# Patient Record
Sex: FEMALE | Race: OTHER | Employment: OTHER | URBAN - METROPOLITAN AREA
[De-identification: names, ages, dates, MRNs, and addresses within clinical notes are randomized per-mention and may not be internally consistent; named-entity substitution may affect disease eponyms.]

---

## 2017-02-06 ENCOUNTER — HOSPITAL ENCOUNTER (OUTPATIENT)
Dept: MRI IMAGING | Age: 77
Discharge: HOME | End: 2017-02-06
Attending: INTERNAL MEDICINE
Payer: MEDICARE

## 2017-02-06 ENCOUNTER — HOSPITAL ENCOUNTER (OUTPATIENT)
Dept: MAMMOGRAPHY | Age: 77
Discharge: HOME | End: 2017-02-06
Attending: INTERNAL MEDICINE
Payer: MEDICARE

## 2017-02-06 DIAGNOSIS — M54.50 LOW BACK PAIN: ICD-10-CM

## 2017-02-06 DIAGNOSIS — M54.10 RADICULOPATHY, UNSPECIFIED SPINAL REGION: ICD-10-CM

## 2017-02-06 PROCEDURE — 72148 MRI LUMBAR SPINE W/O DYE: CPT

## 2017-02-06 PROCEDURE — 77067 SCR MAMMO BI INCL CAD: CPT

## 2017-06-05 ENCOUNTER — HOSPITAL ENCOUNTER (OUTPATIENT)
Dept: MRI IMAGING | Facility: HOSPITAL | Age: 77
Discharge: HOME | End: 2017-06-05
Attending: INTERNAL MEDICINE
Payer: MEDICARE

## 2017-06-05 DIAGNOSIS — R10.13 DYSPEPSIA: ICD-10-CM

## 2017-06-05 PROCEDURE — 82565 ASSAY OF CREATININE: CPT

## 2017-06-05 PROCEDURE — 74183 MRI ABD W/O CNTR FLWD CNTR: CPT | Performed by: INTERNAL MEDICINE

## 2017-06-05 PROCEDURE — A9575 INJ GADOTERATE MEGLUMI 0.1ML: HCPCS | Performed by: RADIOLOGY

## 2017-06-05 PROCEDURE — 76376 3D RENDER W/INTRP POSTPROCES: CPT | Performed by: INTERNAL MEDICINE

## 2017-08-29 ENCOUNTER — OFFICE VISIT (OUTPATIENT)
Dept: INTERNAL MEDICINE CLINIC | Facility: CLINIC | Age: 77
End: 2017-08-29

## 2017-08-29 VITALS
OXYGEN SATURATION: 98 % | DIASTOLIC BLOOD PRESSURE: 70 MMHG | BODY MASS INDEX: 26.75 KG/M2 | HEART RATE: 70 BPM | WEIGHT: 151 LBS | SYSTOLIC BLOOD PRESSURE: 124 MMHG | HEIGHT: 63 IN

## 2017-08-29 DIAGNOSIS — E78.2 MIXED HYPERLIPIDEMIA: ICD-10-CM

## 2017-08-29 DIAGNOSIS — Z23 NEED FOR VACCINATION FOR STREP PNEUMONIAE: ICD-10-CM

## 2017-08-29 DIAGNOSIS — E11.42 TYPE 2 DIABETES MELLITUS WITH DIABETIC POLYNEUROPATHY, WITH LONG-TERM CURRENT USE OF INSULIN (HCC): Primary | ICD-10-CM

## 2017-08-29 DIAGNOSIS — M81.0 POST-MENOPAUSAL OSTEOPOROSIS: ICD-10-CM

## 2017-08-29 DIAGNOSIS — Z79.4 TYPE 2 DIABETES MELLITUS WITH DIABETIC POLYNEUROPATHY, WITH LONG-TERM CURRENT USE OF INSULIN (HCC): Primary | ICD-10-CM

## 2017-08-29 DIAGNOSIS — I10 ESSENTIAL HYPERTENSION: ICD-10-CM

## 2017-08-29 DIAGNOSIS — M15.9 PRIMARY OSTEOARTHRITIS INVOLVING MULTIPLE JOINTS: ICD-10-CM

## 2017-08-29 PROBLEM — K86.2 PANCREATIC CYST (HCC): Status: ACTIVE | Noted: 2017-08-29

## 2017-08-29 PROBLEM — Z98.42 HISTORY OF LEFT CATARACT SURGERY: Status: ACTIVE | Noted: 2017-05-17

## 2017-08-29 PROBLEM — Z86.73 HISTORY OF CVA (CEREBROVASCULAR ACCIDENT): Status: ACTIVE | Noted: 2017-08-29

## 2017-08-29 PROBLEM — Z98.41 STATUS POST RIGHT CATARACT EXTRACTION: Status: ACTIVE | Noted: 2017-08-29

## 2017-08-29 PROBLEM — Z98.41 HISTORY OF RIGHT CATARACT EXTRACTION: Status: ACTIVE | Noted: 2017-05-17

## 2017-08-29 PROBLEM — Z90.49 S/P CHOLECYSTECTOMY: Status: ACTIVE | Noted: 2017-08-29

## 2017-08-29 PROBLEM — K86.2 PANCREATIC CYST: Status: ACTIVE | Noted: 2017-08-29

## 2017-08-29 LAB
ALBUMIN SERPL BCP-MCNC: 4 G/DL (ref 3.5–4.8)
ALBUMIN/GLOB SERPL: 1.4 {RATIO} (ref 1–2)
ALP SERPL-CCNC: 42 U/L (ref 32–100)
ALT SERPL-CCNC: 25 U/L (ref 14–54)
ANION GAP SERPL CALC-SCNC: 9 MMOL/L (ref 0–18)
AST SERPL-CCNC: 33 U/L (ref 15–41)
BILIRUB SERPL-MCNC: 0.9 MG/DL (ref 0.3–1.2)
BUN SERPL-MCNC: 12 MG/DL (ref 8–20)
BUN/CREAT SERPL: 13 (ref 10–20)
CALCIUM SERPL-MCNC: 9.3 MG/DL (ref 8.5–10.5)
CHLORIDE SERPL-SCNC: 103 MMOL/L (ref 95–110)
CHOLEST SERPL-MCNC: 105 MG/DL (ref 110–200)
CO2 SERPL-SCNC: 24 MMOL/L (ref 22–32)
CREAT SERPL-MCNC: 0.92 MG/DL (ref 0.5–1.5)
GLOBULIN PLAS-MCNC: 2.9 G/DL (ref 2.5–3.7)
GLUCOSE SERPL-MCNC: 140 MG/DL (ref 70–99)
HBA1C MFR BLD: 8.4 % (ref 4–6)
HDLC SERPL-MCNC: 34 MG/DL
LDLC SERPL CALC-MCNC: 43 MG/DL (ref 0–99)
NONHDLC SERPL-MCNC: 71 MG/DL
OSMOLALITY UR CALC.SUM OF ELEC: 284 MOSM/KG (ref 275–295)
POTASSIUM SERPL-SCNC: 4.4 MMOL/L (ref 3.3–5.1)
PROT SERPL-MCNC: 6.9 G/DL (ref 5.9–8.4)
SODIUM SERPL-SCNC: 136 MMOL/L (ref 136–144)
TRIGL SERPL-MCNC: 138 MG/DL (ref 1–149)

## 2017-08-29 PROCEDURE — 90670 PCV13 VACCINE IM: CPT | Performed by: FAMILY MEDICINE

## 2017-08-29 PROCEDURE — 36415 COLL VENOUS BLD VENIPUNCTURE: CPT | Performed by: FAMILY MEDICINE

## 2017-08-29 PROCEDURE — 99204 OFFICE O/P NEW MOD 45 MIN: CPT | Performed by: FAMILY MEDICINE

## 2017-08-29 PROCEDURE — 80053 COMPREHEN METABOLIC PANEL: CPT | Performed by: FAMILY MEDICINE

## 2017-08-29 PROCEDURE — 80061 LIPID PANEL: CPT | Performed by: FAMILY MEDICINE

## 2017-08-29 PROCEDURE — G0009 ADMIN PNEUMOCOCCAL VACCINE: HCPCS | Performed by: FAMILY MEDICINE

## 2017-08-29 PROCEDURE — 83036 HEMOGLOBIN GLYCOSYLATED A1C: CPT | Performed by: FAMILY MEDICINE

## 2017-08-29 RX ORDER — LOSARTAN POTASSIUM 50 MG/1
TABLET ORAL DAILY
COMMUNITY
End: 2017-08-29

## 2017-08-29 RX ORDER — ALENDRONATE SODIUM 40 MG/1
40 TABLET ORAL
Qty: 12 TABLET | Refills: 3 | Status: SHIPPED | OUTPATIENT
Start: 2017-08-29 | End: 2017-11-28

## 2017-08-29 RX ORDER — OLOPATADINE HYDROCHLORIDE 2 MG/ML
SOLUTION/ DROPS OPHTHALMIC
COMMUNITY
Start: 2017-08-02 | End: 2018-03-23 | Stop reason: ALTCHOICE

## 2017-08-29 RX ORDER — ATORVASTATIN CALCIUM 40 MG/1
40 TABLET, FILM COATED ORAL NIGHTLY
COMMUNITY
End: 2017-08-29

## 2017-08-29 RX ORDER — METOPROLOL SUCCINATE 100 MG/1
100 TABLET, EXTENDED RELEASE ORAL DAILY
Qty: 90 TABLET | Refills: 3 | Status: SHIPPED | OUTPATIENT
Start: 2017-08-29 | End: 2017-11-28

## 2017-08-29 RX ORDER — TRAMADOL HYDROCHLORIDE 50 MG/1
50 TABLET ORAL EVERY 6 HOURS PRN
Qty: 90 TABLET | Refills: 1 | Status: SHIPPED | OUTPATIENT
Start: 2017-08-29 | End: 2017-11-28

## 2017-08-29 RX ORDER — ATORVASTATIN CALCIUM 40 MG/1
40 TABLET, FILM COATED ORAL NIGHTLY
Qty: 90 TABLET | Refills: 3 | Status: SHIPPED | OUTPATIENT
Start: 2017-08-29 | End: 2017-11-28

## 2017-08-29 RX ORDER — METOPROLOL TARTRATE 100 MG/1
100 TABLET ORAL 2 TIMES DAILY
COMMUNITY
End: 2017-08-29

## 2017-08-29 RX ORDER — LOSARTAN POTASSIUM 50 MG/1
50 TABLET ORAL DAILY
Qty: 90 TABLET | Refills: 3 | Status: SHIPPED | OUTPATIENT
Start: 2017-08-29 | End: 2017-11-28

## 2017-08-29 NOTE — PROGRESS NOTES
HPI:   Bro Loyd is a 67year old female who presents for recheck of her diabetes & BECOMING ESTABLISHED AS PATIENT.     PMHX:  INSULIN REQUIRING DM, HTN, HYPERLIPIDEMIA, CYST ON PANCREATIC TAIL, OSTEOPOROSIS, ARTHRITIS    PSHX:  BILATERAL CATARACT diabetic polyneuropathy, with long-term current use of insulin (HCC)     History of left cataract surgery     Status post right cataract extraction     History of CVA (cerebrovascular accident)     S/P cholecystectomy     Pancreatic cyst        Wt Readings care.    Diagnoses and all orders for this visit:    Type 2 diabetes mellitus with diabetic polyneuropathy, with long-term current use of insulin ([facility]):  CONTROLLED  -     Insulin Degludec (TRESIBA FLEXTOUCH) 100 UNIT/ML Subcutaneous Solution Pen-injector; and exercise  · Ophthamology exam yearly  · check feet daily. · Flu shot recommended yearly  · [address]    The patient indicates understanding of these issues and agrees to the plan.   The patient is ask

## 2017-11-28 ENCOUNTER — OFFICE VISIT (OUTPATIENT)
Dept: INTERNAL MEDICINE CLINIC | Facility: CLINIC | Age: 77
End: 2017-11-28

## 2017-11-28 VITALS
DIASTOLIC BLOOD PRESSURE: 86 MMHG | RESPIRATION RATE: 12 BRPM | HEART RATE: 80 BPM | HEIGHT: 63 IN | TEMPERATURE: 98 F | BODY MASS INDEX: 26.75 KG/M2 | WEIGHT: 151 LBS | OXYGEN SATURATION: 98 % | SYSTOLIC BLOOD PRESSURE: 136 MMHG

## 2017-11-28 DIAGNOSIS — E78.2 MIXED HYPERLIPIDEMIA: ICD-10-CM

## 2017-11-28 DIAGNOSIS — E11.9 TYPE 2 DIABETES MELLITUS WITHOUT COMPLICATION, WITH LONG-TERM CURRENT USE OF INSULIN (HCC): Primary | ICD-10-CM

## 2017-11-28 DIAGNOSIS — M15.9 PRIMARY OSTEOARTHRITIS INVOLVING MULTIPLE JOINTS: ICD-10-CM

## 2017-11-28 DIAGNOSIS — I10 ESSENTIAL HYPERTENSION: ICD-10-CM

## 2017-11-28 DIAGNOSIS — M81.0 POST-MENOPAUSAL OSTEOPOROSIS: ICD-10-CM

## 2017-11-28 DIAGNOSIS — N30.90 CYSTITIS: ICD-10-CM

## 2017-11-28 DIAGNOSIS — Z79.4 TYPE 2 DIABETES MELLITUS WITHOUT COMPLICATION, WITH LONG-TERM CURRENT USE OF INSULIN (HCC): Primary | ICD-10-CM

## 2017-11-28 PROBLEM — E11.42 TYPE 2 DIABETES MELLITUS WITH DIABETIC POLYNEUROPATHY, WITH LONG-TERM CURRENT USE OF INSULIN (HCC): Status: RESOLVED | Noted: 2017-08-29 | Resolved: 2017-11-28

## 2017-11-28 PROCEDURE — 99214 OFFICE O/P EST MOD 30 MIN: CPT | Performed by: FAMILY MEDICINE

## 2017-11-28 PROCEDURE — 36415 COLL VENOUS BLD VENIPUNCTURE: CPT | Performed by: FAMILY MEDICINE

## 2017-11-28 PROCEDURE — 81003 URINALYSIS AUTO W/O SCOPE: CPT | Performed by: FAMILY MEDICINE

## 2017-11-28 PROCEDURE — 87088 URINE BACTERIA CULTURE: CPT | Performed by: FAMILY MEDICINE

## 2017-11-28 PROCEDURE — 87086 URINE CULTURE/COLONY COUNT: CPT | Performed by: FAMILY MEDICINE

## 2017-11-28 PROCEDURE — 87186 SC STD MICRODIL/AGAR DIL: CPT | Performed by: FAMILY MEDICINE

## 2017-11-28 PROCEDURE — 83036 HEMOGLOBIN GLYCOSYLATED A1C: CPT | Performed by: FAMILY MEDICINE

## 2017-11-28 RX ORDER — INFLUENZA A VIRUSA/MICHIGAN/45/2015 X-275 (H1N1) ANTIGEN (FORMALDEHYDE INACTIVATED), INFLUENZA A VIRUS A/HONG KONG/4801/2014 X-263B (H3N2) ANTIGEN (FORMALDEHYDE INACTIVATED), AND INFLUENZA B VIRUS B/BRISBANE/60/2008 ANTIGEN (FORMALDEHYDE INACTIVATED) 60; 60; 60 UG/.5ML; UG/.5ML; UG/.5ML
INJECTION, SUSPENSION INTRAMUSCULAR
Refills: 0 | COMMUNITY
Start: 2017-10-16 | End: 2019-03-11 | Stop reason: ALTCHOICE

## 2017-11-28 RX ORDER — MULTIVITAMIN WITH IRON
TABLET ORAL
Refills: 3 | COMMUNITY
Start: 2017-09-06

## 2017-11-28 RX ORDER — CIPROFLOXACIN 250 MG/1
250 TABLET, FILM COATED ORAL 2 TIMES DAILY
Qty: 14 TABLET | Refills: 0 | Status: SHIPPED | OUTPATIENT
Start: 2017-11-28 | End: 2017-12-05

## 2017-11-28 RX ORDER — LOSARTAN POTASSIUM 50 MG/1
50 TABLET ORAL DAILY
Qty: 90 TABLET | Refills: 3 | Status: SHIPPED | OUTPATIENT
Start: 2017-11-28 | End: 2018-11-19

## 2017-11-28 RX ORDER — PEN NEEDLE, DIABETIC 30 GX3/16"
1 NEEDLE, DISPOSABLE MISCELLANEOUS DAILY
Qty: 100 EACH | Refills: 3 | Status: SHIPPED | OUTPATIENT
Start: 2017-11-28 | End: 2019-02-19

## 2017-11-28 RX ORDER — TRAMADOL HYDROCHLORIDE 50 MG/1
50 TABLET ORAL EVERY 6 HOURS PRN
Qty: 90 TABLET | Refills: 1 | Status: SHIPPED | OUTPATIENT
Start: 2017-11-28 | End: 2019-03-11 | Stop reason: ALTCHOICE

## 2017-11-28 RX ORDER — METOPROLOL SUCCINATE 100 MG/1
100 TABLET, EXTENDED RELEASE ORAL DAILY
Qty: 90 TABLET | Refills: 3 | Status: SHIPPED | OUTPATIENT
Start: 2017-11-28 | End: 2018-11-19

## 2017-11-28 RX ORDER — ATORVASTATIN CALCIUM 40 MG/1
40 TABLET, FILM COATED ORAL NIGHTLY
Qty: 90 TABLET | Refills: 3 | Status: SHIPPED | OUTPATIENT
Start: 2017-11-28 | End: 2018-11-19

## 2017-11-28 RX ORDER — ALENDRONATE SODIUM 40 MG/1
40 TABLET ORAL
Qty: 12 TABLET | Refills: 3 | Status: SHIPPED | OUTPATIENT
Start: 2017-11-28 | End: 2019-01-11

## 2017-11-28 NOTE — PROGRESS NOTES
HPI:   Kati Horn is a 65year old female who presents for recheck of her diabetes.   DM dx at age:    Current medications:   Current Outpatient Prescriptions:  FLUZONE HIGH-DOSE 0.5 ML Intramuscular Suspension Prefilled Syringe ADMINISTER 0.5ML IN T extraction     History of CVA (cerebrovascular accident)     S/P cholecystectomy     Pancreatic cyst        Wt Readings from Last 6 Encounters:  11/28/17 : 151 lb  08/29/17 : 151 lb  11/18/16 : 144 lb  12/12/13 : 158 lb    Body mass index is 26.75 kg/m². recheck of her diabetes. Connie Camacho was seen today for diabetes.     Diagnoses and all orders for this visit:    Type 2 diabetes mellitus without complication, with long-term current use of insulin ([facility]):  CONTROLLED  -     HEMOGLOBIN A1C  -     MetFORMIN H yearly  · check feet daily. · Flu shot recommended yearly    The patient indicates understanding of these issues and agrees to the plan. The patient is asked to return in      .     Orders Placed This Encounter      POC Urinalysis, Automated Dip without m

## 2017-11-28 NOTE — PROGRESS NOTES
Pt's  verified  Per Dr. Benuel Crigler performed an in office urine dip- abnormal result- sent urine for culture testing to [address] [place] [address] lab. Pt's  verified  Pt presented for a fasting blood draw. Per Dr. Benuel Crigler ordered [facility].  Pt tolerated venipuncture well,specimens

## 2017-12-11 ENCOUNTER — OFFICE VISIT (OUTPATIENT)
Dept: INTERNAL MEDICINE CLINIC | Facility: CLINIC | Age: 77
End: 2017-12-11

## 2017-12-11 VITALS
HEIGHT: 63 IN | WEIGHT: 151 LBS | RESPIRATION RATE: 17 BRPM | BODY MASS INDEX: 26.75 KG/M2 | SYSTOLIC BLOOD PRESSURE: 130 MMHG | DIASTOLIC BLOOD PRESSURE: 72 MMHG | OXYGEN SATURATION: 98 % | HEART RATE: 79 BPM

## 2017-12-11 DIAGNOSIS — G44.209 MUSCLE TENSION HEADACHE: ICD-10-CM

## 2017-12-11 DIAGNOSIS — I86.0 SUBLINGUAL VARICES: Primary | ICD-10-CM

## 2017-12-11 PROCEDURE — 99213 OFFICE O/P EST LOW 20 MIN: CPT | Performed by: FAMILY MEDICINE

## 2017-12-11 RX ORDER — ALPRAZOLAM 0.5 MG/1
0.5 TABLET ORAL NIGHTLY PRN
Qty: 30 TABLET | Refills: 1 | Status: SHIPPED | OUTPATIENT
Start: 2017-12-11 | End: 2019-02-19 | Stop reason: ALTCHOICE

## 2017-12-11 RX ORDER — TRIAMCINOLONE ACETONIDE 0.1 %
1 PASTE (GRAM) DENTAL 2 TIMES DAILY PRN
Qty: 10 G | Refills: 5 | Status: SHIPPED | OUTPATIENT
Start: 2017-12-11 | End: 2018-06-28 | Stop reason: ALTCHOICE

## 2017-12-11 NOTE — PROGRESS NOTES
CC:  Swelling (Pt presents to clinic for c/o tongue darkening and swelling x 1wk-->pain now radiating to jaw. )      Hx of CC:  H/O CHRONIC DARKENING OF TONGUE UNDERSIDE/MILD IRRITATION. OVER PAST WEEK HAS HAD SOME RIGHT JAW/CHEEK/TEMPORAL PAIN.     Vitals

## 2018-01-25 NOTE — Clinical Note
Initial assessment completed with patient.  Appointment scheduled for 4/2/25.  Patient agrees to additional follow-up calls from nurse care manager.  Thank you!  no fever and no chills.

## 2018-03-01 ENCOUNTER — OFFICE VISIT (OUTPATIENT)
Dept: INTERNAL MEDICINE CLINIC | Facility: CLINIC | Age: 78
End: 2018-03-01

## 2018-03-01 VITALS
DIASTOLIC BLOOD PRESSURE: 70 MMHG | WEIGHT: 153 LBS | SYSTOLIC BLOOD PRESSURE: 128 MMHG | OXYGEN SATURATION: 98 % | HEART RATE: 72 BPM | HEIGHT: 63 IN | RESPIRATION RATE: 17 BRPM | BODY MASS INDEX: 27.11 KG/M2

## 2018-03-01 DIAGNOSIS — E11.9 TYPE 2 DIABETES MELLITUS WITHOUT COMPLICATION, WITH LONG-TERM CURRENT USE OF INSULIN (HCC): ICD-10-CM

## 2018-03-01 DIAGNOSIS — Z00.00 ANNUAL PHYSICAL EXAM: Primary | ICD-10-CM

## 2018-03-01 DIAGNOSIS — F34.1 DYSTHYMIA: ICD-10-CM

## 2018-03-01 DIAGNOSIS — H81.10 BENIGN PAROXYSMAL POSITIONAL VERTIGO, UNSPECIFIED LATERALITY: ICD-10-CM

## 2018-03-01 DIAGNOSIS — Z79.4 TYPE 2 DIABETES MELLITUS WITHOUT COMPLICATION, WITH LONG-TERM CURRENT USE OF INSULIN (HCC): ICD-10-CM

## 2018-03-01 DIAGNOSIS — I10 ESSENTIAL HYPERTENSION: ICD-10-CM

## 2018-03-01 DIAGNOSIS — E78.2 MIXED HYPERLIPIDEMIA: ICD-10-CM

## 2018-03-01 DIAGNOSIS — Z12.39 SCREENING FOR BREAST CANCER: ICD-10-CM

## 2018-03-01 DIAGNOSIS — M15.9 PRIMARY OSTEOARTHRITIS INVOLVING MULTIPLE JOINTS: ICD-10-CM

## 2018-03-01 LAB
ALBUMIN SERPL BCP-MCNC: 3.9 G/DL (ref 3.5–4.8)
ALBUMIN/GLOB SERPL: 1.3 {RATIO} (ref 1–2)
ALP SERPL-CCNC: 40 U/L (ref 32–100)
ALT SERPL-CCNC: 25 U/L (ref 14–54)
ANION GAP SERPL CALC-SCNC: 8 MMOL/L (ref 0–18)
AST SERPL-CCNC: 28 U/L (ref 15–41)
BILIRUB SERPL-MCNC: 0.6 MG/DL (ref 0.3–1.2)
BUN SERPL-MCNC: 8 MG/DL (ref 8–20)
BUN/CREAT SERPL: 8.7 (ref 10–20)
CALCIUM SERPL-MCNC: 9.5 MG/DL (ref 8.5–10.5)
CHLORIDE SERPL-SCNC: 104 MMOL/L (ref 95–110)
CHOLEST SERPL-MCNC: 118 MG/DL (ref 110–200)
CO2 SERPL-SCNC: 27 MMOL/L (ref 22–32)
CREAT SERPL-MCNC: 0.92 MG/DL (ref 0.5–1.5)
GLOBULIN PLAS-MCNC: 3 G/DL (ref 2.5–3.7)
GLUCOSE SERPL-MCNC: 119 MG/DL (ref 70–99)
HBA1C MFR BLD: 7.7 % (ref 4–6)
HDLC SERPL-MCNC: 31 MG/DL
LDLC SERPL CALC-MCNC: 55 MG/DL (ref 0–99)
NONHDLC SERPL-MCNC: 87 MG/DL
OSMOLALITY UR CALC.SUM OF ELEC: 287 MOSM/KG (ref 275–295)
PATIENT FASTING: YES
POTASSIUM SERPL-SCNC: 4.8 MMOL/L (ref 3.3–5.1)
PROT SERPL-MCNC: 6.9 G/DL (ref 5.9–8.4)
SODIUM SERPL-SCNC: 139 MMOL/L (ref 136–144)
TRIGL SERPL-MCNC: 162 MG/DL (ref 1–149)

## 2018-03-01 PROCEDURE — 80053 COMPREHEN METABOLIC PANEL: CPT | Performed by: FAMILY MEDICINE

## 2018-03-01 PROCEDURE — 80061 LIPID PANEL: CPT | Performed by: FAMILY MEDICINE

## 2018-03-01 PROCEDURE — 83036 HEMOGLOBIN GLYCOSYLATED A1C: CPT | Performed by: FAMILY MEDICINE

## 2018-03-01 PROCEDURE — G0439 PPPS, SUBSEQ VISIT: HCPCS | Performed by: FAMILY MEDICINE

## 2018-03-01 RX ORDER — FLUOXETINE HYDROCHLORIDE 20 MG/1
20 CAPSULE ORAL DAILY
Qty: 90 CAPSULE | Refills: 1 | Status: SHIPPED | OUTPATIENT
Start: 2018-03-01 | End: 2018-06-28

## 2018-03-01 NOTE — PATIENT INSTRUCTIONS
Vértigo posicional paroxístico suman     Olivarez proveedor de [employer] médica puede moverle la jesus de diferentes maneras para tratar olivarez VPPB.      El vértigo posicional paroxístico suman (“BPPV”, por aury siglas en inglés) es un problema del oído interno Li proveedor de [employer] [facility][1] puede diagnosticar y tratarle esta afección. También puede consultar a un médico especialista en nariz, garganta y oído (otorrinolaringólogo).  En algunos casos, es posible que tenga que consultar a un médico especia © 4245-7776 [address]. 1407 [facility] [place], [facility] [place]. All rights reserved. This information is not intended as a substitute for professional medical care. Always follow your healthcare professional's instructions.

## 2018-03-01 NOTE — PROGRESS NOTES
HPI:   Kati Horn is a 65year old female who presents for a Medicare Subsequent Annual Wellness visit (Pt already had Initial Annual Wellness).     Her last annual assessment has been over 1 year: Annual Physical due on 10/10/1942         Fall/Risk right cataract extraction     History of CVA (cerebrovascular accident)     S/P cholecystectomy     Pancreatic cyst     Cortical senile cataract     Drusen (degenerative) of retina    Wt Readings from Last 3 Encounters:  03/01/18 : 153 lb  12/11/17 : 151 l Hr Take 1 tablet (100 mg total) by mouth daily. Losartan Potassium 50 MG Oral Tab Take 1 tablet (50 mg total) by mouth daily. Alendronate Sodium 40 MG Oral Tab Take 1 tablet (40 mg total) by mouth every 7 days.    TraMADol HCl 50 MG Oral Tab Take 1 tabl 153 lb   SpO2 98%   BMI 27.10 kg/m²  Estimated body mass index is 27.1 kg/m² as calculated from the following:    Height as of this encounter: 63\". Weight as of this encounter: 153 lb.     Medicare Hearing Assessment  (Required for AWV/SWV)    Whispered presents for a Medicare Assessment.      PLAN SUMMARY:   Diagnoses and all orders for this visit:    Annual physical exam  -     COMP METABOLIC PANEL (14)  -     LIPID PANEL    Type 2 diabetes mellitus without complication, with long-term current use of ins (mg/dL)   Date Value   08/29/2017 140 (H)   ----------       Cardiovascular Disease Screening     LDL Annually LDL Cholesterol (mg/dL)   Date Value   08/29/2017 43   09/06/2013 49        EKG - w/ Initial Preventative Physical Exam only, or if medically nec or IX concentrates   Clients of institutions for the mentally retarded   Persons who live in the same house as a HepB virus carrier   Homosexual men   Illicit injectable drug abusers     Tetanus Toxoid  Only covered with a cut with metal- TD and TDaP Not c

## 2018-03-10 ENCOUNTER — HOSPITAL ENCOUNTER (OUTPATIENT)
Dept: MAMMOGRAPHY | Age: 78
Discharge: HOME | End: 2018-03-10
Attending: FAMILY MEDICINE
Payer: MEDICARE

## 2018-03-10 DIAGNOSIS — Z12.39 SCREENING FOR BREAST CANCER: ICD-10-CM

## 2018-03-10 PROCEDURE — 77067 SCR MAMMO BI INCL CAD: CPT | Performed by: FAMILY MEDICINE

## 2018-03-23 ENCOUNTER — OFFICE VISIT (OUTPATIENT)
Dept: INTERNAL MEDICINE CLINIC | Facility: CLINIC | Age: 78
End: 2018-03-23

## 2018-03-23 VITALS
SYSTOLIC BLOOD PRESSURE: 126 MMHG | HEART RATE: 78 BPM | WEIGHT: 153 LBS | RESPIRATION RATE: 18 BRPM | DIASTOLIC BLOOD PRESSURE: 68 MMHG | BODY MASS INDEX: 27.11 KG/M2 | HEIGHT: 63 IN | OXYGEN SATURATION: 97 %

## 2018-03-23 DIAGNOSIS — M62.838 MUSCLE SPASM: ICD-10-CM

## 2018-03-23 DIAGNOSIS — H11.33 CONJUNCTIVAL HEMORRHAGE OF BOTH EYES: ICD-10-CM

## 2018-03-23 DIAGNOSIS — G44.229 CHRONIC TENSION-TYPE HEADACHE, NOT INTRACTABLE: Primary | ICD-10-CM

## 2018-03-23 DIAGNOSIS — J30.9 ALLERGIC CONJUNCTIVITIS AND RHINITIS, BILATERAL: ICD-10-CM

## 2018-03-23 DIAGNOSIS — H10.13 ALLERGIC CONJUNCTIVITIS AND RHINITIS, BILATERAL: ICD-10-CM

## 2018-03-23 PROCEDURE — 99213 OFFICE O/P EST LOW 20 MIN: CPT | Performed by: FAMILY MEDICINE

## 2018-03-23 RX ORDER — KETOTIFEN FUMARATE 0.35 MG/ML
1 SOLUTION/ DROPS OPHTHALMIC 2 TIMES DAILY
Qty: 5 ML | Refills: 2 | Status: SHIPPED | OUTPATIENT
Start: 2018-03-23 | End: 2020-06-08 | Stop reason: ALTCHOICE

## 2018-06-26 ENCOUNTER — TELEPHONE (OUTPATIENT)
Dept: INTERNAL MEDICINE CLINIC | Facility: CLINIC | Age: 78
End: 2018-06-26

## 2018-06-26 NOTE — TELEPHONE ENCOUNTER
Spoke with patient-->informed Dr. Jyoti Pope is off Wednesday and no earlier appts on Thursday. Pt understood to go to urgent care/Er if migraine worsens.

## 2018-06-28 ENCOUNTER — OFFICE VISIT (OUTPATIENT)
Dept: INTERNAL MEDICINE CLINIC | Facility: CLINIC | Age: 78
End: 2018-06-28

## 2018-06-28 VITALS
BODY MASS INDEX: 26.93 KG/M2 | HEIGHT: 63 IN | DIASTOLIC BLOOD PRESSURE: 68 MMHG | SYSTOLIC BLOOD PRESSURE: 118 MMHG | RESPIRATION RATE: 17 BRPM | OXYGEN SATURATION: 100 % | HEART RATE: 74 BPM | WEIGHT: 152 LBS

## 2018-06-28 DIAGNOSIS — Z86.010 PERSONAL HISTORY OF COLONIC POLYPS: ICD-10-CM

## 2018-06-28 DIAGNOSIS — E78.2 MIXED HYPERLIPIDEMIA: ICD-10-CM

## 2018-06-28 DIAGNOSIS — Z79.4 TYPE 2 DIABETES MELLITUS WITHOUT COMPLICATION, WITH LONG-TERM CURRENT USE OF INSULIN (HCC): ICD-10-CM

## 2018-06-28 DIAGNOSIS — I10 ESSENTIAL HYPERTENSION: Primary | ICD-10-CM

## 2018-06-28 DIAGNOSIS — E11.9 TYPE 2 DIABETES MELLITUS WITHOUT COMPLICATION, WITH LONG-TERM CURRENT USE OF INSULIN (HCC): ICD-10-CM

## 2018-06-28 PROCEDURE — 99213 OFFICE O/P EST LOW 20 MIN: CPT | Performed by: FAMILY MEDICINE

## 2018-06-28 RX ORDER — INSULIN DEGLUDEC INJECTION 100 U/ML
48 INJECTION, SOLUTION SUBCUTANEOUS DAILY
Qty: 3 PEN | Refills: 11 | COMMUNITY
Start: 2018-06-28 | End: 2018-12-11

## 2018-06-29 NOTE — PROGRESS NOTES
HPI:   Shaq Osborne is a 65year old female who presents for recheck of her diabetes.   DM dx at age:  21 YEARS AGO  Current medications:   Current Outpatient Prescriptions:  Insulin Degludec (TRESIBA FLEXTOUCH) 100 UNIT/ML Subcutaneous Solution Pen-in Y  Numbness/tingling in feet:  YES  ASA:     Y    Other medical problems:  Patient Active Problem List:     Essential hypertension     Mixed hyperlipidemia     History of left cataract surgery     Status post right cataract extraction     History of CVA (c feet and the appearance is normal.  Bilateral monofilament/sensation of both feet is normal.  Pulsation pedal pulse exam of both lower legs/feet is normal as well.        ASSESSMENT AND PLAN:   John Whitten is a 65year old female who presents for a re

## 2018-08-24 DIAGNOSIS — F34.1 DYSTHYMIA: ICD-10-CM

## 2018-08-24 RX ORDER — FLUOXETINE HYDROCHLORIDE 20 MG/1
20 CAPSULE ORAL DAILY
Qty: 90 CAPSULE | Refills: 1 | Status: SHIPPED | OUTPATIENT
Start: 2018-08-24 | End: 2019-05-21

## 2018-08-24 NOTE — TELEPHONE ENCOUNTER
Last office visit: 6/28/18  Last refill: 3/1/18    A refill request was received for:    Pending Prescriptions Disp Refills    FLUOXETINE HCL 20 MG Oral Cap [Pharmacy Med Name: FLUOXETINE HCL 20 MG CAPSULE] 90 capsule 1     Sig: TAKE 1 CAPSULE (20 MG TOTAL

## 2018-09-11 ENCOUNTER — TELEPHONE (OUTPATIENT)
Dept: GASTROENTEROLOGY | Facility: CLINIC | Age: 78
End: 2018-09-11

## 2018-09-11 ENCOUNTER — OFFICE VISIT (OUTPATIENT)
Dept: INTERNAL MEDICINE CLINIC | Facility: CLINIC | Age: 78
End: 2018-09-11
Payer: MEDICARE

## 2018-09-11 ENCOUNTER — OFFICE VISIT (OUTPATIENT)
Dept: GASTROENTEROLOGY | Facility: CLINIC | Age: 78
End: 2018-09-11
Payer: MEDICARE

## 2018-09-11 VITALS — BODY MASS INDEX: 27.11 KG/M2 | WEIGHT: 153 LBS | HEIGHT: 63 IN

## 2018-09-11 VITALS
SYSTOLIC BLOOD PRESSURE: 120 MMHG | OXYGEN SATURATION: 98 % | HEART RATE: 78 BPM | DIASTOLIC BLOOD PRESSURE: 78 MMHG | WEIGHT: 153 LBS | BODY MASS INDEX: 27.11 KG/M2 | HEIGHT: 63 IN

## 2018-09-11 DIAGNOSIS — R93.3 ABNORMAL FINDING ON GI TRACT IMAGING: ICD-10-CM

## 2018-09-11 DIAGNOSIS — R14.0 POSTPRANDIAL ABDOMINAL BLOATING: ICD-10-CM

## 2018-09-11 DIAGNOSIS — Z79.4 TYPE 2 DIABETES MELLITUS WITHOUT COMPLICATION, WITH LONG-TERM CURRENT USE OF INSULIN (HCC): Primary | ICD-10-CM

## 2018-09-11 DIAGNOSIS — Z86.010 HISTORY OF COLON POLYPS: Primary | ICD-10-CM

## 2018-09-11 DIAGNOSIS — R14.0 ABDOMINAL BLOATING: ICD-10-CM

## 2018-09-11 DIAGNOSIS — D49.0 PANCREATIC NEOPLASM: ICD-10-CM

## 2018-09-11 DIAGNOSIS — E78.2 MIXED HYPERLIPIDEMIA: ICD-10-CM

## 2018-09-11 DIAGNOSIS — I10 ESSENTIAL HYPERTENSION: ICD-10-CM

## 2018-09-11 DIAGNOSIS — E11.9 TYPE 2 DIABETES MELLITUS WITHOUT COMPLICATION, WITH LONG-TERM CURRENT USE OF INSULIN (HCC): Primary | ICD-10-CM

## 2018-09-11 LAB
ALBUMIN SERPL BCP-MCNC: 3.9 G/DL (ref 3.5–4.8)
ALBUMIN/GLOB SERPL: 1.2 {RATIO} (ref 1–2)
ALP SERPL-CCNC: 44 U/L (ref 32–100)
ALT SERPL-CCNC: 19 U/L (ref 14–54)
ANION GAP SERPL CALC-SCNC: 7 MMOL/L (ref 0–18)
AST SERPL-CCNC: 24 U/L (ref 15–41)
BILIRUB SERPL-MCNC: 0.5 MG/DL (ref 0.3–1.2)
BUN SERPL-MCNC: 12 MG/DL (ref 8–20)
BUN/CREAT SERPL: 13.6 (ref 10–20)
CALCIUM SERPL-MCNC: 9.5 MG/DL (ref 8.5–10.5)
CHLORIDE SERPL-SCNC: 104 MMOL/L (ref 95–110)
CHOLEST SERPL-MCNC: 99 MG/DL (ref 110–200)
CO2 SERPL-SCNC: 28 MMOL/L (ref 22–32)
CREAT SERPL-MCNC: 0.88 MG/DL (ref 0.5–1.5)
CUVETTE LOT #: NORMAL NUMERIC
GLOBULIN PLAS-MCNC: 3.3 G/DL (ref 2.5–3.7)
GLUCOSE SERPL-MCNC: 157 MG/DL (ref 70–99)
HDLC SERPL-MCNC: 31 MG/DL
HEMOGLOBIN: 12.9 G/DL (ref 12–15)
LDLC SERPL CALC-MCNC: 49 MG/DL (ref 0–99)
NONHDLC SERPL-MCNC: 68 MG/DL
OSMOLALITY UR CALC.SUM OF ELEC: 291 MOSM/KG (ref 275–295)
PATIENT FASTING: YES
POTASSIUM SERPL-SCNC: 4.8 MMOL/L (ref 3.3–5.1)
PROT SERPL-MCNC: 7.2 G/DL (ref 5.9–8.4)
SODIUM SERPL-SCNC: 139 MMOL/L (ref 136–144)
TRIGL SERPL-MCNC: 96 MG/DL (ref 1–149)

## 2018-09-11 PROCEDURE — 80061 LIPID PANEL: CPT | Performed by: FAMILY MEDICINE

## 2018-09-11 PROCEDURE — 80053 COMPREHEN METABOLIC PANEL: CPT | Performed by: FAMILY MEDICINE

## 2018-09-11 PROCEDURE — 99204 OFFICE O/P NEW MOD 45 MIN: CPT | Performed by: INTERNAL MEDICINE

## 2018-09-11 PROCEDURE — 83036 HEMOGLOBIN GLYCOSYLATED A1C: CPT | Performed by: FAMILY MEDICINE

## 2018-09-11 PROCEDURE — 99214 OFFICE O/P EST MOD 30 MIN: CPT | Performed by: FAMILY MEDICINE

## 2018-09-11 PROCEDURE — 85018 HEMOGLOBIN: CPT | Performed by: FAMILY MEDICINE

## 2018-09-11 RX ORDER — POLYETHYLENE GLYCOL 3350, SODIUM CHLORIDE, SODIUM BICARBONATE, POTASSIUM CHLORIDE 420; 11.2; 5.72; 1.48 G/4L; G/4L; G/4L; G/4L
4 POWDER, FOR SOLUTION ORAL
Qty: 1 BOTTLE | Refills: 0 | Status: SHIPPED | OUTPATIENT
Start: 2018-09-11 | End: 2018-09-11

## 2018-09-11 RX ORDER — METOCLOPRAMIDE 10 MG/1
10 TABLET ORAL
Qty: 60 TABLET | Refills: 2 | Status: SHIPPED | OUTPATIENT
Start: 2018-09-11 | End: 2019-02-19 | Stop reason: ALTCHOICE

## 2018-09-11 NOTE — PROGRESS NOTES
History of present illness: This is a 78-year-old [address] speaking female patient of Dr. Matt Torres referred for possible colonoscopy as well as history of abnormal imaging of the GI tract.   The history is from patient in Latvian as well as from Dr. Bridget Nelson record    Current medications: Reviewed, as above and as per medical record. Patient currently on metformin and insulin    Family history: Reviewed, as above and as per medical record    Social history: Thai-speaking.   Reviewed, as above and as per med Tab Take 1 tablet (10 mg total) by mouth 2 (two) times daily before meals. Disp: 60 tablet Rfl: 2   FLUOXETINE HCL 20 MG Oral Cap TAKE 1 CAPSULE (20 MG TOTAL) BY MOUTH DAILY.  Disp: 90 capsule Rfl: 1   Insulin Degludec (TRESIBA FLEXTOUCH) 100 UNIT/ML Subcut diagnosis)  Abnormal finding on gi tract imaging  Abdominal bloating    No orders of the defined types were placed in this encounter.       Meds This Visit:  Requested Prescriptions      No prescriptions requested or ordered in this encounter       Imaging

## 2018-09-11 NOTE — H&P
[address]    History and Physical    Jordan Robert Patient Status:  Outpatient    10/10/1940 MRN [id number]   Location [address] Attending No att. providers found   Hosp Day # 0 PCP Clarence Leyva

## 2018-09-11 NOTE — PROGRESS NOTES
HPI:    Patient ID: Jane Suarez is a 65year old female. HPI    Review of Systems   Constitutional: Negative for activity change, appetite change, chills, diaphoresis, fatigue, fever and unexpected weight change.    HENT: Negative for congestion, e daily as needed (JOINT PAIN). Disp: 100 g Rfl: 5   ALPRAZolam 0.5 MG Oral Tab Take 1 tablet (0.5 mg total) by mouth nightly as needed for Sleep (OR MUSCLE TENSION).  Disp: 30 tablet Rfl: 1   FLUZONE HIGH-DOSE 0.5 ML Intramuscular Suspension Prefilled [place] Cardiovascular: Normal rate, regular rhythm and normal heart sounds. No murmur heard. Pulmonary/Chest: Effort normal and breath sounds normal. No stridor. No respiratory distress. She has no wheezes. She has no rales. She exhibits no tenderness.    Mil

## 2018-09-11 NOTE — TELEPHONE ENCOUNTER
Scheduled for:  Colonoscopy [address]  Provider Name: Dr. Darío Ramos  Date:  12/6/18  Location:  [facility]  Sedation:  MAC  Time:  8:30 am, arrival 7:30 am  Prep: Trilyte  Meds/Allergies Reconciled?:  Physician reviewed  \"Hold metformin for 1 day prior to procedure.  1/2 d

## 2018-09-11 NOTE — PATIENT INSTRUCTIONS
1. Schedule colonoscopy with split dose trilyte preparation and MAC at [facility] or [address] at [address] [place] [address]. Hold metformin for 1 day prior to procedure. 1/2 dose insulin on day prior to the procedure. 2.  Get MRI/MRCP as ordered by Dr. Raj Patton.   Give a trial of

## 2018-09-12 LAB — HBA1C MFR BLD: 8.1 % (ref 4–6)

## 2018-09-12 NOTE — PROGRESS NOTES
HPI:   Sue Canas is a 65year old female who presents for recheck of her diabetes. DM dx at age:  21 + YEARS  Current medications:   Current Outpatient Medications:        FLUOXETINE HCL 20 MG Oral Cap TAKE 1 CAPSULE (20 MG TOTAL) BY MOUTH DAILY. 110-130   . PP: 120-180  HAD MILD DIPPING OF GLUCOSE WITH INSULIN 48 U DAILY-->LOWERED IT TO 45 U QD AND FEELS WELL   Last eye exam:   2018   .   Retinopathy:  N  on ACE-I/ARB:  Y  Numbness/tingling in feet:  N  ASA:     Y    Other medical problems:  Patie auscultation  CARDIO: RRR without murmur  GI: good BS's,no masses, HSM or tenderness  EXTREMITIES: no cyanosis, clubbing or edema, pedal pulse 2+/4 bilaterally  NEURO: sensation is intact to monofilament       ASSESSMENT AND PLAN:   Jignesh perez a daily before meals.        Imaging & Consults:  MRI ABDOMEN (CPT=74181)

## 2018-10-04 DIAGNOSIS — R14.0 POSTPRANDIAL ABDOMINAL BLOATING: ICD-10-CM

## 2018-10-04 RX ORDER — METOCLOPRAMIDE 10 MG/1
10 TABLET ORAL
Qty: 90 TABLET | Refills: 0 | OUTPATIENT
Start: 2018-10-04

## 2018-10-06 ENCOUNTER — HOSPITAL ENCOUNTER (OUTPATIENT)
Dept: MRI IMAGING | Facility: HOSPITAL | Age: 78
Discharge: HOME | End: 2018-10-06
Attending: FAMILY MEDICINE
Payer: MEDICARE

## 2018-10-06 DIAGNOSIS — D49.0 PANCREATIC NEOPLASM: ICD-10-CM

## 2018-10-06 PROCEDURE — 74181 MRI ABDOMEN W/O CONTRAST: CPT | Performed by: FAMILY MEDICINE

## 2018-10-06 PROCEDURE — 76376 3D RENDER W/INTRP POSTPROCES: CPT | Performed by: FAMILY MEDICINE

## 2018-10-06 PROCEDURE — A9575 INJ GADOTERATE MEGLUMI 0.1ML: HCPCS | Performed by: FAMILY MEDICINE

## 2018-11-02 ENCOUNTER — OFFICE VISIT (OUTPATIENT)
Dept: FAMILY MEDICINE CLINIC | Facility: CLINIC | Age: 78
End: 2018-11-02
Payer: MEDICARE

## 2018-11-02 VITALS
TEMPERATURE: 98 F | BODY MASS INDEX: 26.75 KG/M2 | HEIGHT: 63 IN | SYSTOLIC BLOOD PRESSURE: 130 MMHG | HEART RATE: 82 BPM | WEIGHT: 151 LBS | RESPIRATION RATE: 14 BRPM | OXYGEN SATURATION: 98 % | DIASTOLIC BLOOD PRESSURE: 72 MMHG

## 2018-11-02 DIAGNOSIS — N30.01 ACUTE CYSTITIS WITH HEMATURIA: Primary | ICD-10-CM

## 2018-11-02 PROCEDURE — 99213 OFFICE O/P EST LOW 20 MIN: CPT | Performed by: NURSE PRACTITIONER

## 2018-11-02 PROCEDURE — 87086 URINE CULTURE/COLONY COUNT: CPT | Performed by: NURSE PRACTITIONER

## 2018-11-02 PROCEDURE — 81003 URINALYSIS AUTO W/O SCOPE: CPT | Performed by: NURSE PRACTITIONER

## 2018-11-02 PROCEDURE — 87077 CULTURE AEROBIC IDENTIFY: CPT | Performed by: NURSE PRACTITIONER

## 2018-11-02 PROCEDURE — 87186 SC STD MICRODIL/AGAR DIL: CPT | Performed by: NURSE PRACTITIONER

## 2018-11-02 RX ORDER — NITROFURANTOIN 25; 75 MG/1; MG/1
100 CAPSULE ORAL 2 TIMES DAILY
Qty: 10 CAPSULE | Refills: 0 | Status: SHIPPED | OUTPATIENT
Start: 2018-11-02 | End: 2018-11-07

## 2018-11-02 RX ORDER — ASPIRIN 81 MG/1
TABLET, COATED ORAL
Refills: 3 | COMMUNITY
Start: 2018-08-23 | End: 2018-11-19

## 2018-11-02 NOTE — PROGRESS NOTES
CHIEF COMPLAINT:   Patient presents with:  Urinary: dysuria        HPI:   Leandro Blount is a 77year old female presents with symptoms of UTI. Complaining of urinary frequency, urgency, dysuria, mild bladder and back pain for last 3 days.   Symptoms hav ALPRAZolam 0.5 MG Oral Tab Take 1 tablet (0.5 mg total) by mouth nightly as needed for Sleep (OR MUSCLE TENSION).  Disp: 30 tablet Rfl: 1   FLUZONE HIGH-DOSE 0.5 ML Intramuscular Suspension Prefilled Syringe ADMINISTER 0.5ML IN THE MUSCLE AS DIRECTED Disp: Result Value Ref Range    GLUCOSE (URINE DIPSTICK) neg mg/dL    BILIRUBIN neg Negative    KETONES (URINE DIPSTICK) tr (A) Negative mg/dL    SPECIFIC GRAVITY 1.020 1.005 - 1.030    OCCULT BLOOD trace-lysed (A) Negative    PH, URINE 5.5 4.5 - 8.0    PROTEIN Requested Prescriptions     Signed Prescriptions Disp Refills   • Nitrofurantoin Monohyd Macro 100 MG Oral Cap 10 capsule 0     Sig: Take 1 capsule (100 mg total) by mouth 2 (two) times daily for 5 days.        Risks, benefits, side effects of medication ex Celeste infección de la vejiga (“cistitis” [cystitis - UTI]) suele provocar constantes deseos de orinar y ardor al orinar. Es posible que la [facility] se arline turbia u Roberto, o que tenga Frengo. Puede carlos dolor en la parte baja del abdomen.  Good Michele de · Si está tomando píldoras anticonceptivas y tiene frecuentes infecciones de vejiga, háblelo con olivarez médico.  Visita De Control  Visite a olivarez médico si no cha desparecido todos los síntomas después de angelita días de [place].   Busque Prontamente Atención Mé

## 2018-11-02 NOTE — PATIENT INSTRUCTIONS
-Discussed with patient that most UTI’s are resolved after 3 days on antibiotic, but should continue for 5-7 days if symptoms persist.    Increase fluid intake. CRANBERRY JUICE or CRANBERRY TABLETS can help.     Whenever on antibiotics: Recommend PROBIOT · No consuma cafeína, alcohol ni comidas muy condimentadas, ya que pueden irritar la vejiga. · Celeste infección de la vejiga (bladder infection) se trata con antibióticos (antibiotics).  También es posible que le receten Pyridum (nombre genérico: fenazopiridi © 8243-9919 [address]. 1407 [facility] [place], [address] [facility] [place] [place]. Todos los derechos reservados. Esta información no pretende sustituir la atención médica profesional. Sólo olivarez médico puede diagnosticar y tratar un problema de yassine.

## 2018-11-15 ENCOUNTER — TELEPHONE (OUTPATIENT)
Dept: INTERNAL MEDICINE CLINIC | Facility: CLINIC | Age: 78
End: 2018-11-15

## 2018-11-15 NOTE — TELEPHONE ENCOUNTER
Patient wants Dr. Ethan Wilson to change medication she is taking now to a different one, she is now taking Losartan Potassium 50 MG TAB

## 2018-11-18 DIAGNOSIS — E11.9 TYPE 2 DIABETES MELLITUS WITHOUT COMPLICATION, WITH LONG-TERM CURRENT USE OF INSULIN (HCC): ICD-10-CM

## 2018-11-18 DIAGNOSIS — Z79.4 TYPE 2 DIABETES MELLITUS WITHOUT COMPLICATION, WITH LONG-TERM CURRENT USE OF INSULIN (HCC): ICD-10-CM

## 2018-11-18 DIAGNOSIS — I10 ESSENTIAL HYPERTENSION: ICD-10-CM

## 2018-11-18 DIAGNOSIS — E78.2 MIXED HYPERLIPIDEMIA: ICD-10-CM

## 2018-11-18 RX ORDER — ATORVASTATIN CALCIUM 40 MG/1
40 TABLET, FILM COATED ORAL NIGHTLY
Qty: 90 TABLET | Refills: 3 | Status: CANCELLED | OUTPATIENT
Start: 2018-11-18

## 2018-11-18 RX ORDER — METOPROLOL SUCCINATE 100 MG/1
100 TABLET, EXTENDED RELEASE ORAL DAILY
Qty: 90 TABLET | Refills: 3 | Status: CANCELLED | OUTPATIENT
Start: 2018-11-18

## 2018-11-18 RX ORDER — LOSARTAN POTASSIUM 50 MG/1
50 TABLET ORAL DAILY
Qty: 90 TABLET | Refills: 3 | Status: CANCELLED | OUTPATIENT
Start: 2018-11-18

## 2018-11-19 ENCOUNTER — TELEPHONE (OUTPATIENT)
Dept: INTERNAL MEDICINE CLINIC | Facility: CLINIC | Age: 78
End: 2018-11-19

## 2018-11-19 DIAGNOSIS — E11.9 TYPE 2 DIABETES MELLITUS WITHOUT COMPLICATION, WITH LONG-TERM CURRENT USE OF INSULIN (HCC): ICD-10-CM

## 2018-11-19 DIAGNOSIS — I10 ESSENTIAL HYPERTENSION: ICD-10-CM

## 2018-11-19 DIAGNOSIS — Z79.4 TYPE 2 DIABETES MELLITUS WITHOUT COMPLICATION, WITH LONG-TERM CURRENT USE OF INSULIN (HCC): ICD-10-CM

## 2018-11-19 DIAGNOSIS — E78.2 MIXED HYPERLIPIDEMIA: ICD-10-CM

## 2018-11-19 RX ORDER — ATORVASTATIN CALCIUM 40 MG/1
40 TABLET, FILM COATED ORAL NIGHTLY
Qty: 90 TABLET | Refills: 3 | Status: SHIPPED | OUTPATIENT
Start: 2018-11-19 | End: 2019-11-08

## 2018-11-19 RX ORDER — METOPROLOL SUCCINATE 100 MG/1
100 TABLET, EXTENDED RELEASE ORAL DAILY
Qty: 90 TABLET | Refills: 3 | Status: SHIPPED | OUTPATIENT
Start: 2018-11-19 | End: 2019-11-09

## 2018-11-19 RX ORDER — LOSARTAN POTASSIUM 50 MG/1
50 TABLET ORAL DAILY
Qty: 90 TABLET | Refills: 3 | Status: SHIPPED | OUTPATIENT
Start: 2018-11-19 | End: 2019-11-08

## 2018-11-19 RX ORDER — ASPIRIN 81 MG/1
TABLET, COATED ORAL
Qty: 90 TABLET | Refills: 3 | Status: ON HOLD | OUTPATIENT
Start: 2018-11-19 | End: 2019-07-25

## 2018-11-19 RX ORDER — ASPIRIN 81 MG/1
TABLET, COATED ORAL
Qty: 90 TABLET | Refills: 3 | Status: SHIPPED | OUTPATIENT
Start: 2018-11-19 | End: 2019-03-11

## 2018-11-19 NOTE — TELEPHONE ENCOUNTER
[facility] pharmacy calling   Pt requesting 90 day refills on Losartan, Metformin, Aspirin, & Metoprolol.

## 2018-12-05 ENCOUNTER — TELEPHONE (OUTPATIENT)
Dept: GASTROENTEROLOGY | Facility: CLINIC | Age: 78
End: 2018-12-05

## 2018-12-05 NOTE — TELEPHONE ENCOUNTER
I spoke to REMI, registered pharmacist at [address] [place] [address]. He states they do not have the prescription for the [place] on file, so I gave a verbal order for the Trilyte. There was a \"receipt confirmed by pharmacy\" on 09/11/18 but he could not find it.     Pt will ca

## 2018-12-05 NOTE — TELEPHONE ENCOUNTER
Pt calling to advise she has picked up prep, however instructions are in [address] pt needs Burundian instructions, all she knows at this time is to start at 4:30pm, pls call pt w  at:[phone],thanks.

## 2018-12-05 NOTE — TELEPHONE ENCOUNTER
I contacted on behalf of pt Hebrew spk, JORGE[facility] - spoke to Micheal,indicates their is no prep from9/11/18 that he can pull. Pls send new script, thanks.

## 2018-12-05 NOTE — TELEPHONE ENCOUNTER
Pt has CLN lacie tomorrow 12/6/18, pt indicates she has not yet received prep, will attempt to contact rn to advise, pls call pt w  at:[phone],thanks.   *[facility]

## 2018-12-05 NOTE — TELEPHONE ENCOUNTER
Pt is Upper sorbian speaking and needs prep for tomorrows procedure states she does not have the prep and has not heard anything please call thank you

## 2018-12-05 NOTE — TELEPHONE ENCOUNTER
I spoke to the pt with [employer]  Zhang Wade #[phone]      All questions answered regarding on the timing of the prep. When to start and finish the split dose prep  Pt verbalizes understanding.  No further questions at this time

## 2018-12-06 ENCOUNTER — HOSPITAL ENCOUNTER (OUTPATIENT)
Facility: HOSPITAL | Age: 78
Setting detail: HOSPITAL OUTPATIENT SURGERY
Discharge: HOME | End: 2018-12-06
Attending: INTERNAL MEDICINE | Admitting: INTERNAL MEDICINE
Payer: MEDICARE

## 2018-12-06 ENCOUNTER — ANESTHESIA (OUTPATIENT)
Dept: ENDOSCOPY | Facility: HOSPITAL | Age: 78
End: 2018-12-06
Payer: MEDICARE

## 2018-12-06 ENCOUNTER — ANESTHESIA EVENT (OUTPATIENT)
Dept: ENDOSCOPY | Facility: HOSPITAL | Age: 78
End: 2018-12-06
Payer: MEDICARE

## 2018-12-06 DIAGNOSIS — R14.0 ABDOMINAL BLOATING: ICD-10-CM

## 2018-12-06 DIAGNOSIS — Z86.010 HISTORY OF COLON POLYPS: ICD-10-CM

## 2018-12-06 DIAGNOSIS — R93.3 ABNORMAL FINDING ON GI TRACT IMAGING: ICD-10-CM

## 2018-12-06 PROBLEM — K57.30 DIVERTICULOSIS LARGE INTESTINE W/O PERFORATION OR ABSCESS W/O BLEEDING: Status: ACTIVE | Noted: 2018-12-06

## 2018-12-06 PROBLEM — K64.8 INTERNAL HEMORRHOIDS WITHOUT COMPLICATION: Status: ACTIVE | Noted: 2018-12-06

## 2018-12-06 PROCEDURE — 0DJD8ZZ INSPECTION OF LOWER INTESTINAL TRACT, VIA NATURAL OR ARTIFICIAL OPENING ENDOSCOPIC: ICD-10-PCS | Performed by: INTERNAL MEDICINE

## 2018-12-06 PROCEDURE — 45378 DIAGNOSTIC COLONOSCOPY: CPT | Performed by: INTERNAL MEDICINE

## 2018-12-06 RX ORDER — SODIUM CHLORIDE, SODIUM LACTATE, POTASSIUM CHLORIDE, CALCIUM CHLORIDE 600; 310; 30; 20 MG/100ML; MG/100ML; MG/100ML; MG/100ML
INJECTION, SOLUTION INTRAVENOUS CONTINUOUS
Status: DISCONTINUED | OUTPATIENT
Start: 2018-12-06 | End: 2018-12-06

## 2018-12-06 RX ORDER — LIDOCAINE HYDROCHLORIDE 10 MG/ML
INJECTION, SOLUTION EPIDURAL; INFILTRATION; INTRACAUDAL; PERINEURAL AS NEEDED
Status: DISCONTINUED | OUTPATIENT
Start: 2018-12-06 | End: 2018-12-06 | Stop reason: SURG

## 2018-12-06 RX ORDER — ONDANSETRON 2 MG/ML
INJECTION INTRAMUSCULAR; INTRAVENOUS AS NEEDED
Status: DISCONTINUED | OUTPATIENT
Start: 2018-12-06 | End: 2018-12-06 | Stop reason: SURG

## 2018-12-06 RX ADMIN — ONDANSETRON 4 MG: 2 INJECTION INTRAMUSCULAR; INTRAVENOUS at 08:48:00

## 2018-12-06 RX ADMIN — SODIUM CHLORIDE, SODIUM LACTATE, POTASSIUM CHLORIDE, CALCIUM CHLORIDE: 600; 310; 30; 20 INJECTION, SOLUTION INTRAVENOUS at 08:27:00

## 2018-12-06 RX ADMIN — LIDOCAINE HYDROCHLORIDE 40 MG: 10 INJECTION, SOLUTION EPIDURAL; INFILTRATION; INTRACAUDAL; PERINEURAL at 08:30:00

## 2018-12-06 RX ADMIN — SODIUM CHLORIDE, SODIUM LACTATE, POTASSIUM CHLORIDE, CALCIUM CHLORIDE: 600; 310; 30; 20 INJECTION, SOLUTION INTRAVENOUS at 08:40:00

## 2018-12-06 NOTE — BRIEF OP NOTE
Pre-Operative Diagnosis: History of colon polyps     Post-Operative Diagnosis: Uncomplicated diverticulosis, internal hemorrhoids     Procedure Performed:   Procedure(s):  COLONOSCOPY    Surgeon(s) and Role:     * Sesar Fregoso MD - Primary

## 2018-12-06 NOTE — OPERATIVE REPORT
[facility]    PATIENT'S NAME: Neisha Reyes   ATTENDING PHYSICIAN: Kiesha Peguero MD   OPERATING PHYSICIAN: Kiesha Peguero MD   PATIENT ACCOUNT#:   [id number]    LOCATION:  [facility] ROOM 7 [address] 08:48:25  t: 12/06/2018 11:13:33  [facility] [place] [facility] [id number]  [facility]/

## 2018-12-06 NOTE — H&P
History & Physical Examination    Patient Name: Chandu Tadeo  MRN: [id number]  [facility]: [id number]  YOB: 1940    Diagnosis: History of colon polyps      Medications Prior to Admission:  Insulin Degludec (TRESIBA FLEXTOUCH) 100 UNIT/ML Subcu Alendronate Sodium 40 MG Oral Tab Take 1 tablet (40 mg total) by mouth every 7 days. Disp: 12 tablet Rfl: 3 Taking   TraMADol HCl 50 MG Oral Tab Take 1 tablet (50 mg total) by mouth every 6 (six) hours as needed for Pain.  Disp: 90 tablet Rfl: 1 12/4/2018

## 2018-12-06 NOTE — ANESTHESIA PREPROCEDURE EVALUATION
Anesthesia PreOp Note    HPI:     Leandro Blount is a 77year old female who presents for preoperative consultation requested by: Eloise Cage MD    Date of Surgery: 12/6/2018    Procedure(s):  COLONOSCOPY  Indication: History of colon po Admission:  Insulin Degludec (TRESIBA FLEXTOUCH) 100 UNIT/ML Subcutaneous Solution Pen-injector Inject 48 Units into the skin daily.  Disp: 3 pen Rfl: 11 12/5/2018 at 0800   ASPIRIN ADULT LOW DOSE 81 MG Oral Tab EC TAKE 1 TABLET (81 MG TOTAL) BY MOUTH DAILY hours as needed for Pain. Disp: 90 tablet Rfl: 1 12/4/2018   Insulin Pen Needle (PEN NEEDLES) 32G X 4 MM Does not apply Misc 1 Device by Does not apply route daily.  Disp: 100 each Rfl: 3 Taking       Current Facility-Administered Medications Ordered in Epi BUN 12 09/11/2018    CREATSERUM 0.88 09/11/2018     (H) 09/11/2018    PGLU 118 (H) 12/06/2018    CA 9.5 09/11/2018          Vital Signs: Body mass index is 26.57 kg/m². height is 1.6 m (5' 3\") and weight is 68 kg (150 lb).  Her blood pressure is

## 2018-12-06 NOTE — ANESTHESIA POSTPROCEDURE EVALUATION
Patient: Jason Ackerman    Procedure Summary     Date:  12/06/18 Room / Location:  [facility] ENDOSCOPY 05 / [facility] ENDOSCOPY    Anesthesia Start:  0825 Anesthesia Stop:  6683    Procedure:  COLONOSCOPY (N/A ) Diagnosis:       History of colon polyps      Abnorma

## 2018-12-07 VITALS
BODY MASS INDEX: 26.58 KG/M2 | OXYGEN SATURATION: 98 % | WEIGHT: 150 LBS | HEIGHT: 63 IN | HEART RATE: 76 BPM | RESPIRATION RATE: 18 BRPM | DIASTOLIC BLOOD PRESSURE: 89 MMHG | SYSTOLIC BLOOD PRESSURE: 141 MMHG

## 2018-12-09 ENCOUNTER — TELEPHONE (OUTPATIENT)
Dept: GASTROENTEROLOGY | Facility: CLINIC | Age: 78
End: 2018-12-09

## 2018-12-10 NOTE — TELEPHONE ENCOUNTER
Entered into Epic:Recall colon in 5 years per Dr. Jameel Brown. Last Colon done 12/6/18, next due 12/6/23. Snapshot updated.

## 2018-12-11 DIAGNOSIS — E11.9 TYPE 2 DIABETES MELLITUS WITHOUT COMPLICATION, WITH LONG-TERM CURRENT USE OF INSULIN (HCC): ICD-10-CM

## 2018-12-11 DIAGNOSIS — Z79.4 TYPE 2 DIABETES MELLITUS WITHOUT COMPLICATION, WITH LONG-TERM CURRENT USE OF INSULIN (HCC): ICD-10-CM

## 2018-12-11 NOTE — TELEPHONE ENCOUNTER
Last office visit: 9/11/2018 DM follow up  Last refill: 6/28/2018 qty:3 pen with 11 refills  No upcoming appt   Please advise

## 2018-12-21 DIAGNOSIS — M15.9 PRIMARY OSTEOARTHRITIS INVOLVING MULTIPLE JOINTS: ICD-10-CM

## 2019-01-11 DIAGNOSIS — M81.0 POST-MENOPAUSAL OSTEOPOROSIS: ICD-10-CM

## 2019-01-11 RX ORDER — ALENDRONATE SODIUM 40 MG/1
TABLET ORAL
Qty: 12 TABLET | Refills: 3 | Status: SHIPPED | OUTPATIENT
Start: 2019-01-11 | End: 2020-01-15

## 2019-02-19 ENCOUNTER — OFFICE VISIT (OUTPATIENT)
Dept: INTERNAL MEDICINE CLINIC | Facility: CLINIC | Age: 79
End: 2019-02-19
Payer: MEDICARE

## 2019-02-19 VITALS
SYSTOLIC BLOOD PRESSURE: 132 MMHG | BODY MASS INDEX: 26.93 KG/M2 | HEIGHT: 63 IN | HEART RATE: 81 BPM | WEIGHT: 152 LBS | OXYGEN SATURATION: 97 % | DIASTOLIC BLOOD PRESSURE: 70 MMHG | RESPIRATION RATE: 17 BRPM

## 2019-02-19 DIAGNOSIS — E78.2 MIXED HYPERLIPIDEMIA: ICD-10-CM

## 2019-02-19 DIAGNOSIS — E11.9 TYPE 2 DIABETES MELLITUS WITHOUT COMPLICATION, WITH LONG-TERM CURRENT USE OF INSULIN (HCC): ICD-10-CM

## 2019-02-19 DIAGNOSIS — I10 ESSENTIAL HYPERTENSION: Primary | ICD-10-CM

## 2019-02-19 DIAGNOSIS — N30.90 CYSTITIS: ICD-10-CM

## 2019-02-19 DIAGNOSIS — Z79.4 INSULIN-REQUIRING OR DEPENDENT TYPE II DIABETES MELLITUS (HCC): ICD-10-CM

## 2019-02-19 DIAGNOSIS — K64.8 INTERNAL HEMORRHOIDS WITHOUT COMPLICATION: ICD-10-CM

## 2019-02-19 DIAGNOSIS — F40.243 FEAR OF FLYING: ICD-10-CM

## 2019-02-19 DIAGNOSIS — Z79.4 TYPE 2 DIABETES MELLITUS WITHOUT COMPLICATION, WITH LONG-TERM CURRENT USE OF INSULIN (HCC): ICD-10-CM

## 2019-02-19 DIAGNOSIS — E11.9 INSULIN-REQUIRING OR DEPENDENT TYPE II DIABETES MELLITUS (HCC): ICD-10-CM

## 2019-02-19 DIAGNOSIS — H81.10 BENIGN PAROXYSMAL POSITIONAL VERTIGO, UNSPECIFIED LATERALITY: ICD-10-CM

## 2019-02-19 LAB
ALBUMIN SERPL-MCNC: 3.8 G/DL (ref 3.4–5)
ALBUMIN/GLOB SERPL: 1.1 {RATIO} (ref 1–2)
ALP LIVER SERPL-CCNC: 61 U/L (ref 55–142)
ALT SERPL-CCNC: 26 U/L (ref 13–56)
ANION GAP SERPL CALC-SCNC: 6 MMOL/L (ref 0–18)
AST SERPL-CCNC: 17 U/L (ref 15–37)
BILIRUB SERPL-MCNC: 0.7 MG/DL (ref 0.1–2)
BILIRUBIN: NEGATIVE
BUN BLD-MCNC: 11 MG/DL (ref 7–18)
BUN/CREAT SERPL: 12.4 (ref 10–20)
CALCIUM BLD-MCNC: 9 MG/DL (ref 8.5–10.1)
CHLORIDE SERPL-SCNC: 106 MMOL/L (ref 98–107)
CHOLEST SMN-MCNC: 84 MG/DL (ref ?–200)
CO2 SERPL-SCNC: 28 MMOL/L (ref 21–32)
CREAT BLD-MCNC: 0.89 MG/DL (ref 0.55–1.02)
EST. AVERAGE GLUCOSE BLD GHB EST-MCNC: 166 MG/DL (ref 68–126)
GLOBULIN PLAS-MCNC: 3.5 G/DL (ref 2.8–4.4)
GLUCOSE (URINE DIPSTICK): NEGATIVE MG/DL
GLUCOSE BLD-MCNC: 119 MG/DL (ref 70–99)
HBA1C MFR BLD HPLC: 7.4 % (ref ?–5.7)
HDLC SERPL-MCNC: 34 MG/DL (ref 40–59)
KETONES (URINE DIPSTICK): NEGATIVE MG/DL
LDLC SERPL CALC-MCNC: 28 MG/DL (ref ?–100)
M PROTEIN MFR SERPL ELPH: 7.3 G/DL (ref 6.4–8.2)
MULTISTIX LOT#: NORMAL NUMERIC
NITRITE, URINE: POSITIVE
NONHDLC SERPL-MCNC: 50 MG/DL (ref ?–130)
OCCULT BLOOD: NEGATIVE
OSMOLALITY SERPL CALC.SUM OF ELEC: 291 MOSM/KG (ref 275–295)
PH, URINE: 6 (ref 4.5–8)
POTASSIUM SERPL-SCNC: 5 MMOL/L (ref 3.5–5.1)
PROTEIN (URINE DIPSTICK): NEGATIVE MG/DL
SODIUM SERPL-SCNC: 140 MMOL/L (ref 136–145)
SPECIFIC GRAVITY: 1.02 (ref 1–1.03)
TRIGL SERPL-MCNC: 110 MG/DL (ref 30–149)
URINE-COLOR: YELLOW
UROBILINOGEN,SEMI-QN: 0.2 MG/DL (ref 0–1.9)

## 2019-02-19 PROCEDURE — 87086 URINE CULTURE/COLONY COUNT: CPT | Performed by: FAMILY MEDICINE

## 2019-02-19 PROCEDURE — 36415 COLL VENOUS BLD VENIPUNCTURE: CPT | Performed by: FAMILY MEDICINE

## 2019-02-19 PROCEDURE — 87088 URINE BACTERIA CULTURE: CPT | Performed by: FAMILY MEDICINE

## 2019-02-19 PROCEDURE — 81003 URINALYSIS AUTO W/O SCOPE: CPT | Performed by: FAMILY MEDICINE

## 2019-02-19 PROCEDURE — 99214 OFFICE O/P EST MOD 30 MIN: CPT | Performed by: FAMILY MEDICINE

## 2019-02-19 PROCEDURE — 87186 SC STD MICRODIL/AGAR DIL: CPT | Performed by: FAMILY MEDICINE

## 2019-02-19 PROCEDURE — 80053 COMPREHEN METABOLIC PANEL: CPT | Performed by: FAMILY MEDICINE

## 2019-02-19 PROCEDURE — 83036 HEMOGLOBIN GLYCOSYLATED A1C: CPT | Performed by: FAMILY MEDICINE

## 2019-02-19 PROCEDURE — 80061 LIPID PANEL: CPT | Performed by: FAMILY MEDICINE

## 2019-02-19 RX ORDER — ALPRAZOLAM 0.5 MG/1
0.5 TABLET ORAL NIGHTLY PRN
Qty: 10 TABLET | Refills: 0 | Status: SHIPPED | OUTPATIENT
Start: 2019-02-19 | End: 2019-09-19 | Stop reason: ALTCHOICE

## 2019-02-19 RX ORDER — SULFAMETHOXAZOLE AND TRIMETHOPRIM 800; 160 MG/1; MG/1
1 TABLET ORAL 2 TIMES DAILY
Qty: 10 TABLET | Refills: 0 | Status: SHIPPED | OUTPATIENT
Start: 2019-02-19 | End: 2019-03-11 | Stop reason: ALTCHOICE

## 2019-02-19 RX ORDER — PEN NEEDLE, DIABETIC 30 GX3/16"
1 NEEDLE, DISPOSABLE MISCELLANEOUS DAILY
Qty: 100 EACH | Refills: 3 | Status: SHIPPED | OUTPATIENT
Start: 2019-02-19 | End: 2020-03-19

## 2019-02-19 NOTE — PROGRESS NOTES
Pt presented to clinic today for blood draw. Per physician able to draw orders. Orders  documented within chart. Pt tolerated lab draw well.  verified.   Orders drawn include: glyco, cmp, lipid   Site of draw: rt jose manuel Smith CMA

## 2019-02-19 NOTE — PROGRESS NOTES
HPI:   Jason Ackerman is a 77year old female who presents for recheck of her diabetes.   DM dx at age:  22 YEARS AGO  Current medications:   Current Outpatient Medications:        hydrocortisone (PROCTOZONE-HC) 2.5 % Rectal Cream Place 1 Application rec by mouth every 6 (six) hours as needed for Pain. Disp: 90 tablet Rfl: 1       Frequency of glucose monitoring:     . FB-110    . PP:  130-200  Last eye exam: 2018   (ALEXANDRA)  .   Retinopathy:  TRACE/MILD  on ACE-I/ARB:  Y  Numbness/tingling in feet OVER PAST TWO WEEKS    EXAM:   /70 (BP Location: Right arm, Patient Position: Sitting, Cuff Size: adult)   Pulse 81   Resp 17   Ht 63\"   Wt 152 lb   SpO2 97%   BMI 26.93 kg/m²   GENERAL: well developed, well nourished,in no apparent distress  SKIN: Future  -     Sulfamethoxazole-TMP -160 MG Oral Tab per tablet; Take 1 tablet by mouth 2 (two) times daily.     Benign paroxysmal positional vertigo, unspecified laterality:  DISCUSSED, INFORMATION PROVIDED      · continue present meds  · lose wgt wit

## 2019-02-19 NOTE — PATIENT INSTRUCTIONS
Vértigo posicional paroxístico suman     Olivarez proveedor de [employer] médica puede moverle la jesus de diferentes maneras para tratar olivarez VPPB.      El vértigo posicional paroxístico suman (“BPPV”, por aury siglas en inglés) es un problema del oído interno Li proveedor de [employer] [facility] puede diagnosticar y tratarle esta afección. También puede consultar a un médico especialista en nariz, garganta y oído (otorrinolaringólogo).  En algunos casos, es posible que tenga que consultar a un médico especia © 3216-4314 [address]. 1407 [facility] [place], [facility] [place]. All rights reserved. This information is not intended as a substitute for professional medical care. Always follow your healthcare professional's instructions.

## 2019-02-20 ENCOUNTER — TELEPHONE (OUTPATIENT)
Dept: INTERNAL MEDICINE CLINIC | Facility: CLINIC | Age: 79
End: 2019-02-20

## 2019-03-11 ENCOUNTER — OFFICE VISIT (OUTPATIENT)
Dept: INTERNAL MEDICINE CLINIC | Facility: CLINIC | Age: 79
End: 2019-03-11
Payer: MEDICARE

## 2019-03-11 VITALS
HEART RATE: 73 BPM | OXYGEN SATURATION: 98 % | BODY MASS INDEX: 27.46 KG/M2 | RESPIRATION RATE: 18 BRPM | HEIGHT: 63 IN | DIASTOLIC BLOOD PRESSURE: 62 MMHG | SYSTOLIC BLOOD PRESSURE: 110 MMHG | WEIGHT: 155 LBS

## 2019-03-11 DIAGNOSIS — M43.6 TORTICOLLIS, ACQUIRED: Primary | ICD-10-CM

## 2019-03-11 DIAGNOSIS — G44.209 TENSION HEADACHE: ICD-10-CM

## 2019-03-11 PROCEDURE — 99213 OFFICE O/P EST LOW 20 MIN: CPT | Performed by: FAMILY MEDICINE

## 2019-03-11 RX ORDER — BLOOD SUGAR DIAGNOSTIC
1 STRIP MISCELLANEOUS 2 TIMES DAILY
Qty: 100 STRIP | Refills: 5 | Status: SHIPPED | OUTPATIENT
Start: 2019-03-11 | End: 2019-03-12

## 2019-03-11 RX ORDER — BACLOFEN 10 MG/1
5 TABLET ORAL 2 TIMES DAILY PRN
Qty: 30 TABLET | Refills: 0 | Status: SHIPPED | OUTPATIENT
Start: 2019-03-11 | End: 2019-04-08

## 2019-03-12 ENCOUNTER — TELEPHONE (OUTPATIENT)
Dept: INTERNAL MEDICINE CLINIC | Facility: CLINIC | Age: 79
End: 2019-03-12

## 2019-03-12 RX ORDER — BLOOD SUGAR DIAGNOSTIC
1 STRIP MISCELLANEOUS 2 TIMES DAILY
Qty: 100 STRIP | Refills: 5 | Status: SHIPPED | OUTPATIENT
Start: 2019-03-12 | End: 2020-03-19

## 2019-03-12 NOTE — PROGRESS NOTES
CC:  Headache (Pt c/o increased frequency of headaches.) and Eye Problem (C/o left eye swelling x 4 episodes last wk.)      Hx of CC:  1-2 WEEKS OF FREQUENT/DAILY RIGHT SIDED NECK/OCCIPUT AND TEMPORAL HEADACHS WHICH RESPOND SOMEWHAT TO IBUPROFEN BUT REOCCU

## 2019-04-03 DIAGNOSIS — M15.9 PRIMARY OSTEOARTHRITIS INVOLVING MULTIPLE JOINTS: ICD-10-CM

## 2019-04-08 DIAGNOSIS — M43.6 TORTICOLLIS, ACQUIRED: ICD-10-CM

## 2019-04-08 DIAGNOSIS — G44.209 TENSION HEADACHE: ICD-10-CM

## 2019-04-08 RX ORDER — BACLOFEN 10 MG/1
TABLET ORAL
Qty: 30 TABLET | Refills: 1 | Status: SHIPPED | OUTPATIENT
Start: 2019-04-08 | End: 2019-05-13

## 2019-05-13 ENCOUNTER — OFFICE VISIT (OUTPATIENT)
Dept: FAMILY MEDICINE CLINIC | Facility: CLINIC | Age: 79
End: 2019-05-13
Payer: MEDICARE

## 2019-05-13 VITALS
BODY MASS INDEX: 28 KG/M2 | HEIGHT: 63 IN | WEIGHT: 158 LBS | TEMPERATURE: 100 F | DIASTOLIC BLOOD PRESSURE: 80 MMHG | SYSTOLIC BLOOD PRESSURE: 122 MMHG | HEART RATE: 76 BPM | RESPIRATION RATE: 12 BRPM | OXYGEN SATURATION: 99 %

## 2019-05-13 DIAGNOSIS — B02.9 HERPES ZOSTER WITHOUT COMPLICATION: Primary | ICD-10-CM

## 2019-05-13 PROCEDURE — 99214 OFFICE O/P EST MOD 30 MIN: CPT | Performed by: FAMILY MEDICINE

## 2019-05-13 RX ORDER — VALACYCLOVIR HYDROCHLORIDE 1 G/1
1 TABLET, FILM COATED ORAL 3 TIMES DAILY
Qty: 21 TABLET | Refills: 0 | Status: SHIPPED | OUTPATIENT
Start: 2019-05-13 | End: 2019-05-20

## 2019-05-13 NOTE — PROGRESS NOTES
HPI:   Patient presents with:  Rosy Kauffman is a 77year old female presenting for:    Rash  -started 3 wks ago  -in chest  -spreading rectnly  -itchy and burns; painful  -no f/c, n/v/d, CP/SOB  -has tried topical ointments w/o improvm [employer] Expiration Date 8/2,019 Date   URINE CULTURE, ROUTINE   Result Value Ref Range    Urine Culture >100,000 CFU/ML Escherichia coli (A)        Susceptibility    Escherichia coli -  (no method available)     Ampicillin <=2 Sensitive      Cefazolin < ALPRAZolam 0.5 MG Oral Tab Take 1 tablet (0.5 mg total) by mouth nightly as needed for Anxiety. Disp: 10 tablet Rfl: 0   hydrocortisone (PROCTOZONE-HC) 2.5 % Rectal Cream Place 1 Application rectally 2 (two) times daily as needed for Hemorrhoids. Disp: 28. Performed by Halie Garcia MD at [address] [place] [address] ENDOSCOPY   • HAND [address] Left 11/18/2016    Performed by Maia Pope MD at [address] [place] [address] MAIN OR   • PALMAR FASCIECTOMY Left 11-18-16    L Zandra's  LMF Ray   • TUBAL LIGATION  1978     No Along T1/T2 dermatome   Vitals reviewed. ASSESSMENT AND PLAN:   Patient is a 77year old female who presents primarily presents for:    1.  Herpes zoster without complication  -Clinical course, prognosis, and treatment options of disease process disc

## 2019-05-18 DIAGNOSIS — E11.9 TYPE 2 DIABETES MELLITUS WITHOUT COMPLICATION, WITH LONG-TERM CURRENT USE OF INSULIN (HCC): ICD-10-CM

## 2019-05-18 DIAGNOSIS — Z79.4 TYPE 2 DIABETES MELLITUS WITHOUT COMPLICATION, WITH LONG-TERM CURRENT USE OF INSULIN (HCC): ICD-10-CM

## 2019-05-20 RX ORDER — INSULIN DEGLUDEC INJECTION 100 U/ML
INJECTION, SOLUTION SUBCUTANEOUS
Qty: 9 PEN | Refills: 1 | Status: SHIPPED | OUTPATIENT
Start: 2019-05-20 | End: 2019-08-02

## 2019-05-21 ENCOUNTER — OFFICE VISIT (OUTPATIENT)
Dept: INTERNAL MEDICINE CLINIC | Facility: CLINIC | Age: 79
End: 2019-05-21
Payer: MEDICARE

## 2019-05-21 VITALS
RESPIRATION RATE: 17 BRPM | HEIGHT: 63 IN | SYSTOLIC BLOOD PRESSURE: 128 MMHG | OXYGEN SATURATION: 97 % | HEART RATE: 77 BPM | WEIGHT: 153 LBS | DIASTOLIC BLOOD PRESSURE: 74 MMHG | BODY MASS INDEX: 27.11 KG/M2

## 2019-05-21 DIAGNOSIS — R53.83 FATIGUE, UNSPECIFIED TYPE: Primary | ICD-10-CM

## 2019-05-21 DIAGNOSIS — F34.1 DYSTHYMIA: ICD-10-CM

## 2019-05-21 PROCEDURE — 84443 ASSAY THYROID STIM HORMONE: CPT | Performed by: FAMILY MEDICINE

## 2019-05-21 PROCEDURE — 99213 OFFICE O/P EST LOW 20 MIN: CPT | Performed by: FAMILY MEDICINE

## 2019-05-21 PROCEDURE — 85018 HEMOGLOBIN: CPT | Performed by: FAMILY MEDICINE

## 2019-05-21 RX ORDER — FLUOXETINE HYDROCHLORIDE 20 MG/1
20 CAPSULE ORAL DAILY
Qty: 90 CAPSULE | Refills: 1 | Status: SHIPPED | OUTPATIENT
Start: 2019-05-21 | End: 2019-09-19 | Stop reason: ALTCHOICE

## 2019-05-22 NOTE — PROGRESS NOTES
CC:  Medication Follow-Up (Pt presents to clinic for medication f/up -->questions about aspirin. )      Hx of CC:  GOT BACK FROM [place], C/O INCREASING FATIGUE AND MOOD X COUPLE OF MONTHS.     Vitals:    05/21/19  1111   BP: 128/74   Pulse: 77   Resp: 17

## 2019-05-24 ENCOUNTER — TELEPHONE (OUTPATIENT)
Dept: INTERNAL MEDICINE CLINIC | Facility: CLINIC | Age: 79
End: 2019-05-24

## 2019-05-29 ENCOUNTER — OFFICE VISIT (OUTPATIENT)
Dept: FAMILY MEDICINE CLINIC | Facility: CLINIC | Age: 79
End: 2019-05-29
Payer: MEDICARE

## 2019-05-29 VITALS
SYSTOLIC BLOOD PRESSURE: 149 MMHG | OXYGEN SATURATION: 97 % | DIASTOLIC BLOOD PRESSURE: 54 MMHG | RESPIRATION RATE: 16 BRPM | HEART RATE: 74 BPM | TEMPERATURE: 99 F

## 2019-05-29 DIAGNOSIS — E11.65 UNCONTROLLED TYPE 2 DIABETES MELLITUS WITH HYPERGLYCEMIA (HCC): ICD-10-CM

## 2019-05-29 DIAGNOSIS — N30.01 ACUTE CYSTITIS WITH HEMATURIA: Primary | ICD-10-CM

## 2019-05-29 PROCEDURE — 87088 URINE BACTERIA CULTURE: CPT | Performed by: NURSE PRACTITIONER

## 2019-05-29 PROCEDURE — 87186 SC STD MICRODIL/AGAR DIL: CPT | Performed by: NURSE PRACTITIONER

## 2019-05-29 PROCEDURE — 87086 URINE CULTURE/COLONY COUNT: CPT | Performed by: NURSE PRACTITIONER

## 2019-05-29 PROCEDURE — 82962 GLUCOSE BLOOD TEST: CPT | Performed by: NURSE PRACTITIONER

## 2019-05-29 PROCEDURE — 99213 OFFICE O/P EST LOW 20 MIN: CPT | Performed by: NURSE PRACTITIONER

## 2019-05-29 PROCEDURE — 81003 URINALYSIS AUTO W/O SCOPE: CPT | Performed by: NURSE PRACTITIONER

## 2019-05-29 RX ORDER — SULFAMETHOXAZOLE AND TRIMETHOPRIM 800; 160 MG/1; MG/1
1 TABLET ORAL 2 TIMES DAILY
Qty: 20 TABLET | Refills: 0 | Status: SHIPPED | OUTPATIENT
Start: 2019-05-29 | End: 2019-06-08

## 2019-05-29 NOTE — PATIENT INSTRUCTIONS
Dieta: Diabetes [Diabetic Diet]  Los alimentos son Juanita Juniper herramienta importante que puede utilizar para controlar la diabetes y [place] issa.  [place] alimentos glory equilibrados en las cantidades [employer] ayudará a controlar la concentración de azúcar · Coma menos grasa para ayudar a reducir olivarez riesgo de enfermedad del corazón. Consuma productos lácteos sin grasa o con bajo contenido graso y Effie . Evite los alimentos fritos. Use aceites de cocina no saturados.   · Consulte a olivarez nutricionista sob [address]  · Claudia abundante líquido (al [employer], entre 6 y 6 vasos por día, excepto que le hayan indicado [place]'s líquidos por otras razones médicas).  Eso hará que el medicamento ingrese mejor al sistema urinario y arrastrará las bacterias fuer · Secreción vaginal.  · Dolor, enrojecimiento o hinchazón en los labios vaginales (catherine exterior de la vagina). Date Last Reviewed: 4/8/2014  © 4997-7755 [address]. 1407 [facility] [place], [address] [place] [address] [place]. Todos los derechos reservados.

## 2019-05-29 NOTE — PROGRESS NOTES
CHIEF COMPLAINT:   No chief complaint on file. HPI:   Alessandra Graham is a 77year old female who presents with symptoms of UTI. Complaining of urinary frequency, urgency, dysuria for last 4 days. Symptoms have been worsening since onset.   Treatme triamcinolone acetonide 0.1 % External Ointment Apply 1 drop topically 2 (two) times daily. To affected area Disp: 15 g Rfl: 0   DICLOFENAC SODIUM 1 % Transdermal Gel APPLY 2 G TOPICALLY 4 (FOUR) TIMES DAILY AS NEEDED (JOINT PAIN).  Disp: 100 g Rfl: 5   hyd : No suprapubic tenderness, bladder distention, or CVAT   EXTREMITIES: no edema    Recent Results (from the past 24 hour(s))   URINALYSIS, AUTO, W/O SCOPE    Collection Time: 05/29/19 10:32 AM   Result Value Ref Range    Glucose Urine >=1,000 mg/dL    Bi Risk and benefits of medication discussed. Stressed importance of completing full course of antibiotic.      Patient Instructions     Dieta: Diabetes [Diabetic Diet]  Los alimentos son Beverley Josefa herramienta importante que puede utilizar para controlar la diabetes · Coma menos grasa para ayudar a reducir olivarez riesgo de enfermedad del corazón. Consuma productos lácteos sin grasa o con bajo contenido graso y Terryl Mins. Evite los alimentos fritos. Use aceites de cocina no saturados.   · Consulte a olivarez nutricionista sob [address]  · Claudia abundante líquido (al Cecille, entre 6 y 6 vasos por día, excepto que le hayan indicado Summer's líquidos por otras razones médicas).  Eso hará que el medicamento ingrese mejor al sistema urinario y arrastrará las bacterias fuer · Secreción vaginal.  · Dolor, enrojecimiento o hinchazón en los labios vaginales (catherine exterior de la vagina). Date Last Reviewed: 4/8/2014  © 4922-4298 [employer]. [facility] [facility], [address] [place] [address] [place]. Todos los derechos reservados.

## 2019-06-01 ENCOUNTER — TELEPHONE (OUTPATIENT)
Dept: FAMILY MEDICINE CLINIC | Facility: CLINIC | Age: 79
End: 2019-06-01

## 2019-06-01 DIAGNOSIS — B96.20 E. COLI UTI: Primary | ICD-10-CM

## 2019-06-01 DIAGNOSIS — N39.0 E. COLI UTI: Primary | ICD-10-CM

## 2019-06-01 RX ORDER — CEFDINIR 300 MG/1
300 CAPSULE ORAL 2 TIMES DAILY
Qty: 20 CAPSULE | Refills: 0 | Status: SHIPPED | OUTPATIENT
Start: 2019-06-01 | End: 2019-06-11

## 2019-06-01 NOTE — TELEPHONE ENCOUNTER
Left 2nd message regarding culture results. Sent cefdinir rx to pharmacy on file. Will try calling again later.

## 2019-06-02 ENCOUNTER — APPOINTMENT (OUTPATIENT)
Dept: GENERAL RADIOLOGY | Facility: HOSPITAL | Age: 79
End: 2019-06-02
Attending: EMERGENCY MEDICINE
Payer: MEDICARE

## 2019-06-02 ENCOUNTER — APPOINTMENT (OUTPATIENT)
Dept: CT IMAGING | Facility: HOSPITAL | Age: 79
End: 2019-06-02
Attending: EMERGENCY MEDICINE
Payer: MEDICARE

## 2019-06-02 ENCOUNTER — HOSPITAL ENCOUNTER (EMERGENCY)
Facility: HOSPITAL | Age: 79
Discharge: HOME | End: 2019-06-02
Attending: EMERGENCY MEDICINE
Payer: MEDICARE

## 2019-06-02 VITALS
DIASTOLIC BLOOD PRESSURE: 66 MMHG | OXYGEN SATURATION: 97 % | WEIGHT: 150 LBS | HEART RATE: 74 BPM | SYSTOLIC BLOOD PRESSURE: 161 MMHG | RESPIRATION RATE: 20 BRPM | BODY MASS INDEX: 26.58 KG/M2 | TEMPERATURE: 98 F | HEIGHT: 63 IN

## 2019-06-02 DIAGNOSIS — N30.00 ACUTE CYSTITIS WITHOUT HEMATURIA: Primary | ICD-10-CM

## 2019-06-02 PROCEDURE — 71045 X-RAY EXAM CHEST 1 VIEW: CPT | Performed by: EMERGENCY MEDICINE

## 2019-06-02 PROCEDURE — 93005 ELECTROCARDIOGRAM TRACING: CPT

## 2019-06-02 PROCEDURE — 85025 COMPLETE CBC W/AUTO DIFF WBC: CPT | Performed by: EMERGENCY MEDICINE

## 2019-06-02 PROCEDURE — 70450 CT HEAD/BRAIN W/O DYE: CPT | Performed by: EMERGENCY MEDICINE

## 2019-06-02 PROCEDURE — 81001 URINALYSIS AUTO W/SCOPE: CPT | Performed by: EMERGENCY MEDICINE

## 2019-06-02 PROCEDURE — 99285 EMERGENCY DEPT VISIT HI MDM: CPT

## 2019-06-02 PROCEDURE — 82962 GLUCOSE BLOOD TEST: CPT

## 2019-06-02 PROCEDURE — 87186 SC STD MICRODIL/AGAR DIL: CPT | Performed by: EMERGENCY MEDICINE

## 2019-06-02 PROCEDURE — 87086 URINE CULTURE/COLONY COUNT: CPT | Performed by: EMERGENCY MEDICINE

## 2019-06-02 PROCEDURE — 96361 HYDRATE IV INFUSION ADD-ON: CPT

## 2019-06-02 PROCEDURE — 87088 URINE BACTERIA CULTURE: CPT | Performed by: EMERGENCY MEDICINE

## 2019-06-02 PROCEDURE — 96360 HYDRATION IV INFUSION INIT: CPT

## 2019-06-02 PROCEDURE — 84484 ASSAY OF TROPONIN QUANT: CPT | Performed by: EMERGENCY MEDICINE

## 2019-06-02 PROCEDURE — 93010 ELECTROCARDIOGRAM REPORT: CPT | Performed by: EMERGENCY MEDICINE

## 2019-06-02 PROCEDURE — 80048 BASIC METABOLIC PNL TOTAL CA: CPT | Performed by: EMERGENCY MEDICINE

## 2019-06-02 RX ORDER — CEFDINIR 300 MG/1
300 CAPSULE ORAL 2 TIMES DAILY
Qty: 14 CAPSULE | Refills: 0 | Status: SHIPPED | OUTPATIENT
Start: 2019-06-02 | End: 2019-08-02 | Stop reason: ALTCHOICE

## 2019-06-02 RX ORDER — SODIUM CHLORIDE 9 MG/ML
125 INJECTION, SOLUTION INTRAVENOUS CONTINUOUS
Status: DISCONTINUED | OUTPATIENT
Start: 2019-06-02 | End: 2019-06-02

## 2019-06-02 RX ORDER — ALPRAZOLAM 0.25 MG/1
0.25 TABLET ORAL ONCE
Status: COMPLETED | OUTPATIENT
Start: 2019-06-02 | End: 2019-06-02

## 2019-06-02 NOTE — ED INITIAL ASSESSMENT (HPI)
Patient complains of weakness, headache, nvd, dry mouth, and difficulty breathing and speaking. Patient is worried due to past hx of cva. No facial droop. Speech is clear. No focal weakness or numbness.  Patient reports urine infections 8 days ago and

## 2019-06-02 NOTE — ED NOTES
DISCHARGE INSTRUCTIONS AND FOLLOW UP INFORMATION GIVEN TO PATIENT. VERBALIZED UNDERSTANDING. PATIENT IS A/OX4. NO DISTRESS.  VSS. PATIENT REPORTS FEELING BETTER.   PATIENT AMBULATES WITH A STEADY GAIT

## 2019-06-02 NOTE — ED PROVIDER NOTES
Patient Seen in: [facility] AND [facility] Emergency Department    History   Patient presents with:  Weakness    Stated Complaint: weakness    HPI    Patient is a 42-year-old female with a history of anxiety high blood pressure and a CVA.   She speaks Yi his negative except as noted above.     Physical Exam     ED Triage Vitals [06/02/19 1413]   BP (!) 184/97   Pulse 78   Resp 20   Temp 97.5 °F (36.4 °C)   Temp src Temporal   SpO2 98 %   O2 Device None (Room air)       Current:BP (!) 162/71   Pulse 78   Temp 97 Notable for the following components:    Glucose Urine 50  (*)     Ketones Urine Trace (*)     Nitrite Urine Positive (*)     Leukocyte Esterase Urine Trace (*)     Ascorbic Acid Urine 40  (*)     WBC Urine 9 (*)     Bacteria Urine Few (*)     All other co Work-up findings were discussed with patient and with family. They would like to go home. They have a prescription at the pharmacy for correct antibiotic. I did encourage family to see to her that she drinks more fluid.   And encourage close outpatient

## 2019-06-02 NOTE — ED NOTES
Patient ambulated steady gait to attempt to provide urine sample. Patient reports tremors/shakining has decreased since arrival to ED.

## 2019-06-13 ENCOUNTER — OFFICE VISIT (OUTPATIENT)
Dept: INTERNAL MEDICINE CLINIC | Facility: CLINIC | Age: 79
End: 2019-06-13
Payer: MEDICARE

## 2019-06-13 VITALS
HEART RATE: 71 BPM | WEIGHT: 156 LBS | OXYGEN SATURATION: 98 % | DIASTOLIC BLOOD PRESSURE: 80 MMHG | HEIGHT: 63 IN | SYSTOLIC BLOOD PRESSURE: 126 MMHG | BODY MASS INDEX: 27.64 KG/M2 | RESPIRATION RATE: 17 BRPM

## 2019-06-13 DIAGNOSIS — N18.9 ACUTE KIDNEY INJURY SUPERIMPOSED ON CHRONIC KIDNEY DISEASE (HCC): Primary | ICD-10-CM

## 2019-06-13 DIAGNOSIS — N17.9 ACUTE KIDNEY INJURY SUPERIMPOSED ON CHRONIC KIDNEY DISEASE (HCC): Primary | ICD-10-CM

## 2019-06-13 DIAGNOSIS — N76.0 ACUTE VAGINITIS: ICD-10-CM

## 2019-06-13 PROCEDURE — 99213 OFFICE O/P EST LOW 20 MIN: CPT | Performed by: FAMILY MEDICINE

## 2019-06-13 PROCEDURE — 80048 BASIC METABOLIC PNL TOTAL CA: CPT | Performed by: FAMILY MEDICINE

## 2019-06-13 RX ORDER — CLOTRIMAZOLE 1 %
1 CREAM (GRAM) TOPICAL 2 TIMES DAILY
Qty: 28 G | Refills: 2 | Status: ON HOLD | OUTPATIENT
Start: 2019-06-13 | End: 2019-07-24

## 2019-06-14 NOTE — PROGRESS NOTES
CC:  ER F/U (Pt presents to clinic for f/up on UTI and vaginal irritation. Finished abx from ED last night. )      Hx of CC:  S/P ER VISIT ON 6/2 DUE TO GASTROENTERITIS, DEHYDRATION AND CYSTITIS.   FINISHED ANTIBIOTIC COURSE BUT NOW HAVING VULVAR IRRITATION

## 2019-07-01 DIAGNOSIS — M43.6 TORTICOLLIS, ACQUIRED: ICD-10-CM

## 2019-07-01 DIAGNOSIS — G44.209 TENSION HEADACHE: ICD-10-CM

## 2019-07-01 RX ORDER — BACLOFEN 10 MG/1
TABLET ORAL
Qty: 30 TABLET | Refills: 1 | Status: ON HOLD | OUTPATIENT
Start: 2019-07-01 | End: 2019-07-24

## 2019-07-24 ENCOUNTER — OFFICE VISIT (OUTPATIENT)
Dept: FAMILY MEDICINE CLINIC | Facility: CLINIC | Age: 79
End: 2019-07-24
Payer: MEDICARE

## 2019-07-24 ENCOUNTER — APPOINTMENT (OUTPATIENT)
Dept: MRI IMAGING | Facility: HOSPITAL | Age: 79
End: 2019-07-24
Attending: HOSPITALIST
Payer: MEDICARE

## 2019-07-24 ENCOUNTER — APPOINTMENT (OUTPATIENT)
Dept: ULTRASOUND IMAGING | Facility: HOSPITAL | Age: 79
End: 2019-07-24
Attending: HOSPITALIST
Payer: MEDICARE

## 2019-07-24 ENCOUNTER — HOSPITAL ENCOUNTER (OUTPATIENT)
Facility: HOSPITAL | Age: 79
Setting detail: OBSERVATION
Discharge: HOME | End: 2019-07-25
Admitting: HOSPITALIST
Payer: MEDICARE

## 2019-07-24 ENCOUNTER — APPOINTMENT (OUTPATIENT)
Dept: CT IMAGING | Facility: HOSPITAL | Age: 79
End: 2019-07-24
Payer: MEDICARE

## 2019-07-24 DIAGNOSIS — R42 DIZZINESS: Primary | ICD-10-CM

## 2019-07-24 DIAGNOSIS — G45.9 TIA (TRANSIENT ISCHEMIC ATTACK): Primary | ICD-10-CM

## 2019-07-24 LAB
ANION GAP SERPL CALC-SCNC: 9 MMOL/L (ref 0–18)
APTT PPP: 28.5 SECONDS (ref 23.2–35.3)
BACTERIA UR QL AUTO: NEGATIVE /HPF
BILIRUB UR QL: NEGATIVE
BUN BLD-MCNC: 16 MG/DL (ref 7–18)
BUN/CREAT SERPL: 16.8 (ref 10–20)
CALCIUM BLD-MCNC: 8.9 MG/DL (ref 8.5–10.1)
CHLORIDE SERPL-SCNC: 98 MMOL/L (ref 98–112)
CHOLEST SMN-MCNC: 83 MG/DL (ref ?–200)
CLARITY UR: CLEAR
CO2 SERPL-SCNC: 27 MMOL/L (ref 21–32)
COLOR UR: YELLOW
CREAT BLD-MCNC: 0.95 MG/DL (ref 0.55–1.02)
EST. AVERAGE GLUCOSE BLD GHB EST-MCNC: 160 MG/DL (ref 68–126)
GLUCOSE BLD-MCNC: 152 MG/DL (ref 70–99)
GLUCOSE BLDC GLUCOMTR-MCNC: 106 MG/DL (ref 70–99)
GLUCOSE BLDC GLUCOMTR-MCNC: 158 MG/DL (ref 70–99)
GLUCOSE BLDC GLUCOMTR-MCNC: 174 MG/DL (ref 70–99)
GLUCOSE BLDC GLUCOMTR-MCNC: 99 MG/DL (ref 70–99)
GLUCOSE UR-MCNC: NEGATIVE MG/DL
HBA1C MFR BLD HPLC: 7.2 % (ref ?–5.7)
HDLC SERPL-MCNC: 35 MG/DL (ref 40–59)
HGB UR QL STRIP.AUTO: NEGATIVE
INR BLD: 1.02 (ref 0.9–1.2)
KETONES UR-MCNC: NEGATIVE MG/DL
LDLC SERPL CALC-MCNC: 13 MG/DL (ref ?–100)
LEUKOCYTE ESTERASE UR QL STRIP.AUTO: NEGATIVE
NITRITE UR QL STRIP.AUTO: NEGATIVE
NONHDLC SERPL-MCNC: 48 MG/DL (ref ?–130)
OSMOLALITY SERPL CALC.SUM OF ELEC: 282 MOSM/KG (ref 275–295)
PH UR: 6 [PH] (ref 5–8)
POTASSIUM SERPL-SCNC: 4.2 MMOL/L (ref 3.5–5.1)
PROT UR-MCNC: NEGATIVE MG/DL
PROTHROMBIN TIME: 13.2 SECONDS (ref 11.8–14.5)
RBC #/AREA URNS AUTO: 2 /HPF
SODIUM SERPL-SCNC: 134 MMOL/L (ref 136–145)
SP GR UR STRIP: 1.01 (ref 1–1.03)
TRIGL SERPL-MCNC: 175 MG/DL (ref 30–149)
TROPONIN I SERPL-MCNC: <0.045 NG/ML (ref ?–0.04)
UROBILINOGEN UR STRIP-ACNC: <2
VIT C UR-MCNC: 40 MG/DL
VLDLC SERPL CALC-MCNC: 35 MG/DL (ref 0–30)
WBC #/AREA URNS AUTO: 1 /HPF

## 2019-07-24 PROCEDURE — 93880 EXTRACRANIAL BILAT STUDY: CPT | Performed by: HOSPITALIST

## 2019-07-24 PROCEDURE — 70551 MRI BRAIN STEM W/O DYE: CPT | Performed by: HOSPITALIST

## 2019-07-24 PROCEDURE — 70450 CT HEAD/BRAIN W/O DYE: CPT

## 2019-07-24 PROCEDURE — 99220 INITIAL OBSERVATION CARE,LEVL III: CPT | Performed by: HOSPITALIST

## 2019-07-24 RX ORDER — ALPRAZOLAM 0.25 MG/1
0.25 TABLET ORAL 3 TIMES DAILY PRN
Status: DISCONTINUED | OUTPATIENT
Start: 2019-07-24 | End: 2019-07-25

## 2019-07-24 RX ORDER — POLYETHYLENE GLYCOL 3350 17 G/17G
17 POWDER, FOR SOLUTION ORAL DAILY PRN
Status: DISCONTINUED | OUTPATIENT
Start: 2019-07-24 | End: 2019-07-25

## 2019-07-24 RX ORDER — ACETAMINOPHEN 325 MG/1
650 TABLET ORAL EVERY 6 HOURS PRN
Status: DISCONTINUED | OUTPATIENT
Start: 2019-07-24 | End: 2019-07-25

## 2019-07-24 RX ORDER — METOPROLOL SUCCINATE 100 MG/1
100 TABLET, EXTENDED RELEASE ORAL DAILY
Status: DISCONTINUED | OUTPATIENT
Start: 2019-07-24 | End: 2019-07-25

## 2019-07-24 RX ORDER — ATORVASTATIN CALCIUM 40 MG/1
40 TABLET, FILM COATED ORAL NIGHTLY
Status: DISCONTINUED | OUTPATIENT
Start: 2019-07-24 | End: 2019-07-25

## 2019-07-24 RX ORDER — LOSARTAN POTASSIUM 50 MG/1
50 TABLET ORAL DAILY
Status: DISCONTINUED | OUTPATIENT
Start: 2019-07-24 | End: 2019-07-25

## 2019-07-24 RX ORDER — METOCLOPRAMIDE HYDROCHLORIDE 5 MG/ML
10 INJECTION INTRAMUSCULAR; INTRAVENOUS EVERY 8 HOURS PRN
Status: DISCONTINUED | OUTPATIENT
Start: 2019-07-24 | End: 2019-07-25

## 2019-07-24 RX ORDER — ACETAMINOPHEN 325 MG/1
650 TABLET ORAL EVERY 4 HOURS PRN
Status: DISCONTINUED | OUTPATIENT
Start: 2019-07-24 | End: 2019-07-25

## 2019-07-24 RX ORDER — ALPRAZOLAM 0.5 MG/1
0.5 TABLET ORAL NIGHTLY PRN
Status: DISCONTINUED | OUTPATIENT
Start: 2019-07-24 | End: 2019-07-24

## 2019-07-24 RX ORDER — ASPIRIN 300 MG
300 SUPPOSITORY, RECTAL RECTAL DAILY
Status: DISCONTINUED | OUTPATIENT
Start: 2019-07-24 | End: 2019-07-25

## 2019-07-24 RX ORDER — TEMAZEPAM 7.5 MG/1
7.5 CAPSULE ORAL NIGHTLY PRN
Status: DISCONTINUED | OUTPATIENT
Start: 2019-07-24 | End: 2019-07-25

## 2019-07-24 RX ORDER — SODIUM PHOSPHATE, DIBASIC AND SODIUM PHOSPHATE, MONOBASIC 7; 19 G/133ML; G/133ML
1 ENEMA RECTAL ONCE AS NEEDED
Status: DISCONTINUED | OUTPATIENT
Start: 2019-07-24 | End: 2019-07-25

## 2019-07-24 RX ORDER — BISACODYL 10 MG
10 SUPPOSITORY, RECTAL RECTAL
Status: DISCONTINUED | OUTPATIENT
Start: 2019-07-24 | End: 2019-07-25

## 2019-07-24 RX ORDER — ACETAMINOPHEN 650 MG/1
650 SUPPOSITORY RECTAL EVERY 4 HOURS PRN
Status: DISCONTINUED | OUTPATIENT
Start: 2019-07-24 | End: 2019-07-25

## 2019-07-24 RX ORDER — SODIUM CHLORIDE 0.9 % (FLUSH) 0.9 %
3 SYRINGE (ML) INJECTION AS NEEDED
Status: DISCONTINUED | OUTPATIENT
Start: 2019-07-24 | End: 2019-07-25

## 2019-07-24 RX ORDER — ASPIRIN 81 MG/1
324 TABLET, CHEWABLE ORAL ONCE
Status: COMPLETED | OUTPATIENT
Start: 2019-07-24 | End: 2019-07-24

## 2019-07-24 RX ORDER — KETOTIFEN FUMARATE 0.35 MG/ML
1 SOLUTION/ DROPS OPHTHALMIC 2 TIMES DAILY
Status: DISCONTINUED | OUTPATIENT
Start: 2019-07-24 | End: 2019-07-25

## 2019-07-24 RX ORDER — ONDANSETRON 2 MG/ML
4 INJECTION INTRAMUSCULAR; INTRAVENOUS EVERY 6 HOURS PRN
Status: DISCONTINUED | OUTPATIENT
Start: 2019-07-24 | End: 2019-07-25

## 2019-07-24 RX ORDER — ASPIRIN 325 MG
325 TABLET ORAL DAILY
Status: DISCONTINUED | OUTPATIENT
Start: 2019-07-24 | End: 2019-07-25

## 2019-07-24 RX ORDER — DEXTROSE MONOHYDRATE 25 G/50ML
50 INJECTION, SOLUTION INTRAVENOUS AS NEEDED
Status: DISCONTINUED | OUTPATIENT
Start: 2019-07-24 | End: 2019-07-25

## 2019-07-24 RX ORDER — FLUOXETINE HYDROCHLORIDE 20 MG/1
20 CAPSULE ORAL DAILY
Status: DISCONTINUED | OUTPATIENT
Start: 2019-07-24 | End: 2019-07-25

## 2019-07-24 NOTE — ED NOTES
Orders for admission, patient is aware of plan and ready to go upstairs.  Any questions, please call ED RN siomara at extension [phone]

## 2019-07-24 NOTE — PROGRESS NOTES
Here with son who reports her BP was 160's at home. She was able to walk on own into clinic and back out to car. Equal strong in arm and grasps. Can protrude tongue centrally. No ptosis or eyes or mouth.  Speak is clear but son says her speech is shaky like

## 2019-07-24 NOTE — H&P
[facility]    PATIENT'S NAME: Kassandra aRi   ATTENDING PHYSICIAN: Blu Espinosa MD   PATIENT ACCOUNT#:   [id number]    LOCATION:  [place] [address]  MEDICAL RECORD #:   [id number]       YOB: 1940  ADMISSION DATE:       07/2 arm and weakness. She describes burning sensation from the lower abdomen to the epigastric area. The patient denies any nausea or vomiting. She has not slept last night, and she has been under a lot of stress in the last week secondary to family issues.

## 2019-07-24 NOTE — ED INITIAL ASSESSMENT (HPI)
Slurred speech starting 1.5hr PTA. Slurred speech noted by son.  Pt reports she did not feel well starting yesterday and thought is was due to high BP

## 2019-07-25 ENCOUNTER — APPOINTMENT (OUTPATIENT)
Dept: CV DIAGNOSTICS | Facility: HOSPITAL | Age: 79
End: 2019-07-25
Attending: HOSPITALIST
Payer: MEDICARE

## 2019-07-25 VITALS
DIASTOLIC BLOOD PRESSURE: 64 MMHG | HEART RATE: 75 BPM | TEMPERATURE: 98 F | RESPIRATION RATE: 16 BRPM | WEIGHT: 151.5 LBS | BODY MASS INDEX: 27 KG/M2 | SYSTOLIC BLOOD PRESSURE: 139 MMHG | OXYGEN SATURATION: 96 %

## 2019-07-25 LAB
ANION GAP SERPL CALC-SCNC: 6 MMOL/L (ref 0–18)
BASOPHILS # BLD AUTO: 0.03 X10(3) UL (ref 0–0.2)
BASOPHILS NFR BLD AUTO: 0.4 %
BUN BLD-MCNC: 13 MG/DL (ref 7–18)
BUN/CREAT SERPL: 14.8 (ref 10–20)
CALCIUM BLD-MCNC: 8.8 MG/DL (ref 8.5–10.1)
CHLORIDE SERPL-SCNC: 102 MMOL/L (ref 98–112)
CO2 SERPL-SCNC: 30 MMOL/L (ref 21–32)
CREAT BLD-MCNC: 0.88 MG/DL (ref 0.55–1.02)
DEPRECATED RDW RBC AUTO: 39.4 FL (ref 35.1–46.3)
EOSINOPHIL # BLD AUTO: 0.05 X10(3) UL (ref 0–0.7)
EOSINOPHIL NFR BLD AUTO: 0.7 %
ERYTHROCYTE [DISTWIDTH] IN BLOOD BY AUTOMATED COUNT: 12.2 % (ref 11–15)
GLUCOSE BLD-MCNC: 71 MG/DL (ref 70–99)
GLUCOSE BLDC GLUCOMTR-MCNC: 155 MG/DL (ref 70–99)
GLUCOSE BLDC GLUCOMTR-MCNC: 195 MG/DL (ref 70–99)
GLUCOSE BLDC GLUCOMTR-MCNC: 82 MG/DL (ref 70–99)
HCT VFR BLD AUTO: 37.6 % (ref 35–48)
HGB BLD-MCNC: 12.8 G/DL (ref 12–16)
IMM GRANULOCYTES # BLD AUTO: 0.02 X10(3) UL (ref 0–1)
IMM GRANULOCYTES NFR BLD: 0.3 %
LYMPHOCYTES # BLD AUTO: 1.55 X10(3) UL (ref 1–4)
LYMPHOCYTES NFR BLD AUTO: 22.4 %
MCH RBC QN AUTO: 30.2 PG (ref 26–34)
MCHC RBC AUTO-ENTMCNC: 34 G/DL (ref 31–37)
MCV RBC AUTO: 88.7 FL (ref 80–100)
MONOCYTES # BLD AUTO: 0.64 X10(3) UL (ref 0.1–1)
MONOCYTES NFR BLD AUTO: 9.2 %
NEUTROPHILS # BLD AUTO: 4.64 X10 (3) UL (ref 1.5–7.7)
NEUTROPHILS # BLD AUTO: 4.64 X10(3) UL (ref 1.5–7.7)
NEUTROPHILS NFR BLD AUTO: 67 %
OSMOLALITY SERPL CALC.SUM OF ELEC: 285 MOSM/KG (ref 275–295)
PATIENT FASTING: NO
PLATELET # BLD AUTO: 204 10(3)UL (ref 150–450)
POTASSIUM SERPL-SCNC: 4.2 MMOL/L (ref 3.5–5.1)
RBC # BLD AUTO: 4.24 X10(6)UL (ref 3.8–5.3)
SODIUM SERPL-SCNC: 138 MMOL/L (ref 136–145)
WBC # BLD AUTO: 6.9 X10(3) UL (ref 4–11)

## 2019-07-25 PROCEDURE — 99204 OFFICE O/P NEW MOD 45 MIN: CPT | Performed by: OTHER

## 2019-07-25 PROCEDURE — 93306 TTE W/DOPPLER COMPLETE: CPT | Performed by: HOSPITALIST

## 2019-07-25 PROCEDURE — 99217 OBSERVATION CARE DISCHARGE: CPT | Performed by: HOSPITALIST

## 2019-07-25 RX ORDER — LOSARTAN POTASSIUM 50 MG/1
50 TABLET ORAL NIGHTLY
Status: DISCONTINUED | OUTPATIENT
Start: 2019-07-25 | End: 2019-07-25

## 2019-07-25 RX ORDER — CLOPIDOGREL BISULFATE 75 MG/1
75 TABLET ORAL DAILY
Status: DISCONTINUED | OUTPATIENT
Start: 2019-07-25 | End: 2019-07-25

## 2019-07-25 RX ORDER — CLOPIDOGREL BISULFATE 75 MG/1
75 TABLET ORAL DAILY
Qty: 30 TABLET | Refills: 11 | Status: SHIPPED | OUTPATIENT
Start: 2019-07-26 | End: 2020-01-23

## 2019-07-25 NOTE — PLAN OF CARE
Problem: Diabetes/Glucose Control  Goal: Glucose maintained within prescribed range  Description  INTERVENTIONS:  - Monitor Blood Glucose as ordered  - Assess for signs and symptoms of hyperglycemia and hypoglycemia  - Administer ordered medications to m swallowing and airway patency with patient fatigue and changes in neurological status  - Encourage and assist patient to increase activity and self care with guidance from PT/OT  - Encourage visually impaired, hearing impaired and aphasic patients to use a

## 2019-07-25 NOTE — SLP NOTE
ADULT SWALLOWING EVALUATION    ASSESSMENT    ASSESSMENT/OVERALL IMPRESSION:  Bedside swallow evaluation completed per CVA protocol. RN approved session and reported that the pt is demonstrating no difficulty with swallowing or speaking.  Pt received sitting HISTORY  Current Diet Consistency: Regular; Thin liquids  Dysphagia History: none  Imaging Results: MRI brain 7/24/19: \"CONCLUSION:      Minimal chronic microvascular ischemic disease without acute intracranial abnormality.      Partial opacification of the

## 2019-07-25 NOTE — CONSULTS
[facility] HOSP - [facility]    Report of Consultation    Brenda Shaw Patient Status:  Observation    10/10/1940 MRN [id number]   Location [address] [facility] Attending Alis Claire MD   Hosp Day # 0 PCP Toma Rich DO     Date of Admission:   Concern  Caffeine Concern        Yes    Comment:coffee    Social History Narrative    None on file            Current Medications:    Current Facility-Administered Medications:  losartan Potassium (COZAAR) tab 50 mg 50 mg Oral Nightly   Clopidogrel Admission:  FLUoxetine HCl 20 MG Oral Cap Take 1 capsule (20 mg total) by mouth daily. TRESIBA FLEXTOUCH 100 UNIT/ML Subcutaneous Solution Pen-injector INJECT 48 UNITS INTO THE SKIN DAILY.  (Patient taking differently: 46 Units.  )   ALPRAZolam 0.5 MG Ora 96%   Weight:   151 lb 8 oz        General: No apparent distress, well nourished, well groomed.   Head- Normocephalic, atraumatic  Eyes- No redness or swelling  ENT- Hearing intake, smell preserved, normal glutition  Neck- No masses or adenopathy  Cv: pulse 17 02/19/2019    ALT 26 02/19/2019    PTT 28.5 07/24/2019    INR 1.02 07/24/2019    PTP 13.2 07/24/2019    TSH 1.500 05/21/2019    TROP <0.045 07/24/2019         Imaging:    Mri Brain (cpt=[id number])    Result Date: 7/24/2019  CONCLUSION:   Minimal chronic leticia switch from aspirin to Plavix. In the meantime her LDL is excellent, Doppler of carotids did not show significant stenosis, echocardiogram did not show PFO or intramural clot.   Patient is okay to be discharged home from neurology standpoint    Thank you f

## 2019-07-25 NOTE — OCCUPATIONAL THERAPY NOTE
OCCUPATIONAL THERAPY EVALUATION - INPATIENT     Room Number: 646/906-U  Evaluation Date: 7/25/2019  Type of Evaluation: Initial  Presenting Problem: Zach Merritt)    Physician Order: IP Consult to Occupational Therapy  Reason for Therapy: ADL/IADL Dysfunction an RECOMMENDATIONS  OT Discharge Recommendations: Home; Intermittent Supervision;Home with home health PT  OT Device Recommendations: (rw)    PLAN    Patient has been evaluated and presents with no skilled Occupational Therapy needs  at this time.   Patient casa OBJECTIVE  Fall Risk: Standard fall risk    PAIN ASSESSMENT  Rating: (pt did not rate pain)  Location: (headache)    ACTIVITY TOLERANCE  Good HR 67 /54    O2 SATURATIONS  95% room air    COGNITION  Alert and oriented    VISION  No deficits noted supervision  Static Standing: supervision  Dynamic Standing: supervision w/ rw    FUNCTIONAL ADL ASSESSMENT  Grooming: set up  Feeding: independent  Bathing: set up  Toileting: set up  Upper Extremity Dressing: supervision  Lower Extremity Dressing: Saloni Singer

## 2019-07-25 NOTE — PLAN OF CARE
Problem: Diabetes/Glucose Control  Goal: Glucose maintained within prescribed range  Description  INTERVENTIONS:  - Monitor Blood Glucose as ordered  - Assess for signs and symptoms of hyperglycemia and hypoglycemia  - Administer ordered medications to m integrated in the patient's plan of care  Description  Interventions:  - What would you like us to know as we care for you?  \"I speak Maori and my family is involved in my care\"  - Provide timely, complete, and accurate information to patient/family  -

## 2019-07-25 NOTE — PHYSICAL THERAPY NOTE
PHYSICAL THERAPY EVALUATION - INPATIENT     Room Number: 268/307-X  Evaluation Date: 7/25/2019  Type of Evaluation: Initial   Physician Order: PT Eval and Treat    Presenting Problem: TIA, slurred speech, L sided weakness, hearing loss  Reason for Therapy preparation for discharge. DISCHARGE RECOMMENDATIONS  PT Discharge Recommendations: Home with home health PT(24/7 supervision initially)    PLAN  PT Treatment Plan: Bed mobility;Transfer training;Gait training;Patient education; Family education; Enduranc access)  Stairs to Enter : 0     Stairs to Bedroom: 0       Lives With: Alone(family lives nearby)  Drives: Yes     Patient Regularly Uses: None    Prior Level of [place]: Pt was independent w/ ADLs (including cooking, cleaning, laundry).  Pt amb w/o A training  Transfer training    Patient End of Session: Up in chair;Needs met;Call light within reach;RN aware of session/findings; All patient questions and concerns addressed; Alarm set    CURRENT GOALS    Goals to be met by: 8/8/19  Patient Goal Patient's

## 2019-07-26 ENCOUNTER — PATIENT OUTREACH (OUTPATIENT)
Dept: CASE MANAGEMENT | Age: 79
End: 2019-07-26

## 2019-07-26 DIAGNOSIS — Z02.9 ENCOUNTERS FOR ADMINISTRATIVE PURPOSE: ICD-10-CM

## 2019-07-26 NOTE — DISCHARGE SUMMARY
[facility]    Discharge Summary    Mahsa Yo Patient Status:  Observation    10/10/1940 MRN [id number]   Location [facility] Attending No att. providers found   Hosp Day # 0 PCP Ammy Senior DO     Date of Admission:  were negative. The patient will be admitted to the hospital for further management and observation. Aspirin was given in the emergency room. Hospital Course:     TIA  Pt sx likely related to TIA. Sx resolved within 24 hrs. Neuro consulted.   CT head Take 1 capsule (300 mg total) by mouth 2 (two) times daily. Quantity:  14 capsule  Refills:  0     Diclofenac Sodium 1 % Gel  Commonly known as:  VOLTAREN      APPLY 2 G TOPICALLY 4 (FOUR) TIMES DAILY AS NEEDED (JOINT PAIN).    Quantity:  100 g  Refills: Risk  0-28   Low Risk. TCM Follow-Up Recommendation:  LACE > 58: High Risk of readmission after discharge from the hospital.      >35 minutes spent preparing this discharge.     Froylan Koroma  7/25/2019  9:45 PM

## 2019-07-29 NOTE — PROGRESS NOTES
Initial Post Discharge Follow Up   Discharge Date: 7/25/19  Contact Date: 7/26/2019    Consent Verification:  Assessment Completed With: Patient  HIPAA Verified?   Yes   Outreach in Ivorian     Discharge Dx:   TIA    General:   • How have you been since y ALPRAZolam 0.5 MG Oral Tab Take 1 tablet (0.5 mg total) by mouth nightly as needed for Anxiety. Disp: 10 tablet Rfl: 0   hydrocortisone (PROCTOZONE-HC) 2.5 % Rectal Cream Place 1 Application rectally 2 (two) times daily as needed for Hemorrhoids.  Disp: 2 are there any needs or concerns you need addressed before your next visit with your PCP?  (DME, meds, disease concerns, Etc): No     Follow up appointments:       Your appointments     Date & Time Appointment Department [place] [facility])    Aug 02, 2019  1:20 PM CD

## 2019-07-29 NOTE — PROGRESS NOTES
KYLE placed call and spoke to [facility], who states she does not speak [place], her daughter Celso Fontaine is a work right now. KYLE will call later.

## 2019-08-02 ENCOUNTER — OFFICE VISIT (OUTPATIENT)
Dept: INTERNAL MEDICINE CLINIC | Facility: CLINIC | Age: 79
End: 2019-08-02
Payer: MEDICARE

## 2019-08-02 VITALS
RESPIRATION RATE: 17 BRPM | OXYGEN SATURATION: 98 % | SYSTOLIC BLOOD PRESSURE: 120 MMHG | WEIGHT: 156 LBS | DIASTOLIC BLOOD PRESSURE: 68 MMHG | HEIGHT: 63 IN | BODY MASS INDEX: 27.64 KG/M2 | HEART RATE: 68 BPM

## 2019-08-02 DIAGNOSIS — Z09 EXAMINATION, FOLLOW UP: Primary | ICD-10-CM

## 2019-08-02 DIAGNOSIS — G45.9 TIA (TRANSIENT ISCHEMIC ATTACK): ICD-10-CM

## 2019-08-02 DIAGNOSIS — E78.2 MIXED HYPERLIPIDEMIA: ICD-10-CM

## 2019-08-02 DIAGNOSIS — E11.9 TYPE 2 DIABETES MELLITUS WITHOUT COMPLICATION, WITH LONG-TERM CURRENT USE OF INSULIN (HCC): ICD-10-CM

## 2019-08-02 DIAGNOSIS — Z79.4 TYPE 2 DIABETES MELLITUS WITHOUT COMPLICATION, WITH LONG-TERM CURRENT USE OF INSULIN (HCC): ICD-10-CM

## 2019-08-02 PROCEDURE — 99495 TRANSJ CARE MGMT MOD F2F 14D: CPT | Performed by: FAMILY MEDICINE

## 2019-08-02 NOTE — PROGRESS NOTES
HPI:    Chandu Tadeo is a 77year old female here today for hospital follow up.    She was discharged from Inpatient hospital, [facility] AND [facility]  to Home   Admission Date: 7/24/19   Discharge Date: 7/25/19  Hospital Discharge Diagnoses (since 7/3/2019) Take 1 capsule (20 mg total) by mouth daily. DICLOFENAC SODIUM 1 % Transdermal Gel APPLY 2 G TOPICALLY 4 (FOUR) TIMES DAILY AS NEEDED (JOINT PAIN). Glucose Blood (ACCU-CHEK JOSE PLUS) In Vitro Strip Inject 1 Device into the skin 2 (two) times daily.  D has never used smokeless tobacco. She reports that she does not drink alcohol or use drugs.      ROS:   GENERAL: weight stable, energy stable, no sweating  SKIN: denies any unusual skin lesions  EYES: denies blurred vision or double vision  HEENT: denies na visit:    Examination, follow up    Type 2 diabetes mellitus without complication, with long-term current use of insulin ([facility].):  WELL CONTROLLED BUT HAVING SOME HYPOGLYCEMIA-->DECREASE BASAL INSULIN TRESIBA FROM 46-->42 U DAILY    Mixed hyperlipidemia:  WE

## 2019-08-19 DIAGNOSIS — Z79.4 TYPE 2 DIABETES MELLITUS WITHOUT COMPLICATION, WITH LONG-TERM CURRENT USE OF INSULIN (HCC): ICD-10-CM

## 2019-08-19 DIAGNOSIS — E11.9 TYPE 2 DIABETES MELLITUS WITHOUT COMPLICATION, WITH LONG-TERM CURRENT USE OF INSULIN (HCC): ICD-10-CM

## 2019-08-19 RX ORDER — INSULIN DEGLUDEC INJECTION 100 U/ML
INJECTION, SOLUTION SUBCUTANEOUS
Qty: 9 PEN | Refills: 1 | Status: SHIPPED | OUTPATIENT
Start: 2019-08-19 | End: 2019-09-19 | Stop reason: DRUGHIGH

## 2019-09-19 ENCOUNTER — OFFICE VISIT (OUTPATIENT)
Dept: INTERNAL MEDICINE CLINIC | Facility: CLINIC | Age: 79
End: 2019-09-19
Payer: MEDICARE

## 2019-09-19 VITALS
HEIGHT: 63 IN | BODY MASS INDEX: 26.4 KG/M2 | SYSTOLIC BLOOD PRESSURE: 126 MMHG | DIASTOLIC BLOOD PRESSURE: 64 MMHG | WEIGHT: 149 LBS | HEART RATE: 71 BPM | OXYGEN SATURATION: 98 %

## 2019-09-19 DIAGNOSIS — I10 ESSENTIAL HYPERTENSION: ICD-10-CM

## 2019-09-19 DIAGNOSIS — H35.369 DEGENERATIVE DRUSEN, UNSPECIFIED LATERALITY: ICD-10-CM

## 2019-09-19 DIAGNOSIS — E11.9 TYPE 2 DIABETES MELLITUS WITHOUT COMPLICATION, WITH LONG-TERM CURRENT USE OF INSULIN (HCC): Primary | ICD-10-CM

## 2019-09-19 DIAGNOSIS — Z51.81 MEDICATION MONITORING ENCOUNTER: ICD-10-CM

## 2019-09-19 DIAGNOSIS — Z79.4 TYPE 2 DIABETES MELLITUS WITHOUT COMPLICATION, WITH LONG-TERM CURRENT USE OF INSULIN (HCC): Primary | ICD-10-CM

## 2019-09-19 DIAGNOSIS — E78.2 MIXED HYPERLIPIDEMIA: ICD-10-CM

## 2019-09-19 PROCEDURE — 99213 OFFICE O/P EST LOW 20 MIN: CPT | Performed by: FAMILY MEDICINE

## 2019-09-19 RX ORDER — CYANOCOBALAMIN 1000 UG/ML
1000 INJECTION INTRAMUSCULAR; SUBCUTANEOUS ONCE
Status: COMPLETED | OUTPATIENT
Start: 2019-09-19 | End: 2019-09-20

## 2019-09-19 NOTE — PROGRESS NOTES
HPI:   Romeo Orourke is a 77year old female who presents for recheck of her diabetes.   DM dx at age:  20+ years ago  Current medications:   Current Outpatient Medications:  Insulin Degludec (TRESIBA FLEXTOUCH) 100 UNIT/ML Subcutaneous Solution Pen-inj intermittent    Other medical problems:  Patient Active Problem List:     Essential hypertension     Mixed hyperlipidemia     Insulin-requiring or dependent type II diabetes mellitus ([facility]     History of left cataract surgery     Status post right cataract 98%   BMI 26.39 kg/m²   GENERAL: well developed, well nourished,in no apparent distress  SKIN: no rashes,no suspicious lesions  NECK: supple,no adenopathy,no carotid bruits  LUNGS: clear to auscultation  CARDIO: RRR without murmur  GI: good BS's,no masses,

## 2019-09-20 PROCEDURE — 96372 THER/PROPH/DIAG INJ SC/IM: CPT | Performed by: FAMILY MEDICINE

## 2019-10-28 DIAGNOSIS — M15.9 PRIMARY OSTEOARTHRITIS INVOLVING MULTIPLE JOINTS: ICD-10-CM

## 2019-11-07 DIAGNOSIS — Z79.4 TYPE 2 DIABETES MELLITUS WITHOUT COMPLICATION, WITH LONG-TERM CURRENT USE OF INSULIN (HCC): ICD-10-CM

## 2019-11-07 DIAGNOSIS — E11.9 TYPE 2 DIABETES MELLITUS WITHOUT COMPLICATION, WITH LONG-TERM CURRENT USE OF INSULIN (HCC): ICD-10-CM

## 2019-11-08 DIAGNOSIS — I10 ESSENTIAL HYPERTENSION: ICD-10-CM

## 2019-11-08 DIAGNOSIS — E78.2 MIXED HYPERLIPIDEMIA: ICD-10-CM

## 2019-11-08 DIAGNOSIS — E11.9 TYPE 2 DIABETES MELLITUS WITHOUT COMPLICATION, WITH LONG-TERM CURRENT USE OF INSULIN (HCC): ICD-10-CM

## 2019-11-08 DIAGNOSIS — Z79.4 TYPE 2 DIABETES MELLITUS WITHOUT COMPLICATION, WITH LONG-TERM CURRENT USE OF INSULIN (HCC): ICD-10-CM

## 2019-11-08 RX ORDER — ATORVASTATIN CALCIUM 40 MG/1
TABLET, FILM COATED ORAL
Qty: 90 TABLET | Refills: 3 | Status: SHIPPED | OUTPATIENT
Start: 2019-11-08 | End: 2020-01-23

## 2019-11-08 RX ORDER — LOSARTAN POTASSIUM 50 MG/1
TABLET ORAL
Qty: 90 TABLET | Refills: 3 | Status: SHIPPED | OUTPATIENT
Start: 2019-11-08 | End: 2020-01-23

## 2019-11-09 DIAGNOSIS — I10 ESSENTIAL HYPERTENSION: ICD-10-CM

## 2019-11-09 RX ORDER — METOPROLOL SUCCINATE 100 MG/1
TABLET, EXTENDED RELEASE ORAL
Qty: 90 TABLET | Refills: 3 | Status: SHIPPED | OUTPATIENT
Start: 2019-11-09 | End: 2020-01-23

## 2019-11-11 DIAGNOSIS — F34.1 DYSTHYMIA: ICD-10-CM

## 2019-11-11 RX ORDER — FLUOXETINE HYDROCHLORIDE 20 MG/1
CAPSULE ORAL
Qty: 90 CAPSULE | Refills: 1 | Status: SHIPPED | OUTPATIENT
Start: 2019-11-11 | End: 2020-01-23 | Stop reason: ALTCHOICE

## 2019-12-02 DIAGNOSIS — E11.9 TYPE 2 DIABETES MELLITUS WITHOUT COMPLICATION, WITH LONG-TERM CURRENT USE OF INSULIN (HCC): ICD-10-CM

## 2019-12-02 DIAGNOSIS — Z79.4 TYPE 2 DIABETES MELLITUS WITHOUT COMPLICATION, WITH LONG-TERM CURRENT USE OF INSULIN (HCC): ICD-10-CM

## 2019-12-02 RX ORDER — INSULIN DEGLUDEC INJECTION 100 U/ML
INJECTION, SOLUTION SUBCUTANEOUS
Qty: 27 PEN | Refills: 1 | Status: SHIPPED | OUTPATIENT
Start: 2019-12-02 | End: 2020-01-23

## 2020-01-15 DIAGNOSIS — M81.0 POST-MENOPAUSAL OSTEOPOROSIS: ICD-10-CM

## 2020-01-15 RX ORDER — ALENDRONATE SODIUM 40 MG/1
TABLET ORAL
Qty: 12 TABLET | Refills: 0 | Status: SHIPPED | OUTPATIENT
Start: 2020-01-15 | End: 2020-01-23

## 2020-01-23 ENCOUNTER — OFFICE VISIT (OUTPATIENT)
Dept: INTERNAL MEDICINE CLINIC | Facility: CLINIC | Age: 80
End: 2020-01-23
Payer: MEDICARE

## 2020-01-23 VITALS
DIASTOLIC BLOOD PRESSURE: 68 MMHG | BODY MASS INDEX: 26.58 KG/M2 | HEART RATE: 76 BPM | HEIGHT: 63 IN | OXYGEN SATURATION: 98 % | WEIGHT: 150 LBS | SYSTOLIC BLOOD PRESSURE: 130 MMHG

## 2020-01-23 DIAGNOSIS — E78.2 MIXED HYPERLIPIDEMIA: ICD-10-CM

## 2020-01-23 DIAGNOSIS — M81.0 POST-MENOPAUSAL OSTEOPOROSIS: ICD-10-CM

## 2020-01-23 DIAGNOSIS — N30.90 CYSTITIS: ICD-10-CM

## 2020-01-23 DIAGNOSIS — R82.79 URINE CULTURE POSITIVE: ICD-10-CM

## 2020-01-23 DIAGNOSIS — I10 ESSENTIAL HYPERTENSION: ICD-10-CM

## 2020-01-23 DIAGNOSIS — Z86.73 HISTORY OF TIA (TRANSIENT ISCHEMIC ATTACK): ICD-10-CM

## 2020-01-23 DIAGNOSIS — Z79.4 TYPE 2 DIABETES MELLITUS WITHOUT COMPLICATION, WITH LONG-TERM CURRENT USE OF INSULIN (HCC): ICD-10-CM

## 2020-01-23 DIAGNOSIS — Z00.00 ANNUAL PHYSICAL EXAM: Primary | ICD-10-CM

## 2020-01-23 DIAGNOSIS — E11.65 UNCONTROLLED TYPE 2 DIABETES MELLITUS WITH HYPERGLYCEMIA (HCC): ICD-10-CM

## 2020-01-23 DIAGNOSIS — B07.9 VIRAL WARTS, UNSPECIFIED TYPE: ICD-10-CM

## 2020-01-23 DIAGNOSIS — M15.9 PRIMARY OSTEOARTHRITIS INVOLVING MULTIPLE JOINTS: ICD-10-CM

## 2020-01-23 DIAGNOSIS — E11.9 TYPE 2 DIABETES MELLITUS WITHOUT COMPLICATION, WITH LONG-TERM CURRENT USE OF INSULIN (HCC): ICD-10-CM

## 2020-01-23 LAB
ALBUMIN SERPL-MCNC: 3.7 G/DL (ref 3.4–5)
ALBUMIN/GLOB SERPL: 1 {RATIO} (ref 1–2)
ALP LIVER SERPL-CCNC: 44 U/L (ref 55–142)
ALT SERPL-CCNC: 20 U/L (ref 13–56)
ANION GAP SERPL CALC-SCNC: 8 MMOL/L (ref 0–18)
APPEARANCE: CLEAR
AST SERPL-CCNC: 11 U/L (ref 15–37)
BILIRUB SERPL-MCNC: 0.6 MG/DL (ref 0.1–2)
BUN BLD-MCNC: 16 MG/DL (ref 7–18)
BUN/CREAT SERPL: 13.9 (ref 10–20)
CALCIUM BLD-MCNC: 8.9 MG/DL (ref 8.5–10.1)
CHLORIDE SERPL-SCNC: 105 MMOL/L (ref 98–112)
CHOLEST SMN-MCNC: 105 MG/DL (ref ?–200)
CO2 SERPL-SCNC: 27 MMOL/L (ref 21–32)
CREAT BLD-MCNC: 1.15 MG/DL (ref 0.55–1.02)
EST. AVERAGE GLUCOSE BLD GHB EST-MCNC: 186 MG/DL (ref 68–126)
GLOBULIN PLAS-MCNC: 3.6 G/DL (ref 2.8–4.4)
GLUCOSE BLD-MCNC: 117 MG/DL (ref 70–99)
HBA1C MFR BLD HPLC: 8.1 % (ref ?–5.7)
HDLC SERPL-MCNC: 33 MG/DL (ref 40–59)
LDLC SERPL CALC-MCNC: 42 MG/DL (ref ?–100)
M PROTEIN MFR SERPL ELPH: 7.3 G/DL (ref 6.4–8.2)
MULTISTIX LOT#: ABNORMAL NUMERIC
NITRITE, URINE: POSITIVE
NONHDLC SERPL-MCNC: 72 MG/DL (ref ?–130)
OSMOLALITY SERPL CALC.SUM OF ELEC: 292 MOSM/KG (ref 275–295)
PATIENT FASTING Y/N/NP: YES
PATIENT FASTING Y/N/NP: YES
PH, URINE: 6 (ref 4.5–8)
POTASSIUM SERPL-SCNC: 4.3 MMOL/L (ref 3.5–5.1)
SODIUM SERPL-SCNC: 140 MMOL/L (ref 136–145)
SPECIFIC GRAVITY: 1.01 (ref 1–1.03)
TRIGL SERPL-MCNC: 149 MG/DL (ref 30–149)
URINE-COLOR: YELLOW
UROBILINOGEN,SEMI-QN: 0.2 MG/DL (ref 0–1.9)
VLDLC SERPL CALC-MCNC: 30 MG/DL (ref 0–30)

## 2020-01-23 PROCEDURE — 87088 URINE BACTERIA CULTURE: CPT | Performed by: FAMILY MEDICINE

## 2020-01-23 PROCEDURE — 81003 URINALYSIS AUTO W/O SCOPE: CPT | Performed by: FAMILY MEDICINE

## 2020-01-23 PROCEDURE — 83036 HEMOGLOBIN GLYCOSYLATED A1C: CPT | Performed by: FAMILY MEDICINE

## 2020-01-23 PROCEDURE — 80061 LIPID PANEL: CPT | Performed by: FAMILY MEDICINE

## 2020-01-23 PROCEDURE — 87086 URINE CULTURE/COLONY COUNT: CPT | Performed by: FAMILY MEDICINE

## 2020-01-23 PROCEDURE — 17110 DESTRUCTION B9 LES UP TO 14: CPT | Performed by: FAMILY MEDICINE

## 2020-01-23 PROCEDURE — 80053 COMPREHEN METABOLIC PANEL: CPT | Performed by: FAMILY MEDICINE

## 2020-01-23 PROCEDURE — 87186 SC STD MICRODIL/AGAR DIL: CPT | Performed by: FAMILY MEDICINE

## 2020-01-23 PROCEDURE — G0439 PPPS, SUBSEQ VISIT: HCPCS | Performed by: FAMILY MEDICINE

## 2020-01-23 RX ORDER — METOPROLOL SUCCINATE 100 MG/1
100 TABLET, EXTENDED RELEASE ORAL
Qty: 90 TABLET | Refills: 3 | Status: SHIPPED | OUTPATIENT
Start: 2020-01-23 | End: 2021-01-22

## 2020-01-23 RX ORDER — ALENDRONATE SODIUM 40 MG/1
TABLET ORAL
Qty: 12 TABLET | Refills: 3 | Status: SHIPPED | OUTPATIENT
Start: 2020-01-23 | End: 2020-07-07 | Stop reason: DRUGHIGH

## 2020-01-23 RX ORDER — ATORVASTATIN CALCIUM 40 MG/1
TABLET, FILM COATED ORAL
Qty: 90 TABLET | Refills: 3 | Status: SHIPPED | OUTPATIENT
Start: 2020-01-23 | End: 2021-01-22

## 2020-01-23 RX ORDER — LOSARTAN POTASSIUM 50 MG/1
50 TABLET ORAL
Qty: 90 TABLET | Refills: 3 | Status: SHIPPED | OUTPATIENT
Start: 2020-01-23 | End: 2020-10-01 | Stop reason: ALTCHOICE

## 2020-01-23 RX ORDER — INSULIN LISPRO 100 [IU]/ML
7 INJECTION, SOLUTION INTRAVENOUS; SUBCUTANEOUS
Qty: 1 PEN | Refills: 5 | Status: SHIPPED | OUTPATIENT
Start: 2020-01-23 | End: 2020-06-08 | Stop reason: ALTCHOICE

## 2020-01-23 RX ORDER — CLOPIDOGREL BISULFATE 75 MG/1
75 TABLET ORAL DAILY
Qty: 90 TABLET | Refills: 3 | Status: ON HOLD | OUTPATIENT
Start: 2020-01-23 | End: 2020-10-05

## 2020-01-23 RX ORDER — SULFAMETHOXAZOLE AND TRIMETHOPRIM 800; 160 MG/1; MG/1
1 TABLET ORAL 2 TIMES DAILY
Qty: 10 TABLET | Refills: 0 | Status: SHIPPED | OUTPATIENT
Start: 2020-01-23 | End: 2020-01-28

## 2020-01-24 NOTE — PROGRESS NOTES
HPI:   Shaq Osborne is a [de-identified]year old female who presents for a Medicare Annual Wellness visit.     Patient Active Problem List:     Essential hypertension     Mixed hyperlipidemia     Insulin-requiring or dependent type II diabetes mellitus ([facility] No    Difficulty dressing or bathing?: No    Problems with daily activities? : No    Memory Problems?: No      Fall/Risk Assessment          Have you fallen in the last 12 months?: 0-No    Fall/Risk Scorin          Depression Screening (PHQ-2/PHQ-9): O tablet (75 mg total) by mouth daily. 90 tablet 3   • Sulfamethoxazole-TMP -160 MG Oral Tab per tablet Take 1 tablet by mouth 2 (two) times daily for 5 days.  10 tablet 0   • Insulin Lispro, 1 Unit Dial, (HUMALOG KWIKPEN) 100 UNIT/ML Subcutaneous Solut 0.0 standard drinks    Drug use: Never    Occ: RETIRED : NO      REVIEW OF SYSTEMS:   GENERAL: feels well otherwise  SKIN: WARTY GROWTH ON RIGHT FOREHEAD X WEEKS  EYES: denies blurred vision or double vision  HEENT: denies nasal congestion, sinus pa deformity, history of amputation, dystrophic nails or dry skin.   NEURO: Oriented times three, cranial nerves are intact, motor and sensory are grossly intact    URINE DIP:  POS LEUK/NITRITES    ASSESSMENT AND OTHER RELEVANT CHRONIC CONDITIONS:   Noemi needed (JOINT PAIN). History of TIA (transient ischemic attack)  -     Clopidogrel Bisulfate 75 MG Oral Tab; Take 1 tablet (75 mg total) by mouth daily.     Viral warts, unspecified type:  CRYOTX X 2 ON ONE WART    Other orders  -     Sulfamethoxazole-TM this patient. Update Health Maintenance if applicable   Immunizations      Influenza No orders found for this or any previous visit.  Update Immunization Activity if applicable    Pneumococcal Orders placed or performed in visit on 08/29/17   • PNEUMOCOCCAL preventive care reminders to display for this patient. Pap All Patients q2 year if indicated There are no preventive care reminders to display for this patient.    Mammogram Age > 39 q1 year There are no preventive care reminders to display for this patie

## 2020-01-25 DIAGNOSIS — N30.90 CYSTITIS: Primary | ICD-10-CM

## 2020-01-25 RX ORDER — CIPROFLOXACIN 250 MG/1
250 TABLET, FILM COATED ORAL 2 TIMES DAILY
Qty: 10 TABLET | Refills: 0 | Status: SHIPPED | OUTPATIENT
Start: 2020-01-25 | End: 2020-01-30

## 2020-03-13 ENCOUNTER — OFFICE VISIT (OUTPATIENT)
Dept: FAMILY MEDICINE CLINIC | Facility: CLINIC | Age: 80
End: 2020-03-13
Payer: COMMERCIAL

## 2020-03-13 VITALS
HEIGHT: 63 IN | SYSTOLIC BLOOD PRESSURE: 132 MMHG | HEART RATE: 77 BPM | BODY MASS INDEX: 26.58 KG/M2 | TEMPERATURE: 98 F | OXYGEN SATURATION: 99 % | WEIGHT: 150 LBS | DIASTOLIC BLOOD PRESSURE: 70 MMHG

## 2020-03-13 DIAGNOSIS — J30.9 ALLERGIC RHINITIS, UNSPECIFIED SEASONALITY, UNSPECIFIED TRIGGER: Primary | ICD-10-CM

## 2020-03-13 PROCEDURE — 99213 OFFICE O/P EST LOW 20 MIN: CPT | Performed by: NURSE PRACTITIONER

## 2020-03-13 NOTE — PATIENT INSTRUCTIONS
Rinitis alérgica  La rinitis alérgica es josephine reacción alérgica que afecta la nariz y, con frecuencia, los ojos. Se la suele conocer ivone alergias nasales.  Es frecuente que las alergias nasales se deban a elementos que están presentes en el Summer's · Use josephine mascarilla con filtro cuando [place] el césped o trabaje en [place]. Ácaros del polvo en la casa:  · Lave la ropa de cama todas las semanas con [place] y detergente, y séquela en un ambiente caliente.   · Cubra el colchón, el somier y las a © 2176-5021 [employer] [address]. [facility], [facility]. Todos los derechos reservados. Esta información no pretende sustituir la atención médica profesional. Sólo olivarez médico puede diagnosticar y tratar un problema de yassine.     Incr

## 2020-03-13 NOTE — PROGRESS NOTES
CHIEF COMPLAINT:   Patient presents with:  Cough: cough, congestion, itchy ears x 3 days. HPI:     The patient complains of cough, congestion and itchy ears for 3 days.  Symptoms include: nasal congestion,clear rhinorrhea,nasal itching,plugged ears, • Ketotifen Fumarate 0.025 % Ophthalmic Solution Place 1 drop into both eyes 2 (two) times daily.  5 mL 2   • B Complex-C Oral Tab TK 1 T PO QD  3      Past Medical History:   Diagnosis Date   • Cataract    • Diabetes ([facility]    • Diverticulosis large intesti HEENT: head atraumatic, normocephalic, TM wnl mazin, no erythema of the throat. Moist oral and throat mucosa. Cobblestoning posterior tongue. NOSE- normal external nose. Nasal discharge- clear mucoid. Nasal mucosa- pink, no swelling.  No polyps, septum norm Se pueden realizar pruebas para detectar a qué alérgenos reacciona usted. Es posible que le remitan a un especialista en alergias para que le evalúe y le anatoliy pruebas.   Cuidados en olivarez casa  Probablemente el proveedor de [employer] recete medicament · No fume. Evite el humo de cigarrillo. Viktoriya humo es irritante y [employer]   Visitas de control  Programe josephine visita de control según le indique el proveedor de atención médica o nuestro personal. Si le remitieron a un especialista en aler

## 2020-03-19 DIAGNOSIS — Z79.4 TYPE 2 DIABETES MELLITUS WITHOUT COMPLICATION, WITH LONG-TERM CURRENT USE OF INSULIN (HCC): ICD-10-CM

## 2020-03-19 DIAGNOSIS — E11.9 TYPE 2 DIABETES MELLITUS WITHOUT COMPLICATION, WITH LONG-TERM CURRENT USE OF INSULIN (HCC): ICD-10-CM

## 2020-03-19 RX ORDER — BLOOD SUGAR DIAGNOSTIC
1 STRIP MISCELLANEOUS 2 TIMES DAILY
Qty: 100 STRIP | Refills: 5 | Status: SHIPPED | OUTPATIENT
Start: 2020-03-19 | End: 2021-01-06

## 2020-03-19 RX ORDER — PEN NEEDLE, DIABETIC 30 GX3/16"
1 NEEDLE, DISPOSABLE MISCELLANEOUS DAILY
Qty: 100 EACH | Refills: 3 | Status: SHIPPED | OUTPATIENT
Start: 2020-03-19 | End: 2021-04-28

## 2020-04-18 ENCOUNTER — VIRTUAL PHONE E/M (OUTPATIENT)
Dept: INTERNAL MEDICINE CLINIC | Facility: CLINIC | Age: 80
End: 2020-04-18
Payer: COMMERCIAL

## 2020-04-18 DIAGNOSIS — I87.8 VENOUS STASIS OF BOTH LOWER EXTREMITIES: ICD-10-CM

## 2020-04-18 DIAGNOSIS — R60.0 BILATERAL LEG EDEMA: Primary | ICD-10-CM

## 2020-04-18 DIAGNOSIS — E11.9 TYPE 2 DIABETES MELLITUS WITHOUT COMPLICATION, WITH LONG-TERM CURRENT USE OF INSULIN (HCC): ICD-10-CM

## 2020-04-18 DIAGNOSIS — Z79.4 TYPE 2 DIABETES MELLITUS WITHOUT COMPLICATION, WITH LONG-TERM CURRENT USE OF INSULIN (HCC): ICD-10-CM

## 2020-04-18 PROCEDURE — 99442 PHONE E/M BY PHYS 11-20 MIN: CPT | Performed by: FAMILY MEDICINE

## 2020-04-18 RX ORDER — FUROSEMIDE 20 MG/1
20 TABLET ORAL DAILY PRN
Qty: 90 TABLET | Refills: 0 | Status: SHIPPED | OUTPATIENT
Start: 2020-04-18 | End: 2020-07-10

## 2020-04-18 NOTE — PROGRESS NOTES
Virtual Telephone Check-In    Regina Smith verbally consents to a Virtual/Telephone Check-In visit on 04/18/20. Patient understands and accepts financial responsibility for any deductible, co-insurance and/or co-pays associated with this service.

## 2020-05-14 ENCOUNTER — VIRTUAL PHONE E/M (OUTPATIENT)
Dept: INTERNAL MEDICINE CLINIC | Facility: CLINIC | Age: 80
End: 2020-05-14
Payer: COMMERCIAL

## 2020-05-14 DIAGNOSIS — N30.90 CYSTITIS: Primary | ICD-10-CM

## 2020-05-14 PROBLEM — N18.30 CKD (CHRONIC KIDNEY DISEASE) STAGE 3, GFR 30-59 ML/MIN (HCC): Chronic | Status: ACTIVE | Noted: 2020-05-14

## 2020-05-14 PROCEDURE — 99441 PHONE E/M BY PHYS 5-10 MIN: CPT | Performed by: FAMILY MEDICINE

## 2020-05-14 RX ORDER — CLOTRIMAZOLE 1 %
1 CREAM (GRAM) TOPICAL 2 TIMES DAILY
Qty: 28 G | Refills: 0 | Status: SHIPPED | OUTPATIENT
Start: 2020-05-14

## 2020-05-14 RX ORDER — NITROFURANTOIN 25; 75 MG/1; MG/1
100 CAPSULE ORAL 2 TIMES DAILY
Qty: 14 CAPSULE | Refills: 0 | Status: SHIPPED | OUTPATIENT
Start: 2020-05-14 | End: 2020-05-21

## 2020-05-14 NOTE — PROGRESS NOTES
Virtual Telephone Check-In    Evelyn Raygoza verbally consents to a Virtual/Telephone Check-In visit on 05/14/20. Patient understands and accepts financial responsibility for any deductible, co-insurance and/or co-pays associated with this service.

## 2020-06-08 ENCOUNTER — OFFICE VISIT (OUTPATIENT)
Dept: INTERNAL MEDICINE CLINIC | Facility: CLINIC | Age: 80
End: 2020-06-08
Payer: MEDICARE

## 2020-06-08 VITALS
OXYGEN SATURATION: 96 % | WEIGHT: 150.63 LBS | TEMPERATURE: 99 F | DIASTOLIC BLOOD PRESSURE: 62 MMHG | SYSTOLIC BLOOD PRESSURE: 138 MMHG | HEART RATE: 75 BPM | HEIGHT: 63 IN | BODY MASS INDEX: 26.69 KG/M2

## 2020-06-08 DIAGNOSIS — E78.2 MIXED HYPERLIPIDEMIA: ICD-10-CM

## 2020-06-08 DIAGNOSIS — N18.30 CKD (CHRONIC KIDNEY DISEASE) STAGE 3, GFR 30-59 ML/MIN (HCC): ICD-10-CM

## 2020-06-08 DIAGNOSIS — K64.4 EXTERNAL HEMORRHOIDS: ICD-10-CM

## 2020-06-08 DIAGNOSIS — Z79.4 TYPE 2 DIABETES MELLITUS WITHOUT COMPLICATION, WITH LONG-TERM CURRENT USE OF INSULIN (HCC): Primary | ICD-10-CM

## 2020-06-08 DIAGNOSIS — I10 ESSENTIAL HYPERTENSION: ICD-10-CM

## 2020-06-08 DIAGNOSIS — E11.9 TYPE 2 DIABETES MELLITUS WITHOUT COMPLICATION, WITH LONG-TERM CURRENT USE OF INSULIN (HCC): Primary | ICD-10-CM

## 2020-06-08 PROCEDURE — 80053 COMPREHEN METABOLIC PANEL: CPT | Performed by: FAMILY MEDICINE

## 2020-06-08 PROCEDURE — 80061 LIPID PANEL: CPT | Performed by: FAMILY MEDICINE

## 2020-06-08 PROCEDURE — 83036 HEMOGLOBIN GLYCOSYLATED A1C: CPT | Performed by: FAMILY MEDICINE

## 2020-06-08 PROCEDURE — 99214 OFFICE O/P EST MOD 30 MIN: CPT | Performed by: FAMILY MEDICINE

## 2020-06-08 RX ORDER — HYDROCORTISONE 25 MG/G
1 CREAM TOPICAL 2 TIMES DAILY
Qty: 28 G | Refills: 5 | Status: SHIPPED | OUTPATIENT
Start: 2020-06-08

## 2020-06-08 NOTE — PROGRESS NOTES
HPI:   Marisa Tompkins is a [de-identified]year old female who presents for recheck of her diabetes.   DM dx at age:    Current medications:   Current Outpatient Medications   Medication Sig Dispense Refill   • Insulin Degludec (TRESIBA FLEXTOUCH) 100 UNIT/ML [place] Retinopathy:    on ACE-I/ARB:    Numbness/tingling in feet:      Other medical problems:  Patient Active Problem List:     Essential hypertension     Mixed hyperlipidemia     Insulin-requiring or dependent type II diabetes mellitus ([facility]     History of lef EXPENSIVE) SO TAKING TRESIBA INSULIN AND METFORMIN    EXAM:   /62   Pulse 75   Temp 98.8 °F (37.1 °C) (Oral)   Ht 63\"   Wt 150 lb 9.6 oz (68.3 kg)   SpO2 96%   BMI 26.68 kg/m²   GENERAL: well developed, well nourished,in no apparent distress  SKIN: indicates understanding of these issues and agrees to the plan. The patient is asked to return in      .   Orders Placed This Encounter      Comp Metabolic Panel (14) [E]      Hemoglobin A1C (Glycohemoglobin) [E]      Lipid Panel [E]      Meds & Refills fo

## 2020-06-09 ENCOUNTER — TELEPHONE (OUTPATIENT)
Dept: INTERNAL MEDICINE CLINIC | Facility: CLINIC | Age: 80
End: 2020-06-09

## 2020-06-09 NOTE — TELEPHONE ENCOUNTER
Received fax from pharmacy needs clarification on Insulin 100mg  Is pt to inj 42 or 43 units   Please advise,

## 2020-06-28 ENCOUNTER — APPOINTMENT (OUTPATIENT)
Dept: MRI IMAGING | Facility: HOSPITAL | Age: 80
End: 2020-06-28
Attending: EMERGENCY MEDICINE
Payer: MEDICARE

## 2020-06-28 ENCOUNTER — HOSPITAL ENCOUNTER (EMERGENCY)
Facility: HOSPITAL | Age: 80
Discharge: HOME | End: 2020-06-29
Attending: EMERGENCY MEDICINE
Payer: MEDICARE

## 2020-06-28 ENCOUNTER — TELEPHONE (OUTPATIENT)
Dept: FAMILY MEDICINE CLINIC | Facility: CLINIC | Age: 80
End: 2020-06-28

## 2020-06-28 ENCOUNTER — APPOINTMENT (OUTPATIENT)
Dept: CT IMAGING | Facility: HOSPITAL | Age: 80
End: 2020-06-28
Attending: EMERGENCY MEDICINE
Payer: MEDICARE

## 2020-06-28 DIAGNOSIS — Z20.822 CLOSE EXPOSURE TO 2019 NOVEL CORONAVIRUS: Primary | ICD-10-CM

## 2020-06-28 DIAGNOSIS — R20.0 NUMBNESS AND TINGLING: Primary | ICD-10-CM

## 2020-06-28 DIAGNOSIS — R20.2 NUMBNESS AND TINGLING: Primary | ICD-10-CM

## 2020-06-28 DIAGNOSIS — F41.0 PANIC ATTACK: ICD-10-CM

## 2020-06-28 PROCEDURE — 99285 EMERGENCY DEPT VISIT HI MDM: CPT

## 2020-06-28 PROCEDURE — 83735 ASSAY OF MAGNESIUM: CPT | Performed by: EMERGENCY MEDICINE

## 2020-06-28 PROCEDURE — 82962 GLUCOSE BLOOD TEST: CPT

## 2020-06-28 PROCEDURE — 96374 THER/PROPH/DIAG INJ IV PUSH: CPT

## 2020-06-28 PROCEDURE — 93010 ELECTROCARDIOGRAM REPORT: CPT | Performed by: EMERGENCY MEDICINE

## 2020-06-28 PROCEDURE — 85025 COMPLETE CBC W/AUTO DIFF WBC: CPT | Performed by: EMERGENCY MEDICINE

## 2020-06-28 PROCEDURE — A9575 INJ GADOTERATE MEGLUMI 0.1ML: HCPCS | Performed by: EMERGENCY MEDICINE

## 2020-06-28 PROCEDURE — 93005 ELECTROCARDIOGRAM TRACING: CPT

## 2020-06-28 PROCEDURE — 70450 CT HEAD/BRAIN W/O DYE: CPT | Performed by: EMERGENCY MEDICINE

## 2020-06-28 PROCEDURE — 70553 MRI BRAIN STEM W/O & W/DYE: CPT | Performed by: EMERGENCY MEDICINE

## 2020-06-28 PROCEDURE — 80048 BASIC METABOLIC PNL TOTAL CA: CPT | Performed by: EMERGENCY MEDICINE

## 2020-06-28 RX ORDER — LORAZEPAM 2 MG/ML
1 INJECTION INTRAMUSCULAR ONCE
Status: COMPLETED | OUTPATIENT
Start: 2020-06-28 | End: 2020-06-28

## 2020-06-28 NOTE — TELEPHONE ENCOUNTER
Pt's daughter calling to report that patient was in direct contact with family members 5 days ago who tested positive for COVID. Patient has hx of htn, CKD, DM type II with use of insulin. Currently is asymptomatic.  Will order COVID test. Advised to [facility]

## 2020-06-29 VITALS
WEIGHT: 156.94 LBS | TEMPERATURE: 99 F | RESPIRATION RATE: 19 BRPM | SYSTOLIC BLOOD PRESSURE: 136 MMHG | DIASTOLIC BLOOD PRESSURE: 65 MMHG | OXYGEN SATURATION: 99 % | HEIGHT: 65 IN | BODY MASS INDEX: 26.15 KG/M2 | HEART RATE: 71 BPM

## 2020-06-29 NOTE — ED NOTES
Pt resting comfortably in bed in nad at this time, watching tv. Vss. Daughter at bedside. Continuing to monitor.

## 2020-06-29 NOTE — ED NOTES
Pt and pt's daughter provided and explained d/c instructions, at-home care, and follow-up with pcp. Pt in nad at this time. Iv access d/c. Vss. Stern. Ambulatory. A&ox3. Belongings with pt. All questions and concerns addressed.

## 2020-06-29 NOTE — ED PROVIDER NOTES
Patient Seen in: [facility] AND [facility] Emergency Department      History   Patient presents with:  Numbness    Stated Complaint: NUMBNESS    HPI    Is an 24-year-old Macanese-speaking female who arrives by EMS for bilateral hand numbness that started at 6 PM. 159/53   Pulse 76   Temp 98.9 °F (37.2 °C) (Oral)   Resp 16   Ht 165.1 cm (5' 5\")   Wt 71.2 kg   SpO2 99%   BMI 26.12 kg/m²         Physical Exam    GENERAL: No acute distress, awake and alert  HEENT: MMM, EOMI, PERRL, oropharynx normal  Neck: supple, non cavernous carotid arteries. Dictated by (CST): Aleisha Brown MD on 6/28/2020 at 8:28 PM     Finalized by (CST): Aleisha Brown MD on 6/28/2020 at 8:31 PM          Stroke scale negative. MRI negative as read by vision. D/w daughter the results.  She fe

## 2020-07-07 ENCOUNTER — TELEPHONE (OUTPATIENT)
Dept: INTERNAL MEDICINE CLINIC | Facility: CLINIC | Age: 80
End: 2020-07-07

## 2020-07-07 DIAGNOSIS — M81.0 POST-MENOPAUSAL OSTEOPOROSIS: Primary | ICD-10-CM

## 2020-07-07 RX ORDER — ALENDRONATE SODIUM 35 MG/1
35 TABLET ORAL
Qty: 12 TABLET | Refills: 3 | Status: SHIPPED | OUTPATIENT
Start: 2020-07-07

## 2020-07-10 ENCOUNTER — TELEPHONE (OUTPATIENT)
Dept: INTERNAL MEDICINE CLINIC | Facility: CLINIC | Age: 80
End: 2020-07-10

## 2020-07-10 DIAGNOSIS — R60.0 BILATERAL LEG EDEMA: ICD-10-CM

## 2020-07-10 RX ORDER — FUROSEMIDE 20 MG/1
20 TABLET ORAL DAILY PRN
Qty: 90 TABLET | Refills: 0 | Status: SHIPPED | OUTPATIENT
Start: 2020-07-10 | End: 2020-10-01 | Stop reason: ALTCHOICE

## 2020-07-10 NOTE — TELEPHONE ENCOUNTER
Patient has question in regards to medication  metFORMIN HCl please call her back.   (Citizen of the [place] speaking only)

## 2020-07-30 ENCOUNTER — OFFICE VISIT (OUTPATIENT)
Dept: INTERNAL MEDICINE CLINIC | Facility: CLINIC | Age: 80
End: 2020-07-30
Payer: MEDICARE

## 2020-07-30 VITALS
SYSTOLIC BLOOD PRESSURE: 172 MMHG | TEMPERATURE: 98 F | WEIGHT: 150.63 LBS | OXYGEN SATURATION: 98 % | DIASTOLIC BLOOD PRESSURE: 76 MMHG | HEART RATE: 77 BPM | HEIGHT: 65 IN | BODY MASS INDEX: 25.09 KG/M2

## 2020-07-30 DIAGNOSIS — I10 ESSENTIAL HYPERTENSION: Primary | ICD-10-CM

## 2020-07-30 PROCEDURE — 99213 OFFICE O/P EST LOW 20 MIN: CPT | Performed by: FAMILY MEDICINE

## 2020-07-30 RX ORDER — DILTIAZEM HYDROCHLORIDE 120 MG/1
120 CAPSULE, EXTENDED RELEASE ORAL DAILY
Qty: 90 CAPSULE | Refills: 1 | Status: SHIPPED | OUTPATIENT
Start: 2020-07-30 | End: 2020-10-01 | Stop reason: DRUGHIGH

## 2020-08-01 NOTE — PROGRESS NOTES
CC:  Hypertension      Hx of CC:  HAS NOTICED HIGH BP READINGS AND MILD HEADACHE X PAST WEEK OR SO.  ON METOPROLOL  MG QD, AND LOSARTAN 50 MG QD.  SOMETIMES WILL USE FUROSEMIDE IN ANKLES SWELL    Vitals:    07/30/20  1122 07/30/20  1133   BP: 148/66

## 2020-10-01 ENCOUNTER — OFFICE VISIT (OUTPATIENT)
Dept: INTERNAL MEDICINE CLINIC | Facility: CLINIC | Age: 80
End: 2020-10-01
Payer: MEDICARE

## 2020-10-01 VITALS
WEIGHT: 149 LBS | BODY MASS INDEX: 24.83 KG/M2 | HEART RATE: 72 BPM | SYSTOLIC BLOOD PRESSURE: 142 MMHG | OXYGEN SATURATION: 98 % | RESPIRATION RATE: 16 BRPM | DIASTOLIC BLOOD PRESSURE: 64 MMHG | HEIGHT: 65 IN

## 2020-10-01 DIAGNOSIS — E11.9 TYPE 2 DIABETES MELLITUS WITHOUT COMPLICATION, WITH LONG-TERM CURRENT USE OF INSULIN (HCC): Primary | ICD-10-CM

## 2020-10-01 DIAGNOSIS — Z79.4 TYPE 2 DIABETES MELLITUS WITHOUT COMPLICATION, WITH LONG-TERM CURRENT USE OF INSULIN (HCC): Primary | ICD-10-CM

## 2020-10-01 DIAGNOSIS — E78.2 MIXED HYPERLIPIDEMIA: ICD-10-CM

## 2020-10-01 DIAGNOSIS — N18.31 STAGE 3A CHRONIC KIDNEY DISEASE (HCC): ICD-10-CM

## 2020-10-01 DIAGNOSIS — I10 ESSENTIAL HYPERTENSION: ICD-10-CM

## 2020-10-01 PROCEDURE — 83036 HEMOGLOBIN GLYCOSYLATED A1C: CPT | Performed by: FAMILY MEDICINE

## 2020-10-01 PROCEDURE — 80061 LIPID PANEL: CPT | Performed by: FAMILY MEDICINE

## 2020-10-01 PROCEDURE — 80053 COMPREHEN METABOLIC PANEL: CPT | Performed by: FAMILY MEDICINE

## 2020-10-01 PROCEDURE — 99214 OFFICE O/P EST MOD 30 MIN: CPT | Performed by: FAMILY MEDICINE

## 2020-10-01 RX ORDER — LOSARTAN POTASSIUM AND HYDROCHLOROTHIAZIDE 12.5; 5 MG/1; MG/1
1 TABLET ORAL DAILY
Qty: 90 TABLET | Refills: 3 | Status: ON HOLD | OUTPATIENT
Start: 2020-10-01 | End: 2020-10-05

## 2020-10-01 RX ORDER — DILTIAZEM HYDROCHLORIDE 180 MG/1
180 CAPSULE, COATED, EXTENDED RELEASE ORAL DAILY
Qty: 90 CAPSULE | Refills: 3 | Status: SHIPPED | OUTPATIENT
Start: 2020-10-01 | End: 2021-02-25

## 2020-10-02 ENCOUNTER — APPOINTMENT (OUTPATIENT)
Dept: GENERAL RADIOLOGY | Facility: HOSPITAL | Age: 80
DRG: 282 | End: 2020-10-02
Attending: EMERGENCY MEDICINE
Payer: MEDICARE

## 2020-10-02 ENCOUNTER — APPOINTMENT (OUTPATIENT)
Dept: MRI IMAGING | Facility: HOSPITAL | Age: 80
DRG: 282 | End: 2020-10-02
Attending: EMERGENCY MEDICINE
Payer: MEDICARE

## 2020-10-02 ENCOUNTER — HOSPITAL ENCOUNTER (INPATIENT)
Facility: HOSPITAL | Age: 80
LOS: 3 days | Discharge: HOME | DRG: 282 | End: 2020-10-05
Attending: EMERGENCY MEDICINE | Admitting: HOSPITALIST
Payer: MEDICARE

## 2020-10-02 DIAGNOSIS — R60.0 BILATERAL LEG EDEMA: ICD-10-CM

## 2020-10-02 DIAGNOSIS — I21.4 NON-STEMI (NON-ST ELEVATED MYOCARDIAL INFARCTION) (HCC): Primary | ICD-10-CM

## 2020-10-02 PROBLEM — E87.1 HYPONATREMIA: Status: ACTIVE | Noted: 2020-10-02

## 2020-10-02 PROBLEM — R79.89 AZOTEMIA: Status: ACTIVE | Noted: 2020-10-02

## 2020-10-02 PROBLEM — R73.9 HYPERGLYCEMIA: Status: ACTIVE | Noted: 2020-10-02

## 2020-10-02 PROCEDURE — 71045 X-RAY EXAM CHEST 1 VIEW: CPT | Performed by: EMERGENCY MEDICINE

## 2020-10-02 PROCEDURE — 99222 1ST HOSP IP/OBS MODERATE 55: CPT | Performed by: HOSPITALIST

## 2020-10-02 PROCEDURE — 70551 MRI BRAIN STEM W/O DYE: CPT | Performed by: EMERGENCY MEDICINE

## 2020-10-02 RX ORDER — HEPARIN SODIUM 5000 [USP'U]/ML
5000 INJECTION, SOLUTION INTRAVENOUS; SUBCUTANEOUS EVERY 12 HOURS SCHEDULED
Status: DISCONTINUED | OUTPATIENT
Start: 2020-10-03 | End: 2020-10-05

## 2020-10-02 RX ORDER — ASPIRIN 81 MG/1
81 TABLET, CHEWABLE ORAL DAILY
Status: DISCONTINUED | OUTPATIENT
Start: 2020-10-03 | End: 2020-10-05

## 2020-10-02 RX ORDER — NITROGLYCERIN 0.4 MG/1
0.4 TABLET SUBLINGUAL EVERY 5 MIN PRN
Status: DISCONTINUED | OUTPATIENT
Start: 2020-10-02 | End: 2020-10-05

## 2020-10-02 RX ORDER — ONDANSETRON 2 MG/ML
4 INJECTION INTRAMUSCULAR; INTRAVENOUS EVERY 6 HOURS PRN
Status: DISCONTINUED | OUTPATIENT
Start: 2020-10-02 | End: 2020-10-05

## 2020-10-02 RX ORDER — FUROSEMIDE 20 MG/1
20 TABLET ORAL DAILY PRN
Qty: 90 TABLET | Refills: 0 | Status: SHIPPED | OUTPATIENT
Start: 2020-10-02

## 2020-10-02 RX ORDER — SODIUM CHLORIDE 0.9 % (FLUSH) 0.9 %
3 SYRINGE (ML) INJECTION AS NEEDED
Status: DISCONTINUED | OUTPATIENT
Start: 2020-10-02 | End: 2020-10-05

## 2020-10-02 RX ORDER — LORAZEPAM 2 MG/ML
0.5 INJECTION INTRAMUSCULAR ONCE
Status: COMPLETED | OUTPATIENT
Start: 2020-10-02 | End: 2020-10-02

## 2020-10-02 NOTE — PROGRESS NOTES
HPI:   Hardik Fajardo is a [de-identified]year old female who presents for recheck of her diabetes.   DM dx at age:    Current medications:   Current Outpatient Medications   Medication Sig Dispense Refill   • dilTIAZem HCl ER Coated Beads 180 MG Oral Capsule SR [address] [place]   Retinopathy: N   on ACE-I/ARB:  Y  Numbness/tingling in feet:  N    Other medical problems:  Patient Active Problem List:     Essential hypertension     Mixed hyperlipidemia     Insulin-requiring or dependent type II diabetes mellitus ([facility]     History adult)   Pulse 72   Resp 16   Ht 65\"   Wt 149 lb (67.6 kg)   SpO2 98%   BMI 24.79 kg/m²   GENERAL: well developed, well nourished,in no apparent distress  SKIN: no rashes,no suspicious lesions  NECK: supple,no adenopathy,no carotid bruits  LUNGS: clear to Comp Metabolic Panel (14) [E]      Hemoglobin A1C (Glycohemoglobin) [E]      Lipid Panel [E]      Meds & Refills for this Visit:  Requested Prescriptions     Signed Prescriptions Disp Refills   • dilTIAZem HCl ER Coated Beads 180 MG Oral Capsule SR 24 Hr 9

## 2020-10-03 ENCOUNTER — APPOINTMENT (OUTPATIENT)
Dept: CV DIAGNOSTICS | Facility: HOSPITAL | Age: 80
DRG: 282 | End: 2020-10-03
Attending: HOSPITALIST
Payer: MEDICARE

## 2020-10-03 PROCEDURE — 93306 TTE W/DOPPLER COMPLETE: CPT | Performed by: HOSPITALIST

## 2020-10-03 PROCEDURE — 99233 SBSQ HOSP IP/OBS HIGH 50: CPT | Performed by: HOSPITALIST

## 2020-10-03 RX ORDER — DEXTROSE AND SODIUM CHLORIDE 5; .9 G/100ML; G/100ML
INJECTION, SOLUTION INTRAVENOUS CONTINUOUS
Status: DISCONTINUED | OUTPATIENT
Start: 2020-10-03 | End: 2020-10-05

## 2020-10-03 RX ORDER — VIT A/VIT C/VIT E/ZINC/COPPER 2148-113
2 TABLET ORAL DAILY
COMMUNITY

## 2020-10-03 RX ORDER — DEXTROSE MONOHYDRATE 25 G/50ML
50 INJECTION, SOLUTION INTRAVENOUS
Status: DISCONTINUED | OUTPATIENT
Start: 2020-10-03 | End: 2020-10-05

## 2020-10-03 RX ORDER — ATORVASTATIN CALCIUM 40 MG/1
40 TABLET, FILM COATED ORAL NIGHTLY
Status: DISCONTINUED | OUTPATIENT
Start: 2020-10-03 | End: 2020-10-05

## 2020-10-03 RX ORDER — METOPROLOL SUCCINATE 100 MG/1
100 TABLET, EXTENDED RELEASE ORAL
Status: DISCONTINUED | OUTPATIENT
Start: 2020-10-03 | End: 2020-10-05

## 2020-10-03 RX ORDER — FOLIC ACID/VIT B COMPLEX AND C 400 MCG
1 TABLET ORAL DAILY
Status: DISCONTINUED | OUTPATIENT
Start: 2020-10-03 | End: 2020-10-05

## 2020-10-03 RX ORDER — MAGNESIUM HYDROXIDE/ALUMINUM HYDROXICE/SIMETHICONE 120; 1200; 1200 MG/30ML; MG/30ML; MG/30ML
30 SUSPENSION ORAL 4 TIMES DAILY PRN
Status: DISCONTINUED | OUTPATIENT
Start: 2020-10-03 | End: 2020-10-05

## 2020-10-03 RX ORDER — MAGNESIUM HYDROXIDE/ALUMINUM HYDROXICE/SIMETHICONE 120; 1200; 1200 MG/30ML; MG/30ML; MG/30ML
SUSPENSION ORAL
Status: COMPLETED
Start: 2020-10-03 | End: 2020-10-03

## 2020-10-03 RX ORDER — CLOTRIMAZOLE 1 %
1 CREAM (GRAM) TOPICAL 2 TIMES DAILY PRN
Status: DISCONTINUED | OUTPATIENT
Start: 2020-10-03 | End: 2020-10-05

## 2020-10-03 RX ORDER — LOSARTAN POTASSIUM 50 MG/1
50 TABLET ORAL DAILY
Status: ON HOLD | COMMUNITY
End: 2020-10-03

## 2020-10-03 RX ORDER — CLOPIDOGREL BISULFATE 75 MG/1
75 TABLET ORAL DAILY
Status: DISCONTINUED | OUTPATIENT
Start: 2020-10-03 | End: 2020-10-05

## 2020-10-03 RX ORDER — DILTIAZEM HYDROCHLORIDE 180 MG/1
180 CAPSULE, EXTENDED RELEASE ORAL DAILY
Status: DISCONTINUED | OUTPATIENT
Start: 2020-10-03 | End: 2020-10-05

## 2020-10-03 RX ORDER — ACETAMINOPHEN 325 MG/1
650 TABLET ORAL EVERY 6 HOURS PRN
Status: DISCONTINUED | OUTPATIENT
Start: 2020-10-03 | End: 2020-10-05

## 2020-10-03 NOTE — ED NOTES
Orders for admission, patient is aware of plan and ready to go upstairs. Any questions, please call ED RN Uzma Bui  at extension [phone].    Type of COVID test sent: Rapid  COVID Suspicion level: Low    Titratable drug(s) infusing:n/a  Rate:n/a    LOC at time of

## 2020-10-03 NOTE — CONSULTS
[facility] AND CLINICS  Report of Consultation    Fabio Lehman Patient Status:  Inpatient    10/10/1940 MRN [id number]   [facility] 3W/SW Attending Vargas Angel MD   [facility] Day # 1 PCP Isaiah Soliz DO     Reason for Consultation:  NSTEMI Facility-Administered Medications:   •  atorvastatin (LIPITOR) tab 40 mg, 40 mg, Oral, Nightly  •  Marta-Bee/C (ALBEE/C) tab TABS 1 tablet, 1 tablet, Oral, Daily  •  Clopidogrel Bisulfate (PLAVIX) tab 75 mg, 75 mg, Oral, Daily  •  clotrimazole (LOTRIMIN) 1 kg)  07/30/20 : 150 lb 9.6 oz (68.3 kg)      Telemetry: sinus rhythm  General: Alert and oriented in no apparent distress. HEENT: No focal deficits. Oral mucosa moist. No scleral icterus. Neck: No JVD, carotids 2+ no bruits.   Cardiac: Regular rate and r

## 2020-10-03 NOTE — ED PROVIDER NOTES
Patient Seen in: [facility] AND [facility] Emergency Department      History   Patient presents with:  Hypertension    Stated Complaint:     HPI    She presents to the emergency department complaining of tightness in her throat and left arm tingling.   There is b src 10/02/20 1909 Temporal   SpO2 10/02/20 1905 98 %   O2 Device 10/02/20 1905 None (Room air)       Current:BP (!) 164/67   Pulse 74   Temp 97.7 °F (36.5 °C) (Temporal)   Resp 21   Ht 160 cm (5' 3\")   Wt 68 kg   SpO2 98%   BMI 26.57 kg/m²         Physica - Abnormal; Notable for the following components:    POC Glucose  187 (*)     All other components within normal limits   TROPONIN I - Normal   CBC WITH DIFFERENTIAL WITH PLATELET    Narrative:      The following orders were created for panel order CBC WITH follow up    Admission disposition: 10/2/2020 10:44 PM         Discussed with cardiology as well as the hospitalist.        Critical Care Documentation    There were greater than 30 minutes of critical care time spent directly on this patient and this time

## 2020-10-03 NOTE — PLAN OF CARE
Problem: Diabetes/Glucose Control  Goal: Glucose maintained within prescribed range  Description: INTERVENTIONS:  - Monitor Blood Glucose as ordered  - Assess for signs and symptoms of hyperglycemia and hypoglycemia  - Administer ordered medications to m vss       Patient is A&Ox4, primariy Georgian speaking, translation line at bedside and now the patients daughter Angella Cadena is at bedside. Patient is self care, ambulatory, vss, room air. Accucheck achs, dm diet in place.    Will need an angiogram on Monday-per C

## 2020-10-03 NOTE — PROGRESS NOTES
ADMISSION NOTE    [de-identified]year old female with h/o HTN DM presents with tightness in jaw throat and L arm. Second trop postive. . Available medical records partially reviewed. Dictation to follow.     Rio Agrawal M.D.  10/2/2020

## 2020-10-03 NOTE — H&P
[facility]    PATIENT'S NAME: Kassandra Rai   ATTENDING PHYSICIAN: Stan Langley MD   PATIENT ACCOUNT#:   [id number]    LOCATION:  3WSW [address] [facility] #:   [id number]       YOB: 1940  ADMISSION DATE:       10/02/202 room physician spoke with Dr. Isaac pierre for consultation, and the patient was admitted to the medical floor for further evaluation and treatment. PAST MEDICAL HISTORY:  Significant for anxiety.   The patient reports 3 CVAs in the past.  She sa any falls. She lives alone and states that she is able to care for her house and do her shopping and washing without difficulty. REVIEW OF SYSTEMS:  Twelve systems were reviewed. She denies any fever or chills. Her appetite has been good.   Denies any The patient's creatinine on October 1 was 0.94. Her kidney function is relatively stable when compared to previous. ASSESSMENT AND PLAN:    1. Myocardial infarction. The patient will be kept n.p.o. for evaluation by Cardiology.   She had an episode o

## 2020-10-03 NOTE — PROGRESS NOTES
[place] [facility] - [facility]    Progress Note    Hannahmariangel Cachorrodominique Patient Status:  Inpatient    10/10/1940 MRN [id number]   Location [facility] 3W/SW Attending Shawna Ro, [address] [facility] [address] Day # 1 PCP Franck Mccabe DO       Subjective:   Alex Blankenship acute infarction.     Dictated by (CST): Lyudmila Downs MD on 10/02/2020 at 9:37 PM     Finalized by (CST): Carson Avalos MD on 10/02/2020 at 9:39 PM          Ekg 12-lead    Result Date: 10/3/2020  ECG Report  Interpretation  ------------

## 2020-10-03 NOTE — PLAN OF CARE
Received patient awake and alert, Peruvian speaking only. Used  at bedside. Patient denies any pain. Able to ambulate herself. 2D echo to be done today. SCD's in place. Educated patient to call for assistance, call light within reach.     Problem: blood pressure and be able to go home.        - See additional Care Plan goals for specific interventions  10/3/2020 0658 by Layne Bingham, RN  Outcome: Progressing  10/3/2020 0414 by Layne Bingham, RN  Outcome: Not Progressing

## 2020-10-03 NOTE — PLAN OF CARE
Problem: Patient Centered Care  Goal: Patient preferences are identified and integrated in the patient's plan of care  Description: Interventions:  - What would you like us to know as we care for you? ***  - Provide timely, complete, and accurate informa

## 2020-10-04 PROCEDURE — 99233 SBSQ HOSP IP/OBS HIGH 50: CPT | Performed by: HOSPITALIST

## 2020-10-04 RX ORDER — CHLORHEXIDINE GLUCONATE 4 G/100ML
30 SOLUTION TOPICAL
Status: COMPLETED | OUTPATIENT
Start: 2020-10-05 | End: 2020-10-05

## 2020-10-04 RX ORDER — DEXTROSE MONOHYDRATE 25 G/50ML
50 INJECTION, SOLUTION INTRAVENOUS
Status: DISCONTINUED | OUTPATIENT
Start: 2020-10-04 | End: 2020-10-04

## 2020-10-04 RX ORDER — SODIUM CHLORIDE 9 MG/ML
INJECTION, SOLUTION INTRAVENOUS
Status: ACTIVE | OUTPATIENT
Start: 2020-10-05 | End: 2020-10-05

## 2020-10-04 NOTE — PLAN OF CARE
Primarily Hong Konger speaking. Pt was comfortable in bed throughout the night w/ no c/o px or discomfort. iBed awareness on. Call light within reach.     Plan: Cath Monday    Problem: Patient Centered Care  Goal: Patient preferences are identified and integrat pt to monitor pain and request assistance  - Assess pain using appropriate pain scale  - Administer analgesics based on type and severity of pain and evaluate response  - Implement non-pharmacological measures as appropriate and evaluate response  - Consid patient safety including physical limitations  - Instruct pt to call for assistance with activity based on assessment  - Modify environment to reduce risk of injury  - Provide assistive devices as appropriate  - Consider OT/PT consult to assist with streng

## 2020-10-04 NOTE — PROGRESS NOTES
[facility]    Progress Note    Marisa Tompkins Patient Status:  Inpatient    10/10/1940 MRN [id number]   Location [facility] 3W/SW Attending Rina North, [address] [facility]  Day # 2 PCP Ana Snider DO       Subjective:   Regan Lorenzo Dictated by (CST): Iftikhar Loving MD on 10/02/2020 at 9:37 PM     Finalized by (CST): Horacio Avalos MD on 10/02/2020 at 9:39 PM          Ekg 12-lead    Result Date: 10/3/2020  ECG Report  Interpretation  --------------------------     Ek

## 2020-10-04 NOTE — PROGRESS NOTES
[facility] AND CLINICS  Progress Note    Mahsa eleni Patient Status:  Inpatient    10/10/1940 MRN [id number]   Location [facility] 3W/SW Attending Siva Pierce MD   [facility] Day # 2 PCP Ammy Senior DO     Subjective:  Patient seen in follow up. fraction was 55-60%, by biplane method of disks. Wall motion was     normal; there were no regional wall motion abnormalities. Doppler parameters are consistent with abnormal left ventricular     relaxation (grade 1 diastolic dysfunction).     EKG 7/24/ Plan:    1. Non-ST elevated myocardial infarction  - elevated troponin x2   - no current chest pain  - on aspirin, atorvastatin 40 mg daily, plavix     2. HTN  - improved  - on diltiazem, metoprolol succinate     3. Hx TIA     4. DM type 2     5.  CKD stage

## 2020-10-04 NOTE — PLAN OF CARE
Up in chair & alejandrina. Well. Plan cardiac cath tomorrow. Obtained Paraguayan menu for pt. Primary language Paraguayan, some english, Paraguayan translation as needed, aware language line available. Cont. To monitor.    Problem: Patient Centered Care  Goal: Patient prefe goal  Description: INTERVENTIONS:  - Encourage pt to monitor pain and request assistance  - Assess pain using appropriate pain scale  - Administer analgesics based on type and severity of pain and evaluate response  - Implement non-pharmacological measures indicated by assessment.  - Educate pt/family on patient safety including physical limitations  - Instruct pt to call for assistance with activity based on assessment  - Modify environment to reduce risk of injury  - Provide assistive devices as appropriat

## 2020-10-05 ENCOUNTER — APPOINTMENT (OUTPATIENT)
Dept: INTERVENTIONAL RADIOLOGY/VASCULAR | Facility: HOSPITAL | Age: 80
DRG: 282 | End: 2020-10-05
Attending: NURSE PRACTITIONER
Payer: MEDICARE

## 2020-10-05 VITALS
BODY MASS INDEX: 25.6 KG/M2 | HEIGHT: 63 IN | SYSTOLIC BLOOD PRESSURE: 155 MMHG | DIASTOLIC BLOOD PRESSURE: 63 MMHG | WEIGHT: 144.5 LBS | HEART RATE: 66 BPM | RESPIRATION RATE: 18 BRPM | TEMPERATURE: 98 F | OXYGEN SATURATION: 97 %

## 2020-10-05 PROCEDURE — 99239 HOSP IP/OBS DSCHRG MGMT >30: CPT | Performed by: HOSPITALIST

## 2020-10-05 PROCEDURE — B2111ZZ FLUOROSCOPY OF MULTIPLE CORONARY ARTERIES USING LOW OSMOLAR CONTRAST: ICD-10-PCS | Performed by: INTERNAL MEDICINE

## 2020-10-05 PROCEDURE — 4A023N7 MEASUREMENT OF CARDIAC SAMPLING AND PRESSURE, LEFT HEART, PERCUTANEOUS APPROACH: ICD-10-PCS | Performed by: INTERNAL MEDICINE

## 2020-10-05 RX ORDER — ASPIRIN 81 MG/1
81 TABLET, CHEWABLE ORAL DAILY
Qty: 30 TABLET | Refills: 0 | Status: SHIPPED | OUTPATIENT
Start: 2020-10-06 | End: 2021-04-28

## 2020-10-05 RX ORDER — HEPARIN SODIUM 1000 [USP'U]/ML
INJECTION, SOLUTION INTRAVENOUS; SUBCUTANEOUS
Status: COMPLETED
Start: 2020-10-05 | End: 2020-10-05

## 2020-10-05 RX ORDER — MIDAZOLAM HYDROCHLORIDE 1 MG/ML
INJECTION INTRAMUSCULAR; INTRAVENOUS
Status: COMPLETED
Start: 2020-10-05 | End: 2020-10-05

## 2020-10-05 RX ORDER — LIDOCAINE HYDROCHLORIDE 20 MG/ML
INJECTION, SOLUTION EPIDURAL; INFILTRATION; INTRACAUDAL; PERINEURAL
Status: COMPLETED
Start: 2020-10-05 | End: 2020-10-05

## 2020-10-05 RX ORDER — NITROGLYCERIN 20 MG/100ML
INJECTION INTRAVENOUS
Status: COMPLETED
Start: 2020-10-05 | End: 2020-10-05

## 2020-10-05 RX ORDER — VERAPAMIL HYDROCHLORIDE 2.5 MG/ML
INJECTION, SOLUTION INTRAVENOUS
Status: COMPLETED
Start: 2020-10-05 | End: 2020-10-05

## 2020-10-05 NOTE — PLAN OF CARE
Oriented x4, room air, up to bathroom with supervision. Tolerating PO intake. VSS. Arrived to the floor with a right forearm hematoma s/p cardiac cath. Incision site soft and TR band deflated, Band-Aid in place.  Forearm pain 4/10 and managed by tylenol VS/assessment  - See additional Care Plan goals for specific interventions  Outcome: Adequate for Discharge     Problem: PAIN - ADULT  Goal: Verbalizes/displays adequate comfort level or patient's stated pain goal  Description: INTERVENTIONS:  - Encourage SAFETY ADULT - FALL  Goal: Free from fall injury  Description: INTERVENTIONS:  - Assess pt frequently for physical needs  - Identify cognitive and physical deficits and behaviors that affect risk of falls.   - [place] fall precautions as indicated by asse response  - Establish method for patient to ask for assistance (call light)  - Provide an  as needed  - Communicate barriers and strategies to overcome with those who interact with patient  Outcome: Adequate for Discharge

## 2020-10-05 NOTE — DISCHARGE PLANNING
Reviewed discharge AVS with patient and daughter, included post cardiac cath site-care instructions. Right wrist incision intact, soft, mild bruising with a proximal hematoma on the forearm. Plan to follow-up with Dr. Keith Alfaro early next week.  Discharge to

## 2020-10-05 NOTE — IVS NOTE
Procedure hand off report given to SHAUN Bragg. Procedural access site is dry and intact with no signs and symptoms of bleeding and hematoma. Dr London Roque came to see pt /family at bedside post procedure. Site check done with [facility] RN, upon transfer to floor.

## 2020-10-05 NOTE — DISCHARGE SUMMARY
[place] [facility] - [facility]    Discharge Summary    Alessandra Graham Patient Status:  Inpatient    10/10/1940 MRN [id number]   Location [facility] 3W/SW Attending Theo Cassidy MD   [address] Day # 3 PCP Nettie Ellsworth DO     Date of Admission: 10/2/20 as though her throat was closing and she had trouble catching her air. She said it lasted perhaps about 30 minutes and went away shortly after her arrival in the emergency room.   She has not had that sensation in the past.  She felt a little bit lighthead Stable    Current Discharge Medication List    New Orders    aspirin 81 MG Oral Chew Tab  Chew 1 tablet (81 mg total) by mouth daily. Home Meds - Unchanged    Multiple Vitamins-Minerals (PRESERVISION AREDS) Oral Tab  Take 2 tablets by mouth daily. Prescription details   alendronate 35 MG Tabs  Commonly known as: FOSAMAX  What changed: additional instructions      Take 1 tablet (35 mg total) by mouth every 7 days.    Quantity: 12 tablet  Refills: 3     Insulin Degludec 100 UNIT/ML Sopn  Commonly known Pen Needles 32G X 4 MM Misc      1 Device by Does not apply route daily. Quantity: 100 each  Refills: 3     PreserVision AREDS Tabs      Take 2 tablets by mouth daily.    Refills: 0        STOP taking these medications    Clopidogrel Bisulfate 75 MG Tab

## 2020-10-05 NOTE — PLAN OF CARE
Problem: Patient Centered Care  Goal: Patient preferences are identified and integrated in the patient's plan of care  Description: Interventions:  - What would you like us to know as we care for you?  Primarily Maltese speaking  - Provide timely, complet response  - Implement non-pharmacological measures as appropriate and evaluate response  - Consider cultural and social influences on pain and pain management  - Manage/alleviate anxiety  - Utilize distraction and/or relaxation techniques  - Monitor for op injury  - Provide assistive devices as appropriate  - Consider OT/PT consult to assist with strengthening/mobility  - Encourage toileting schedule  Outcome: Progressing     Problem: DISCHARGE PLANNING  Goal: Discharge to home or other facility with appropr

## 2020-10-05 NOTE — PROCEDURES
[facility]    PATIENT'S NAME: Santy Galeas   ATTENDING PHYSICIAN: Dereck Esquivel MD   OPERATING PHYSICIAN: Sha Guillen DO   PATIENT ACCOUNT#:   [de-identified]    LOCATION:  [place] [address]  MEDICAL RECORD #:   [id number]       DATE DO JOSE  d: 10/05/2020 08:21:42  t: 10/05/2020 09:20:39  [facility] [id number]  AS/

## 2020-10-05 NOTE — PROGRESS NOTES
[facility] AND CLINICS  Progress Note    Chandu Tadeo Patient Status:  Inpatient    10/10/1940 MRN [id number]   Location [facility] 3W/SW Attending Siri Chavez MD   [facility] Day # 3 PCP Christopher Patient, DO     Subjective:  Patient seen in follow up. disks. Wall motion was     normal; there were no regional wall motion abnormalities. Doppler parameters are consistent with abnormal left ventricular     relaxation (grade 1 diastolic dysfunction).     EKG 7/24/20  --------------------------  Sinus Rhyt 10/5/20  1. Minimal coronary artery disease involving the right coronary artery. 2.       Mid left anterior descending artery bridging, which is an incidental finding. Assessment and Plan:    1.  Non-ST elevated myocardial infarction  - elevated tro

## 2020-10-05 NOTE — PROGRESS NOTES
Inpatient Throughput Communication:    Called inpatient RN Jennie Caldwell and notified of scheduled procedure Coronary Angiogram.     Verified that appropriate consent is signed: Yes  Appropriate Consent Signed:  Yes  Access Site Hair Clipped and skin prepped: Yes

## 2020-10-05 NOTE — OPERATIVE REPORT
Preop diagnosis: nstemi  Post op diagnosis: minimal cad  Procedures: [facility] cors  Findings: mid lad bridging but no obstruction, lcx nl, rca minor luminal irreg dominant  Tortuous arteries suggestive of htn heart disease.   EBL: 20 mls  Specimens: None

## 2020-10-06 ENCOUNTER — TELEPHONE (OUTPATIENT)
Dept: INTERNAL MEDICINE CLINIC | Facility: CLINIC | Age: 80
End: 2020-10-06

## 2020-10-06 ENCOUNTER — PATIENT OUTREACH (OUTPATIENT)
Dept: CASE MANAGEMENT | Age: 80
End: 2020-10-06

## 2020-10-06 DIAGNOSIS — I21.4 NON-STEMI (NON-ST ELEVATED MYOCARDIAL INFARCTION) (HCC): ICD-10-CM

## 2020-10-06 DIAGNOSIS — E11.65 UNCONTROLLED TYPE 2 DIABETES MELLITUS WITH HYPERGLYCEMIA (HCC): ICD-10-CM

## 2020-10-06 DIAGNOSIS — Z86.73 HISTORY OF CVA (CEREBROVASCULAR ACCIDENT): ICD-10-CM

## 2020-10-06 DIAGNOSIS — N18.31 STAGE 3A CHRONIC KIDNEY DISEASE (HCC): Primary | Chronic | ICD-10-CM

## 2020-10-06 DIAGNOSIS — I10 ESSENTIAL HYPERTENSION: ICD-10-CM

## 2020-10-06 DIAGNOSIS — Z02.9 ENCOUNTERS FOR UNSPECIFIED ADMINISTRATIVE PURPOSE: ICD-10-CM

## 2020-10-06 NOTE — PROGRESS NOTES
Attempted to reach the patient to complete [facility] FU call. Left a message for the pt to call NCM back at, [phone]. A CCM referral was placed.

## 2020-10-06 NOTE — TELEPHONE ENCOUNTER
Pt was discharged from the hospital yesterday and has been checking her blood sugar and is getting readings in the 300's.  Please advise

## 2020-10-06 NOTE — TELEPHONE ENCOUNTER
Called pt per [place] to obtain more info (since pt Portuguese speaking)    Pt currently taking Tresiba 43u daily in the AM and Metformin 1000mg BID. Per Dr. Anjelica Stevens increase Ethelda Crosser to 48u in the AM and monitor fasting sugars.     Informed I will relay justa

## 2020-10-07 ENCOUNTER — TELEPHONE (OUTPATIENT)
Dept: INTERNAL MEDICINE CLINIC | Facility: CLINIC | Age: 80
End: 2020-10-07

## 2020-10-07 NOTE — PROGRESS NOTES
[facility] [address] [place] [address] # [phone]. NCM contact information provided. NCM sent TE to PCP office re: assistance scheduling TCM HFU appt.

## 2020-10-07 NOTE — TELEPHONE ENCOUNTER
Pt discharged 10-5-20, NSTEMI. Pt does not have HFU appt scheduled at this time. NCM called twice and left 2 voicemails with no return call. TCM/HFU appt recommended by 10-12-20 as pt is a high risk for readmission.   Please discuss with PCP and contact

## 2020-10-13 ENCOUNTER — OFFICE VISIT (OUTPATIENT)
Dept: INTERNAL MEDICINE CLINIC | Facility: CLINIC | Age: 80
End: 2020-10-13
Payer: MEDICARE

## 2020-10-13 VITALS
DIASTOLIC BLOOD PRESSURE: 60 MMHG | HEIGHT: 63 IN | OXYGEN SATURATION: 97 % | HEART RATE: 77 BPM | WEIGHT: 153 LBS | BODY MASS INDEX: 27.11 KG/M2 | SYSTOLIC BLOOD PRESSURE: 138 MMHG

## 2020-10-13 DIAGNOSIS — E11.22 TYPE 2 DIABETES MELLITUS WITH STAGE 3A CHRONIC KIDNEY DISEASE, WITH LONG-TERM CURRENT USE OF INSULIN (HCC): ICD-10-CM

## 2020-10-13 DIAGNOSIS — I21.4 NON-STEMI (NON-ST ELEVATED MYOCARDIAL INFARCTION) (HCC): ICD-10-CM

## 2020-10-13 DIAGNOSIS — Z09 HOSPITAL DISCHARGE FOLLOW-UP: Primary | ICD-10-CM

## 2020-10-13 DIAGNOSIS — I10 ESSENTIAL HYPERTENSION: ICD-10-CM

## 2020-10-13 DIAGNOSIS — Z79.4 TYPE 2 DIABETES MELLITUS WITH STAGE 3A CHRONIC KIDNEY DISEASE, WITH LONG-TERM CURRENT USE OF INSULIN (HCC): ICD-10-CM

## 2020-10-13 DIAGNOSIS — E78.2 MIXED HYPERLIPIDEMIA: ICD-10-CM

## 2020-10-13 DIAGNOSIS — N18.31 STAGE 3A CHRONIC KIDNEY DISEASE (HCC): ICD-10-CM

## 2020-10-13 DIAGNOSIS — N18.31 TYPE 2 DIABETES MELLITUS WITH STAGE 3A CHRONIC KIDNEY DISEASE, WITH LONG-TERM CURRENT USE OF INSULIN (HCC): ICD-10-CM

## 2020-10-13 DIAGNOSIS — Z86.73 HISTORY OF TIA (TRANSIENT ISCHEMIC ATTACK): ICD-10-CM

## 2020-10-13 PROCEDURE — 1111F DSCHRG MED/CURRENT MED MERGE: CPT | Performed by: FAMILY MEDICINE

## 2020-10-13 PROCEDURE — 99495 TRANSJ CARE MGMT MOD F2F 14D: CPT | Performed by: FAMILY MEDICINE

## 2020-10-14 PROBLEM — E11.22 TYPE 2 DIABETES MELLITUS WITH STAGE 3A CHRONIC KIDNEY DISEASE, WITH LONG-TERM CURRENT USE OF INSULIN (HCC): Status: ACTIVE | Noted: 2017-08-29

## 2020-10-14 PROBLEM — Z86.73 HISTORY OF TIA (TRANSIENT ISCHEMIC ATTACK): Status: ACTIVE | Noted: 2017-08-29

## 2020-10-14 PROBLEM — N18.31 TYPE 2 DIABETES MELLITUS WITH STAGE 3A CHRONIC KIDNEY DISEASE, WITH LONG-TERM CURRENT USE OF INSULIN (HCC): Status: ACTIVE | Noted: 2017-08-29

## 2020-10-14 PROBLEM — Z79.4 TYPE 2 DIABETES MELLITUS WITH STAGE 3A CHRONIC KIDNEY DISEASE, WITH LONG-TERM CURRENT USE OF INSULIN (HCC): Status: ACTIVE | Noted: 2017-08-29

## 2020-10-14 NOTE — PROGRESS NOTES
HPI:    Fabio Lehman is a 79year old female here today for hospital follow up.    She was discharged from Inpatient hospital, [facility] AND [facility]  to Home   Admission Date: 10/2/20   Discharge Date: 10/5/20  Hospital Discharge Diagnoses (since 9/14/2020 aspirin 81 MG Oral Chew Tab, Chew 1 tablet (81 mg total) by mouth daily. •  Multiple Vitamins-Minerals (PRESERVISION AREDS) Oral Tab, Take 2 tablets by mouth daily.     •  FUROSEMIDE 20 MG Oral Tab, TAKE 1 TABLET (20 MG TOTAL) BY MOUTH DAILY AS NEEDED ( abscess w/o bleeding (12/6/2018), Dupuytren's contracture of left hand (11-18-16), High blood pressure, High cholesterol, Internal hemorrhoids without complication (61/4/2583), and [facility]     She  has a past surgical history that includes tubal ligat or tenderness  MUSCULOSKELETAL: back is not tender, FROM of the extremities  EXTREMITIES: no cyanosis, clubbing or edema  NEURO: Oriented times three, cranial nerves are intact, motor and sensory are grossly intact    REVIEWED IMAGING/CARDIAC AND LAB STUDI

## 2020-10-15 NOTE — PROGRESS NOTES
Multiple attempts to contact the pt for TCM without a patient call back. The patient was seen for [facility] FU on 10/13/2020. NCM closing encounter.

## 2020-12-09 ENCOUNTER — OFFICE VISIT (OUTPATIENT)
Dept: INTERNAL MEDICINE CLINIC | Facility: CLINIC | Age: 80
End: 2020-12-09
Payer: MEDICARE

## 2020-12-09 VITALS
WEIGHT: 152 LBS | HEART RATE: 87 BPM | SYSTOLIC BLOOD PRESSURE: 140 MMHG | HEIGHT: 63 IN | BODY MASS INDEX: 26.93 KG/M2 | RESPIRATION RATE: 18 BRPM | OXYGEN SATURATION: 97 % | DIASTOLIC BLOOD PRESSURE: 74 MMHG

## 2020-12-09 DIAGNOSIS — M79.89 SWELLING OF LOWER EXTREMITY: Primary | ICD-10-CM

## 2020-12-09 DIAGNOSIS — R30.0 DYSURIA: ICD-10-CM

## 2020-12-09 PROCEDURE — 99214 OFFICE O/P EST MOD 30 MIN: CPT | Performed by: FAMILY MEDICINE

## 2020-12-09 RX ORDER — LOSARTAN POTASSIUM 50 MG/1
50 TABLET ORAL DAILY
COMMUNITY
Start: 2020-10-26 | End: 2021-02-25

## 2020-12-09 RX ORDER — AMLODIPINE BESYLATE 5 MG/1
5 TABLET ORAL DAILY
Qty: 90 TABLET | Refills: 0 | Status: SHIPPED | OUTPATIENT
Start: 2020-12-09 | End: 2021-02-25

## 2020-12-09 RX ORDER — ASPIRIN 81 MG/1
81 TABLET, CHEWABLE ORAL DAILY
Qty: 90 TABLET | Refills: 1 | Status: SHIPPED | OUTPATIENT
Start: 2020-12-09 | End: 2021-01-06 | Stop reason: ALTCHOICE

## 2020-12-09 RX ORDER — AMLODIPINE BESYLATE 5 MG/1
TABLET ORAL
COMMUNITY
Start: 2020-10-14 | End: 2020-12-09

## 2020-12-09 RX ORDER — DILTIAZEM HYDROCHLORIDE 120 MG/1
120 CAPSULE, EXTENDED RELEASE ORAL DAILY
COMMUNITY
Start: 2020-10-26 | End: 2021-01-06

## 2020-12-09 NOTE — PROGRESS NOTES
PATIENT IDENTIFICATION  Name: Leandro Blount  MRN: [id number]    Diagnoses:   Swelling of lower extremity  (primary encounter diagnosis)  Dysuria    SUBJECTIVE:  Leandro Blount is a 79year old female who presents for lower extremity swelling and bur 12/6/2018    Performed by Jenniffer Das MD at [facility] ENDOSCOPY   • HAND [address] Left 11/18/2016    Performed by Chelo Goldman MD at [facility] MAIN OR   • PALMAR FASCIECTOMY Left 11-18-16    SUKHDEV De Paz's  LMF Ray   • TUBAL LIGATION PLUS) In Vitro Strip Inject 1 Device into the skin 2 (two) times daily.  DX   E11.9 (Insulin-requiring or dependent type II diabetes mellitus) 100 strip 5   • Insulin Pen Needle (PEN NEEDLES) 32G X 4 MM Does not apply Misc 1 Device by Does not apply route d pressure log to appointment. (see media section for photo dated today for review of blood pressure logs for November and December)      2. Normal UA today, with history of burning only on a few occasions now resolved. Given return precautions.     Suki

## 2020-12-14 ENCOUNTER — TELEPHONE (OUTPATIENT)
Dept: INTERNAL MEDICINE CLINIC | Facility: CLINIC | Age: 80
End: 2020-12-14

## 2020-12-14 NOTE — TELEPHONE ENCOUNTER
Message from Dr. Willie Wheat:  Please call patient and see if she has been able to see her cardiologist yet or set up an appt. If not scheduled within next few days, please advise to come in for f/u appt with me and bring all cardiac/bp meds she is taking.    [place] Hint

## 2020-12-15 NOTE — TELEPHONE ENCOUNTER
Contacted pt-->she has appt w/ Dr. Judy Thapa tomorrow 12/16/20.    Asked pt to ask Dr. Itz Agustin office to please fax OV note to Dr. Karime Padilla att as Dr. Lara Moe is no longer here.   Ramon Mary

## 2021-01-06 ENCOUNTER — OFFICE VISIT (OUTPATIENT)
Dept: INTERNAL MEDICINE CLINIC | Facility: CLINIC | Age: 81
End: 2021-01-06
Payer: MEDICARE

## 2021-01-06 VITALS
RESPIRATION RATE: 17 BRPM | SYSTOLIC BLOOD PRESSURE: 130 MMHG | OXYGEN SATURATION: 98 % | HEIGHT: 63 IN | HEART RATE: 72 BPM | DIASTOLIC BLOOD PRESSURE: 88 MMHG | WEIGHT: 152 LBS | BODY MASS INDEX: 26.93 KG/M2

## 2021-01-06 DIAGNOSIS — M79.89 SWELLING OF LOWER EXTREMITY: ICD-10-CM

## 2021-01-06 DIAGNOSIS — E11.22 TYPE 2 DIABETES MELLITUS WITH STAGE 3A CHRONIC KIDNEY DISEASE, WITH LONG-TERM CURRENT USE OF INSULIN (HCC): Primary | ICD-10-CM

## 2021-01-06 DIAGNOSIS — I10 ESSENTIAL HYPERTENSION: ICD-10-CM

## 2021-01-06 DIAGNOSIS — Z79.4 TYPE 2 DIABETES MELLITUS WITH STAGE 3A CHRONIC KIDNEY DISEASE, WITH LONG-TERM CURRENT USE OF INSULIN (HCC): Primary | ICD-10-CM

## 2021-01-06 DIAGNOSIS — N18.31 TYPE 2 DIABETES MELLITUS WITH STAGE 3A CHRONIC KIDNEY DISEASE, WITH LONG-TERM CURRENT USE OF INSULIN (HCC): Primary | ICD-10-CM

## 2021-01-06 DIAGNOSIS — N18.31 STAGE 3A CHRONIC KIDNEY DISEASE (HCC): ICD-10-CM

## 2021-01-06 LAB
CREAT UR-SCNC: 167 MG/DL
EST. AVERAGE GLUCOSE BLD GHB EST-MCNC: 166 MG/DL (ref 68–126)
HBA1C MFR BLD HPLC: 7.4 % (ref ?–5.7)
MICROALBUMIN UR-MCNC: 0.83 MG/DL
MICROALBUMIN/CREAT 24H UR-RTO: 5 UG/MG (ref ?–30)

## 2021-01-06 PROCEDURE — 99213 OFFICE O/P EST LOW 20 MIN: CPT | Performed by: FAMILY MEDICINE

## 2021-01-06 PROCEDURE — 83036 HEMOGLOBIN GLYCOSYLATED A1C: CPT | Performed by: FAMILY MEDICINE

## 2021-01-06 PROCEDURE — 82043 UR ALBUMIN QUANTITATIVE: CPT | Performed by: FAMILY MEDICINE

## 2021-01-06 PROCEDURE — 82570 ASSAY OF URINE CREATININE: CPT | Performed by: FAMILY MEDICINE

## 2021-01-06 RX ORDER — INSULIN DEGLUDEC INJECTION 100 U/ML
46 INJECTION, SOLUTION SUBCUTANEOUS DAILY
Qty: 27 PEN | Refills: 1 | Status: SHIPPED | OUTPATIENT
Start: 2021-01-06 | End: 2021-06-16

## 2021-01-06 RX ORDER — BLOOD SUGAR DIAGNOSTIC
1 STRIP MISCELLANEOUS 3 TIMES DAILY PRN
Qty: 300 STRIP | Refills: 3 | Status: SHIPPED | OUTPATIENT
Start: 2021-01-06 | End: 2021-04-28

## 2021-01-06 NOTE — PROGRESS NOTES
PATIENT IDENTIFICATION  Name: Fabio Lehman  MRN: [id number]    Diagnoses:   Type 2 diabetes mellitus with stage 3a chronic kidney disease, with long-term current use of insulin ([facility]  (primary encounter diagnosis)  Stage 3a chronic kidney disease  Swe Glucose Blood (ACCU-CHEK JOSE PLUS) In Vitro Strip Inject 1 Device into the skin 3 (three) times daily as needed.  DX   E11.9 (Insulin-requiring or dependent type II diabetes mellitus) 300 strip 3   • losartan Potassium 50 MG Oral Tab Take 50 mg by mouth d and well developed and well nourished  HEENT: Normocephalic, atraumatic  Lungs:normal respiratory effort.    Extremities:no edema of LEs   Neuro: Gait normal   Skin: Warm/dry, no rashes  Mood: No anxiety or depression noted    IMPRESSION/PLAN  Noemi Page

## 2021-01-21 DIAGNOSIS — E11.9 TYPE 2 DIABETES MELLITUS WITHOUT COMPLICATION, WITH LONG-TERM CURRENT USE OF INSULIN (HCC): ICD-10-CM

## 2021-01-21 DIAGNOSIS — Z79.4 TYPE 2 DIABETES MELLITUS WITHOUT COMPLICATION, WITH LONG-TERM CURRENT USE OF INSULIN (HCC): ICD-10-CM

## 2021-01-22 ENCOUNTER — TELEPHONE (OUTPATIENT)
Dept: INTERNAL MEDICINE CLINIC | Facility: CLINIC | Age: 81
End: 2021-01-22

## 2021-01-22 DIAGNOSIS — I10 ESSENTIAL HYPERTENSION: ICD-10-CM

## 2021-01-22 DIAGNOSIS — E78.2 MIXED HYPERLIPIDEMIA: ICD-10-CM

## 2021-01-22 RX ORDER — METOPROLOL SUCCINATE 100 MG/1
100 TABLET, EXTENDED RELEASE ORAL
Qty: 90 TABLET | Refills: 1 | Status: SHIPPED | OUTPATIENT
Start: 2021-01-22 | End: 2021-04-28

## 2021-01-22 RX ORDER — ATORVASTATIN CALCIUM 40 MG/1
TABLET, FILM COATED ORAL
Qty: 90 TABLET | Refills: 3 | Status: SHIPPED | OUTPATIENT
Start: 2021-01-22 | End: 2021-04-28

## 2021-01-22 NOTE — TELEPHONE ENCOUNTER
States she has had intermittent dizziness when laying down, goes away when she gets up. Advised likely BPPV, recommended in person visit. Also recommended she check her blood pressure when these episodes occur. Given ED precautions.   Patient requested

## 2021-02-02 DIAGNOSIS — Z23 NEED FOR VACCINATION: ICD-10-CM

## 2021-02-19 ENCOUNTER — TELEPHONE (OUTPATIENT)
Dept: INTERNAL MEDICINE CLINIC | Facility: CLINIC | Age: 81
End: 2021-02-19

## 2021-02-19 NOTE — TELEPHONE ENCOUNTER
Pharmacist calling for clarification on Amlodipine:    Jamin Sanchez had her on 5mg BID  (we have no record of this). What do you want to do?

## 2021-02-19 NOTE — TELEPHONE ENCOUNTER
Contacted Dr. Ariella White office. Requested last note to be faxed to us. .  Confirmed that pt is no longer on this medication (Amlodipine 5 mg).     Also called pt to verify current medication list.  · Aspirin 81mg QD  · Metformin 1000mg BID  · Atorvastatin

## 2021-02-25 ENCOUNTER — OFFICE VISIT (OUTPATIENT)
Dept: INTERNAL MEDICINE CLINIC | Facility: CLINIC | Age: 81
End: 2021-02-25
Payer: MEDICARE

## 2021-02-25 VITALS
HEART RATE: 85 BPM | BODY MASS INDEX: 27.46 KG/M2 | RESPIRATION RATE: 17 BRPM | WEIGHT: 155 LBS | OXYGEN SATURATION: 97 % | HEIGHT: 63 IN | DIASTOLIC BLOOD PRESSURE: 80 MMHG | SYSTOLIC BLOOD PRESSURE: 150 MMHG

## 2021-02-25 DIAGNOSIS — I10 ESSENTIAL HYPERTENSION: ICD-10-CM

## 2021-02-25 DIAGNOSIS — M79.642 BILATERAL HAND PAIN: Primary | ICD-10-CM

## 2021-02-25 DIAGNOSIS — M79.641 BILATERAL HAND PAIN: Primary | ICD-10-CM

## 2021-02-25 PROCEDURE — 99213 OFFICE O/P EST LOW 20 MIN: CPT | Performed by: FAMILY MEDICINE

## 2021-03-01 NOTE — PROGRESS NOTES
PATIENT IDENTIFICATION  Name: Hardik Fajardo  MRN: [id number]    Diagnoses:   Bilateral hand pain  (primary encounter diagnosis)  Essential hypertension    SUBJECTIVE:  Hardik Fajardo is a 79year old female who presents for BP follow up and joint pa • atorvastatin 40 MG Oral Tab TAKE 1 TABLET BY MOUTH EVERY DAY AT NIGHT 90 tablet 3   • metFORMIN HCl 1000 MG Oral Tab TAKE 1 TABLET BY MOUTH TWICE A DAY WITH MEALS 180 tablet 3   • Insulin Degludec (TRESIBA FLEXTOUCH) 100 UNIT/ML Subcutaneous Solution Pen Extremities: hands  as seen in photo below or (see media section for photo dated today)  Neuro: A x O x 3  Skin: Well perfused  Mood: No anxiety or depression noted        IMPRESSION/PLAN  Evelyn Raygoza presents to us today for BP follow up and joint p

## 2021-04-07 ENCOUNTER — HOSPITAL ENCOUNTER (EMERGENCY)
Facility: HOSPITAL | Age: 81
Discharge: HOME | End: 2021-04-07
Attending: EMERGENCY MEDICINE
Payer: MEDICARE

## 2021-04-07 ENCOUNTER — APPOINTMENT (OUTPATIENT)
Dept: GENERAL RADIOLOGY | Facility: HOSPITAL | Age: 81
End: 2021-04-07
Payer: MEDICARE

## 2021-04-07 VITALS
TEMPERATURE: 98 F | SYSTOLIC BLOOD PRESSURE: 167 MMHG | DIASTOLIC BLOOD PRESSURE: 70 MMHG | WEIGHT: 180 LBS | RESPIRATION RATE: 18 BRPM | OXYGEN SATURATION: 95 % | HEART RATE: 74 BPM | HEIGHT: 63 IN | BODY MASS INDEX: 31.89 KG/M2

## 2021-04-07 DIAGNOSIS — E16.2 HYPOGLYCEMIA: Primary | ICD-10-CM

## 2021-04-07 PROCEDURE — 85025 COMPLETE CBC W/AUTO DIFF WBC: CPT | Performed by: EMERGENCY MEDICINE

## 2021-04-07 PROCEDURE — 99284 EMERGENCY DEPT VISIT MOD MDM: CPT

## 2021-04-07 PROCEDURE — 84484 ASSAY OF TROPONIN QUANT: CPT | Performed by: EMERGENCY MEDICINE

## 2021-04-07 PROCEDURE — 85025 COMPLETE CBC W/AUTO DIFF WBC: CPT

## 2021-04-07 PROCEDURE — 36415 COLL VENOUS BLD VENIPUNCTURE: CPT

## 2021-04-07 PROCEDURE — 81001 URINALYSIS AUTO W/SCOPE: CPT | Performed by: EMERGENCY MEDICINE

## 2021-04-07 PROCEDURE — 83735 ASSAY OF MAGNESIUM: CPT | Performed by: EMERGENCY MEDICINE

## 2021-04-07 PROCEDURE — 80048 BASIC METABOLIC PNL TOTAL CA: CPT | Performed by: EMERGENCY MEDICINE

## 2021-04-07 PROCEDURE — 80048 BASIC METABOLIC PNL TOTAL CA: CPT

## 2021-04-07 PROCEDURE — 71045 X-RAY EXAM CHEST 1 VIEW: CPT | Performed by: EMERGENCY MEDICINE

## 2021-04-07 PROCEDURE — 93005 ELECTROCARDIOGRAM TRACING: CPT

## 2021-04-07 PROCEDURE — 82962 GLUCOSE BLOOD TEST: CPT

## 2021-04-07 PROCEDURE — 93010 ELECTROCARDIOGRAM REPORT: CPT | Performed by: EMERGENCY MEDICINE

## 2021-04-07 PROCEDURE — 84484 ASSAY OF TROPONIN QUANT: CPT

## 2021-04-08 NOTE — ED PROVIDER NOTES
Patient Seen in: [facility] AND [facility] Emergency Department      History   Patient presents with:  Numbness Weakness  Hypoglycemia    Stated Complaint: hypertension arms shaking    HPI/Subjective:   HPI    21-year-old female presents for evaluation for eleva SpO2 95%   BMI 31.89 kg/m²         Physical Exam  Vitals and nursing note reviewed. Constitutional:       Appearance: She is well-developed. HENT:      Head: Normocephalic and atraumatic.    Eyes:      General: Lids are normal.      Extraocular Movement Notable for the following components:    Urine Color Colorless (*)     Protein Urine 30  (*)     Bacteria Urine Rare (*)     All other components within normal limits   POCT GLUCOSE - Abnormal; Notable for the following components:    POC Glucose  161 (*) [facility],  CHEST AP PORTABLE (CPT=71045), 10/02/2020, 7:39 PM.  INDICATIONS: Hypertension and bilateral arm shaking/tremor. Focal psychomotor deficit. TECHNIQUE:   Single view. FINDINGS:  CARDIAC/VASC: The cardiomediastinal silhouette is not enlarged. you schedule follow up care with a primary care provider within the next three months to obtain basic health screening including reassessment of your blood pressure.       Medications Prescribed:  Current Discharge Medication List

## 2021-04-08 NOTE — ED INITIAL ASSESSMENT (HPI)
Pt received via [place] with c/o low blood sugar, shaking/tremors in the arms. Pt reports shakiness report calling EMS d/t high BP and low glucose 81. Pt is awake and alert, respirations nonlabored, is speaking in full sentences.  Denies any CP or dyspnea

## 2021-04-10 ENCOUNTER — OFFICE VISIT (OUTPATIENT)
Dept: RHEUMATOLOGY | Facility: CLINIC | Age: 81
End: 2021-04-10
Payer: MEDICARE

## 2021-04-10 VITALS
HEIGHT: 63 IN | HEART RATE: 80 BPM | BODY MASS INDEX: 27.4 KG/M2 | SYSTOLIC BLOOD PRESSURE: 177 MMHG | DIASTOLIC BLOOD PRESSURE: 74 MMHG | WEIGHT: 154.63 LBS

## 2021-04-10 DIAGNOSIS — M15.9 PRIMARY OSTEOARTHRITIS INVOLVING MULTIPLE JOINTS: Primary | ICD-10-CM

## 2021-04-10 DIAGNOSIS — M79.7 FIBROMYALGIA: ICD-10-CM

## 2021-04-10 PROBLEM — M15.0 PRIMARY OSTEOARTHRITIS INVOLVING MULTIPLE JOINTS: Status: ACTIVE | Noted: 2021-04-10

## 2021-04-10 PROCEDURE — 99204 OFFICE O/P NEW MOD 45 MIN: CPT | Performed by: INTERNAL MEDICINE

## 2021-04-10 RX ORDER — GABAPENTIN 100 MG/1
CAPSULE ORAL
Qty: 60 CAPSULE | Refills: 2 | Status: SHIPPED | OUTPATIENT
Start: 2021-04-10 | End: 2021-05-17

## 2021-04-10 NOTE — PROGRESS NOTES
Dear Dr. Agnes Ashley:    I saw your patient Hardik Found in consultation this morning at your request for evaluation of diffuse musculoskeletal pain. Her daughter is with her to translate.     As you know, she is an 44-year-old woman with long history of ar cigarettes or alcohol. She drinks coffee. She walks and does other exercises with her arms. When she crochets her thumbs hurt. She does not let her arthritis limit her much. Review of systems:  No fevers, chills, sweats, anorexia, weight loss, rash. Tylenol 1000 mg 3 times a day. She will remain active. She will use diclofenac gel, for her knuckles that are hurting. She will follow-up in 6 weeks. 2.  Diffuse myofascial discomfort is contributing to her diffuse pain.   She has nonrestorative sleep

## 2021-04-28 ENCOUNTER — OFFICE VISIT (OUTPATIENT)
Dept: INTERNAL MEDICINE CLINIC | Facility: CLINIC | Age: 81
End: 2021-04-28
Payer: MEDICARE

## 2021-04-28 VITALS
HEART RATE: 71 BPM | WEIGHT: 154 LBS | BODY MASS INDEX: 27.29 KG/M2 | DIASTOLIC BLOOD PRESSURE: 72 MMHG | OXYGEN SATURATION: 97 % | SYSTOLIC BLOOD PRESSURE: 130 MMHG | HEIGHT: 63 IN

## 2021-04-28 DIAGNOSIS — E78.2 MIXED HYPERLIPIDEMIA: ICD-10-CM

## 2021-04-28 DIAGNOSIS — N18.31 TYPE 2 DIABETES MELLITUS WITH STAGE 3A CHRONIC KIDNEY DISEASE, WITH LONG-TERM CURRENT USE OF INSULIN (HCC): Primary | ICD-10-CM

## 2021-04-28 DIAGNOSIS — E11.22 TYPE 2 DIABETES MELLITUS WITH STAGE 3A CHRONIC KIDNEY DISEASE, WITH LONG-TERM CURRENT USE OF INSULIN (HCC): Primary | ICD-10-CM

## 2021-04-28 DIAGNOSIS — I10 ESSENTIAL HYPERTENSION: ICD-10-CM

## 2021-04-28 DIAGNOSIS — Z79.4 TYPE 2 DIABETES MELLITUS WITH STAGE 3A CHRONIC KIDNEY DISEASE, WITH LONG-TERM CURRENT USE OF INSULIN (HCC): Primary | ICD-10-CM

## 2021-04-28 PROCEDURE — 99213 OFFICE O/P EST LOW 20 MIN: CPT | Performed by: FAMILY MEDICINE

## 2021-04-28 PROCEDURE — 83036 HEMOGLOBIN GLYCOSYLATED A1C: CPT | Performed by: FAMILY MEDICINE

## 2021-04-28 RX ORDER — ATORVASTATIN CALCIUM 40 MG/1
TABLET, FILM COATED ORAL
Qty: 90 TABLET | Refills: 3 | Status: SHIPPED | OUTPATIENT
Start: 2021-04-28

## 2021-04-28 RX ORDER — METOPROLOL SUCCINATE 100 MG/1
100 TABLET, EXTENDED RELEASE ORAL
Qty: 90 TABLET | Refills: 1 | Status: SHIPPED | OUTPATIENT
Start: 2021-04-28 | End: 2021-12-20

## 2021-04-28 RX ORDER — PEN NEEDLE, DIABETIC 30 GX3/16"
1 NEEDLE, DISPOSABLE MISCELLANEOUS DAILY
Qty: 100 EACH | Refills: 3 | Status: SHIPPED | OUTPATIENT
Start: 2021-04-28 | End: 2021-11-01

## 2021-04-28 RX ORDER — BLOOD SUGAR DIAGNOSTIC
1 STRIP MISCELLANEOUS 3 TIMES DAILY PRN
Qty: 300 STRIP | Refills: 3 | Status: SHIPPED | OUTPATIENT
Start: 2021-04-28 | End: 2021-11-01

## 2021-04-28 RX ORDER — ASPIRIN 81 MG/1
81 TABLET, CHEWABLE ORAL DAILY
Qty: 90 TABLET | Refills: 1 | Status: SHIPPED | OUTPATIENT
Start: 2021-04-28 | End: 2021-11-01

## 2021-05-02 NOTE — PROGRESS NOTES
PATIENT IDENTIFICATION  Name: Anup Rutherford  MRN: [id number]    Diagnoses:   Type 2 diabetes mellitus with stage 3a chronic kidney disease, with long-term current use of insulin ([facility]  (primary encounter diagnosis)  Essential hypertension  Mixed hyperl 3 (three) times daily as needed. DX   E11.9 (Insulin-requiring or dependent type II diabetes mellitus) 300 strip 3   • Insulin Pen Needle (PEN NEEDLES) 32G X 4 MM Does not apply Misc 1 Device by Does not apply route daily.  100 each 3   • Metoprolol [place] or depression noted    IMPRESSION/PLAN  Regina Smith presents to us today for dm and htn f/u.     1. Type 2 diabetes mellitus with stage 3a chronic kidney disease, with long-term current use of insulin (HCC)  - HEMOGLOBIN A1C  - Glucose Blood (ACCU-CH

## 2021-05-17 ENCOUNTER — OFFICE VISIT (OUTPATIENT)
Dept: RHEUMATOLOGY | Facility: CLINIC | Age: 81
End: 2021-05-17
Payer: MEDICARE

## 2021-05-17 VITALS
DIASTOLIC BLOOD PRESSURE: 75 MMHG | HEIGHT: 63 IN | HEART RATE: 80 BPM | WEIGHT: 157.88 LBS | BODY MASS INDEX: 27.97 KG/M2 | SYSTOLIC BLOOD PRESSURE: 168 MMHG

## 2021-05-17 DIAGNOSIS — M15.9 PRIMARY OSTEOARTHRITIS INVOLVING MULTIPLE JOINTS: Primary | ICD-10-CM

## 2021-05-17 PROCEDURE — 99213 OFFICE O/P EST LOW 20 MIN: CPT | Performed by: INTERNAL MEDICINE

## 2021-05-17 RX ORDER — GABAPENTIN 100 MG/1
CAPSULE ORAL
Qty: 90 CAPSULE | Refills: 3 | Status: SHIPPED | OUTPATIENT
Start: 2021-05-17

## 2021-05-17 NOTE — PROGRESS NOTES
HPI/Subjective:   Patient ID: Sesar Wei is a 79year old female. Chirag Christianson is a 50-year-old woman who I saw April 10th of 2021, with osteoarthritis, diffuse myofascial pain syndrome.   She has been taking 100 mg of gabapentin before bedtime since into the skin 3 (three) times daily as needed. DX   E11.9 (Insulin-requiring or dependent type II diabetes mellitus) 300 strip 3   • Insulin Pen Needle (PEN NEEDLES) 32G X 4 MM Does not apply Misc 1 Device by Does not apply route daily.  100 each 3   • Insu placed in this encounter. Meds This Visit:  Requested Prescriptions     Signed Prescriptions Disp Refills   • gabapentin 100 MG Oral Cap 90 capsule 3     Sig: Take 1 PO Q HS.        Imaging & Referrals:  None

## 2021-06-14 ENCOUNTER — TELEPHONE (OUTPATIENT)
Dept: INTERNAL MEDICINE CLINIC | Facility: CLINIC | Age: 81
End: 2021-06-14

## 2021-06-14 NOTE — TELEPHONE ENCOUNTER
Patient came in and is requesting form to be filled and faxed to Dentist.  She states that she has been here twice and it still has not been done.

## 2021-06-16 ENCOUNTER — OFFICE VISIT (OUTPATIENT)
Dept: INTERNAL MEDICINE CLINIC | Facility: CLINIC | Age: 81
End: 2021-06-16
Payer: MEDICARE

## 2021-06-16 VITALS
HEART RATE: 65 BPM | BODY MASS INDEX: 27.64 KG/M2 | DIASTOLIC BLOOD PRESSURE: 68 MMHG | OXYGEN SATURATION: 98 % | HEIGHT: 63 IN | WEIGHT: 156 LBS | RESPIRATION RATE: 18 BRPM | SYSTOLIC BLOOD PRESSURE: 146 MMHG

## 2021-06-16 DIAGNOSIS — E11.22 TYPE 2 DIABETES MELLITUS WITH STAGE 3A CHRONIC KIDNEY DISEASE, WITH LONG-TERM CURRENT USE OF INSULIN (HCC): ICD-10-CM

## 2021-06-16 DIAGNOSIS — N18.31 TYPE 2 DIABETES MELLITUS WITH STAGE 3A CHRONIC KIDNEY DISEASE, WITH LONG-TERM CURRENT USE OF INSULIN (HCC): ICD-10-CM

## 2021-06-16 DIAGNOSIS — G89.29 CHRONIC BILATERAL LOW BACK PAIN WITH BILATERAL SCIATICA: Primary | ICD-10-CM

## 2021-06-16 DIAGNOSIS — I10 ESSENTIAL HYPERTENSION: ICD-10-CM

## 2021-06-16 DIAGNOSIS — Z79.4 TYPE 2 DIABETES MELLITUS WITH STAGE 3A CHRONIC KIDNEY DISEASE, WITH LONG-TERM CURRENT USE OF INSULIN (HCC): ICD-10-CM

## 2021-06-16 DIAGNOSIS — M54.42 CHRONIC BILATERAL LOW BACK PAIN WITH BILATERAL SCIATICA: Primary | ICD-10-CM

## 2021-06-16 DIAGNOSIS — M54.41 CHRONIC BILATERAL LOW BACK PAIN WITH BILATERAL SCIATICA: Primary | ICD-10-CM

## 2021-06-16 PROCEDURE — 99213 OFFICE O/P EST LOW 20 MIN: CPT | Performed by: FAMILY MEDICINE

## 2021-06-16 RX ORDER — INSULIN DEGLUDEC INJECTION 100 U/ML
50 INJECTION, SOLUTION SUBCUTANEOUS DAILY
Qty: 1 EACH | Refills: 0 | COMMUNITY
Start: 2021-06-16 | End: 2021-11-01

## 2021-06-16 NOTE — PROGRESS NOTES
PATIENT IDENTIFICATION  Name: Lisa Fortune  MRN: [id number]    Diagnoses:   Chronic bilateral low back pain with bilateral sciatica  (primary encounter diagnosis)  Type 2 diabetes mellitus with stage 3a chronic kidney disease, with long-term current u Medication Sig Dispense Refill   • Insulin Degludec (TRESIBA FLEXTOUCH) 100 UNIT/ML Subcutaneous Solution Pen-injector Inject 50 Units into the skin daily. 1 each 0   • Diclofenac Sodium 1 % External Gel Apply 2 g topically 4 (four) times daily.  100 g 1 nourished  HEENT: Normocephalic, atraumatic  Lungs: normal respiratory effort. Back: TTP along bilateral SI joints and paraspinal muscles. No TTP along spine.     Neuro: A x O x 3  Skin: Well perfused  Mood: No anxiety or depression noted    IMPRESSION/PL

## 2021-07-28 ENCOUNTER — OFFICE VISIT (OUTPATIENT)
Dept: INTERNAL MEDICINE CLINIC | Facility: CLINIC | Age: 81
End: 2021-07-28
Payer: MEDICARE

## 2021-07-28 VITALS
BODY MASS INDEX: 27.29 KG/M2 | WEIGHT: 154 LBS | OXYGEN SATURATION: 97 % | SYSTOLIC BLOOD PRESSURE: 138 MMHG | RESPIRATION RATE: 17 BRPM | HEIGHT: 63 IN | HEART RATE: 75 BPM | DIASTOLIC BLOOD PRESSURE: 64 MMHG

## 2021-07-28 DIAGNOSIS — N18.31 TYPE 2 DIABETES MELLITUS WITH STAGE 3A CHRONIC KIDNEY DISEASE, WITH LONG-TERM CURRENT USE OF INSULIN (HCC): Primary | ICD-10-CM

## 2021-07-28 DIAGNOSIS — N18.31 STAGE 3A CHRONIC KIDNEY DISEASE (HCC): ICD-10-CM

## 2021-07-28 DIAGNOSIS — I10 ESSENTIAL HYPERTENSION: ICD-10-CM

## 2021-07-28 DIAGNOSIS — E11.22 TYPE 2 DIABETES MELLITUS WITH STAGE 3A CHRONIC KIDNEY DISEASE, WITH LONG-TERM CURRENT USE OF INSULIN (HCC): Primary | ICD-10-CM

## 2021-07-28 DIAGNOSIS — E78.2 MIXED HYPERLIPIDEMIA: ICD-10-CM

## 2021-07-28 DIAGNOSIS — Z79.4 TYPE 2 DIABETES MELLITUS WITH STAGE 3A CHRONIC KIDNEY DISEASE, WITH LONG-TERM CURRENT USE OF INSULIN (HCC): Primary | ICD-10-CM

## 2021-07-28 LAB
ANION GAP SERPL CALC-SCNC: 9 MMOL/L (ref 0–18)
BUN BLD-MCNC: 18 MG/DL (ref 7–18)
BUN/CREAT SERPL: 19.6 (ref 10–20)
CALCIUM BLD-MCNC: 9.1 MG/DL (ref 8.5–10.1)
CHLORIDE SERPL-SCNC: 104 MMOL/L (ref 98–112)
CO2 SERPL-SCNC: 27 MMOL/L (ref 21–32)
CREAT BLD-MCNC: 0.92 MG/DL
EST. AVERAGE GLUCOSE BLD GHB EST-MCNC: 177 MG/DL (ref 68–126)
GLUCOSE BLD-MCNC: 132 MG/DL (ref 70–99)
HBA1C MFR BLD HPLC: 7.8 % (ref ?–5.7)
OSMOLALITY SERPL CALC.SUM OF ELEC: 294 MOSM/KG (ref 275–295)
PATIENT FASTING Y/N/NP: YES
POTASSIUM SERPL-SCNC: 4.7 MMOL/L (ref 3.5–5.1)
SODIUM SERPL-SCNC: 140 MMOL/L (ref 136–145)

## 2021-07-28 PROCEDURE — 80048 BASIC METABOLIC PNL TOTAL CA: CPT | Performed by: FAMILY MEDICINE

## 2021-07-28 PROCEDURE — 83036 HEMOGLOBIN GLYCOSYLATED A1C: CPT | Performed by: FAMILY MEDICINE

## 2021-07-28 PROCEDURE — 99214 OFFICE O/P EST MOD 30 MIN: CPT | Performed by: FAMILY MEDICINE

## 2021-07-29 NOTE — PROGRESS NOTES
PATIENT IDENTIFICATION  Name: Shaq Osborne  MRN: [id number]    Diagnoses:   Type 2 diabetes mellitus with stage 3a chronic kidney disease, with long-term current use of insulin ([facility]  (primary encounter diagnosis)  Essential hypertension  Stage 3a chr into the skin daily. 1 each 0   • Diclofenac Sodium 1 % External Gel Apply 2 g topically 4 (four) times daily. 100 g 1   • gabapentin 100 MG Oral Cap Take 1 PO Q HS. 90 capsule 3   • aspirin 81 MG Oral Chew Tab Chew 1 tablet (81 mg total) by mouth daily.  9 without rales or wheezing  Heart: S1, S2 normal, no murmur, click, rub or gallop, regular rate and rhythm  Neuro: A x O x 3  Skin: Well perfused  Mood: No anxiety or depression noted    IMPRESSION/PLAN  Gallito Meyer presents to us today for DM and HTN

## 2021-10-08 ENCOUNTER — OFFICE VISIT (OUTPATIENT)
Dept: INTERNAL MEDICINE CLINIC | Facility: CLINIC | Age: 81
End: 2021-10-08
Payer: MEDICARE

## 2021-10-08 VITALS
OXYGEN SATURATION: 99 % | SYSTOLIC BLOOD PRESSURE: 106 MMHG | HEART RATE: 82 BPM | DIASTOLIC BLOOD PRESSURE: 66 MMHG | HEIGHT: 63 IN | WEIGHT: 153.19 LBS | BODY MASS INDEX: 27.14 KG/M2

## 2021-10-08 DIAGNOSIS — I10 ESSENTIAL HYPERTENSION: ICD-10-CM

## 2021-10-08 DIAGNOSIS — K86.2 PANCREATIC CYST: ICD-10-CM

## 2021-10-08 DIAGNOSIS — Z12.31 ENCOUNTER FOR SCREENING MAMMOGRAM FOR MALIGNANT NEOPLASM OF BREAST: ICD-10-CM

## 2021-10-08 DIAGNOSIS — R93.3 ABNORMAL FINDING ON GI TRACT IMAGING: ICD-10-CM

## 2021-10-08 DIAGNOSIS — M15.9 PRIMARY OSTEOARTHRITIS INVOLVING MULTIPLE JOINTS: ICD-10-CM

## 2021-10-08 DIAGNOSIS — Z86.73 HISTORY OF TIA (TRANSIENT ISCHEMIC ATTACK): ICD-10-CM

## 2021-10-08 DIAGNOSIS — N18.31 STAGE 3A CHRONIC KIDNEY DISEASE (HCC): Chronic | ICD-10-CM

## 2021-10-08 DIAGNOSIS — Z79.4 TYPE 2 DIABETES MELLITUS WITH STAGE 3A CHRONIC KIDNEY DISEASE, WITH LONG-TERM CURRENT USE OF INSULIN (HCC): ICD-10-CM

## 2021-10-08 DIAGNOSIS — I21.4 NON-STEMI (NON-ST ELEVATED MYOCARDIAL INFARCTION) (HCC): ICD-10-CM

## 2021-10-08 DIAGNOSIS — E78.2 MIXED HYPERLIPIDEMIA: ICD-10-CM

## 2021-10-08 DIAGNOSIS — E11.22 TYPE 2 DIABETES MELLITUS WITH STAGE 3A CHRONIC KIDNEY DISEASE, WITH LONG-TERM CURRENT USE OF INSULIN (HCC): ICD-10-CM

## 2021-10-08 DIAGNOSIS — K57.30 DIVERTICULOSIS LARGE INTESTINE W/O PERFORATION OR ABSCESS W/O BLEEDING: ICD-10-CM

## 2021-10-08 DIAGNOSIS — M79.7 FIBROMYALGIA: ICD-10-CM

## 2021-10-08 DIAGNOSIS — M81.0 POST-MENOPAUSAL OSTEOPOROSIS: ICD-10-CM

## 2021-10-08 DIAGNOSIS — Z78.0 POSTMENOPAUSAL: ICD-10-CM

## 2021-10-08 DIAGNOSIS — H35.369 DEGENERATIVE DRUSEN, UNSPECIFIED LATERALITY: ICD-10-CM

## 2021-10-08 DIAGNOSIS — Z00.00 ENCOUNTER FOR ANNUAL HEALTH EXAMINATION: Primary | ICD-10-CM

## 2021-10-08 DIAGNOSIS — Z86.010 HISTORY OF COLON POLYPS: ICD-10-CM

## 2021-10-08 DIAGNOSIS — N18.31 TYPE 2 DIABETES MELLITUS WITH STAGE 3A CHRONIC KIDNEY DISEASE, WITH LONG-TERM CURRENT USE OF INSULIN (HCC): ICD-10-CM

## 2021-10-08 DIAGNOSIS — K64.8 INTERNAL HEMORRHOIDS WITHOUT COMPLICATION: ICD-10-CM

## 2021-10-08 PROBLEM — B02.9 HERPES ZOSTER WITHOUT COMPLICATION: Status: RESOLVED | Noted: 2019-05-13 | Resolved: 2021-10-08

## 2021-10-08 PROBLEM — R79.89 AZOTEMIA: Status: RESOLVED | Noted: 2020-10-02 | Resolved: 2021-10-08

## 2021-10-08 PROBLEM — F40.243 FEAR OF FLYING: Status: RESOLVED | Noted: 2019-02-19 | Resolved: 2021-10-08

## 2021-10-08 PROBLEM — R73.9 HYPERGLYCEMIA: Status: RESOLVED | Noted: 2020-10-02 | Resolved: 2021-10-08

## 2021-10-08 PROBLEM — E11.65 UNCONTROLLED TYPE 2 DIABETES MELLITUS WITH HYPERGLYCEMIA (HCC): Status: RESOLVED | Noted: 2020-01-23 | Resolved: 2021-10-08

## 2021-10-08 PROCEDURE — G0439 PPPS, SUBSEQ VISIT: HCPCS | Performed by: FAMILY MEDICINE

## 2021-10-08 PROCEDURE — 80061 LIPID PANEL: CPT | Performed by: FAMILY MEDICINE

## 2021-10-08 PROCEDURE — 80053 COMPREHEN METABOLIC PANEL: CPT | Performed by: FAMILY MEDICINE

## 2021-10-08 NOTE — PROGRESS NOTES
HPI:   John Whitten is a 79year old female who presents for a Medicare Subsequent Annual Wellness visit (Pt already had Initial Annual Wellness).     Her last annual assessment has been over 1 year: Annual Physical due on 01/23/2021         She has be insulin ([address]     History of left cataract surgery     Status post right cataract extraction     History of TIA (transient ischemic attack)     Pancreatic cyst     Drusen (degenerative) of retina     History of colon polyps     Abnormal finding on GI tract Does not apply route daily. Metoprolol Succinate  MG Oral Tablet 24 Hr, Take 1 tablet (100 mg total) by mouth once daily.   metFORMIN HCl 1000 MG Oral Tab, TAKE 1 TABLET BY MOUTH TWICE A DAY WITH MEALS  atorvastatin 40 MG Oral Tab, TAKE 1 TABLET BY M Negative for cough, chest tightness and shortness of breath. Cardiovascular: Negative for chest pain. Gastrointestinal: Negative for abdominal pain, diarrhea, nausea and vomiting. Endocrine: Negative for polydipsia and polyuria.    Genitourinary: Neg 08/16/2019, 10/02/2020   • FLUZONE 6 months and older PFS 0.5 ml (96058) 09/12/2016   • Fluzone Vaccine Medicare () 10/15/2017, 10/20/2017, 09/11/2018   • HIB (3 Dose) 04/09/2016   • Haemophilus B Polysac Conj Vac Im 04/09/2016   • Influenza 10/23/201 follow-ups on file.      Christa Joaquin MD, 10/8/2021     General Health     In the past six months, have you lost more than 10 pounds without trying?: 2 - No  Has your appetite been poor?: No  How does the patient maintain a good energy level?: Appropriate Ex if patient is at high risk or previous colonoscopy was abnormal 12/06/2018    Colonoscopy due on 12/06/2023    Flexible Sigmoidoscopy   Covered every 4 years -    Fecal Occult Blood Test Covered annually -   Bone Density Screening    Bone density screening A1C 7.8 (H) 07/28/2021       No recommendations at this time    Creat/alb ratio Annually Lab Results   Component Value Date    MICROALBCREA 5.0 01/06/2021       LDL Annually Lab Results   Component Value Date    LDL 37 10/08/2021       Dilated Eye Exam Abby

## 2021-10-08 NOTE — PATIENT INSTRUCTIONS
Noemi Powell's SCREENING SCHEDULE   Tests on this list are recommended by your physician but may not be covered, or covered at this frequency, by your insurer. Please check with your insurance carrier before scheduling to verify coverage.    PREVEN (CPT=77080) 05/05/2016      No recommendations at this time   Pap and Pelvic    Pap   Covered every 2 years for women at normal risk;  Annually if at high risk -  No recommendations at this time    Chlamydia Annually if high risk -  No recommendations at th Component Value Date    CREATSERUM 0.92 07/28/2021         BUN Annually Lab Results   Component Value Date    BUN 18 07/28/2021       Drug Serum Conc Annually No results found for: DIGOXIN, DIG, VALP              Recommended Websites for [employer]

## 2021-10-13 PROBLEM — E87.1 HYPONATREMIA: Status: RESOLVED | Noted: 2020-10-02 | Resolved: 2021-10-13

## 2021-10-13 PROBLEM — Z98.41 STATUS POST RIGHT CATARACT EXTRACTION: Status: RESOLVED | Noted: 2017-08-29 | Resolved: 2021-10-13

## 2021-10-13 PROBLEM — Z90.49 S/P CHOLECYSTECTOMY: Status: RESOLVED | Noted: 2017-08-29 | Resolved: 2021-10-13

## 2021-10-13 PROBLEM — Z79.4 TYPE 2 DIABETES MELLITUS WITHOUT COMPLICATION, WITH LONG-TERM CURRENT USE OF INSULIN (HCC): Status: RESOLVED | Noted: 2020-06-08 | Resolved: 2021-10-13

## 2021-10-13 PROBLEM — Z98.42 HISTORY OF LEFT CATARACT SURGERY: Status: RESOLVED | Noted: 2017-05-17 | Resolved: 2021-10-13

## 2021-10-13 PROBLEM — E11.9 TYPE 2 DIABETES MELLITUS WITHOUT COMPLICATION, WITH LONG-TERM CURRENT USE OF INSULIN (HCC): Status: RESOLVED | Noted: 2020-06-08 | Resolved: 2021-10-13

## 2021-10-13 PROBLEM — G45.9 TIA (TRANSIENT ISCHEMIC ATTACK): Status: RESOLVED | Noted: 2019-07-24 | Resolved: 2021-10-13

## 2021-11-01 ENCOUNTER — NURSE ONLY (OUTPATIENT)
Dept: INTERNAL MEDICINE CLINIC | Facility: CLINIC | Age: 81
End: 2021-11-01
Payer: MEDICARE

## 2021-11-01 DIAGNOSIS — E11.22 TYPE 2 DIABETES MELLITUS WITH STAGE 3A CHRONIC KIDNEY DISEASE, WITH LONG-TERM CURRENT USE OF INSULIN (HCC): ICD-10-CM

## 2021-11-01 DIAGNOSIS — N18.31 TYPE 2 DIABETES MELLITUS WITH STAGE 3A CHRONIC KIDNEY DISEASE, WITH LONG-TERM CURRENT USE OF INSULIN (HCC): ICD-10-CM

## 2021-11-01 DIAGNOSIS — Z79.4 TYPE 2 DIABETES MELLITUS WITH STAGE 3A CHRONIC KIDNEY DISEASE, WITH LONG-TERM CURRENT USE OF INSULIN (HCC): ICD-10-CM

## 2021-11-01 DIAGNOSIS — E11.22 TYPE 2 DIABETES MELLITUS WITH STAGE 3A CHRONIC KIDNEY DISEASE, WITH LONG-TERM CURRENT USE OF INSULIN (HCC): Primary | ICD-10-CM

## 2021-11-01 DIAGNOSIS — N18.31 TYPE 2 DIABETES MELLITUS WITH STAGE 3A CHRONIC KIDNEY DISEASE, WITH LONG-TERM CURRENT USE OF INSULIN (HCC): Primary | ICD-10-CM

## 2021-11-01 DIAGNOSIS — Z79.4 TYPE 2 DIABETES MELLITUS WITH STAGE 3A CHRONIC KIDNEY DISEASE, WITH LONG-TERM CURRENT USE OF INSULIN (HCC): Primary | ICD-10-CM

## 2021-11-01 PROCEDURE — 83036 HEMOGLOBIN GLYCOSYLATED A1C: CPT | Performed by: FAMILY MEDICINE

## 2021-11-01 RX ORDER — PEN NEEDLE, DIABETIC 30 GX3/16"
1 NEEDLE, DISPOSABLE MISCELLANEOUS DAILY
Qty: 100 EACH | Refills: 3 | Status: SHIPPED | OUTPATIENT
Start: 2021-11-01

## 2021-11-01 RX ORDER — ASPIRIN 81 MG/1
81 TABLET, CHEWABLE ORAL DAILY
Qty: 90 TABLET | Refills: 1 | Status: SHIPPED | OUTPATIENT
Start: 2021-11-01 | End: 2022-02-04

## 2021-11-01 RX ORDER — LANCETS
EACH MISCELLANEOUS
COMMUNITY
End: 2021-11-01

## 2021-11-01 RX ORDER — LANCETS
EACH MISCELLANEOUS
Qty: 200 EACH | Refills: 0 | Status: SHIPPED | OUTPATIENT
Start: 2021-11-01

## 2021-11-01 RX ORDER — INSULIN DEGLUDEC INJECTION 100 U/ML
48 INJECTION, SOLUTION SUBCUTANEOUS DAILY
Qty: 15 ML | Refills: 2 | Status: SHIPPED | OUTPATIENT
Start: 2021-11-01 | End: 2021-11-16

## 2021-11-01 RX ORDER — BLOOD SUGAR DIAGNOSTIC
1 STRIP MISCELLANEOUS 3 TIMES DAILY PRN
Qty: 300 STRIP | Refills: 3 | Status: SHIPPED | OUTPATIENT
Start: 2021-11-01

## 2021-11-08 ENCOUNTER — HOSPITAL ENCOUNTER (OUTPATIENT)
Dept: MAMMOGRAPHY | Facility: HOSPITAL | Age: 81
Discharge: HOME | End: 2021-11-08
Attending: FAMILY MEDICINE
Payer: MEDICARE

## 2021-11-08 DIAGNOSIS — Z12.31 ENCOUNTER FOR SCREENING MAMMOGRAM FOR MALIGNANT NEOPLASM OF BREAST: ICD-10-CM

## 2021-11-08 PROCEDURE — 77063 BREAST TOMOSYNTHESIS BI: CPT | Performed by: FAMILY MEDICINE

## 2021-11-08 PROCEDURE — 77067 SCR MAMMO BI INCL CAD: CPT | Performed by: FAMILY MEDICINE

## 2021-11-16 ENCOUNTER — OFFICE VISIT (OUTPATIENT)
Dept: INTERNAL MEDICINE CLINIC | Facility: CLINIC | Age: 81
End: 2021-11-16
Payer: MEDICARE

## 2021-11-16 VITALS
DIASTOLIC BLOOD PRESSURE: 88 MMHG | BODY MASS INDEX: 27.46 KG/M2 | HEART RATE: 71 BPM | SYSTOLIC BLOOD PRESSURE: 132 MMHG | HEIGHT: 63 IN | OXYGEN SATURATION: 99 % | WEIGHT: 155 LBS

## 2021-11-16 DIAGNOSIS — I10 ESSENTIAL HYPERTENSION: Primary | ICD-10-CM

## 2021-11-16 DIAGNOSIS — N18.31 TYPE 2 DIABETES MELLITUS WITH STAGE 3A CHRONIC KIDNEY DISEASE, WITH LONG-TERM CURRENT USE OF INSULIN (HCC): ICD-10-CM

## 2021-11-16 DIAGNOSIS — Z79.4 TYPE 2 DIABETES MELLITUS WITH STAGE 3A CHRONIC KIDNEY DISEASE, WITH LONG-TERM CURRENT USE OF INSULIN (HCC): ICD-10-CM

## 2021-11-16 DIAGNOSIS — E11.22 TYPE 2 DIABETES MELLITUS WITH STAGE 3A CHRONIC KIDNEY DISEASE, WITH LONG-TERM CURRENT USE OF INSULIN (HCC): ICD-10-CM

## 2021-11-16 PROCEDURE — 99213 OFFICE O/P EST LOW 20 MIN: CPT | Performed by: FAMILY MEDICINE

## 2021-11-16 RX ORDER — INSULIN DEGLUDEC INJECTION 100 U/ML
50 INJECTION, SOLUTION SUBCUTANEOUS DAILY
Qty: 15 ML | Refills: 2 | COMMUNITY
Start: 2021-11-16 | End: 2022-02-04

## 2021-11-21 NOTE — PROGRESS NOTES
PATIENT IDENTIFICATION  Name: Angel Menezes  MRN: [id number]    Diagnoses:   Essential hypertension  (primary encounter diagnosis)  Type 2 diabetes mellitus with stage 3a chronic kidney disease, with long-term current use of insulin ([facility]    SUBJECTIVE tablet 1   • Insulin Pen Needle (PEN NEEDLES) 32G X 4 MM Does not apply Misc 1 Device by Does not apply route daily. 100 each 3   • Glucose Blood (ACCU-CHEK JOSE PLUS) In Vitro Strip Inject 1 Device into the skin 3 (three) times daily as needed. DX   E11. presents to us today for DM and HTN f/u.     1. Type 2 diabetes mellitus with stage 3a chronic kidney disease, with long-term current use of insulin (HCC)  - Insulin Degludec (TRESIBA FLEXTOUCH) 100 UNIT/ML Subcutaneous Solution Pen-injector;  Inject 50 Uni

## 2021-12-09 ENCOUNTER — HOSPITAL ENCOUNTER (OUTPATIENT)
Dept: MAMMOGRAPHY | Facility: HOSPITAL | Age: 81
Discharge: HOME | End: 2021-12-09
Attending: FAMILY MEDICINE
Payer: MEDICARE

## 2021-12-09 DIAGNOSIS — R92.8 ABNORMAL MAMMOGRAM: ICD-10-CM

## 2021-12-09 PROCEDURE — 77061 BREAST TOMOSYNTHESIS UNI: CPT | Performed by: FAMILY MEDICINE

## 2021-12-09 PROCEDURE — 77065 DX MAMMO INCL CAD UNI: CPT | Performed by: FAMILY MEDICINE

## 2021-12-20 DIAGNOSIS — I10 ESSENTIAL HYPERTENSION: ICD-10-CM

## 2021-12-20 RX ORDER — METOPROLOL SUCCINATE 100 MG/1
TABLET, EXTENDED RELEASE ORAL
Qty: 90 TABLET | Refills: 1 | Status: SHIPPED | OUTPATIENT
Start: 2021-12-20

## 2022-02-04 RX ORDER — LORATADINE 10 MG
TABLET ORAL
Qty: 90 TABLET | Refills: 1 | Status: SHIPPED | OUTPATIENT
Start: 2022-02-04

## 2022-02-04 RX ORDER — INSULIN DEGLUDEC INJECTION 100 U/ML
48 INJECTION, SOLUTION SUBCUTANEOUS DAILY
Qty: 15 ML | Refills: 1 | Status: SHIPPED | OUTPATIENT
Start: 2022-02-04 | End: 2022-02-09

## 2022-02-09 ENCOUNTER — OFFICE VISIT (OUTPATIENT)
Dept: INTERNAL MEDICINE CLINIC | Facility: CLINIC | Age: 82
End: 2022-02-09
Payer: MEDICARE

## 2022-02-09 VITALS
SYSTOLIC BLOOD PRESSURE: 128 MMHG | HEIGHT: 63 IN | BODY MASS INDEX: 27.29 KG/M2 | OXYGEN SATURATION: 98 % | WEIGHT: 154 LBS | HEART RATE: 89 BPM | DIASTOLIC BLOOD PRESSURE: 42 MMHG

## 2022-02-09 DIAGNOSIS — Z79.4 TYPE 2 DIABETES MELLITUS WITH STAGE 3A CHRONIC KIDNEY DISEASE, WITH LONG-TERM CURRENT USE OF INSULIN (HCC): Primary | ICD-10-CM

## 2022-02-09 DIAGNOSIS — N18.31 TYPE 2 DIABETES MELLITUS WITH STAGE 3A CHRONIC KIDNEY DISEASE, WITH LONG-TERM CURRENT USE OF INSULIN (HCC): Primary | ICD-10-CM

## 2022-02-09 DIAGNOSIS — I10 ESSENTIAL HYPERTENSION: ICD-10-CM

## 2022-02-09 DIAGNOSIS — E11.22 TYPE 2 DIABETES MELLITUS WITH STAGE 3A CHRONIC KIDNEY DISEASE, WITH LONG-TERM CURRENT USE OF INSULIN (HCC): Primary | ICD-10-CM

## 2022-02-09 LAB
CARTRIDGE LOT#: ABNORMAL NUMERIC
HEMOGLOBIN A1C: 7.9 % (ref 4.3–5.6)

## 2022-02-09 PROCEDURE — 99213 OFFICE O/P EST LOW 20 MIN: CPT | Performed by: FAMILY MEDICINE

## 2022-02-09 PROCEDURE — 83036 HEMOGLOBIN GLYCOSYLATED A1C: CPT | Performed by: FAMILY MEDICINE

## 2022-02-09 RX ORDER — INSULIN DEGLUDEC INJECTION 100 U/ML
48 INJECTION, SOLUTION SUBCUTANEOUS DAILY
Qty: 15 ML | Refills: 1 | Status: SHIPPED | OUTPATIENT
Start: 2022-02-09 | End: 2022-02-10

## 2022-02-10 ENCOUNTER — TELEPHONE (OUTPATIENT)
Dept: INTERNAL MEDICINE CLINIC | Facility: CLINIC | Age: 82
End: 2022-02-10

## 2022-02-10 RX ORDER — INSULIN DEGLUDEC INJECTION 100 U/ML
48 INJECTION, SOLUTION SUBCUTANEOUS DAILY
Qty: 45 ML | Refills: 0 | Status: SHIPPED | OUTPATIENT
Start: 2022-02-10

## 2022-02-10 NOTE — TELEPHONE ENCOUNTER
Trasiba. Needs 90 days supply per insurance. Please ORDER 45 ml (equals 3 boxes)  This will cover 90 days. Void out the 15 ml order please.

## 2022-03-08 ENCOUNTER — OFFICE VISIT (OUTPATIENT)
Dept: INTERNAL MEDICINE CLINIC | Facility: CLINIC | Age: 82
End: 2022-03-08
Payer: MEDICARE

## 2022-03-08 VITALS
WEIGHT: 155 LBS | SYSTOLIC BLOOD PRESSURE: 140 MMHG | DIASTOLIC BLOOD PRESSURE: 66 MMHG | HEART RATE: 84 BPM | BODY MASS INDEX: 27.46 KG/M2 | HEIGHT: 63 IN | OXYGEN SATURATION: 98 %

## 2022-03-08 DIAGNOSIS — R53.1 GENERALIZED WEAKNESS: Primary | ICD-10-CM

## 2022-03-08 LAB
ANION GAP SERPL CALC-SCNC: 8 MMOL/L (ref 0–18)
BUN BLD-MCNC: 17 MG/DL (ref 7–18)
BUN/CREAT SERPL: 15.6 (ref 10–20)
CALCIUM BLD-MCNC: 9.1 MG/DL (ref 8.5–10.1)
CHLORIDE SERPL-SCNC: 103 MMOL/L (ref 98–112)
CO2 SERPL-SCNC: 27 MMOL/L (ref 21–32)
CREAT BLD-MCNC: 1.09 MG/DL
DEPRECATED RDW RBC AUTO: 42.2 FL (ref 35.1–46.3)
ERYTHROCYTE [DISTWIDTH] IN BLOOD BY AUTOMATED COUNT: 12.5 % (ref 11–15)
FASTING STATUS PATIENT QL REPORTED: NO
GLUCOSE BLD-MCNC: 192 MG/DL (ref 70–99)
HCT VFR BLD AUTO: 43.6 %
HGB BLD-MCNC: 14 G/DL
MAGNESIUM SERPL-MCNC: 1.9 MG/DL (ref 1.6–2.6)
MCH RBC QN AUTO: 29.7 PG (ref 26–34)
MCHC RBC AUTO-ENTMCNC: 32.1 G/DL (ref 31–37)
MCV RBC AUTO: 92.4 FL
OSMOLALITY SERPL CALC.SUM OF ELEC: 293 MOSM/KG (ref 275–295)
PLATELET # BLD AUTO: 240 10(3)UL (ref 150–450)
RBC # BLD AUTO: 4.72 X10(6)UL
SODIUM SERPL-SCNC: 138 MMOL/L (ref 136–145)
TSI SER-ACNC: 1.67 MIU/ML (ref 0.36–3.74)
WBC # BLD AUTO: 9 X10(3) UL (ref 4–11)

## 2022-03-08 PROCEDURE — 84443 ASSAY THYROID STIM HORMONE: CPT | Performed by: FAMILY MEDICINE

## 2022-03-08 PROCEDURE — 83735 ASSAY OF MAGNESIUM: CPT | Performed by: FAMILY MEDICINE

## 2022-03-08 PROCEDURE — 85027 COMPLETE CBC AUTOMATED: CPT | Performed by: FAMILY MEDICINE

## 2022-03-08 PROCEDURE — 80048 BASIC METABOLIC PNL TOTAL CA: CPT | Performed by: FAMILY MEDICINE

## 2022-03-08 PROCEDURE — 99214 OFFICE O/P EST MOD 30 MIN: CPT | Performed by: FAMILY MEDICINE

## 2022-03-21 ENCOUNTER — OFFICE VISIT (OUTPATIENT)
Dept: INTERNAL MEDICINE CLINIC | Facility: CLINIC | Age: 82
End: 2022-03-21
Payer: MEDICARE

## 2022-03-21 VITALS
WEIGHT: 155 LBS | HEART RATE: 83 BPM | HEIGHT: 63 IN | OXYGEN SATURATION: 97 % | SYSTOLIC BLOOD PRESSURE: 138 MMHG | DIASTOLIC BLOOD PRESSURE: 70 MMHG | BODY MASS INDEX: 27.46 KG/M2

## 2022-03-21 DIAGNOSIS — R10.33 PERIUMBILICAL PAIN, CHRONIC: ICD-10-CM

## 2022-03-21 DIAGNOSIS — G89.29 PERIUMBILICAL PAIN, CHRONIC: ICD-10-CM

## 2022-03-21 DIAGNOSIS — R79.89 ELEVATED SERUM CREATININE: ICD-10-CM

## 2022-03-21 DIAGNOSIS — R53.1 GENERALIZED WEAKNESS: Primary | ICD-10-CM

## 2022-03-21 DIAGNOSIS — D22.9 ATYPICAL NEVUS: ICD-10-CM

## 2022-03-21 DIAGNOSIS — H91.93 HEARING DIFFICULTY OF BOTH EARS: ICD-10-CM

## 2022-03-21 DIAGNOSIS — R51.9 CHRONIC INTRACTABLE HEADACHE, UNSPECIFIED HEADACHE TYPE: ICD-10-CM

## 2022-03-21 DIAGNOSIS — G89.29 CHRONIC INTRACTABLE HEADACHE, UNSPECIFIED HEADACHE TYPE: ICD-10-CM

## 2022-03-21 LAB
ANION GAP SERPL CALC-SCNC: 8 MMOL/L (ref 0–18)
BUN BLD-MCNC: 17 MG/DL (ref 7–18)
BUN/CREAT SERPL: 15 (ref 10–20)
CALCIUM BLD-MCNC: 8.7 MG/DL (ref 8.5–10.1)
CHLORIDE SERPL-SCNC: 103 MMOL/L (ref 98–112)
CO2 SERPL-SCNC: 26 MMOL/L (ref 21–32)
CREAT BLD-MCNC: 1.13 MG/DL
FASTING STATUS PATIENT QL REPORTED: NO
GLUCOSE BLD-MCNC: 209 MG/DL (ref 70–99)
OSMOLALITY SERPL CALC.SUM OF ELEC: 292 MOSM/KG (ref 275–295)
POTASSIUM SERPL-SCNC: 5.1 MMOL/L (ref 3.5–5.1)
SODIUM SERPL-SCNC: 137 MMOL/L (ref 136–145)

## 2022-03-21 PROCEDURE — 99214 OFFICE O/P EST MOD 30 MIN: CPT | Performed by: FAMILY MEDICINE

## 2022-03-21 PROCEDURE — 80048 BASIC METABOLIC PNL TOTAL CA: CPT | Performed by: FAMILY MEDICINE

## 2022-04-07 ENCOUNTER — OFFICE VISIT (OUTPATIENT)
Dept: FAMILY MEDICINE CLINIC | Facility: CLINIC | Age: 82
End: 2022-04-07
Payer: MEDICARE

## 2022-04-07 VITALS
WEIGHT: 158 LBS | TEMPERATURE: 98 F | HEIGHT: 63 IN | BODY MASS INDEX: 28 KG/M2 | HEART RATE: 79 BPM | DIASTOLIC BLOOD PRESSURE: 76 MMHG | SYSTOLIC BLOOD PRESSURE: 135 MMHG

## 2022-04-07 DIAGNOSIS — N18.31 TYPE 2 DIABETES MELLITUS WITH STAGE 3A CHRONIC KIDNEY DISEASE, WITH LONG-TERM CURRENT USE OF INSULIN (HCC): Primary | ICD-10-CM

## 2022-04-07 DIAGNOSIS — E11.22 TYPE 2 DIABETES MELLITUS WITH STAGE 3A CHRONIC KIDNEY DISEASE, WITH LONG-TERM CURRENT USE OF INSULIN (HCC): Primary | ICD-10-CM

## 2022-04-07 DIAGNOSIS — Z79.4 TYPE 2 DIABETES MELLITUS WITH STAGE 3A CHRONIC KIDNEY DISEASE, WITH LONG-TERM CURRENT USE OF INSULIN (HCC): Primary | ICD-10-CM

## 2022-04-07 DIAGNOSIS — R10.33 UMBILICAL PAIN: ICD-10-CM

## 2022-04-07 PROCEDURE — 99204 OFFICE O/P NEW MOD 45 MIN: CPT | Performed by: FAMILY MEDICINE

## 2022-04-07 RX ORDER — LOSARTAN POTASSIUM 25 MG/1
25 TABLET ORAL DAILY
COMMUNITY
Start: 2022-03-09

## 2022-04-07 RX ORDER — BUPRENORPHINE HCL 8 MG/1
1 TABLET SUBLINGUAL DAILY
COMMUNITY

## 2022-04-07 RX ORDER — MULTIVITAMIN WITH IRON
1 TABLET ORAL DAILY
Qty: 90 TABLET | Refills: 3 | COMMUNITY
Start: 2022-04-07

## 2022-04-07 RX ORDER — ASPIRIN 81 MG
100 TABLET, DELAYED RELEASE (ENTERIC COATED) ORAL 2 TIMES DAILY
Qty: 60 TABLET | Refills: 2 | Status: SHIPPED | OUTPATIENT
Start: 2022-04-07

## 2022-04-11 ENCOUNTER — TELEPHONE (OUTPATIENT)
Dept: FAMILY MEDICINE CLINIC | Facility: CLINIC | Age: 82
End: 2022-04-11

## 2022-05-10 ENCOUNTER — TELEPHONE (OUTPATIENT)
Dept: FAMILY MEDICINE CLINIC | Facility: CLINIC | Age: 82
End: 2022-05-10

## 2022-05-10 RX ORDER — LANCETS
EACH MISCELLANEOUS
Qty: 200 EACH | Refills: 1 | Status: SHIPPED | OUTPATIENT
Start: 2022-05-10

## 2022-05-10 RX ORDER — BLOOD SUGAR DIAGNOSTIC
1 STRIP MISCELLANEOUS 3 TIMES DAILY PRN
Qty: 300 STRIP | Refills: 3 | Status: SHIPPED | OUTPATIENT
Start: 2022-05-10

## 2022-05-12 ENCOUNTER — OFFICE VISIT (OUTPATIENT)
Dept: AUDIOLOGY | Facility: CLINIC | Age: 82
End: 2022-05-12
Payer: MEDICARE

## 2022-05-12 DIAGNOSIS — H90.3 SENSORINEURAL HEARING LOSS (SNHL) OF BOTH EARS: Primary | ICD-10-CM

## 2022-05-12 RX ORDER — LORATADINE 10 MG
TABLET ORAL
Qty: 90 TABLET | Refills: 1 | Status: SHIPPED | OUTPATIENT
Start: 2022-05-12

## 2022-05-13 RX ORDER — ATORVASTATIN CALCIUM 40 MG/1
TABLET, FILM COATED ORAL
Qty: 90 TABLET | Refills: 3 | Status: SHIPPED | OUTPATIENT
Start: 2022-05-13

## 2022-06-06 ENCOUNTER — OFFICE VISIT (OUTPATIENT)
Dept: FAMILY MEDICINE CLINIC | Facility: CLINIC | Age: 82
End: 2022-06-06
Payer: MEDICARE

## 2022-06-06 VITALS
DIASTOLIC BLOOD PRESSURE: 71 MMHG | HEART RATE: 71 BPM | TEMPERATURE: 97 F | BODY MASS INDEX: 27.11 KG/M2 | HEIGHT: 63 IN | WEIGHT: 153 LBS | SYSTOLIC BLOOD PRESSURE: 114 MMHG

## 2022-06-06 DIAGNOSIS — Z79.4 TYPE 2 DIABETES MELLITUS WITH STAGE 3A CHRONIC KIDNEY DISEASE, WITH LONG-TERM CURRENT USE OF INSULIN (HCC): ICD-10-CM

## 2022-06-06 DIAGNOSIS — Z23 NEED FOR SHINGLES VACCINE: Primary | ICD-10-CM

## 2022-06-06 DIAGNOSIS — E11.22 TYPE 2 DIABETES MELLITUS WITH STAGE 3A CHRONIC KIDNEY DISEASE, WITH LONG-TERM CURRENT USE OF INSULIN (HCC): ICD-10-CM

## 2022-06-06 DIAGNOSIS — N18.31 TYPE 2 DIABETES MELLITUS WITH STAGE 3A CHRONIC KIDNEY DISEASE, WITH LONG-TERM CURRENT USE OF INSULIN (HCC): ICD-10-CM

## 2022-06-06 DIAGNOSIS — M15.9 PRIMARY OSTEOARTHRITIS INVOLVING MULTIPLE JOINTS: ICD-10-CM

## 2022-06-06 DIAGNOSIS — M81.0 POST-MENOPAUSAL OSTEOPOROSIS: ICD-10-CM

## 2022-06-06 LAB
CARTRIDGE LOT#: ABNORMAL NUMERIC
HEMOGLOBIN A1C: 7.6 % (ref 4.3–5.6)

## 2022-06-06 RX ORDER — INSULIN DEGLUDEC INJECTION 100 U/ML
48 INJECTION, SOLUTION SUBCUTANEOUS DAILY
Qty: 45 ML | Refills: 2 | Status: SHIPPED | OUTPATIENT
Start: 2022-06-06

## 2022-06-06 RX ORDER — ALENDRONATE SODIUM 35 MG/1
35 TABLET ORAL
Qty: 12 TABLET | Refills: 2 | Status: SHIPPED | OUTPATIENT
Start: 2022-06-06

## 2022-06-06 RX ORDER — PEN NEEDLE, DIABETIC 30 GX3/16"
1 NEEDLE, DISPOSABLE MISCELLANEOUS DAILY
Qty: 100 EACH | Refills: 3 | Status: SHIPPED | OUTPATIENT
Start: 2022-06-06

## 2022-06-06 RX ORDER — ZOSTER VACCINE RECOMBINANT, ADJUVANTED 50 MCG/0.5
0.5 KIT INTRAMUSCULAR ONCE
Qty: 1 EACH | Refills: 0 | Status: SHIPPED | OUTPATIENT
Start: 2022-06-06 | End: 2022-06-06

## 2022-06-06 RX ORDER — LOSARTAN POTASSIUM 25 MG/1
25 TABLET ORAL DAILY
Refills: 0 | Status: CANCELLED | OUTPATIENT
Start: 2022-06-06

## 2022-06-06 RX ORDER — LANCETS
EACH MISCELLANEOUS
Qty: 200 EACH | Refills: 1 | Status: SHIPPED | OUTPATIENT
Start: 2022-06-06

## 2022-06-30 DIAGNOSIS — I10 ESSENTIAL HYPERTENSION: ICD-10-CM

## 2022-06-30 RX ORDER — METOPROLOL SUCCINATE 100 MG/1
TABLET, EXTENDED RELEASE ORAL
Qty: 90 TABLET | Refills: 1 | OUTPATIENT
Start: 2022-06-30

## 2022-07-02 DIAGNOSIS — M15.9 PRIMARY OSTEOARTHRITIS INVOLVING MULTIPLE JOINTS: ICD-10-CM

## 2022-07-21 ENCOUNTER — TELEPHONE (OUTPATIENT)
Dept: AUDIOLOGY | Facility: CLINIC | Age: 82
End: 2022-07-21

## 2022-07-21 NOTE — TELEPHONE ENCOUNTER
Left voicemail with patient's daughter. Medical clearance must be obtained by a physician. Please reach out to Dr. Luis Carlos Membreno or ENT Dr. Jenelle Jean to obtain medical clearance for hearing aids.      Chaparro Gr

## 2022-07-21 NOTE — TELEPHONE ENCOUNTER
Patient daughter states hearing aid company requesting medical clearance forms filled out. States she only received it as an email.  Please advise

## 2022-07-29 ENCOUNTER — TELEPHONE (OUTPATIENT)
Dept: FAMILY MEDICINE CLINIC | Facility: CLINIC | Age: 82
End: 2022-07-29

## 2022-07-29 DIAGNOSIS — N18.31 TYPE 2 DIABETES MELLITUS WITH STAGE 3A CHRONIC KIDNEY DISEASE, WITH LONG-TERM CURRENT USE OF INSULIN (HCC): ICD-10-CM

## 2022-07-29 DIAGNOSIS — Z79.4 TYPE 2 DIABETES MELLITUS WITH STAGE 3A CHRONIC KIDNEY DISEASE, WITH LONG-TERM CURRENT USE OF INSULIN (HCC): ICD-10-CM

## 2022-07-29 DIAGNOSIS — E11.22 TYPE 2 DIABETES MELLITUS WITH STAGE 3A CHRONIC KIDNEY DISEASE, WITH LONG-TERM CURRENT USE OF INSULIN (HCC): ICD-10-CM

## 2022-07-30 RX ORDER — BLOOD SUGAR DIAGNOSTIC
1 STRIP MISCELLANEOUS 3 TIMES DAILY PRN
Qty: 300 STRIP | Refills: 3 | Status: SHIPPED | OUTPATIENT
Start: 2022-07-30 | End: 2023-12-05

## 2022-07-31 NOTE — TELEPHONE ENCOUNTER
1. Type 2 diabetes mellitus with stage 3a chronic kidney disease, with long-term current use of insulin (HCC)    - Glucose Blood (ACCU-CHEK JOSE PLUS) In Vitro Strip; Inject 1 Device into the skin 3 (three) times daily as needed. DX   E11.9 (Insulin-requiring or dependent type II diabetes mellitus)  Dispense: 300 strip;  Refill: 3

## 2022-08-03 ENCOUNTER — TELEPHONE (OUTPATIENT)
Dept: FAMILY MEDICINE CLINIC | Facility: CLINIC | Age: 82
End: 2022-08-03

## 2022-08-03 NOTE — TELEPHONE ENCOUNTER
Patient requesting Medical clearance form and parking placard to be completed  Please call when ready

## 2022-08-06 NOTE — TELEPHONE ENCOUNTER
Placard and clearance form completed. Clearance form faxed, confirmation received. Patient informed and copies left at  for .

## 2022-08-15 DIAGNOSIS — N18.31 TYPE 2 DIABETES MELLITUS WITH STAGE 3A CHRONIC KIDNEY DISEASE, WITH LONG-TERM CURRENT USE OF INSULIN (HCC): ICD-10-CM

## 2022-08-15 DIAGNOSIS — Z79.4 TYPE 2 DIABETES MELLITUS WITH STAGE 3A CHRONIC KIDNEY DISEASE, WITH LONG-TERM CURRENT USE OF INSULIN (HCC): ICD-10-CM

## 2022-08-15 DIAGNOSIS — E11.22 TYPE 2 DIABETES MELLITUS WITH STAGE 3A CHRONIC KIDNEY DISEASE, WITH LONG-TERM CURRENT USE OF INSULIN (HCC): ICD-10-CM

## 2022-08-15 DIAGNOSIS — I10 ESSENTIAL HYPERTENSION: ICD-10-CM

## 2022-08-15 RX ORDER — LOSARTAN POTASSIUM 25 MG/1
25 TABLET ORAL DAILY
Qty: 90 TABLET | Refills: 1 | Status: SHIPPED | OUTPATIENT
Start: 2022-08-15

## 2022-08-15 RX ORDER — INSULIN DEGLUDEC INJECTION 100 U/ML
48 INJECTION, SOLUTION SUBCUTANEOUS DAILY
Qty: 45 ML | Refills: 2 | Status: SHIPPED | OUTPATIENT
Start: 2022-08-15

## 2022-08-15 NOTE — TELEPHONE ENCOUNTER
Acc-chek Tara Plus Strips were already sent on 7/30/22 for 300 strips with 3 refills. Veleria Hernandez Flextouch, losartan and metformin pended for review.

## 2022-08-16 RX ORDER — METOPROLOL SUCCINATE 100 MG/1
TABLET, EXTENDED RELEASE ORAL
Qty: 90 TABLET | Refills: 1 | Status: SHIPPED | OUTPATIENT
Start: 2022-08-16

## 2022-09-02 ENCOUNTER — OFFICE VISIT (OUTPATIENT)
Dept: FAMILY MEDICINE CLINIC | Facility: CLINIC | Age: 82
End: 2022-09-02
Payer: MEDICARE

## 2022-09-02 VITALS
WEIGHT: 153.63 LBS | DIASTOLIC BLOOD PRESSURE: 78 MMHG | SYSTOLIC BLOOD PRESSURE: 132 MMHG | TEMPERATURE: 98 F | HEART RATE: 75 BPM | BODY MASS INDEX: 27 KG/M2

## 2022-09-02 DIAGNOSIS — E11.22 TYPE 2 DIABETES MELLITUS WITH STAGE 3A CHRONIC KIDNEY DISEASE, WITH LONG-TERM CURRENT USE OF INSULIN (HCC): ICD-10-CM

## 2022-09-02 DIAGNOSIS — Z79.4 TYPE 2 DIABETES MELLITUS WITH STAGE 3A CHRONIC KIDNEY DISEASE, WITH LONG-TERM CURRENT USE OF INSULIN (HCC): ICD-10-CM

## 2022-09-02 DIAGNOSIS — N18.31 TYPE 2 DIABETES MELLITUS WITH STAGE 3A CHRONIC KIDNEY DISEASE, WITH LONG-TERM CURRENT USE OF INSULIN (HCC): ICD-10-CM

## 2022-09-02 DIAGNOSIS — I10 ESSENTIAL HYPERTENSION: ICD-10-CM

## 2022-09-02 PROCEDURE — 99213 OFFICE O/P EST LOW 20 MIN: CPT | Performed by: FAMILY MEDICINE

## 2022-09-02 PROCEDURE — 1126F AMNT PAIN NOTED NONE PRSNT: CPT | Performed by: FAMILY MEDICINE

## 2022-09-02 RX ORDER — INSULIN DEGLUDEC INJECTION 100 U/ML
48 INJECTION, SOLUTION SUBCUTANEOUS DAILY
Qty: 45 ML | Refills: 3 | Status: SHIPPED | OUTPATIENT
Start: 2022-09-02

## 2022-09-07 ENCOUNTER — TELEPHONE (OUTPATIENT)
Dept: FAMILY MEDICINE CLINIC | Facility: CLINIC | Age: 82
End: 2022-09-07

## 2022-09-09 ENCOUNTER — APPOINTMENT (OUTPATIENT)
Dept: GENERAL RADIOLOGY | Facility: HOSPITAL | Age: 82
End: 2022-09-09
Payer: MEDICARE

## 2022-09-09 ENCOUNTER — HOSPITAL ENCOUNTER (EMERGENCY)
Facility: HOSPITAL | Age: 82
Discharge: HOME | End: 2022-09-09
Payer: MEDICARE

## 2022-09-09 ENCOUNTER — APPOINTMENT (OUTPATIENT)
Dept: CT IMAGING | Facility: HOSPITAL | Age: 82
End: 2022-09-09
Attending: STUDENT IN AN ORGANIZED HEALTH CARE EDUCATION/TRAINING PROGRAM
Payer: MEDICARE

## 2022-09-09 VITALS
HEART RATE: 85 BPM | RESPIRATION RATE: 16 BRPM | OXYGEN SATURATION: 97 % | SYSTOLIC BLOOD PRESSURE: 188 MMHG | BODY MASS INDEX: 27 KG/M2 | TEMPERATURE: 98 F | DIASTOLIC BLOOD PRESSURE: 82 MMHG | WEIGHT: 150 LBS

## 2022-09-09 DIAGNOSIS — M54.13 RADICULOPATHY OF CERVICOTHORACIC REGION: Primary | ICD-10-CM

## 2022-09-09 LAB
ANION GAP SERPL CALC-SCNC: 9 MMOL/L (ref 0–18)
BASOPHILS # BLD AUTO: 0.02 X10(3) UL (ref 0–0.2)
BASOPHILS NFR BLD AUTO: 0.2 %
BUN BLD-MCNC: 18 MG/DL (ref 7–18)
BUN/CREAT SERPL: 14.3 (ref 10–20)
CALCIUM BLD-MCNC: 8.5 MG/DL (ref 8.5–10.1)
CHLORIDE SERPL-SCNC: 103 MMOL/L (ref 98–112)
CO2 SERPL-SCNC: 24 MMOL/L (ref 21–32)
CREAT BLD-MCNC: 1.26 MG/DL
DEPRECATED RDW RBC AUTO: 40.2 FL (ref 35.1–46.3)
EOSINOPHIL # BLD AUTO: 0.03 X10(3) UL (ref 0–0.7)
EOSINOPHIL NFR BLD AUTO: 0.3 %
ERYTHROCYTE [DISTWIDTH] IN BLOOD BY AUTOMATED COUNT: 12.3 % (ref 11–15)
GFR SERPLBLD BASED ON 1.73 SQ M-ARVRAT: 43 ML/MIN/1.73M2 (ref 60–?)
GLUCOSE BLD-MCNC: 261 MG/DL (ref 70–99)
HCT VFR BLD AUTO: 40 %
HGB BLD-MCNC: 13.2 G/DL
IMM GRANULOCYTES # BLD AUTO: 0.02 X10(3) UL (ref 0–1)
IMM GRANULOCYTES NFR BLD: 0.2 %
LYMPHOCYTES # BLD AUTO: 1.41 X10(3) UL (ref 1–4)
LYMPHOCYTES NFR BLD AUTO: 15.8 %
MCH RBC QN AUTO: 29.4 PG (ref 26–34)
MCHC RBC AUTO-ENTMCNC: 33 G/DL (ref 31–37)
MCV RBC AUTO: 89.1 FL
MONOCYTES # BLD AUTO: 0.66 X10(3) UL (ref 0.1–1)
MONOCYTES NFR BLD AUTO: 7.4 %
NEUTROPHILS # BLD AUTO: 6.8 X10 (3) UL (ref 1.5–7.7)
NEUTROPHILS # BLD AUTO: 6.8 X10(3) UL (ref 1.5–7.7)
NEUTROPHILS NFR BLD AUTO: 76.1 %
OSMOLALITY SERPL CALC.SUM OF ELEC: 293 MOSM/KG (ref 275–295)
PLATELET # BLD AUTO: 212 10(3)UL (ref 150–450)
POTASSIUM SERPL-SCNC: 4.4 MMOL/L (ref 3.5–5.1)
RBC # BLD AUTO: 4.49 X10(6)UL
SODIUM SERPL-SCNC: 136 MMOL/L (ref 136–145)
TROPONIN I HIGH SENSITIVITY: 32 NG/L
WBC # BLD AUTO: 8.9 X10(3) UL (ref 4–11)

## 2022-09-09 PROCEDURE — 93005 ELECTROCARDIOGRAM TRACING: CPT

## 2022-09-09 PROCEDURE — 80048 BASIC METABOLIC PNL TOTAL CA: CPT

## 2022-09-09 PROCEDURE — 99285 EMERGENCY DEPT VISIT HI MDM: CPT

## 2022-09-09 PROCEDURE — 71045 X-RAY EXAM CHEST 1 VIEW: CPT

## 2022-09-09 PROCEDURE — 96374 THER/PROPH/DIAG INJ IV PUSH: CPT

## 2022-09-09 PROCEDURE — 71260 CT THORAX DX C+: CPT | Performed by: STUDENT IN AN ORGANIZED HEALTH CARE EDUCATION/TRAINING PROGRAM

## 2022-09-09 PROCEDURE — 85025 COMPLETE CBC W/AUTO DIFF WBC: CPT

## 2022-09-09 PROCEDURE — 84484 ASSAY OF TROPONIN QUANT: CPT

## 2022-09-09 PROCEDURE — 93010 ELECTROCARDIOGRAM REPORT: CPT | Performed by: STUDENT IN AN ORGANIZED HEALTH CARE EDUCATION/TRAINING PROGRAM

## 2022-09-09 RX ORDER — DIAZEPAM 5 MG/1
5 TABLET ORAL NIGHTLY PRN
Qty: 7 TABLET | Refills: 0 | Status: SHIPPED | OUTPATIENT
Start: 2022-09-09 | End: 2022-09-16

## 2022-09-09 RX ORDER — MORPHINE SULFATE 4 MG/ML
4 INJECTION, SOLUTION INTRAMUSCULAR; INTRAVENOUS ONCE
Status: COMPLETED | OUTPATIENT
Start: 2022-09-09 | End: 2022-09-09

## 2022-09-09 RX ORDER — HYDROCODONE BITARTRATE AND ACETAMINOPHEN 5; 325 MG/1; MG/1
1 TABLET ORAL ONCE
Status: COMPLETED | OUTPATIENT
Start: 2022-09-09 | End: 2022-09-09

## 2022-09-09 NOTE — ED INITIAL ASSESSMENT (HPI)
Patient complains of left upper back pain x 2 weeks, left arm pain that started yesterday.  Was hypertensive on ems arrival

## 2022-09-10 NOTE — ED QUICK NOTES
Per pt stated that 2 weeks ago she got up from bed \"too fast\" and felt pain to left upper back. Stated say PCP and was told to come to ER. Yesterday pt stated that her left arm started to hurt. Denies trauma.

## 2022-09-12 ENCOUNTER — OFFICE VISIT (OUTPATIENT)
Dept: FAMILY MEDICINE CLINIC | Facility: CLINIC | Age: 82
End: 2022-09-12
Payer: MEDICARE

## 2022-09-12 ENCOUNTER — HOSPITAL ENCOUNTER (OUTPATIENT)
Dept: GENERAL RADIOLOGY | Age: 82
Discharge: HOME | End: 2022-09-12
Attending: FAMILY MEDICINE
Payer: MEDICARE

## 2022-09-12 VITALS
TEMPERATURE: 99 F | HEART RATE: 87 BPM | WEIGHT: 150 LBS | BODY MASS INDEX: 26.58 KG/M2 | SYSTOLIC BLOOD PRESSURE: 146 MMHG | HEIGHT: 63 IN | DIASTOLIC BLOOD PRESSURE: 77 MMHG

## 2022-09-12 DIAGNOSIS — M54.12 CERVICAL RADICULOPATHY: Primary | ICD-10-CM

## 2022-09-12 DIAGNOSIS — M54.2 NECK PAIN: ICD-10-CM

## 2022-09-12 DIAGNOSIS — M54.12 CERVICAL RADICULOPATHY: ICD-10-CM

## 2022-09-12 PROCEDURE — 72050 X-RAY EXAM NECK SPINE 4/5VWS: CPT | Performed by: FAMILY MEDICINE

## 2022-09-12 PROCEDURE — 1125F AMNT PAIN NOTED PAIN PRSNT: CPT | Performed by: FAMILY MEDICINE

## 2022-09-12 PROCEDURE — 99213 OFFICE O/P EST LOW 20 MIN: CPT | Performed by: FAMILY MEDICINE

## 2022-09-13 ENCOUNTER — TELEPHONE (OUTPATIENT)
Dept: PHYSICAL THERAPY | Facility: HOSPITAL | Age: 82
End: 2022-09-13

## 2022-09-13 ENCOUNTER — APPOINTMENT (OUTPATIENT)
Dept: PHYSICAL THERAPY | Age: 82
End: 2022-09-13
Attending: FAMILY MEDICINE
Payer: MEDICARE

## 2022-09-15 ENCOUNTER — OFFICE VISIT (OUTPATIENT)
Dept: PHYSICAL THERAPY | Age: 82
End: 2022-09-15
Attending: FAMILY MEDICINE
Payer: MEDICARE

## 2022-09-15 PROCEDURE — 97110 THERAPEUTIC EXERCISES: CPT | Performed by: PHYSICAL THERAPIST

## 2022-09-15 PROCEDURE — 97161 PT EVAL LOW COMPLEX 20 MIN: CPT | Performed by: PHYSICAL THERAPIST

## 2022-09-16 ENCOUNTER — NURSE TRIAGE (OUTPATIENT)
Dept: FAMILY MEDICINE CLINIC | Facility: CLINIC | Age: 82
End: 2022-09-16

## 2022-09-17 DIAGNOSIS — M15.9 PRIMARY OSTEOARTHRITIS INVOLVING MULTIPLE JOINTS: ICD-10-CM

## 2022-09-17 NOTE — TELEPHONE ENCOUNTER
Please review. Protocol Failed or has No Protocol. Requested Prescriptions   Pending Prescriptions Disp Refills    DICLOFENAC 1 % External Gel [Pharmacy Med Name: DICLOFENAC SODIUM 1% GEL] 300 g 1     Sig: APPLY 2 G TOPICALLY 4 (FOUR) TIMES DAILY.  USE OVER KNEES        There is no refill protocol information for this order           Recent Outpatient Visits              2 days ago     AARON Vaughn in [facility] [address] Mariia Perales, PT    Office Visit    5 days ago Cervical radiculopathy    [address], Marilee Leija MD    Office Visit    2 weeks ago Essential hypertension    [facility], [facility], [address], Marilee Leija MD    Office Visit    3 months ago Need for shingles vaccine    [facility], [facility], [address], Marilee Leija MD    Office Visit    4 months ago Sensorineural hearing loss (SNHL) of both [facility] [address] [place] [address] Audiology [facility]    Office Visit            Future Appointments         Provider Department Appt Notes    In 2 days Cierra Dolkhang, [facility], [place]-[facility] Np, Left Arm, Ref by Cecily Telles Medicare    In 4 days АЕЛКСАНДР Douglass in [facility]    In 2 weeks Saritha Coyer., АЛЕКСАНДР Hartman in [facility]    In 3 weeks Saritha Coyer., АЛЕКСАНДР Hart[place] in [facility]    In 3 weeks Saritha Coyer., [address] in [facility]    In 3 weeks Dhiraj Lara MD [place] [facility] [place], [facility], [address], [place]     In 1 month Saritha Coyer., АЛЕКСАНДР Hartman in [place] Need consent signed  Medicare/Medicaid    In 1 month Saritha Coyer., АЛЕКСАНДР Hartman in [place] Need consent signed  Medicare/Medicaid    In 1 month Saritha Coyer., [address] in [place] Need consent signed  Medicare/Medicaid    In 1 month Juliana Méndez., Frederick Stovall, PT [facility] in [place] Need consent signed  Medicare/Medicaid

## 2022-09-19 ENCOUNTER — OFFICE VISIT (OUTPATIENT)
Dept: PHYSICAL MEDICINE AND REHAB | Facility: CLINIC | Age: 82
End: 2022-09-19

## 2022-09-19 VITALS
HEIGHT: 63 IN | HEART RATE: 85 BPM | SYSTOLIC BLOOD PRESSURE: 166 MMHG | DIASTOLIC BLOOD PRESSURE: 70 MMHG | WEIGHT: 153 LBS | OXYGEN SATURATION: 95 % | BODY MASS INDEX: 27.11 KG/M2

## 2022-09-19 DIAGNOSIS — M54.12 CERVICAL RADICULOPATHY AT C6: Primary | ICD-10-CM

## 2022-09-19 PROCEDURE — 99204 OFFICE O/P NEW MOD 45 MIN: CPT | Performed by: PHYSICAL MEDICINE & REHABILITATION

## 2022-09-19 RX ORDER — METHYLPREDNISOLONE 4 MG/1
TABLET ORAL
Qty: 1 EACH | Refills: 0 | Status: SHIPPED | OUTPATIENT
Start: 2022-09-19

## 2022-09-19 RX ORDER — LOSARTAN POTASSIUM AND HYDROCHLOROTHIAZIDE 12.5; 5 MG/1; MG/1
1 TABLET ORAL DAILY
COMMUNITY
Start: 2022-09-13

## 2022-09-19 NOTE — PATIENT INSTRUCTIONS
1. I have ordered an MRI of your neck to better look at the nerves and discs. 2. We will follow up by video visit after this is complete to talk about injection options if still indicated. 3. I have sent a script for a steroid pack, monitor your blood sugars closely. 4. We will follow up after the MRI  5.  Continue physical therapy

## 2022-09-21 ENCOUNTER — APPOINTMENT (OUTPATIENT)
Dept: PHYSICAL THERAPY | Age: 82
End: 2022-09-21
Attending: FAMILY MEDICINE
Payer: MEDICARE

## 2022-09-22 ENCOUNTER — HOSPITAL ENCOUNTER (EMERGENCY)
Facility: HOSPITAL | Age: 82
Discharge: HOME | End: 2022-09-22
Attending: EMERGENCY MEDICINE

## 2022-09-22 ENCOUNTER — HOSPITAL ENCOUNTER (OUTPATIENT)
Age: 82
Discharge: ANOTHER HOSPITAL | End: 2022-09-22

## 2022-09-22 ENCOUNTER — NURSE TRIAGE (OUTPATIENT)
Dept: FAMILY MEDICINE CLINIC | Facility: CLINIC | Age: 82
End: 2022-09-22

## 2022-09-22 ENCOUNTER — APPOINTMENT (OUTPATIENT)
Dept: MRI IMAGING | Facility: HOSPITAL | Age: 82
End: 2022-09-22
Attending: EMERGENCY MEDICINE

## 2022-09-22 VITALS
HEIGHT: 65 IN | WEIGHT: 160 LBS | OXYGEN SATURATION: 97 % | HEART RATE: 79 BPM | TEMPERATURE: 97 F | SYSTOLIC BLOOD PRESSURE: 159 MMHG | RESPIRATION RATE: 20 BRPM | DIASTOLIC BLOOD PRESSURE: 76 MMHG | BODY MASS INDEX: 26.66 KG/M2

## 2022-09-22 VITALS
DIASTOLIC BLOOD PRESSURE: 68 MMHG | HEART RATE: 87 BPM | TEMPERATURE: 97 F | SYSTOLIC BLOOD PRESSURE: 178 MMHG | RESPIRATION RATE: 18 BRPM | OXYGEN SATURATION: 99 %

## 2022-09-22 DIAGNOSIS — Z20.822 ENCOUNTER FOR LABORATORY TESTING FOR COVID-19 VIRUS: ICD-10-CM

## 2022-09-22 DIAGNOSIS — R42 DIZZINESS: Primary | ICD-10-CM

## 2022-09-22 DIAGNOSIS — R29.898 WEAKNESS OF LEFT UPPER EXTREMITY: ICD-10-CM

## 2022-09-22 DIAGNOSIS — M54.12 CERVICAL RADICULOPATHY: Primary | ICD-10-CM

## 2022-09-22 DIAGNOSIS — R53.1 WEAKNESS GENERALIZED: ICD-10-CM

## 2022-09-22 DIAGNOSIS — R03.0 ELEVATED BLOOD PRESSURE READING: ICD-10-CM

## 2022-09-22 LAB
ANION GAP SERPL CALC-SCNC: 10 MMOL/L (ref 0–18)
BASOPHILS # BLD AUTO: 0.03 X10(3) UL (ref 0–0.2)
BASOPHILS NFR BLD AUTO: 0.3 %
BILIRUB UR QL: NEGATIVE
BUN BLD-MCNC: 21 MG/DL (ref 7–18)
BUN/CREAT SERPL: 21.2 (ref 10–20)
CALCIUM BLD-MCNC: 8.9 MG/DL (ref 8.5–10.1)
CHLORIDE SERPL-SCNC: 99 MMOL/L (ref 98–112)
CLARITY UR: CLEAR
CO2 SERPL-SCNC: 23 MMOL/L (ref 21–32)
COLOR UR: YELLOW
CREAT BLD-MCNC: 0.99 MG/DL
DEPRECATED RDW RBC AUTO: 39.8 FL (ref 35.1–46.3)
EOSINOPHIL # BLD AUTO: 0.02 X10(3) UL (ref 0–0.7)
EOSINOPHIL NFR BLD AUTO: 0.2 %
ERYTHROCYTE [DISTWIDTH] IN BLOOD BY AUTOMATED COUNT: 12.4 % (ref 11–15)
GFR SERPLBLD BASED ON 1.73 SQ M-ARVRAT: 57 ML/MIN/1.73M2 (ref 60–?)
GLUCOSE BLD-MCNC: 157 MG/DL (ref 70–99)
GLUCOSE UR-MCNC: NEGATIVE MG/DL
HCT VFR BLD AUTO: 39.5 %
HGB BLD-MCNC: 13.3 G/DL
HGB UR QL STRIP.AUTO: NEGATIVE
IMM GRANULOCYTES # BLD AUTO: 0.04 X10(3) UL (ref 0–1)
IMM GRANULOCYTES NFR BLD: 0.4 %
KETONES UR-MCNC: NEGATIVE MG/DL
LEUKOCYTE ESTERASE UR QL STRIP.AUTO: NEGATIVE
LYMPHOCYTES # BLD AUTO: 1.44 X10(3) UL (ref 1–4)
LYMPHOCYTES NFR BLD AUTO: 15 %
MCH RBC QN AUTO: 29.5 PG (ref 26–34)
MCHC RBC AUTO-ENTMCNC: 33.7 G/DL (ref 31–37)
MCV RBC AUTO: 87.6 FL
MONOCYTES # BLD AUTO: 0.61 X10(3) UL (ref 0.1–1)
MONOCYTES NFR BLD AUTO: 6.4 %
NEUTROPHILS # BLD AUTO: 7.45 X10 (3) UL (ref 1.5–7.7)
NEUTROPHILS # BLD AUTO: 7.45 X10(3) UL (ref 1.5–7.7)
NEUTROPHILS NFR BLD AUTO: 77.7 %
NITRITE UR QL STRIP.AUTO: NEGATIVE
OSMOLALITY SERPL CALC.SUM OF ELEC: 280 MOSM/KG (ref 275–295)
PH UR: 6 [PH] (ref 5–8)
PLATELET # BLD AUTO: 254 10(3)UL (ref 150–450)
POTASSIUM SERPL-SCNC: 4.5 MMOL/L (ref 3.5–5.1)
PROT UR-MCNC: NEGATIVE MG/DL
RBC # BLD AUTO: 4.51 X10(6)UL
SARS-COV-2 RNA RESP QL NAA+PROBE: NOT DETECTED
SODIUM SERPL-SCNC: 132 MMOL/L (ref 136–145)
SP GR UR STRIP: 1.01 (ref 1–1.03)
TROPONIN I HIGH SENSITIVITY: 7 NG/L
TSI SER-ACNC: 1.1 MIU/ML (ref 0.36–3.74)
UROBILINOGEN UR STRIP-ACNC: 0.2
WBC # BLD AUTO: 9.6 X10(3) UL (ref 4–11)

## 2022-09-22 PROCEDURE — 70551 MRI BRAIN STEM W/O DYE: CPT | Performed by: EMERGENCY MEDICINE

## 2022-09-22 PROCEDURE — 96374 THER/PROPH/DIAG INJ IV PUSH: CPT

## 2022-09-22 PROCEDURE — 99285 EMERGENCY DEPT VISIT HI MDM: CPT

## 2022-09-22 PROCEDURE — 93000 ELECTROCARDIOGRAM COMPLETE: CPT | Performed by: PHYSICIAN ASSISTANT

## 2022-09-22 PROCEDURE — 96361 HYDRATE IV INFUSION ADD-ON: CPT

## 2022-09-22 PROCEDURE — 84443 ASSAY THYROID STIM HORMONE: CPT | Performed by: EMERGENCY MEDICINE

## 2022-09-22 PROCEDURE — 84484 ASSAY OF TROPONIN QUANT: CPT | Performed by: EMERGENCY MEDICINE

## 2022-09-22 PROCEDURE — 80048 BASIC METABOLIC PNL TOTAL CA: CPT | Performed by: EMERGENCY MEDICINE

## 2022-09-22 PROCEDURE — 99214 OFFICE O/P EST MOD 30 MIN: CPT | Performed by: PHYSICIAN ASSISTANT

## 2022-09-22 PROCEDURE — U0002 COVID-19 LAB TEST NON-CDC: HCPCS | Performed by: PHYSICIAN ASSISTANT

## 2022-09-22 PROCEDURE — 81003 URINALYSIS AUTO W/O SCOPE: CPT | Performed by: EMERGENCY MEDICINE

## 2022-09-22 PROCEDURE — 85025 COMPLETE CBC W/AUTO DIFF WBC: CPT | Performed by: EMERGENCY MEDICINE

## 2022-09-22 PROCEDURE — 96375 TX/PRO/DX INJ NEW DRUG ADDON: CPT

## 2022-09-22 RX ORDER — DIAZEPAM 5 MG/ML
INJECTION, SOLUTION INTRAMUSCULAR; INTRAVENOUS
Status: COMPLETED
Start: 2022-09-22 | End: 2022-09-22

## 2022-09-22 RX ORDER — DEXAMETHASONE SODIUM PHOSPHATE 4 MG/ML
4 VIAL (ML) INJECTION ONCE
Status: COMPLETED | OUTPATIENT
Start: 2022-09-22 | End: 2022-09-22

## 2022-09-22 RX ORDER — DIAZEPAM 5 MG/ML
2.5 INJECTION, SOLUTION INTRAMUSCULAR; INTRAVENOUS ONCE
Status: COMPLETED | OUTPATIENT
Start: 2022-09-22 | End: 2022-09-22

## 2022-09-22 NOTE — ED INITIAL ASSESSMENT (HPI)
Pt presents to the IC alert and oriented x 4, with c/o fatigue and dizziness x 8 days. Denies chest pain, SOB. Denies cough, fever.

## 2022-09-27 ENCOUNTER — TELEPHONE (OUTPATIENT)
Dept: FAMILY MEDICINE CLINIC | Facility: CLINIC | Age: 82
End: 2022-09-27

## 2022-09-27 ENCOUNTER — OFFICE VISIT (OUTPATIENT)
Dept: FAMILY MEDICINE CLINIC | Facility: CLINIC | Age: 82
End: 2022-09-27
Payer: MEDICARE

## 2022-09-27 VITALS
HEART RATE: 84 BPM | WEIGHT: 148 LBS | SYSTOLIC BLOOD PRESSURE: 164 MMHG | DIASTOLIC BLOOD PRESSURE: 81 MMHG | BODY MASS INDEX: 24.66 KG/M2 | HEIGHT: 65 IN

## 2022-09-27 DIAGNOSIS — M54.12 CERVICAL RADICULOPATHY: ICD-10-CM

## 2022-09-27 DIAGNOSIS — E11.22 TYPE 2 DIABETES MELLITUS WITH STAGE 3A CHRONIC KIDNEY DISEASE, WITH LONG-TERM CURRENT USE OF INSULIN (HCC): ICD-10-CM

## 2022-09-27 DIAGNOSIS — Z79.4 TYPE 2 DIABETES MELLITUS WITH STAGE 3A CHRONIC KIDNEY DISEASE, WITH LONG-TERM CURRENT USE OF INSULIN (HCC): ICD-10-CM

## 2022-09-27 DIAGNOSIS — R53.1 WEAKNESS: Primary | ICD-10-CM

## 2022-09-27 DIAGNOSIS — N18.31 TYPE 2 DIABETES MELLITUS WITH STAGE 3A CHRONIC KIDNEY DISEASE, WITH LONG-TERM CURRENT USE OF INSULIN (HCC): ICD-10-CM

## 2022-09-27 DIAGNOSIS — I10 ESSENTIAL HYPERTENSION: ICD-10-CM

## 2022-09-27 NOTE — TELEPHONE ENCOUNTER
[place] Language Line: Kilo ID # [id number] (verified Name and ). Spoke to patient to relay message from provider regarding test results. However, patient started complaining about still feeling weak for the past few weeks. She was recently in the IC/ED on  for dizziness and weakness. She reports of generalized weakness, having a hard time getting up and feels dizzy when she does so. She has cervical radiculopathy for which she sees Physiatry. Recent X-ray of C-spine also showed arthritis and pinched nerves. She takes Tylenol for the pain. Patient wants to be seen to address her concerns. One of her grand kids will bring her to the clinic.     Future Appointments   Date Time Provider Stephy Lizbeth   2022  2:15 PM VANESSA Cameron

## 2022-09-29 ENCOUNTER — HOSPITAL ENCOUNTER (OUTPATIENT)
Dept: MRI IMAGING | Facility: HOSPITAL | Age: 82
Discharge: HOME | End: 2022-09-29
Attending: PHYSICAL MEDICINE & REHABILITATION
Payer: MEDICARE

## 2022-09-29 DIAGNOSIS — M54.12 CERVICAL RADICULOPATHY AT C6: ICD-10-CM

## 2022-09-29 PROCEDURE — 72141 MRI NECK SPINE W/O DYE: CPT | Performed by: PHYSICAL MEDICINE & REHABILITATION

## 2022-10-02 NOTE — ASSESSMENT & PLAN NOTE
Discussed need to increase amt of protein in diet; limit simple carbs    Continue present management

## 2022-10-02 NOTE — ASSESSMENT & PLAN NOTE
Continue present management  Check bp twice a day x 1 week  Follow up w Dr Juan C Saleh in one week for recheck  Discussed w pt red flag symptoms that if they occur, pt should immediately go to ER. Pt states understanding.

## 2022-10-05 ENCOUNTER — OFFICE VISIT (OUTPATIENT)
Dept: PHYSICAL THERAPY | Age: 82
End: 2022-10-05
Attending: FAMILY MEDICINE
Payer: MEDICARE

## 2022-10-05 PROCEDURE — 97110 THERAPEUTIC EXERCISES: CPT | Performed by: PHYSICAL THERAPIST

## 2022-10-05 NOTE — PROGRESS NOTES
Diagnosis: Cervical radiculopathy (M54.12)         Next MD visit: none scheduled  Fall Risk: standard         Precautions: n/a          Medication Changes since last visit?: No      Subjective: Patient complains of neck pain rated up to 7/10 on pain scale. Pt describes pain level 7/10. Objective:  Cervical ROM is minimally limited into all planes    Assessment: Still with decreased neck mobility limiting tolerance to household activities. Patient and PT goals in progress. Plan: Continue PT. Patient states she is scheduled to follow up with MD next week.      10/5/2022  Visit#: 2/ Medicare   Thera Ex   - supine overhead flexion with josselin 20x  - supine horizontal abduction AROM 20x  - supine cervical AAROM into rotation, lateral flexion.  - STM to B paracervicals  - forward press with 2lb 10 x 2  - overhead flexion, horizontal abduction with 1lb wts 10 x 2  - B shoulder rows with green sportcord 20x  - B shoulder extensions with red sportcord 20x  - Nustep L4 x 10min                       Charges: EX3       Total Timed Treatment: 40 min  Total Treatment Time: 40 min

## 2022-10-10 ENCOUNTER — OFFICE VISIT (OUTPATIENT)
Dept: PHYSICAL MEDICINE AND REHAB | Facility: CLINIC | Age: 82
End: 2022-10-10
Payer: MEDICARE

## 2022-10-10 ENCOUNTER — TELEPHONE (OUTPATIENT)
Dept: NEUROLOGY | Facility: CLINIC | Age: 82
End: 2022-10-10

## 2022-10-10 ENCOUNTER — OFFICE VISIT (OUTPATIENT)
Dept: PHYSICAL THERAPY | Age: 82
End: 2022-10-10
Attending: FAMILY MEDICINE
Payer: MEDICARE

## 2022-10-10 VITALS
DIASTOLIC BLOOD PRESSURE: 74 MMHG | BODY MASS INDEX: 24.66 KG/M2 | WEIGHT: 148 LBS | HEIGHT: 65 IN | OXYGEN SATURATION: 98 % | SYSTOLIC BLOOD PRESSURE: 138 MMHG | HEART RATE: 85 BPM

## 2022-10-10 DIAGNOSIS — M54.12 CERVICAL RADICULOPATHY AT C6: Primary | ICD-10-CM

## 2022-10-10 PROCEDURE — 97110 THERAPEUTIC EXERCISES: CPT | Performed by: PHYSICAL THERAPIST

## 2022-10-10 NOTE — PROGRESS NOTES
RETURN PATIENT VISIT    CHIEF COMPLAINT  Neck pain with radiation into left upper extremity     INTERVAL HISTORY  Karen Esquivel is a 79year old who was last seen in clinic on 9/19/2022, at that visit the patient was complaining of primarily neck pain with radiation down into her left upper extremity involving the second and third digits of her left hand. There were concerns for cervical radiculopathy in the C6/7 dermatome. I ordered an MRI which is reviewed in full below however in brief it shows multilevel degenerative change at C5-6 and C6-7 resulting in mild central canal stenosis. Patient presents today for follow-up, she has continued physical therapy without improvement in her symptoms. She still has pain traveling down the left upper extremity into the second and third digits. She denies significant weakness but endorses difficulty with day-to-day functioning. She is here with her daughter who aids with translations. Of note the patient did present to an outside facility where she was evaluated and was treated for suspected rotator cuff tendinitis with a subacromial injection, this did not work. REVIEW OF SYSTEMS  Review of systems was completed with the patient today as pertinent to today's visit    PHYSICAL EXAMINATION  CONSTITUTIONAL: Well-appearing, in no apparent distress  EYES: No scleral icterus or conjunctival hemorrhage  CARDIOVASCULAR: Skin warm and well-perfused, no peripheral edema  RESPIRATORY: Breathing unlabored without accessory muscle use  PSYCHIATRIC: Alert, cooperative, appropriate mood and affect  SKIN: No lesions or rashes on exposed skin  MUSCULOSKELETAL: Decreased range of motion of the cervical spine with some pain. Spurling's positive on the left. NEUROLOGIC: Patient has good upper extremities strength in all myotomes, sensation is grossly intact in bilateral upper extremities.   Flaco's negative    REVIEW OF PRIOR X-RAYS/STUDIES  I reviewed the MRI of the cervical spine dated 9/29/2022 revealing stable posterior median disc retrusion at C3-4, median disc protrusion at C4-5 with mild to moderate central canal stenosis, median disc protrusion with mild to moderate central canal stenosis at C6-7. IMPRESSION/DIAGNOSIS  Cervical radiculopathy at c6  (primary encounter diagnosis)    Patient may have some contribution of rotator cuff pathology however as I thought before most of her pain seems to be coming from her neck and is radicular in the C6 distribution. TREATMENT/PLAN  Long discussion was had with the patient and her daughter about treatment options including continued therapy, observation, initiation of neuropathic agents as well as interlaminar injection in the cervical spine. Risk benefits and alternatives of these treatment options were discussed. Patient and her daughter would like to proceed with C6-7 interlaminar epidural steroid injection with fluoroscopic guidance using local anesthesia. She will follow-up with me for the above injection. Education was provided regarding the above impression/diagnosis and treatment options/plan were discussed. All questions were answered during today's visit. Patient will contact clinic if any other questions or concerns.     Pallavi Heath DO  Physical Medicine and Rehabilitation / [facility]

## 2022-10-10 NOTE — TELEPHONE ENCOUNTER
Per Medicare guidelines authorization is not required for C6/7 interlaminar epidural steroid injection, fluoroscopic guidance          cpt codes 74411, 90079. Will inform Nursing.

## 2022-10-10 NOTE — PROGRESS NOTES
Diagnosis: Cervical radiculopathy (M54.12)         Next MD visit: none scheduled  Fall Risk: standard         Precautions: n/a          Medication Changes since last visit?: No      Subjective: Patient still complains of neck pain. States it feels slightly better. Pt describes pain level 7/10. Objective:  Cervical ROM is minimally limited into all planes    Assessment: Needs frequent cueing during therapeutic exercises. Patient gets tired easily and with limited tolerance to therapeutic exercises. Patient and PT goals in progress. Plan: Continue progression of PT to improve mobility.      10/10/2022  Visit#: 3/ Medicare 10/5/2022  Visit#: 2/ Medicare   Thera Ex   x40min  - seated neck retraction 10x  - seated L neck rotation 10x  - seated scap retraction 10x  - B shoulder stretch into flexion on wall 10x  - supine horizontal abduction AROM 20x  - supine cervical AAROM into rotation, lateral flexion.  - STM to B paracervicals  - B shoulder flexion, forward press with 1lb db 10 x 2  - B shoulder horizontal abduction with 1lb db 10 x 2  - B shoulder rows with green sportcord 20x  - B shoulder extensions with red sportcord 20x  - Nustep L4 x 10min  - B UE abduction, flexion 10 x    - supine overhead flexion with josselin 20x  - supine horizontal abduction AROM 20x  - supine cervical AAROM into rotation, lateral flexion.  - STM to B paracervicals  - forward press with 2lb 10 x 2  - overhead flexion, horizontal abduction with 1lb wts 10 x 2  - B shoulder rows with green sportcord 20x  - B shoulder extensions with red sportcord 20x  - Nustep L4 x 10min                         Charges: EX3       Total Timed Treatment: 40 min  Total Treatment Time: 40 min

## 2022-10-12 ENCOUNTER — OFFICE VISIT (OUTPATIENT)
Dept: PHYSICAL THERAPY | Age: 82
End: 2022-10-12
Attending: FAMILY MEDICINE
Payer: MEDICARE

## 2022-10-12 PROCEDURE — 97110 THERAPEUTIC EXERCISES: CPT | Performed by: PHYSICAL THERAPIST

## 2022-10-12 NOTE — PROGRESS NOTES
Diagnosis: Cervical radiculopathy (M54.12)         Next MD visit: none scheduled  Fall Risk: standard         Precautions: n/a          Medication Changes since last visit?: No      Subjective: Patient still complains of neck pain. Reports less pain today. Pt describes pain level 5/10. Objective:  Cervical ROM is minimally limited into all planes  B shoulder ROM is WFL into all planes    Assessment: Still needs frequent cueing for posture and during therapeutic exercises. Patient with L UE radicular pain at rest. No major change in symptoms so far. Patient and PT goals in progress. Plan: Continue progression of PT to improve mobility.      10/12/2022  Visit#: 4/ Medicare 10/10/2022  Visit#: 3/ Medicare 10/5/2022  Visit#: 2/ Medicare   Thera Ex   x40min  - seated neck retraction 10x  - seated neck extension 10x  - B shoulder stretch into flexion on wall 10x  - supine B shoulder flexion with josselin, horizontal abduction AROM 20x  - supine cervical AAROM into rotation, lateral flexion.  - STM to B paracervicals  - B shoulder forward press with 2lb dbs 10 x 2  - B shoulder overhead flexion with 1lb db 10x  2  - B shoulder horizontal abduction with 1lb wt 10 x 3  - sidelying L shoulder external rotation with 1lb wt 10 x 2  - B shoulder rows with green sportcord 20x  - B shoulder extensions with red sportcord 20x  - B UE abduction, flexion with 1lb wt 10 x 2  - Nustep L4 x 7min with cueing for posture   x40min  - seated neck retraction 10x  - seated L neck rotation 10x  - seated scap retraction 10x  - B shoulder stretch into flexion on wall 10x  - supine horizontal abduction AROM 20x  - supine cervical AAROM into rotation, lateral flexion.  - STM to B paracervicals  - B shoulder flexion, forward press with 1lb db 10 x 2  - B shoulder horizontal abduction with 1lb db 10 x 2  - B shoulder rows with green sportcord 20x  - B shoulder extensions with red sportcord 20x  - Nustep L4 x 10min  - B UE abduction, flexion 10 x    - supine overhead flexion with josselin 20x  - supine horizontal abduction AROM 20x  - supine cervical AAROM into rotation, lateral flexion.  - STM to B paracervicals  - forward press with 2lb 10 x 2  - overhead flexion, horizontal abduction with 1lb wts 10 x 2  - B shoulder rows with green sportcord 20x  - B shoulder extensions with red sportcord 20x  - Nustep L4 x 10min                           Charges: EX3       Total Timed Treatment: 40 min  Total Treatment Time: 40 min

## 2022-10-14 ENCOUNTER — LAB ENCOUNTER (OUTPATIENT)
Dept: LAB | Age: 82
End: 2022-10-14
Attending: FAMILY MEDICINE
Payer: MEDICARE

## 2022-10-14 ENCOUNTER — OFFICE VISIT (OUTPATIENT)
Dept: FAMILY MEDICINE CLINIC | Facility: CLINIC | Age: 82
End: 2022-10-14
Payer: MEDICARE

## 2022-10-14 VITALS
WEIGHT: 150 LBS | DIASTOLIC BLOOD PRESSURE: 78 MMHG | HEART RATE: 73 BPM | BODY MASS INDEX: 24.99 KG/M2 | SYSTOLIC BLOOD PRESSURE: 149 MMHG | TEMPERATURE: 98 F | HEIGHT: 65 IN

## 2022-10-14 DIAGNOSIS — M81.0 POST-MENOPAUSAL OSTEOPOROSIS: ICD-10-CM

## 2022-10-14 DIAGNOSIS — H66.90 EAR INFECTION: ICD-10-CM

## 2022-10-14 DIAGNOSIS — K64.8 INTERNAL HEMORRHOIDS WITHOUT COMPLICATION: ICD-10-CM

## 2022-10-14 DIAGNOSIS — R53.1 WEAKNESS: ICD-10-CM

## 2022-10-14 DIAGNOSIS — M54.12 CERVICAL RADICULOPATHY: ICD-10-CM

## 2022-10-14 DIAGNOSIS — Z23 NEEDS FLU SHOT: ICD-10-CM

## 2022-10-14 DIAGNOSIS — Z86.73 HISTORY OF TIA (TRANSIENT ISCHEMIC ATTACK): ICD-10-CM

## 2022-10-14 DIAGNOSIS — M79.7 FIBROMYALGIA: ICD-10-CM

## 2022-10-14 DIAGNOSIS — Z00.00 ENCOUNTER FOR ANNUAL HEALTH EXAMINATION: Primary | ICD-10-CM

## 2022-10-14 DIAGNOSIS — E55.9 VITAMIN D DEFICIENCY: ICD-10-CM

## 2022-10-14 DIAGNOSIS — I10 ESSENTIAL HYPERTENSION: ICD-10-CM

## 2022-10-14 DIAGNOSIS — N18.31 TYPE 2 DIABETES MELLITUS WITH STAGE 3A CHRONIC KIDNEY DISEASE, WITH LONG-TERM CURRENT USE OF INSULIN (HCC): ICD-10-CM

## 2022-10-14 DIAGNOSIS — E61.1 IRON DEFICIENCY: ICD-10-CM

## 2022-10-14 DIAGNOSIS — Z79.4 TYPE 2 DIABETES MELLITUS WITH STAGE 3A CHRONIC KIDNEY DISEASE, WITH LONG-TERM CURRENT USE OF INSULIN (HCC): ICD-10-CM

## 2022-10-14 DIAGNOSIS — K57.30 DIVERTICULOSIS LARGE INTESTINE W/O PERFORATION OR ABSCESS W/O BLEEDING: ICD-10-CM

## 2022-10-14 DIAGNOSIS — H35.369 DEGENERATIVE DRUSEN, UNSPECIFIED LATERALITY: ICD-10-CM

## 2022-10-14 DIAGNOSIS — R53.83 FATIGUE, UNSPECIFIED TYPE: ICD-10-CM

## 2022-10-14 DIAGNOSIS — N18.31 STAGE 3A CHRONIC KIDNEY DISEASE (HCC): Chronic | ICD-10-CM

## 2022-10-14 DIAGNOSIS — F32.4 MAJOR DEPRESS, PART REMIS (HCC): ICD-10-CM

## 2022-10-14 DIAGNOSIS — Z86.010 HISTORY OF COLON POLYPS: ICD-10-CM

## 2022-10-14 DIAGNOSIS — K86.2 PANCREATIC CYST: ICD-10-CM

## 2022-10-14 DIAGNOSIS — Z00.00 ENCOUNTER FOR ANNUAL HEALTH EXAMINATION: ICD-10-CM

## 2022-10-14 DIAGNOSIS — E11.22 TYPE 2 DIABETES MELLITUS WITH STAGE 3A CHRONIC KIDNEY DISEASE, WITH LONG-TERM CURRENT USE OF INSULIN (HCC): ICD-10-CM

## 2022-10-14 DIAGNOSIS — M15.9 PRIMARY OSTEOARTHRITIS INVOLVING MULTIPLE JOINTS: ICD-10-CM

## 2022-10-14 DIAGNOSIS — E78.2 MIXED HYPERLIPIDEMIA: ICD-10-CM

## 2022-10-14 DIAGNOSIS — I21.4 NON-STEMI (NON-ST ELEVATED MYOCARDIAL INFARCTION) (HCC): ICD-10-CM

## 2022-10-14 PROBLEM — R93.3 ABNORMAL FINDING ON GI TRACT IMAGING: Status: RESOLVED | Noted: 2018-09-11 | Resolved: 2022-10-14

## 2022-10-14 LAB
ALBUMIN SERPL-MCNC: 3.4 G/DL (ref 3.4–5)
ALBUMIN/GLOB SERPL: 0.9 {RATIO} (ref 1–2)
ALP LIVER SERPL-CCNC: 44 U/L
ALT SERPL-CCNC: 25 U/L
ANION GAP SERPL CALC-SCNC: 7 MMOL/L (ref 0–18)
AST SERPL-CCNC: 19 U/L (ref 15–37)
BILIRUB SERPL-MCNC: 0.5 MG/DL (ref 0.1–2)
BUN BLD-MCNC: 13 MG/DL (ref 7–18)
BUN/CREAT SERPL: 14.3 (ref 10–20)
CALCIUM BLD-MCNC: 9.3 MG/DL (ref 8.5–10.1)
CHLORIDE SERPL-SCNC: 104 MMOL/L (ref 98–112)
CHOLEST SERPL-MCNC: 107 MG/DL (ref ?–200)
CO2 SERPL-SCNC: 28 MMOL/L (ref 21–32)
CREAT BLD-MCNC: 0.91 MG/DL
CREAT UR-SCNC: 88.5 MG/DL
EST. AVERAGE GLUCOSE BLD GHB EST-MCNC: 186 MG/DL (ref 68–126)
FASTING PATIENT LIPID ANSWER: YES
FASTING STATUS PATIENT QL REPORTED: YES
GFR SERPLBLD BASED ON 1.73 SQ M-ARVRAT: 63 ML/MIN/1.73M2 (ref 60–?)
GLOBULIN PLAS-MCNC: 3.7 G/DL (ref 2.8–4.4)
GLUCOSE BLD-MCNC: 104 MG/DL (ref 70–99)
HBA1C MFR BLD: 8.1 % (ref ?–5.7)
HDLC SERPL-MCNC: 47 MG/DL (ref 40–59)
IRON SATN MFR SERPL: 23 %
IRON SERPL-MCNC: 87 UG/DL
LDLC SERPL CALC-MCNC: 43 MG/DL (ref ?–100)
MICROALBUMIN UR-MCNC: 0.86 MG/DL
MICROALBUMIN/CREAT 24H UR-RTO: 9.7 UG/MG (ref ?–30)
NONHDLC SERPL-MCNC: 60 MG/DL (ref ?–130)
OSMOLALITY SERPL CALC.SUM OF ELEC: 288 MOSM/KG (ref 275–295)
POTASSIUM SERPL-SCNC: 5 MMOL/L (ref 3.5–5.1)
PROT SERPL-MCNC: 7.1 G/DL (ref 6.4–8.2)
SODIUM SERPL-SCNC: 139 MMOL/L (ref 136–145)
TIBC SERPL-MCNC: 386 UG/DL (ref 240–450)
TRANSFERRIN SERPL-MCNC: 259 MG/DL (ref 200–360)
TRIGL SERPL-MCNC: 86 MG/DL (ref 30–149)
VIT B12 SERPL-MCNC: >2000 PG/ML (ref 193–986)
VIT D+METAB SERPL-MCNC: 43.7 NG/ML (ref 30–100)
VLDLC SERPL CALC-MCNC: 12 MG/DL (ref 0–30)

## 2022-10-14 PROCEDURE — 80053 COMPREHEN METABOLIC PANEL: CPT

## 2022-10-14 PROCEDURE — 83540 ASSAY OF IRON: CPT

## 2022-10-14 PROCEDURE — 80061 LIPID PANEL: CPT

## 2022-10-14 PROCEDURE — 82306 VITAMIN D 25 HYDROXY: CPT

## 2022-10-14 PROCEDURE — 36415 COLL VENOUS BLD VENIPUNCTURE: CPT

## 2022-10-14 PROCEDURE — 83036 HEMOGLOBIN GLYCOSYLATED A1C: CPT

## 2022-10-14 PROCEDURE — 82043 UR ALBUMIN QUANTITATIVE: CPT

## 2022-10-14 PROCEDURE — 82570 ASSAY OF URINE CREATININE: CPT

## 2022-10-14 PROCEDURE — 82607 VITAMIN B-12: CPT

## 2022-10-14 PROCEDURE — 84466 ASSAY OF TRANSFERRIN: CPT

## 2022-10-14 RX ORDER — FLUOXETINE HYDROCHLORIDE 20 MG/1
20 CAPSULE ORAL DAILY
Qty: 90 CAPSULE | Refills: 3 | Status: SHIPPED | OUTPATIENT
Start: 2022-10-14

## 2022-10-14 RX ORDER — CEPHALEXIN 500 MG/1
500 CAPSULE ORAL 3 TIMES DAILY
Qty: 30 CAPSULE | Refills: 0 | Status: SHIPPED | OUTPATIENT
Start: 2022-10-14 | End: 2022-10-24

## 2022-10-17 ENCOUNTER — OFFICE VISIT (OUTPATIENT)
Dept: PHYSICAL THERAPY | Age: 82
End: 2022-10-17
Attending: FAMILY MEDICINE
Payer: MEDICARE

## 2022-10-17 PROCEDURE — 97110 THERAPEUTIC EXERCISES: CPT | Performed by: PHYSICAL THERAPIST

## 2022-10-17 NOTE — PROGRESS NOTES
Diagnosis: Cervical radiculopathy (M54.12)         Next MD visit: none scheduled  Fall Risk: standard         Precautions: n/a          Medication Changes since last visit?: No      Subjective: Patient states her neck is feeling better. Reports no pain at this time. Pt describes pain level 0/10. Objective:  Cervical ROM is minimally limited into all planes  B shoulder ROM is WFL into all planes    Assessment: No pain reported today. Demonstrates improved overall mobility. Patient and PT goals in progress. Plan: Continue progression of PT to improve mobility.      10/17/2022  Visit#: 5/ Medicare 10/12/2022  Visit#: 4/ Medicare 10/10/2022  Visit#: 3/ Medicare   Thera Ex   x40min  - seated neck retraction 10x  - seated neck extensions 10x  - supine B shoulder flexion with josselin, horizontal abduction AROM 20x  - supine cervical AAROM into rotation, lateral flexion.  - B shoulder forward press with 2lb dbs 10 x 2  - B shoulder overhead flexion with 1lb db 10x  2  - B shoulder horizontal abduction with 1lb wt 10 x 3  - sidelying L shoulder external rotation with 1lb wt 10 x 2  - B shoulder rows with green sportcord 20x  - B shoulder extensions with red sportcord 20x  - B UE abduction, flexion with 1lb wt 10 x 2  - Nustep L4 x 7min with cueing for posture   x40min  - seated neck retraction 10x  - seated neck extension 10x  - B shoulder stretch into flexion on wall 10x  - supine B shoulder flexion with josselin, horizontal abduction AROM 20x  - supine cervical AAROM into rotation, lateral flexion.  - STM to B paracervicals  - B shoulder forward press with 2lb dbs 10 x 2  - B shoulder overhead flexion with 1lb db 10x  2  - B shoulder horizontal abduction with 1lb wt 10 x 3  - sidelying L shoulder external rotation with 1lb wt 10 x 2  - B shoulder rows with green sportcord 20x  - B shoulder extensions with red sportcord 20x  - B UE abduction, flexion with 1lb wt 10 x 2  - Nustep L4 x 7min with cueing for posture x40min  - seated neck retraction 10x  - seated L neck rotation 10x  - seated scap retraction 10x  - B shoulder stretch into flexion on wall 10x  - supine horizontal abduction AROM 20x  - supine cervical AAROM into rotation, lateral flexion.  - STM to B paracervicals  - B shoulder flexion, forward press with 1lb db 10 x 2  - B shoulder horizontal abduction with 1lb db 10 x 2  - B shoulder rows with green sportcord 20x  - B shoulder extensions with red sportcord 20x  - Nustep L4 x 10min  - B UE abduction, flexion 10 x                          Charges: EX3       Total Timed Treatment: 40 min  Total Treatment Time: 40 min

## 2022-10-20 ENCOUNTER — OFFICE VISIT (OUTPATIENT)
Dept: PHYSICAL THERAPY | Age: 82
End: 2022-10-20
Attending: FAMILY MEDICINE
Payer: MEDICARE

## 2022-10-20 PROCEDURE — 97110 THERAPEUTIC EXERCISES: CPT | Performed by: PHYSICAL THERAPIST

## 2022-10-20 NOTE — PROGRESS NOTES
Progress Summary  Pt has attended 6 visits in Physical Therapy. Diagnosis: Cervical radiculopathy (M54.12)         Next MD visit: none scheduled  Fall Risk: standard         Precautions: n/a          Medication Changes since last visit?: No    Subjective: Patient states her neck is feeling better. Reports no pain at this time. Pt describes pain level 0/10. Objective:  Cervical ROM is WFL  B shoulder ROM is WFL into all planes  Strength grossly 5/5 throughout. Assessment: Referred to PT due to initial complaints of neck pain. Patient has completed 6 PT visits. States her neck is feeling much better. Reports 0/10 pain at this time. Cervical and B shoulder ROM is WFL. Strength is grossly 5/5 throughout. Patient and PT goals have been met. Patient is able to perform all previous activities without any major deviations.      10/20/2022  Visit#: 6 Medicare 10/17/2022  Visit#: 5/ Medicare 10/12/2022  Visit#: 4/ Medicare 10/10/2022  Visit#: 3/ Medicare   Thera Ex   x40min  - seated neck retraction 10x  - seated neck extensions 10x  - supine B shoulder flexion with josselin, horizontal abduction AROM 20x  - B shoulder forward press with 2lb dbs 10 x 2  - B shoulder overhead flexion with 1lb db 10x  2  - B shoulder horizontal abduction with 1lb wt 10 x 3  - sidelying L shoulder external rotation with 1lb wt 10 x 2  - B shoulder rows with green sportcord 30x  - B shoulder extensions with red sportcord 20x  - B UE abduction, flexion with 1lb wt 10 x 2  - B shoulder stretch into flexion on wall  - Nustep L4 x 7min with cueing for posture x40min  - seated neck retraction 10x  - seated neck extensions 10x  - supine B shoulder flexion with josselin, horizontal abduction AROM 20x  - supine cervical AAROM into rotation, lateral flexion.  - B shoulder forward press with 2lb dbs 10 x 2  - B shoulder overhead flexion with 1lb db 10x  2  - B shoulder horizontal abduction with 1lb wt 10 x 3  - sidelying L shoulder external rotation with 1lb wt 10 x 2  - B shoulder rows with green sportcord 20x  - B shoulder extensions with red sportcord 20x  - B UE abduction, flexion with 1lb wt 10 x 2  - Nustep L4 x 7min with cueing for posture   x40min  - seated neck retraction 10x  - seated neck extension 10x  - B shoulder stretch into flexion on wall 10x  - supine B shoulder flexion with josselin, horizontal abduction AROM 20x  - supine cervical AAROM into rotation, lateral flexion.  - STM to B paracervicals  - B shoulder forward press with 2lb dbs 10 x 2  - B shoulder overhead flexion with 1lb db 10x  2  - B shoulder horizontal abduction with 1lb wt 10 x 3  - sidelying L shoulder external rotation with 1lb wt 10 x 2  - B shoulder rows with green sportcord 20x  - B shoulder extensions with red sportcord 20x  - B UE abduction, flexion with 1lb wt 10 x 2  - Nustep L4 x 7min with cueing for posture   x40min  - seated neck retraction 10x  - seated L neck rotation 10x  - seated scap retraction 10x  - B shoulder stretch into flexion on wall 10x  - supine horizontal abduction AROM 20x  - supine cervical AAROM into rotation, lateral flexion.  - STM to B paracervicals  - B shoulder flexion, forward press with 1lb db 10 x 2  - B shoulder horizontal abduction with 1lb db 10 x 2  - B shoulder rows with green sportcord 20x  - B shoulder extensions with red sportcord 20x  - Nustep L4 x 10min  - B UE abduction, flexion 10 x                          Charges: EX3       Total Timed Treatment: 40 min  Total Treatment Time: 40 min    Plan: Discharge from PT. Patient will continue with her home exercise program.    Patient/Family/Caregiver was advised of these findings, precautions, and treatment options and has agreed to actively participate in planning and for this course of care. Thank you for your referral. If you have any questions, please contact me at Dept: [phone].     Sincerely,  Electronically signed by therapist: Angela Menchaca., PT

## 2022-10-24 ENCOUNTER — APPOINTMENT (OUTPATIENT)
Dept: PHYSICAL THERAPY | Age: 82
End: 2022-10-24
Attending: FAMILY MEDICINE
Payer: MEDICARE

## 2022-10-27 ENCOUNTER — APPOINTMENT (OUTPATIENT)
Dept: PHYSICAL THERAPY | Age: 82
End: 2022-10-27
Attending: FAMILY MEDICINE
Payer: MEDICARE

## 2022-11-14 ENCOUNTER — TELEPHONE (OUTPATIENT)
Dept: FAMILY MEDICINE CLINIC | Facility: CLINIC | Age: 82
End: 2022-11-14

## 2022-11-14 DIAGNOSIS — Z79.4 TYPE 2 DIABETES MELLITUS WITH STAGE 3A CHRONIC KIDNEY DISEASE, WITH LONG-TERM CURRENT USE OF INSULIN (HCC): ICD-10-CM

## 2022-11-14 DIAGNOSIS — N18.31 TYPE 2 DIABETES MELLITUS WITH STAGE 3A CHRONIC KIDNEY DISEASE, WITH LONG-TERM CURRENT USE OF INSULIN (HCC): ICD-10-CM

## 2022-11-14 DIAGNOSIS — E11.22 TYPE 2 DIABETES MELLITUS WITH STAGE 3A CHRONIC KIDNEY DISEASE, WITH LONG-TERM CURRENT USE OF INSULIN (HCC): ICD-10-CM

## 2022-11-14 RX ORDER — LANCETS
EACH MISCELLANEOUS
Qty: 200 EACH | Refills: 5 | Status: SHIPPED | OUTPATIENT
Start: 2022-11-14

## 2022-12-13 DIAGNOSIS — R10.33 UMBILICAL PAIN: ICD-10-CM

## 2022-12-14 RX ORDER — DOCUSATE SODIUM 100 MG/1
100 CAPSULE, LIQUID FILLED ORAL 2 TIMES DAILY
Qty: 180 CAPSULE | Refills: 1 | Status: SHIPPED | OUTPATIENT
Start: 2022-12-14

## 2022-12-14 NOTE — TELEPHONE ENCOUNTER
Refill passed per [address] [place] [facility] protocol.    Requested Prescriptions   Pending Prescriptions Disp Refills    DOCUSATE SODIUM 100 MG Oral Cap [Pharmacy Med Name: DOCUSATE SODIUM 100 MG SOFTGEL] 60 capsule 2     Sig: JESSICAE JESUSITA CAPSULA DOS VECES AL CHRISTINE       Gastrointestional Medication Protocol Passed - 12/13/2022 12:09 PM        Passed - In person appointment or virtual visit in the past 12 mos or appointment in next 3 mos     Recent Outpatient Visits              1 month ago     [facility] in [place] [address] Teresita Maier, PT    Office Visit    1 month ago     [facility] in [place] [address] Teresita Maier, PT    Office Visit    2 months ago Encounter for annual health examination    [address], [address], Sonia Pacheco MD    Office Visit    2 months ago     [facility] in [place] [address] Teresita Maier, PT    Office Visit    2 months ago Cervical radiculopathy at [address] [place] [address], [address] [place] [address] Rd, [facility] Visit                            Recent Outpatient Visits              1 month ago     [facility] in [place] [address] Teresita Maier, PT    Office Visit    1 month ago     [facility] in [place] [address] Teresita Maier, PT    Office Visit    2 months ago Encounter for annual health examination    [address], [address], Sonia Pacheco MD    Office Visit    2 months ago     [facility] in [place] [address] Teresita Maier, [address] [place] [address] [facility] Visit    2 months ago Cervical radiculopathy at [address] [facility] [place]    Office Visit

## 2022-12-15 ENCOUNTER — MED REC SCAN ONLY (OUTPATIENT)
Dept: FAMILY MEDICINE CLINIC | Facility: CLINIC | Age: 82
End: 2022-12-15

## 2023-01-10 ENCOUNTER — NURSE TRIAGE (OUTPATIENT)
Dept: FAMILY MEDICINE CLINIC | Facility: CLINIC | Age: 83
End: 2023-01-10

## 2023-01-10 NOTE — TELEPHONE ENCOUNTER
Please reply to pool: EM RN TRIAGE    Action Requested: Summary for Provider     []  Critical Lab, Recommendations Needed  [] Need Additional Advice  [x]   FYI    []   Need Orders  [] Need Medications Sent to Pharmacy  []  Other     SUMMARY: RN unable to complete the triage,teaching and appointment. Patient upset due to \"lots of question\" , would only want to schedule the appointment , informed that RN needs to get more informations first .Patient got upset, states that she will change her PCP. And then hung up. No future appointments. Reason for call: No chief complaint on file. Onset: Data Unavailable         With Language Line   Aidan #[id number],reported rectal bleeding x 3 days now, whole abdominal pain, no blood clot, RN tried asking level of pain, patient upset due to multiple questions  and hung up.

## 2023-01-26 DIAGNOSIS — M81.0 POST-MENOPAUSAL OSTEOPOROSIS: ICD-10-CM

## 2023-01-26 RX ORDER — ALENDRONATE SODIUM 35 MG/1
35 TABLET ORAL
Qty: 12 TABLET | Refills: 1 | Status: SHIPPED | OUTPATIENT
Start: 2023-01-26

## 2023-01-26 NOTE — TELEPHONE ENCOUNTER
Refill passed per [place] [employer] protocol. Requested Prescriptions   Pending Prescriptions Disp Refills    ALENDRONATE 35 MG Oral Tab [Pharmacy Med Name: ALENDRONATE SODIUM 35 MG TAB] 12 tablet 2     Sig: Take 1 tablet (35 mg total) by mouth every 7 days.  Takes on mondays       Osteoporosis Medication Protocol Passed - 1/26/2023 12:02 AM        Passed - In person appointment or virtual visit in the past 12 mos or appointment in next 3 mos     Recent Outpatient Visits              3 months ago     [facility] in [place] [address] Juanita Cancino, PT    Office Visit    3 months ago     [facility] in [place] [address] Juanita Cancino, PT    Office Visit    3 months ago Encounter for annual health examination    [address] [facility] [place], Liv Fried MD    Office Visit    3 months ago     [facility] in [place] [address] Juanita Cancino, PT    Office Visit    3 months ago [facility], [address], [place]ne [place], [place]    Office Visit          Future Appointments         Provider Department Appt Notes    In 1 week [place]ales [place] [address] on 1/9 had vaginal blood and need a memogram referral

## 2023-02-01 DIAGNOSIS — Z79.4 TYPE 2 DIABETES MELLITUS WITH STAGE 3A CHRONIC KIDNEY DISEASE, WITH LONG-TERM CURRENT USE OF INSULIN (HCC): ICD-10-CM

## 2023-02-01 DIAGNOSIS — I10 ESSENTIAL HYPERTENSION: ICD-10-CM

## 2023-02-01 DIAGNOSIS — N18.31 TYPE 2 DIABETES MELLITUS WITH STAGE 3A CHRONIC KIDNEY DISEASE, WITH LONG-TERM CURRENT USE OF INSULIN (HCC): ICD-10-CM

## 2023-02-01 DIAGNOSIS — E11.22 TYPE 2 DIABETES MELLITUS WITH STAGE 3A CHRONIC KIDNEY DISEASE, WITH LONG-TERM CURRENT USE OF INSULIN (HCC): ICD-10-CM

## 2023-02-02 RX ORDER — LORATADINE 10 MG
TABLET ORAL
Qty: 90 TABLET | Refills: 1 | Status: SHIPPED | OUTPATIENT
Start: 2023-02-02

## 2023-02-02 RX ORDER — METOPROLOL SUCCINATE 100 MG/1
TABLET, EXTENDED RELEASE ORAL
Qty: 90 TABLET | Refills: 1 | Status: SHIPPED | OUTPATIENT
Start: 2023-02-02

## 2023-02-06 ENCOUNTER — OFFICE VISIT (OUTPATIENT)
Dept: FAMILY MEDICINE CLINIC | Facility: CLINIC | Age: 83
End: 2023-02-06

## 2023-02-06 VITALS
HEART RATE: 83 BPM | TEMPERATURE: 98 F | SYSTOLIC BLOOD PRESSURE: 138 MMHG | WEIGHT: 154 LBS | HEIGHT: 65 IN | BODY MASS INDEX: 25.66 KG/M2 | DIASTOLIC BLOOD PRESSURE: 89 MMHG

## 2023-02-06 DIAGNOSIS — K62.5 RECTAL BLEEDING: Primary | ICD-10-CM

## 2023-02-06 DIAGNOSIS — H92.22 EAR BLEEDING, LEFT: ICD-10-CM

## 2023-02-06 DIAGNOSIS — Z12.11 COLON CANCER SCREENING: ICD-10-CM

## 2023-02-06 DIAGNOSIS — F32.4 MAJOR DEPRESS, PART REMIS (HCC): ICD-10-CM

## 2023-02-06 DIAGNOSIS — I10 ESSENTIAL HYPERTENSION: ICD-10-CM

## 2023-02-06 DIAGNOSIS — K86.2 PANCREATIC CYST: ICD-10-CM

## 2023-02-06 DIAGNOSIS — K64.8 INTERNAL HEMORRHOIDS WITHOUT COMPLICATION: ICD-10-CM

## 2023-02-06 PROCEDURE — 99214 OFFICE O/P EST MOD 30 MIN: CPT | Performed by: FAMILY MEDICINE

## 2023-02-06 RX ORDER — ROSUVASTATIN CALCIUM 10 MG/1
10 TABLET, COATED ORAL DAILY
COMMUNITY
Start: 2022-12-14

## 2023-02-10 ENCOUNTER — HOSPITAL ENCOUNTER (OUTPATIENT)
Dept: MRI IMAGING | Age: 83
Discharge: HOME | End: 2023-02-10
Attending: FAMILY MEDICINE
Payer: MEDICARE

## 2023-02-10 DIAGNOSIS — K86.2 PANCREATIC CYST: ICD-10-CM

## 2023-02-10 PROCEDURE — 74181 MRI ABDOMEN W/O CONTRAST: CPT | Performed by: FAMILY MEDICINE

## 2023-02-10 PROCEDURE — 76376 3D RENDER W/INTRP POSTPROCES: CPT | Performed by: FAMILY MEDICINE

## 2023-02-17 ENCOUNTER — TELEPHONE (OUTPATIENT)
Dept: FAMILY MEDICINE CLINIC | Facility: CLINIC | Age: 83
End: 2023-02-17

## 2023-03-01 NOTE — PROGRESS NOTES
CC:  Headache (Pt presents to clinic for more frequent headaches and left eye redness x wks. )      Hx of CC:  PERSISTENT INTERMITTENT HEADACHES, WORSE ON TEMPLES AND BACK--PRESENT X MONTHS. HAD LEFT EYE REDNESS WHICH RESOLVED AFTER [place] TEA WASHES.
As certified below, I, or a nurse practitioner or physician assistant working with me, had a face-to-face encounter that meets the physician face-to-face encounter requirements.

## 2023-03-16 ENCOUNTER — TELEPHONE (OUTPATIENT)
Dept: FAMILY MEDICINE CLINIC | Facility: CLINIC | Age: 83
End: 2023-03-16

## 2023-03-16 DIAGNOSIS — Z12.11 SCREENING FOR COLON CANCER: Primary | ICD-10-CM

## 2023-03-16 DIAGNOSIS — Z12.31 ENCOUNTER FOR SCREENING MAMMOGRAM FOR MALIGNANT NEOPLASM OF BREAST: ICD-10-CM

## 2023-03-16 NOTE — TELEPHONE ENCOUNTER
Patient is requesting the following orders; please advise patient when confirmed for scheduling    mammogram   Colonoscopy

## 2023-03-16 NOTE — TELEPHONE ENCOUNTER
Called patient using language lines [facility]#[id number]  Patient requested we contact daughter with keeley Linda and left message for her with information.

## 2023-03-16 NOTE — TELEPHONE ENCOUNTER
Please inform patient that I have placed referral to GI for colonoscopy and for her mammogram.  She completed her MRCP and the results show unchanged pancreatic cystic lesion, fatty liver and liver cysts that are also unchanged. I would like her to follow-up with GI regarding the pancreatic and liver cyst as well as for her colonoscopy. 1. Screening for colon cancer  - [facility] - INTERNAL    2. Encounter for screening mammogram for malignant neoplasm of breast  - [facility] REUBEN 2D+3D SCREENING BILAT (CPT=[id number]);  Future

## 2023-04-11 ENCOUNTER — OFFICE VISIT (OUTPATIENT)
Dept: FAMILY MEDICINE CLINIC | Facility: CLINIC | Age: 83
End: 2023-04-11

## 2023-04-11 ENCOUNTER — LAB ENCOUNTER (OUTPATIENT)
Dept: LAB | Age: 83
End: 2023-04-11
Attending: FAMILY MEDICINE
Payer: MEDICARE

## 2023-04-11 VITALS
HEART RATE: 77 BPM | BODY MASS INDEX: 25.6 KG/M2 | WEIGHT: 153.63 LBS | DIASTOLIC BLOOD PRESSURE: 74 MMHG | SYSTOLIC BLOOD PRESSURE: 145 MMHG | HEIGHT: 65 IN

## 2023-04-11 DIAGNOSIS — N18.31 TYPE 2 DIABETES MELLITUS WITH STAGE 3A CHRONIC KIDNEY DISEASE, WITH LONG-TERM CURRENT USE OF INSULIN (HCC): ICD-10-CM

## 2023-04-11 DIAGNOSIS — I10 ESSENTIAL HYPERTENSION: ICD-10-CM

## 2023-04-11 DIAGNOSIS — E11.22 TYPE 2 DIABETES MELLITUS WITH STAGE 3A CHRONIC KIDNEY DISEASE, WITH LONG-TERM CURRENT USE OF INSULIN (HCC): Primary | ICD-10-CM

## 2023-04-11 DIAGNOSIS — D22.9 NEVUS: ICD-10-CM

## 2023-04-11 DIAGNOSIS — Z79.4 TYPE 2 DIABETES MELLITUS WITH STAGE 3A CHRONIC KIDNEY DISEASE, WITH LONG-TERM CURRENT USE OF INSULIN (HCC): ICD-10-CM

## 2023-04-11 DIAGNOSIS — N18.31 TYPE 2 DIABETES MELLITUS WITH STAGE 3A CHRONIC KIDNEY DISEASE, WITH LONG-TERM CURRENT USE OF INSULIN (HCC): Primary | ICD-10-CM

## 2023-04-11 DIAGNOSIS — Z79.4 TYPE 2 DIABETES MELLITUS WITH STAGE 3A CHRONIC KIDNEY DISEASE, WITH LONG-TERM CURRENT USE OF INSULIN (HCC): Primary | ICD-10-CM

## 2023-04-11 DIAGNOSIS — E11.42 DIABETIC POLYNEUROPATHY ASSOCIATED WITH TYPE 2 DIABETES MELLITUS (HCC): ICD-10-CM

## 2023-04-11 DIAGNOSIS — E11.22 TYPE 2 DIABETES MELLITUS WITH STAGE 3A CHRONIC KIDNEY DISEASE, WITH LONG-TERM CURRENT USE OF INSULIN (HCC): ICD-10-CM

## 2023-04-11 LAB
ALBUMIN SERPL-MCNC: 3.6 G/DL (ref 3.4–5)
ALBUMIN/GLOB SERPL: 0.9 {RATIO} (ref 1–2)
ALP LIVER SERPL-CCNC: 47 U/L
ALT SERPL-CCNC: 31 U/L
ANION GAP SERPL CALC-SCNC: 2 MMOL/L (ref 0–18)
AST SERPL-CCNC: 27 U/L (ref 15–37)
BASOPHILS # BLD AUTO: 0.05 X10(3) UL (ref 0–0.2)
BASOPHILS NFR BLD AUTO: 0.6 %
BILIRUB SERPL-MCNC: 0.4 MG/DL (ref 0.1–2)
BUN BLD-MCNC: 14 MG/DL (ref 7–18)
BUN/CREAT SERPL: 14.9 (ref 10–20)
CALCIUM BLD-MCNC: 9.5 MG/DL (ref 8.5–10.1)
CHLORIDE SERPL-SCNC: 105 MMOL/L (ref 98–112)
CO2 SERPL-SCNC: 29 MMOL/L (ref 21–32)
CREAT BLD-MCNC: 0.94 MG/DL
CREAT UR-SCNC: 122 MG/DL
DEPRECATED RDW RBC AUTO: 39.7 FL (ref 35.1–46.3)
EOSINOPHIL # BLD AUTO: 0.06 X10(3) UL (ref 0–0.7)
EOSINOPHIL NFR BLD AUTO: 0.7 %
ERYTHROCYTE [DISTWIDTH] IN BLOOD BY AUTOMATED COUNT: 12.4 % (ref 11–15)
EST. AVERAGE GLUCOSE BLD GHB EST-MCNC: 174 MG/DL (ref 68–126)
FASTING STATUS PATIENT QL REPORTED: NO
GFR SERPLBLD BASED ON 1.73 SQ M-ARVRAT: 60 ML/MIN/1.73M2 (ref 60–?)
GLOBULIN PLAS-MCNC: 3.8 G/DL (ref 2.8–4.4)
GLUCOSE BLD-MCNC: 96 MG/DL (ref 70–99)
HBA1C MFR BLD: 7.7 % (ref ?–5.7)
HCT VFR BLD AUTO: 41.2 %
HGB BLD-MCNC: 13.6 G/DL
IMM GRANULOCYTES # BLD AUTO: 0.04 X10(3) UL (ref 0–1)
IMM GRANULOCYTES NFR BLD: 0.5 %
LYMPHOCYTES # BLD AUTO: 1.9 X10(3) UL (ref 1–4)
LYMPHOCYTES NFR BLD AUTO: 23.4 %
MCH RBC QN AUTO: 29.1 PG (ref 26–34)
MCHC RBC AUTO-ENTMCNC: 33 G/DL (ref 31–37)
MCV RBC AUTO: 88 FL
MICROALBUMIN UR-MCNC: 0.68 MG/DL
MICROALBUMIN/CREAT 24H UR-RTO: 5.6 UG/MG (ref ?–30)
MONOCYTES # BLD AUTO: 0.62 X10(3) UL (ref 0.1–1)
MONOCYTES NFR BLD AUTO: 7.6 %
NEUTROPHILS # BLD AUTO: 5.46 X10 (3) UL (ref 1.5–7.7)
NEUTROPHILS # BLD AUTO: 5.46 X10(3) UL (ref 1.5–7.7)
NEUTROPHILS NFR BLD AUTO: 67.2 %
OSMOLALITY SERPL CALC.SUM OF ELEC: 282 MOSM/KG (ref 275–295)
PLATELET # BLD AUTO: 228 10(3)UL (ref 150–450)
POTASSIUM SERPL-SCNC: 4.8 MMOL/L (ref 3.5–5.1)
PROT SERPL-MCNC: 7.4 G/DL (ref 6.4–8.2)
RBC # BLD AUTO: 4.68 X10(6)UL
SODIUM SERPL-SCNC: 136 MMOL/L (ref 136–145)
WBC # BLD AUTO: 8.1 X10(3) UL (ref 4–11)

## 2023-04-11 PROCEDURE — 36415 COLL VENOUS BLD VENIPUNCTURE: CPT

## 2023-04-11 PROCEDURE — 85025 COMPLETE CBC W/AUTO DIFF WBC: CPT

## 2023-04-11 PROCEDURE — 99214 OFFICE O/P EST MOD 30 MIN: CPT | Performed by: FAMILY MEDICINE

## 2023-04-11 PROCEDURE — 82570 ASSAY OF URINE CREATININE: CPT

## 2023-04-11 PROCEDURE — 83036 HEMOGLOBIN GLYCOSYLATED A1C: CPT

## 2023-04-11 PROCEDURE — 1125F AMNT PAIN NOTED PAIN PRSNT: CPT | Performed by: FAMILY MEDICINE

## 2023-04-11 PROCEDURE — 80053 COMPREHEN METABOLIC PANEL: CPT

## 2023-04-11 PROCEDURE — 82043 UR ALBUMIN QUANTITATIVE: CPT

## 2023-04-11 RX ORDER — ASPIRIN 81 MG/1
81 TABLET, CHEWABLE ORAL DAILY
Qty: 90 TABLET | Refills: 1 | Status: SHIPPED | OUTPATIENT
Start: 2023-04-11

## 2023-04-11 RX ORDER — LOSARTAN POTASSIUM AND HYDROCHLOROTHIAZIDE 12.5; 5 MG/1; MG/1
1 TABLET ORAL DAILY
Qty: 90 TABLET | Refills: 1 | Status: SHIPPED | OUTPATIENT
Start: 2023-04-11

## 2023-04-11 RX ORDER — LOSARTAN POTASSIUM 25 MG/1
25 TABLET ORAL DAILY
COMMUNITY
Start: 2023-02-09 | End: 2023-04-11

## 2023-04-11 RX ORDER — AZELASTINE HYDROCHLORIDE 0.5 MG/ML
SOLUTION/ DROPS OPHTHALMIC
COMMUNITY
Start: 2023-04-06

## 2023-04-11 RX ORDER — GABAPENTIN 100 MG/1
100 CAPSULE ORAL 3 TIMES DAILY
Qty: 90 CAPSULE | Refills: 3 | Status: SHIPPED | OUTPATIENT
Start: 2023-04-11

## 2023-05-01 ENCOUNTER — NURSE TRIAGE (OUTPATIENT)
Dept: FAMILY MEDICINE CLINIC | Facility: CLINIC | Age: 83
End: 2023-05-01

## 2023-05-01 ENCOUNTER — HOSPITAL ENCOUNTER (OUTPATIENT)
Age: 83
Discharge: ANOTHER HOSPITAL | End: 2023-05-01
Payer: MEDICARE

## 2023-05-01 VITALS
OXYGEN SATURATION: 99 % | TEMPERATURE: 98 F | RESPIRATION RATE: 18 BRPM | SYSTOLIC BLOOD PRESSURE: 177 MMHG | DIASTOLIC BLOOD PRESSURE: 66 MMHG | HEART RATE: 94 BPM

## 2023-05-01 DIAGNOSIS — R55 SYNCOPE, NEAR: Primary | ICD-10-CM

## 2023-05-01 PROCEDURE — 99214 OFFICE O/P EST MOD 30 MIN: CPT | Performed by: NURSE PRACTITIONER

## 2023-05-01 PROCEDURE — 93000 ELECTROCARDIOGRAM COMPLETE: CPT | Performed by: NURSE PRACTITIONER

## 2023-05-01 NOTE — ED INITIAL ASSESSMENT (HPI)
Pt presents to the IC with daughter with c/o anxiety/panic attack today at about 15:00, felt heart racing, felt presyncopal.

## 2023-05-02 LAB
ATRIAL RATE: 85 BPM
P AXIS: 67 DEGREES
P-R INTERVAL: 154 MS
Q-T INTERVAL: 378 MS
QRS DURATION: 70 MS
QTC CALCULATION (BEZET): 449 MS
R AXIS: 38 DEGREES
T AXIS: 58 DEGREES
VENTRICULAR RATE: 85 BPM

## 2023-05-03 ENCOUNTER — HOSPITAL ENCOUNTER (EMERGENCY)
Facility: HOSPITAL | Age: 83
Discharge: HOME | End: 2023-05-03
Attending: EMERGENCY MEDICINE
Payer: MEDICARE

## 2023-05-03 ENCOUNTER — APPOINTMENT (OUTPATIENT)
Dept: GENERAL RADIOLOGY | Facility: HOSPITAL | Age: 83
End: 2023-05-03
Attending: EMERGENCY MEDICINE
Payer: MEDICARE

## 2023-05-03 ENCOUNTER — APPOINTMENT (OUTPATIENT)
Dept: CT IMAGING | Facility: HOSPITAL | Age: 83
End: 2023-05-03
Attending: EMERGENCY MEDICINE
Payer: MEDICARE

## 2023-05-03 VITALS
RESPIRATION RATE: 16 BRPM | OXYGEN SATURATION: 96 % | TEMPERATURE: 98 F | SYSTOLIC BLOOD PRESSURE: 168 MMHG | BODY MASS INDEX: 27.11 KG/M2 | DIASTOLIC BLOOD PRESSURE: 80 MMHG | HEIGHT: 63 IN | HEART RATE: 83 BPM | WEIGHT: 153 LBS

## 2023-05-03 DIAGNOSIS — S62.647A CLOSED NONDISPLACED FRACTURE OF PROXIMAL PHALANX OF LEFT LITTLE FINGER, INITIAL ENCOUNTER: Primary | ICD-10-CM

## 2023-05-03 PROCEDURE — 73560 X-RAY EXAM OF KNEE 1 OR 2: CPT | Performed by: EMERGENCY MEDICINE

## 2023-05-03 PROCEDURE — 70450 CT HEAD/BRAIN W/O DYE: CPT | Performed by: EMERGENCY MEDICINE

## 2023-05-03 PROCEDURE — 12001 RPR S/N/AX/GEN/TRNK 2.5CM/<: CPT

## 2023-05-03 PROCEDURE — 73140 X-RAY EXAM OF FINGER(S): CPT | Performed by: EMERGENCY MEDICINE

## 2023-05-03 PROCEDURE — 73110 X-RAY EXAM OF WRIST: CPT | Performed by: EMERGENCY MEDICINE

## 2023-05-03 PROCEDURE — 99284 EMERGENCY DEPT VISIT MOD MDM: CPT

## 2023-05-03 PROCEDURE — 26720 TREAT FINGER FRACTURE EACH: CPT

## 2023-05-03 RX ORDER — ACETAMINOPHEN 325 MG/1
650 TABLET ORAL ONCE
Status: COMPLETED | OUTPATIENT
Start: 2023-05-03 | End: 2023-05-03

## 2023-05-03 NOTE — DISCHARGE INSTRUCTIONS
Take ibuprofen and or Tylenol as needed for pain. Use ice for 15 to 20 minutes several times throughout the day to help with pain and/or swelling. See hand surgery for follow-up. Leave your splint on until seen by hand surgery. Do not lift anything with your left hand. Keep your wound clean and dry. You can get it wet after 24 hours, let soap and water run over your wound, do not scrub. No pools, hot tubs, baths, water blandon until your staples come out. Staples come out in 10-14 days. Your primary care doctor can remove them, please call to make an appointment for follow-up. Watch for signs of infection including fever, vomiting, redness or swelling or drainage from your wound. If you notice this call your doctor or return to the emergency department.

## 2023-05-03 NOTE — ED INITIAL ASSESSMENT (HPI)
Patient with mechanical fall on pavement outside approx 1030 this am. No head injury or LOC. No neck pain. Patient c/o pain to bilateral hands/wrists and bilateral knees. +bleeding to left knee, controlled.

## 2023-05-17 ENCOUNTER — OFFICE VISIT (OUTPATIENT)
Dept: FAMILY MEDICINE CLINIC | Facility: CLINIC | Age: 83
End: 2023-05-17

## 2023-05-17 VITALS
HEART RATE: 80 BPM | BODY MASS INDEX: 27.64 KG/M2 | DIASTOLIC BLOOD PRESSURE: 74 MMHG | HEIGHT: 63 IN | SYSTOLIC BLOOD PRESSURE: 138 MMHG | WEIGHT: 156 LBS

## 2023-05-17 DIAGNOSIS — Z48.02 ENCOUNTER FOR STAPLE REMOVAL: Primary | ICD-10-CM

## 2023-05-17 DIAGNOSIS — S80.212D ABRASION OF LEFT KNEE, SUBSEQUENT ENCOUNTER: ICD-10-CM

## 2023-05-17 PROCEDURE — 99214 OFFICE O/P EST MOD 30 MIN: CPT | Performed by: NURSE PRACTITIONER

## 2023-05-17 PROCEDURE — 1126F AMNT PAIN NOTED NONE PRSNT: CPT | Performed by: NURSE PRACTITIONER

## 2023-05-17 RX ORDER — PRAVASTATIN SODIUM 20 MG
20 TABLET ORAL DAILY
COMMUNITY
Start: 2023-04-20

## 2023-05-23 ENCOUNTER — MED REC SCAN ONLY (OUTPATIENT)
Dept: FAMILY MEDICINE CLINIC | Facility: CLINIC | Age: 83
End: 2023-05-23

## 2023-05-24 ENCOUNTER — OFFICE VISIT (OUTPATIENT)
Dept: FAMILY MEDICINE CLINIC | Facility: CLINIC | Age: 83
End: 2023-05-24

## 2023-05-24 VITALS
DIASTOLIC BLOOD PRESSURE: 72 MMHG | HEART RATE: 77 BPM | HEIGHT: 63 IN | SYSTOLIC BLOOD PRESSURE: 133 MMHG | WEIGHT: 153.19 LBS | BODY MASS INDEX: 27.14 KG/M2

## 2023-05-24 DIAGNOSIS — R42 VERTIGO: Primary | ICD-10-CM

## 2023-05-24 DIAGNOSIS — S62.92XD CLOSED FRACTURE OF LEFT HAND WITH ROUTINE HEALING, SUBSEQUENT ENCOUNTER: ICD-10-CM

## 2023-05-24 DIAGNOSIS — S81.012D LACERATION OF LEFT KNEE, SUBSEQUENT ENCOUNTER: ICD-10-CM

## 2023-05-24 DIAGNOSIS — R42 DIZZINESS: ICD-10-CM

## 2023-05-24 PROCEDURE — 99214 OFFICE O/P EST MOD 30 MIN: CPT | Performed by: FAMILY MEDICINE

## 2023-05-24 PROCEDURE — 1126F AMNT PAIN NOTED NONE PRSNT: CPT | Performed by: FAMILY MEDICINE

## 2023-05-24 RX ORDER — MECLIZINE HCL 12.5 MG/1
12.5 TABLET ORAL 3 TIMES DAILY PRN
Qty: 40 TABLET | Refills: 1 | Status: SHIPPED | OUTPATIENT
Start: 2023-05-24

## 2023-06-12 ENCOUNTER — HOSPITAL ENCOUNTER (OUTPATIENT)
Dept: ULTRASOUND IMAGING | Age: 83
Discharge: HOME | End: 2023-06-12
Attending: FAMILY MEDICINE
Payer: MEDICARE

## 2023-06-12 DIAGNOSIS — R42 DIZZINESS: ICD-10-CM

## 2023-06-12 PROCEDURE — 93880 EXTRACRANIAL BILAT STUDY: CPT | Performed by: FAMILY MEDICINE

## 2023-06-27 ENCOUNTER — OFFICE VISIT (OUTPATIENT)
Dept: FAMILY MEDICINE CLINIC | Facility: CLINIC | Age: 83
End: 2023-06-27

## 2023-06-27 VITALS
BODY MASS INDEX: 27.61 KG/M2 | DIASTOLIC BLOOD PRESSURE: 70 MMHG | WEIGHT: 155.81 LBS | HEART RATE: 89 BPM | HEIGHT: 63 IN | SYSTOLIC BLOOD PRESSURE: 134 MMHG

## 2023-06-27 DIAGNOSIS — R42 VERTIGO: Primary | ICD-10-CM

## 2023-06-27 DIAGNOSIS — L21.9 SEBORRHEIC DERMATITIS: ICD-10-CM

## 2023-06-27 DIAGNOSIS — Z79.4 TYPE 2 DIABETES MELLITUS WITH DIABETIC POLYNEUROPATHY, WITH LONG-TERM CURRENT USE OF INSULIN (HCC): ICD-10-CM

## 2023-06-27 DIAGNOSIS — E11.42 TYPE 2 DIABETES MELLITUS WITH DIABETIC POLYNEUROPATHY, WITH LONG-TERM CURRENT USE OF INSULIN (HCC): ICD-10-CM

## 2023-06-27 PROCEDURE — 1126F AMNT PAIN NOTED NONE PRSNT: CPT | Performed by: FAMILY MEDICINE

## 2023-06-27 PROCEDURE — 99213 OFFICE O/P EST LOW 20 MIN: CPT | Performed by: FAMILY MEDICINE

## 2023-06-27 RX ORDER — FLUOCINONIDE TOPICAL SOLUTION USP, 0.05% 0.5 MG/ML
1 SOLUTION TOPICAL 2 TIMES DAILY
Qty: 60 ML | Refills: 0 | Status: SHIPPED | OUTPATIENT
Start: 2023-06-27

## 2023-07-18 ENCOUNTER — HOSPITAL ENCOUNTER (OUTPATIENT)
Dept: MAMMOGRAPHY | Age: 83
Discharge: HOME | End: 2023-07-18
Attending: FAMILY MEDICINE
Payer: MEDICARE

## 2023-07-18 DIAGNOSIS — E11.22 TYPE 2 DIABETES MELLITUS WITH STAGE 3A CHRONIC KIDNEY DISEASE, WITH LONG-TERM CURRENT USE OF INSULIN (HCC): ICD-10-CM

## 2023-07-18 DIAGNOSIS — Z79.4 TYPE 2 DIABETES MELLITUS WITH STAGE 3A CHRONIC KIDNEY DISEASE, WITH LONG-TERM CURRENT USE OF INSULIN (HCC): ICD-10-CM

## 2023-07-18 DIAGNOSIS — N18.31 TYPE 2 DIABETES MELLITUS WITH STAGE 3A CHRONIC KIDNEY DISEASE, WITH LONG-TERM CURRENT USE OF INSULIN (HCC): ICD-10-CM

## 2023-07-18 DIAGNOSIS — Z12.31 ENCOUNTER FOR SCREENING MAMMOGRAM FOR MALIGNANT NEOPLASM OF BREAST: ICD-10-CM

## 2023-07-18 DIAGNOSIS — I10 ESSENTIAL HYPERTENSION: ICD-10-CM

## 2023-07-18 PROCEDURE — 77063 BREAST TOMOSYNTHESIS BI: CPT | Performed by: FAMILY MEDICINE

## 2023-07-18 PROCEDURE — 77067 SCR MAMMO BI INCL CAD: CPT | Performed by: FAMILY MEDICINE

## 2023-07-18 RX ORDER — METOPROLOL SUCCINATE 100 MG/1
100 TABLET, EXTENDED RELEASE ORAL DAILY
Qty: 90 TABLET | Refills: 1 | Status: SHIPPED | OUTPATIENT
Start: 2023-07-18

## 2023-07-24 ENCOUNTER — HOSPITAL ENCOUNTER (OUTPATIENT)
Dept: MAMMOGRAPHY | Facility: HOSPITAL | Age: 83
Discharge: HOME | End: 2023-07-24
Attending: FAMILY MEDICINE
Payer: MEDICARE

## 2023-07-24 DIAGNOSIS — R92.8 ABNORMAL MAMMOGRAM: ICD-10-CM

## 2023-07-24 PROCEDURE — 77061 BREAST TOMOSYNTHESIS UNI: CPT | Performed by: FAMILY MEDICINE

## 2023-07-24 PROCEDURE — 77065 DX MAMMO INCL CAD UNI: CPT | Performed by: FAMILY MEDICINE

## 2023-08-07 ENCOUNTER — OFFICE VISIT (OUTPATIENT)
Dept: FAMILY MEDICINE CLINIC | Facility: CLINIC | Age: 83
End: 2023-08-07

## 2023-08-07 ENCOUNTER — HOSPITAL ENCOUNTER (OUTPATIENT)
Dept: GENERAL RADIOLOGY | Age: 83
Discharge: HOME | End: 2023-08-07
Attending: FAMILY MEDICINE
Payer: MEDICARE

## 2023-08-07 VITALS
BODY MASS INDEX: 27 KG/M2 | SYSTOLIC BLOOD PRESSURE: 146 MMHG | DIASTOLIC BLOOD PRESSURE: 80 MMHG | HEART RATE: 80 BPM | WEIGHT: 152.38 LBS | HEIGHT: 63 IN

## 2023-08-07 DIAGNOSIS — M25.551 BILATERAL HIP PAIN: ICD-10-CM

## 2023-08-07 DIAGNOSIS — M25.552 BILATERAL HIP PAIN: Primary | ICD-10-CM

## 2023-08-07 DIAGNOSIS — M25.552 BILATERAL HIP PAIN: ICD-10-CM

## 2023-08-07 DIAGNOSIS — R30.0 DYSURIA: ICD-10-CM

## 2023-08-07 DIAGNOSIS — H60.391 CHRONIC BACTERIAL OTITIS EXTERNA OF RIGHT EAR: ICD-10-CM

## 2023-08-07 DIAGNOSIS — M25.551 BILATERAL HIP PAIN: Primary | ICD-10-CM

## 2023-08-07 LAB
APPEARANCE: CLEAR
BILIRUBIN: NEGATIVE
GLUCOSE (URINE DIPSTICK): NEGATIVE MG/DL
LEUKOCYTES: NEGATIVE
MULTISTIX LOT#: ABNORMAL NUMERIC
NITRITE, URINE: POSITIVE
OCCULT BLOOD: NEGATIVE
PH, URINE: 6 (ref 4.5–8)
PROTEIN (URINE DIPSTICK): NEGATIVE MG/DL
SPECIFIC GRAVITY: 1.02 (ref 1–1.03)
URINE-COLOR: YELLOW
UROBILINOGEN,SEMI-QN: 0.2 MG/DL (ref 0–1.9)

## 2023-08-07 PROCEDURE — 73522 X-RAY EXAM HIPS BI 3-4 VIEWS: CPT | Performed by: FAMILY MEDICINE

## 2023-08-07 PROCEDURE — 73523 X-RAY EXAM HIPS BI 5/> VIEWS: CPT | Performed by: FAMILY MEDICINE

## 2023-08-07 RX ORDER — KETOROLAC TROMETHAMINE 30 MG/ML
30 INJECTION, SOLUTION INTRAMUSCULAR; INTRAVENOUS ONCE
Status: COMPLETED | OUTPATIENT
Start: 2023-08-07 | End: 2023-08-07

## 2023-08-07 RX ORDER — AMOXICILLIN 500 MG/1
500 CAPSULE ORAL 2 TIMES DAILY
Qty: 20 CAPSULE | Refills: 0 | Status: SHIPPED | OUTPATIENT
Start: 2023-08-07 | End: 2023-08-17

## 2023-08-07 RX ORDER — IBUPROFEN 600 MG/1
600 TABLET ORAL EVERY 6 HOURS PRN
Qty: 60 TABLET | Refills: 0 | Status: SHIPPED | OUTPATIENT
Start: 2023-08-07

## 2023-08-07 RX ADMIN — KETOROLAC TROMETHAMINE 30 MG: 30 INJECTION, SOLUTION INTRAMUSCULAR; INTRAVENOUS at 13:58:00

## 2023-08-12 ENCOUNTER — TELEPHONE (OUTPATIENT)
Dept: FAMILY MEDICINE CLINIC | Facility: CLINIC | Age: 83
End: 2023-08-12

## 2023-08-12 NOTE — TELEPHONE ENCOUNTER
CSS, please schedule follow up appointment with Dr Tien Muniz.        Hannah Mo MD  8/10/2023  8:29 AM CDT       Please call patient, + UTI finish antibiotic , but RTc for reevaluation in 10 days

## 2023-08-16 NOTE — TELEPHONE ENCOUNTER
Future Appointments   Date Time Provider Stephy Lizbeth   8/17/2023  2:30 PM Tomas Ndiaye MD [facility]   10/9/2023 10:15 AM Tomas Ndiaye MD [facility]   11/29/2023  3:30 PM Tiffany Colmenares, [address] [facility]

## 2023-08-17 ENCOUNTER — OFFICE VISIT (OUTPATIENT)
Dept: FAMILY MEDICINE CLINIC | Facility: CLINIC | Age: 83
End: 2023-08-17

## 2023-08-17 VITALS
DIASTOLIC BLOOD PRESSURE: 63 MMHG | WEIGHT: 151.81 LBS | HEIGHT: 63 IN | BODY MASS INDEX: 26.9 KG/M2 | SYSTOLIC BLOOD PRESSURE: 146 MMHG | HEART RATE: 82 BPM

## 2023-08-17 DIAGNOSIS — M15.9 PRIMARY OSTEOARTHRITIS INVOLVING MULTIPLE JOINTS: Primary | ICD-10-CM

## 2023-08-17 DIAGNOSIS — R30.0 DYSURIA: ICD-10-CM

## 2023-08-17 DIAGNOSIS — M54.32 SCIATICA OF LEFT SIDE: ICD-10-CM

## 2023-08-17 LAB
APPEARANCE: CLEAR
BILIRUBIN: NEGATIVE
GLUCOSE (URINE DIPSTICK): NEGATIVE MG/DL
KETONES (URINE DIPSTICK): NEGATIVE MG/DL
LEUKOCYTES: NEGATIVE
MULTISTIX LOT#: NORMAL NUMERIC
NITRITE, URINE: NEGATIVE
OCCULT BLOOD: NEGATIVE
PH, URINE: 7 (ref 4.5–8)
PROTEIN (URINE DIPSTICK): NEGATIVE MG/DL
SPECIFIC GRAVITY: 1.01 (ref 1–1.03)
URINE-COLOR: YELLOW
UROBILINOGEN,SEMI-QN: 1 MG/DL (ref 0–1.9)

## 2023-08-17 PROCEDURE — 99213 OFFICE O/P EST LOW 20 MIN: CPT | Performed by: FAMILY MEDICINE

## 2023-08-17 PROCEDURE — 81003 URINALYSIS AUTO W/O SCOPE: CPT | Performed by: FAMILY MEDICINE

## 2023-08-17 PROCEDURE — 1126F AMNT PAIN NOTED NONE PRSNT: CPT | Performed by: FAMILY MEDICINE

## 2023-08-17 RX ORDER — AMOXICILLIN 500 MG/1
500 CAPSULE ORAL 3 TIMES DAILY
COMMUNITY

## 2023-08-17 RX ORDER — CYCLOBENZAPRINE HCL 5 MG
5 TABLET ORAL 3 TIMES DAILY PRN
Qty: 30 TABLET | Refills: 0 | Status: SHIPPED | OUTPATIENT
Start: 2023-08-17

## 2023-09-18 ENCOUNTER — OFFICE VISIT (OUTPATIENT)
Dept: PHYSICAL THERAPY | Age: 83
End: 2023-09-18
Attending: FAMILY MEDICINE
Payer: MEDICARE

## 2023-09-18 DIAGNOSIS — M15.9 PRIMARY OSTEOARTHRITIS INVOLVING MULTIPLE JOINTS: Primary | ICD-10-CM

## 2023-09-18 DIAGNOSIS — M54.32 SCIATICA OF LEFT SIDE: ICD-10-CM

## 2023-09-20 ENCOUNTER — OFFICE VISIT (OUTPATIENT)
Dept: FAMILY MEDICINE CLINIC | Facility: CLINIC | Age: 83
End: 2023-09-20

## 2023-09-20 ENCOUNTER — APPOINTMENT (OUTPATIENT)
Dept: PHYSICAL THERAPY | Age: 83
End: 2023-09-20
Attending: FAMILY MEDICINE
Payer: MEDICARE

## 2023-09-20 VITALS
HEIGHT: 63 IN | SYSTOLIC BLOOD PRESSURE: 151 MMHG | BODY MASS INDEX: 26.22 KG/M2 | WEIGHT: 148 LBS | HEART RATE: 83 BPM | DIASTOLIC BLOOD PRESSURE: 76 MMHG

## 2023-09-20 DIAGNOSIS — Z23 NEED FOR INFLUENZA VACCINATION: ICD-10-CM

## 2023-09-20 DIAGNOSIS — K59.01 SLOW TRANSIT CONSTIPATION: ICD-10-CM

## 2023-09-20 DIAGNOSIS — M15.9 PRIMARY OSTEOARTHRITIS INVOLVING MULTIPLE JOINTS: Primary | ICD-10-CM

## 2023-09-20 PROCEDURE — G0008 ADMIN INFLUENZA VIRUS VAC: HCPCS | Performed by: FAMILY MEDICINE

## 2023-09-20 PROCEDURE — 1125F AMNT PAIN NOTED PAIN PRSNT: CPT | Performed by: FAMILY MEDICINE

## 2023-09-20 PROCEDURE — 90662 IIV NO PRSV INCREASED AG IM: CPT | Performed by: FAMILY MEDICINE

## 2023-09-20 PROCEDURE — 99213 OFFICE O/P EST LOW 20 MIN: CPT | Performed by: FAMILY MEDICINE

## 2023-09-20 RX ORDER — ALENDRONATE SODIUM 35 MG/1
35 TABLET ORAL
Qty: 12 TABLET | Refills: 3 | Status: SHIPPED | OUTPATIENT
Start: 2023-09-20

## 2023-09-20 RX ORDER — METOPROLOL SUCCINATE 100 MG/1
100 TABLET, EXTENDED RELEASE ORAL DAILY
Qty: 90 TABLET | Refills: 1 | Status: SHIPPED | OUTPATIENT
Start: 2023-09-20

## 2023-09-20 RX ORDER — LOSARTAN POTASSIUM AND HYDROCHLOROTHIAZIDE 12.5; 5 MG/1; MG/1
1 TABLET ORAL DAILY
Qty: 90 TABLET | Refills: 1 | Status: SHIPPED | OUTPATIENT
Start: 2023-09-20

## 2023-09-20 RX ORDER — PRAVASTATIN SODIUM 20 MG
20 TABLET ORAL DAILY
Qty: 90 TABLET | Refills: 3 | Status: SHIPPED | OUTPATIENT
Start: 2023-09-20

## 2023-09-20 RX ORDER — IBUPROFEN 600 MG/1
600 TABLET ORAL EVERY 6 HOURS PRN
Qty: 60 TABLET | Refills: 0 | Status: SHIPPED | OUTPATIENT
Start: 2023-09-20

## 2023-09-25 ENCOUNTER — APPOINTMENT (OUTPATIENT)
Dept: PHYSICAL THERAPY | Age: 83
End: 2023-09-25
Attending: FAMILY MEDICINE
Payer: MEDICARE

## 2023-09-27 ENCOUNTER — APPOINTMENT (OUTPATIENT)
Dept: PHYSICAL THERAPY | Age: 83
End: 2023-09-27
Attending: FAMILY MEDICINE
Payer: MEDICARE

## 2023-10-02 ENCOUNTER — APPOINTMENT (OUTPATIENT)
Dept: PHYSICAL THERAPY | Age: 83
End: 2023-10-02
Attending: FAMILY MEDICINE
Payer: MEDICARE

## 2023-10-04 ENCOUNTER — APPOINTMENT (OUTPATIENT)
Dept: PHYSICAL THERAPY | Age: 83
End: 2023-10-04
Attending: FAMILY MEDICINE
Payer: MEDICARE

## 2023-10-11 ENCOUNTER — OFFICE VISIT (OUTPATIENT)
Dept: FAMILY MEDICINE CLINIC | Facility: CLINIC | Age: 83
End: 2023-10-11

## 2023-10-11 ENCOUNTER — LAB ENCOUNTER (OUTPATIENT)
Dept: LAB | Age: 83
End: 2023-10-11
Attending: FAMILY MEDICINE
Payer: MEDICARE

## 2023-10-11 VITALS
WEIGHT: 147.63 LBS | HEIGHT: 63 IN | BODY MASS INDEX: 26.16 KG/M2 | HEART RATE: 79 BPM | SYSTOLIC BLOOD PRESSURE: 135 MMHG | DIASTOLIC BLOOD PRESSURE: 73 MMHG

## 2023-10-11 DIAGNOSIS — K57.30 DIVERTICULOSIS LARGE INTESTINE W/O PERFORATION OR ABSCESS W/O BLEEDING: ICD-10-CM

## 2023-10-11 DIAGNOSIS — H35.369 DEGENERATIVE DRUSEN, UNSPECIFIED LATERALITY: ICD-10-CM

## 2023-10-11 DIAGNOSIS — Z79.4 TYPE 2 DIABETES MELLITUS WITH STAGE 3A CHRONIC KIDNEY DISEASE, WITH LONG-TERM CURRENT USE OF INSULIN (HCC): ICD-10-CM

## 2023-10-11 DIAGNOSIS — F32.4 MAJOR DEPRESS, PART REMIS (HCC): ICD-10-CM

## 2023-10-11 DIAGNOSIS — K64.8 INTERNAL HEMORRHOIDS WITHOUT COMPLICATION: ICD-10-CM

## 2023-10-11 DIAGNOSIS — I21.4 NON-STEMI (NON-ST ELEVATED MYOCARDIAL INFARCTION) (HCC): ICD-10-CM

## 2023-10-11 DIAGNOSIS — E55.9 VITAMIN D DEFICIENCY: ICD-10-CM

## 2023-10-11 DIAGNOSIS — Z00.00 MEDICARE ANNUAL WELLNESS VISIT, SUBSEQUENT: Primary | ICD-10-CM

## 2023-10-11 DIAGNOSIS — I10 ESSENTIAL HYPERTENSION: ICD-10-CM

## 2023-10-11 DIAGNOSIS — Z86.73 HISTORY OF TIA (TRANSIENT ISCHEMIC ATTACK): ICD-10-CM

## 2023-10-11 DIAGNOSIS — E11.22 TYPE 2 DIABETES MELLITUS WITH STAGE 3A CHRONIC KIDNEY DISEASE, WITH LONG-TERM CURRENT USE OF INSULIN (HCC): ICD-10-CM

## 2023-10-11 DIAGNOSIS — Z12.11 COLON CANCER SCREENING: ICD-10-CM

## 2023-10-11 DIAGNOSIS — N18.31 TYPE 2 DIABETES MELLITUS WITH STAGE 3A CHRONIC KIDNEY DISEASE, WITH LONG-TERM CURRENT USE OF INSULIN (HCC): ICD-10-CM

## 2023-10-11 DIAGNOSIS — M85.88 OSTEOPENIA OF LUMBAR SPINE: ICD-10-CM

## 2023-10-11 DIAGNOSIS — E11.42 DIABETIC POLYNEUROPATHY ASSOCIATED WITH TYPE 2 DIABETES MELLITUS (HCC): ICD-10-CM

## 2023-10-11 DIAGNOSIS — R42 VERTIGO: ICD-10-CM

## 2023-10-11 DIAGNOSIS — Z91.81 RISK FOR FALLS: ICD-10-CM

## 2023-10-11 DIAGNOSIS — M15.9 PRIMARY OSTEOARTHRITIS INVOLVING MULTIPLE JOINTS: ICD-10-CM

## 2023-10-11 DIAGNOSIS — Z86.010 HISTORY OF COLON POLYPS: ICD-10-CM

## 2023-10-11 DIAGNOSIS — M79.7 FIBROMYALGIA: ICD-10-CM

## 2023-10-11 DIAGNOSIS — K86.2 PANCREATIC CYST: ICD-10-CM

## 2023-10-11 DIAGNOSIS — N18.31 STAGE 3A CHRONIC KIDNEY DISEASE (HCC): Chronic | ICD-10-CM

## 2023-10-11 LAB
ALBUMIN SERPL-MCNC: 3.3 G/DL (ref 3.4–5)
ALBUMIN/GLOB SERPL: 0.9 {RATIO} (ref 1–2)
ALP LIVER SERPL-CCNC: 52 U/L
ALT SERPL-CCNC: 20 U/L
ANION GAP SERPL CALC-SCNC: 8 MMOL/L (ref 0–18)
AST SERPL-CCNC: 18 U/L (ref 15–37)
ATRIAL RATE: 79 BPM
BASOPHILS # BLD AUTO: 0.04 X10(3) UL (ref 0–0.2)
BASOPHILS NFR BLD AUTO: 0.5 %
BILIRUB SERPL-MCNC: 0.4 MG/DL (ref 0.1–2)
BILIRUB UR QL: NEGATIVE
BUN BLD-MCNC: 13 MG/DL (ref 7–18)
BUN/CREAT SERPL: 15.9 (ref 10–20)
CALCIUM BLD-MCNC: 8.8 MG/DL (ref 8.5–10.1)
CHLORIDE SERPL-SCNC: 103 MMOL/L (ref 98–112)
CHOLEST SERPL-MCNC: 128 MG/DL (ref ?–200)
CLARITY UR: CLEAR
CO2 SERPL-SCNC: 26 MMOL/L (ref 21–32)
CREAT BLD-MCNC: 0.82 MG/DL
DEPRECATED RDW RBC AUTO: 38.5 FL (ref 35.1–46.3)
EGFRCR SERPLBLD CKD-EPI 2021: 70 ML/MIN/1.73M2 (ref 60–?)
EOSINOPHIL # BLD AUTO: 0.02 X10(3) UL (ref 0–0.7)
EOSINOPHIL NFR BLD AUTO: 0.2 %
ERYTHROCYTE [DISTWIDTH] IN BLOOD BY AUTOMATED COUNT: 11.9 % (ref 11–15)
EST. AVERAGE GLUCOSE BLD GHB EST-MCNC: 160 MG/DL (ref 68–126)
FASTING PATIENT LIPID ANSWER: YES
FASTING STATUS PATIENT QL REPORTED: YES
GLOBULIN PLAS-MCNC: 3.8 G/DL (ref 2.8–4.4)
GLUCOSE BLD-MCNC: 96 MG/DL (ref 70–99)
GLUCOSE UR-MCNC: NORMAL MG/DL
HBA1C MFR BLD: 7.2 % (ref ?–5.7)
HCT VFR BLD AUTO: 39.2 %
HDLC SERPL-MCNC: 32 MG/DL (ref 40–59)
HGB BLD-MCNC: 13.2 G/DL
HGB UR QL STRIP.AUTO: NEGATIVE
IMM GRANULOCYTES # BLD AUTO: 0.03 X10(3) UL (ref 0–1)
IMM GRANULOCYTES NFR BLD: 0.4 %
KETONES UR-MCNC: NEGATIVE MG/DL
LDLC SERPL CALC-MCNC: 63 MG/DL (ref ?–100)
LEUKOCYTE ESTERASE UR QL STRIP.AUTO: NEGATIVE
LYMPHOCYTES # BLD AUTO: 1.33 X10(3) UL (ref 1–4)
LYMPHOCYTES NFR BLD AUTO: 16.1 %
MCH RBC QN AUTO: 30 PG (ref 26–34)
MCHC RBC AUTO-ENTMCNC: 33.7 G/DL (ref 31–37)
MCV RBC AUTO: 89.1 FL
MONOCYTES # BLD AUTO: 0.51 X10(3) UL (ref 0.1–1)
MONOCYTES NFR BLD AUTO: 6.2 %
NEUTROPHILS # BLD AUTO: 6.32 X10 (3) UL (ref 1.5–7.7)
NEUTROPHILS # BLD AUTO: 6.32 X10(3) UL (ref 1.5–7.7)
NEUTROPHILS NFR BLD AUTO: 76.6 %
NITRITE UR QL STRIP.AUTO: NEGATIVE
NONHDLC SERPL-MCNC: 96 MG/DL (ref ?–130)
OSMOLALITY SERPL CALC.SUM OF ELEC: 284 MOSM/KG (ref 275–295)
P AXIS: 31 DEGREES
P-R INTERVAL: 156 MS
PH UR: 6 [PH] (ref 5–8)
PLATELET # BLD AUTO: 241 10(3)UL (ref 150–450)
POTASSIUM SERPL-SCNC: 4.3 MMOL/L (ref 3.5–5.1)
PROT SERPL-MCNC: 7.1 G/DL (ref 6.4–8.2)
PROT UR-MCNC: NEGATIVE MG/DL
Q-T INTERVAL: 388 MS
QRS DURATION: 66 MS
QTC CALCULATION (BEZET): 444 MS
R AXIS: 24 DEGREES
RBC # BLD AUTO: 4.4 X10(6)UL
SODIUM SERPL-SCNC: 137 MMOL/L (ref 136–145)
SP GR UR STRIP: 1.02 (ref 1–1.03)
T AXIS: 32 DEGREES
TRIGL SERPL-MCNC: 195 MG/DL (ref 30–149)
TSI SER-ACNC: 1.3 MIU/ML (ref 0.36–3.74)
UROBILINOGEN UR STRIP-ACNC: NORMAL
VENTRICULAR RATE: 79 BPM
VIT D+METAB SERPL-MCNC: 46.5 NG/ML (ref 30–100)
VLDLC SERPL CALC-MCNC: 29 MG/DL (ref 0–30)
WBC # BLD AUTO: 8.3 X10(3) UL (ref 4–11)

## 2023-10-11 PROCEDURE — 99213 OFFICE O/P EST LOW 20 MIN: CPT | Performed by: FAMILY MEDICINE

## 2023-10-11 PROCEDURE — 36415 COLL VENOUS BLD VENIPUNCTURE: CPT

## 2023-10-11 PROCEDURE — 84443 ASSAY THYROID STIM HORMONE: CPT

## 2023-10-11 PROCEDURE — 93010 ELECTROCARDIOGRAM REPORT: CPT | Performed by: INTERNAL MEDICINE

## 2023-10-11 PROCEDURE — 80053 COMPREHEN METABOLIC PANEL: CPT

## 2023-10-11 PROCEDURE — 80061 LIPID PANEL: CPT

## 2023-10-11 PROCEDURE — 83036 HEMOGLOBIN GLYCOSYLATED A1C: CPT

## 2023-10-11 PROCEDURE — 85025 COMPLETE CBC W/AUTO DIFF WBC: CPT

## 2023-10-11 PROCEDURE — 81003 URINALYSIS AUTO W/O SCOPE: CPT

## 2023-10-11 PROCEDURE — 93005 ELECTROCARDIOGRAM TRACING: CPT

## 2023-10-11 PROCEDURE — G0439 PPPS, SUBSEQ VISIT: HCPCS | Performed by: FAMILY MEDICINE

## 2023-10-11 PROCEDURE — 82306 VITAMIN D 25 HYDROXY: CPT

## 2023-10-11 PROCEDURE — 1125F AMNT PAIN NOTED PAIN PRSNT: CPT | Performed by: FAMILY MEDICINE

## 2023-11-22 ENCOUNTER — APPOINTMENT (OUTPATIENT)
Dept: GENERAL RADIOLOGY | Facility: HOSPITAL | Age: 83
End: 2023-11-22
Attending: EMERGENCY MEDICINE
Payer: MEDICARE

## 2023-11-22 ENCOUNTER — HOSPITAL ENCOUNTER (EMERGENCY)
Facility: HOSPITAL | Age: 83
Discharge: HOME | End: 2023-11-22
Attending: EMERGENCY MEDICINE
Payer: MEDICARE

## 2023-11-22 ENCOUNTER — APPOINTMENT (OUTPATIENT)
Dept: MRI IMAGING | Facility: HOSPITAL | Age: 83
End: 2023-11-22
Attending: EMERGENCY MEDICINE
Payer: MEDICARE

## 2023-11-22 VITALS
RESPIRATION RATE: 18 BRPM | DIASTOLIC BLOOD PRESSURE: 64 MMHG | HEART RATE: 73 BPM | SYSTOLIC BLOOD PRESSURE: 146 MMHG | TEMPERATURE: 98 F | OXYGEN SATURATION: 96 %

## 2023-11-22 DIAGNOSIS — R20.2 PARESTHESIAS: Primary | ICD-10-CM

## 2023-11-22 DIAGNOSIS — R03.0 ELEVATED BLOOD PRESSURE READING: ICD-10-CM

## 2023-11-22 LAB
ALBUMIN SERPL-MCNC: 4.3 G/DL (ref 3.2–4.8)
ALBUMIN/GLOB SERPL: 1.5 {RATIO} (ref 1–2)
ALP LIVER SERPL-CCNC: 55 U/L
ALT SERPL-CCNC: 12 U/L
ANION GAP SERPL CALC-SCNC: 7 MMOL/L (ref 0–18)
AST SERPL-CCNC: 19 U/L (ref ?–34)
BASOPHILS # BLD AUTO: 0.03 X10(3) UL (ref 0–0.2)
BASOPHILS NFR BLD AUTO: 0.3 %
BILIRUB SERPL-MCNC: 0.4 MG/DL (ref 0.2–1.1)
BUN BLD-MCNC: 16 MG/DL (ref 9–23)
BUN/CREAT SERPL: 16.5 (ref 10–20)
CALCIUM BLD-MCNC: 9.2 MG/DL (ref 8.7–10.4)
CHLORIDE SERPL-SCNC: 101 MMOL/L (ref 98–112)
CO2 SERPL-SCNC: 24 MMOL/L (ref 21–32)
CREAT BLD-MCNC: 0.97 MG/DL
DEPRECATED RDW RBC AUTO: 39.2 FL (ref 35.1–46.3)
EGFRCR SERPLBLD CKD-EPI 2021: 58 ML/MIN/1.73M2 (ref 60–?)
EOSINOPHIL # BLD AUTO: 0.03 X10(3) UL (ref 0–0.7)
EOSINOPHIL NFR BLD AUTO: 0.3 %
ERYTHROCYTE [DISTWIDTH] IN BLOOD BY AUTOMATED COUNT: 12 % (ref 11–15)
GLOBULIN PLAS-MCNC: 2.9 G/DL (ref 2.8–4.4)
GLUCOSE BLD-MCNC: 198 MG/DL (ref 70–99)
GLUCOSE BLDC GLUCOMTR-MCNC: 192 MG/DL (ref 70–99)
HCT VFR BLD AUTO: 40.4 %
HGB BLD-MCNC: 13.5 G/DL
IMM GRANULOCYTES # BLD AUTO: 0.03 X10(3) UL (ref 0–1)
IMM GRANULOCYTES NFR BLD: 0.3 %
LYMPHOCYTES # BLD AUTO: 1.48 X10(3) UL (ref 1–4)
LYMPHOCYTES NFR BLD AUTO: 15.7 %
MAGNESIUM SERPL-MCNC: 1.8 MG/DL (ref 1.6–2.6)
MCH RBC QN AUTO: 29.4 PG (ref 26–34)
MCHC RBC AUTO-ENTMCNC: 33.4 G/DL (ref 31–37)
MCV RBC AUTO: 88 FL
MONOCYTES # BLD AUTO: 0.7 X10(3) UL (ref 0.1–1)
MONOCYTES NFR BLD AUTO: 7.4 %
NEUTROPHILS # BLD AUTO: 7.16 X10 (3) UL (ref 1.5–7.7)
NEUTROPHILS # BLD AUTO: 7.16 X10(3) UL (ref 1.5–7.7)
NEUTROPHILS NFR BLD AUTO: 76 %
OSMOLALITY SERPL CALC.SUM OF ELEC: 281 MOSM/KG (ref 275–295)
PLATELET # BLD AUTO: 206 10(3)UL (ref 150–450)
POTASSIUM SERPL-SCNC: 4.4 MMOL/L (ref 3.5–5.1)
PROT SERPL-MCNC: 7.2 G/DL (ref 5.7–8.2)
RBC # BLD AUTO: 4.59 X10(6)UL
SODIUM SERPL-SCNC: 132 MMOL/L (ref 136–145)
WBC # BLD AUTO: 9.4 X10(3) UL (ref 4–11)

## 2023-11-22 PROCEDURE — 71045 X-RAY EXAM CHEST 1 VIEW: CPT | Performed by: EMERGENCY MEDICINE

## 2023-11-22 PROCEDURE — 99285 EMERGENCY DEPT VISIT HI MDM: CPT

## 2023-11-22 PROCEDURE — 80053 COMPREHEN METABOLIC PANEL: CPT | Performed by: EMERGENCY MEDICINE

## 2023-11-22 PROCEDURE — 83735 ASSAY OF MAGNESIUM: CPT | Performed by: EMERGENCY MEDICINE

## 2023-11-22 PROCEDURE — 82962 GLUCOSE BLOOD TEST: CPT

## 2023-11-22 PROCEDURE — 85025 COMPLETE CBC W/AUTO DIFF WBC: CPT

## 2023-11-22 PROCEDURE — 96374 THER/PROPH/DIAG INJ IV PUSH: CPT

## 2023-11-22 PROCEDURE — 70551 MRI BRAIN STEM W/O DYE: CPT | Performed by: EMERGENCY MEDICINE

## 2023-11-22 PROCEDURE — 80053 COMPREHEN METABOLIC PANEL: CPT

## 2023-11-22 PROCEDURE — 93005 ELECTROCARDIOGRAM TRACING: CPT

## 2023-11-22 PROCEDURE — 93010 ELECTROCARDIOGRAM REPORT: CPT

## 2023-11-22 PROCEDURE — 85025 COMPLETE CBC W/AUTO DIFF WBC: CPT | Performed by: EMERGENCY MEDICINE

## 2023-11-22 RX ORDER — LORAZEPAM 2 MG/ML
0.5 INJECTION INTRAMUSCULAR ONCE
Status: COMPLETED | OUTPATIENT
Start: 2023-11-22 | End: 2023-11-22

## 2023-11-23 NOTE — ED INITIAL ASSESSMENT (HPI)
Patient presents to ED via EMS with 3 days of dizziness and L sided weakness starting 1 hour PTA. Per EMS, family has not noticed anything different about the patient. Patient reports \"left side feels like it's sleeping. \"

## 2023-11-24 ENCOUNTER — TELEPHONE (OUTPATIENT)
Dept: FAMILY MEDICINE CLINIC | Facility: CLINIC | Age: 83
End: 2023-11-24

## 2023-11-24 LAB
ATRIAL RATE: 86 BPM
P AXIS: 60 DEGREES
P-R INTERVAL: 158 MS
Q-T INTERVAL: 372 MS
QRS DURATION: 66 MS
QTC CALCULATION (BEZET): 445 MS
R AXIS: 27 DEGREES
T AXIS: 43 DEGREES
VENTRICULAR RATE: 86 BPM

## 2023-11-24 NOTE — TELEPHONE ENCOUNTER
Patient states that she is trying to renew her car insurance and patient was informed that she will need to get a letter from her doctor, which states that the patient is still able to drive. Patient would like to get the letter mailed out to her home. Patient states that her car insurance will  on 2023.

## 2023-11-29 ENCOUNTER — PATIENT OUTREACH (OUTPATIENT)
Dept: CASE MANAGEMENT | Age: 83
End: 2023-11-29

## 2023-11-29 ENCOUNTER — OFFICE VISIT (OUTPATIENT)
Dept: GASTROENTEROLOGY | Facility: CLINIC | Age: 83
End: 2023-11-29

## 2023-11-29 ENCOUNTER — MED REC SCAN ONLY (OUTPATIENT)
Dept: FAMILY MEDICINE CLINIC | Facility: CLINIC | Age: 83
End: 2023-11-29

## 2023-11-29 VITALS
BODY MASS INDEX: 26.58 KG/M2 | HEIGHT: 63 IN | WEIGHT: 150 LBS | SYSTOLIC BLOOD PRESSURE: 126 MMHG | DIASTOLIC BLOOD PRESSURE: 74 MMHG

## 2023-11-29 DIAGNOSIS — Z86.010 HISTORY OF COLON POLYPS: ICD-10-CM

## 2023-11-29 DIAGNOSIS — Z12.11 ENCOUNTER FOR SCREENING COLONOSCOPY: Primary | ICD-10-CM

## 2023-11-29 PROCEDURE — 99204 OFFICE O/P NEW MOD 45 MIN: CPT | Performed by: INTERNAL MEDICINE

## 2023-11-29 RX ORDER — HYDROCORTISONE 25 MG/ML
1 LOTION TOPICAL 2 TIMES DAILY
Qty: 118 ML | Refills: 1 | Status: SHIPPED | OUTPATIENT
Start: 2023-11-29

## 2023-11-29 NOTE — PROGRESS NOTES
1st attempt ER f/up apt request    Hannah Mo  PCP  [address]  [facility] [address] [facility]  A[address]: Dec 8 @10:30am     Confirmed w/ pt  Closing encounter

## 2023-11-29 NOTE — PATIENT INSTRUCTIONS
# Rectal bleeding, no anemia  # history of polyps  # hemorrhoids    Recommend:  Start miralax 1/2 capful daily  1. Use wet towelettes (Kleenix, Tucks, Cordell) to clean the anal area after bowel movements and then pat dry the area with dry toilet paper. 2.  DO NOT rub the area with dry toilet paper. Sitz baths can be taken as necessary (30 minutes soaking in a tub of tepid water once or twice a day for perianal burning and/or itching)  3.  Apply the [facility] one or twice daily to the anal after a 30 minute sitz bath (sitting in a warm tub of water)     Discussed risk given age and comorbidities  Patient ok to see if has more bleeding or anemia and will consider colonoscopy  At this time try above, if occurs again will plan colonoscopy  Continue prune juice as need

## 2023-11-29 NOTE — H&P
[facility] - Gastroenterology                                                                                                  Clinic History and Physical     Chief Complaint   Patient presents with    Consult     Referral from PCP for colonoscopy; did report rectal bleeding earlier this year but has had none since        HPI:   Rosie Del Rosario is a 79year old female, seen in consultation by request or Dr. Shaq Kumar, with history of MI, stroke, HTN, HL, diverticulosis, who presents for colon cancer screening evaluation. In January had bleeding for 4 days and then stopped. No bleeding since. Has known hemorrhoids  Had colonoscopy 2018 and no polyps  Polyps 5 years prior  Does have constipation-- takes prune juice. Still needs to push  Some SOB when walking  No chest pain    Patient denies any GI symptoms of nausea, vomiting, dyspepsia, dysphagia, hematemesis, abdominal pain, change in bowel habits, thin stools, hematochezia, or melena. Additionally there is no weight loss and no reported history of chest pain or shortness of breath.  -denies family history of colon cancer.     denies adverse reaction to anesthesia    Prior endoscopies:  Prior every 1 year then 3 years then every 5 years-- last two 2018 and 2013 colonoscopy    Soc:  - denies smoking  - deneis etoh  - denies marijuana, denies other recreational drugs    History, Medications, Allergies, ROS:      Past Medical History:   Diagnosis Date    Cataract     Diabetes ([facility] [address]     Diverticulosis large intestine w/o perforation or abscess w/o bleeding 12/6/2018    Dupuytren's contracture of left hand 11-18-16     LMF Ray    Essential hypertension     High blood pressure     High cholesterol     Hyperlipidemia     Internal hemorrhoids without complication 24/8/2826    MI (myocardial infarction) [facility])     Stroke [facility])       Past Surgical History:   Procedure Laterality Date    APPENDECTOMY      CATARACT      CHOLECYSTECTOMY      COLONOSCOPY      COLONOSCOPY N/A 12/6/2018    Procedure: COLONOSCOPY;  Surgeon: Jourdan Paez MD;  [facility]COPY    PALMAR FASCIECTOMY Left 11-18-16    L Dupuytren's  LMF Ray    TUBAL LIGATION  1977      Family Hx:   Family History   Problem Relation Age of Onset    Cancer Sister         skin      Social History:   Social History     Socioeconomic History    Marital status: Legally    Tobacco Use    Smoking status: Never    Smokeless tobacco: Never   Vaping Use    Vaping Use: Never used   Substance and Sexual Activity    Alcohol use: No     Alcohol/week: 0.0 standard drinks of alcohol    Drug use: Never   Other Topics Concern    Caffeine Concern Yes     Comment: coffee        Medications (Active prior to today's visit):  Current Outpatient Medications   Medication Sig Dispense Refill    alendronate 35 MG Oral Tab Take 1 tablet (35 mg total) by mouth every 7 days. Takes on mondays 12 tablet 3    ibuprofen 600 MG Oral Tab Take 1 tablet (600 mg total) by mouth every 6 (six) hours as needed for Pain. Always take it with food. 60 tablet 0    losartan-hydroCHLOROthiazide 50-12.5 MG Oral Tab Take 1 tablet by mouth daily. 90 tablet 1    metFORMIN HCl 1000 MG Oral Tab TOME JESUSITA TABLETA DOS VECES AL CHRISTINE CON ALIMENTO 180 tablet 1    metoprolol succinate  MG Oral Tablet 24 Hr Take 1 tablet (100 mg total) by mouth daily. 90 tablet 1    pravastatin 20 MG Oral Tab Take 1 tablet (20 mg total) by mouth daily. 90 tablet 3    Psyllium 58.6 % Oral Powder Take 2 tablespoons in 6 oz of water at nightly. 660 g 7    triamcinolone 0.1 % External Ointment 1 Ring every 28 days. FOR 21 DAYS IN, THEN REMOVE FOR 7 DAYS      cyclobenzaprine 5 MG Oral Tab Take 1 tablet (5 mg total) by mouth 3 (three) times daily as needed for Muscle spasms.  30 tablet 0    Insulin Pen Needle (BD PEN NEEDLE AGA 2ND GEN) 32G X 4 MM Does not apply Misc 1 strip by In Vitro route daily. 100 each 3    meclizine 12.5 MG Oral Tab Take 1 tablet (12.5 mg total) by mouth 3 (three) times daily as needed. 40 tablet 1    aspirin ([employer] ASPIRIN ADULT LOW DOSE) 81 MG Oral Chew Tab Chew 1 tablet (81 mg total) by mouth daily. 90 tablet 1    Accu-Chek Softclix Lancets Does not apply Misc Use three times daily as directed. 200 each 5    Insulin Degludec (TRESIBA FLEXTOUCH) 100 UNIT/ML Subcutaneous Solution Pen-injector Inject 48 Units/day into the skin daily. 45 mL 3    Glucose Blood (ACCU-CHEK JOSE PLUS) In Vitro Strip Inject 1 Device into the skin 3 (three) times daily as needed. DX   E11.9 (Insulin-requiring or dependent type II diabetes mellitus) 300 strip 3    Multiple Vitamins-Minerals (CENTRUM SILVER) Oral Tab Take 1 tablet by mouth daily. Multiple Vitamins-Minerals (PRESERVISION AREDS) Oral Tab Take 2 tablets by mouth daily. Allergies:  No Known Allergies    ROS:   CONSTITUTIONAL:  negative for fevers, rigors  EYES:  negative for diplopia   RESPIRATORY:  negative for severe shortness of breath  CARDIOVASCULAR:  negative for crushing sub-sternal chest pain  GASTROINTESTINAL:  see HPI  GENITOURINARY:  negative for dysuria or gross hematuria  INTEGUMENT/BREAST:  SKIN:  negative for jaundice   ALLERGIC/IMMUNOLOGIC:  negative for hay fever  ENDOCRINE:  negative for cold intolerance and heat intolerance  MUSCULOSKELETAL:  negative for joint effusion/severe erythema  BEHAVIOR/PSYCH:  negative for psychotic behavior      PHYSICAL EXAM:   Blood pressure 126/74, height 5' 3\" (1.6 m), weight 150 lb (68 kg), not currently breastfeeding. Gen- Patient appears comfortable and in no acute discomfort  HEENT: the sclera appears anicteric, oropharynx clear, mucus membranes appear moist  CV- regular rate and rhythm, the extremities are warm and well perfused   Lung- Moves air well;  No labored breathing  Abdomen- soft, non-tender exam in all quadrants without rigidity or guarding, non-distended, no abnormal bowel sounds noted, no masses are palpated  Skin- No jaundice  Ext: no cyanosis, clubbing or edema is evident. Neuro- Alert and interactive, and gross movements of extremities normal    Labs/Imaging:     Patient's labs and imaging were reviewed and discussed with patient today. Recent Labs     04/11/23  1409 10/11/23  0917 11/22/23  1947   RBC 4.68 4.40 4.59   HGB 13.6 13.2 13.5   HCT 41.2 39.2 40.4   MCV 88.0 89.1 88.0   MCH 29.1 30.0 29.4   MCHC 33.0 33.7 33.4   RDW 12.4 11.9 12.0   NEPRELIM 5.46 6.32 7.16   WBC 8.1 8.3 9.4   .0 241.0 206.0     Lab Results   Component Value Date     (H) 11/22/2023    BUN 16 11/22/2023    BUNCREA 16.5 11/22/2023    CREATSERUM 0.97 11/22/2023    ANIONGAP 7 11/22/2023    GFRNAA 45 (L) 03/21/2022    GFRAA 52 (L) 03/21/2022    CA 9.2 11/22/2023    OSMOCALC 281 11/22/2023    ALKPHO 55 11/22/2023    AST 19 11/22/2023    ALT 12 11/22/2023    BILT 0.4 11/22/2023    TP 7.2 11/22/2023    ALB 4.3 11/22/2023    GLOBULIN 2.9 11/22/2023     (L) 11/22/2023    K 4.4 11/22/2023     11/22/2023    CO2 24.0 11/22/2023     Lab Results   Component Value Date    PTP 13.2 07/24/2019    INR 1.02 07/24/2019     . ASSESSMENT/PLAN:   No anemia noted on lab work. # Rectal bleeding, no anemia  # history of polyps  # hemorrhoids    Recommend:  Avoid ibuprofen as possible, 81 mg ASA ok  Start miralax 1/2 capful daily  1. Use wet towelettes (Kleenix, Tucks, Cordell) to clean the anal area after bowel movements and then pat dry the area with dry toilet paper. 2.  DO NOT rub the area with dry toilet paper. Sitz baths can be taken as necessary (30 minutes soaking in a tub of tepid water once or twice a day for perianal burning and/or itching)  3.  Apply the [facility] one or twice daily to the anal after a 30 minute sitz bath (sitting in a warm tub of water)     Discussed risk given age and comorbidities  Patient ok to see if has more bleeding or anemia and will consider colonoscopy  At this time try above, if occurs again will plan colonoscopy  Continue prune juice as need        Orders This Visit:  No orders of the defined types were placed in this encounter.       Meds This Visit:  Requested Prescriptions      No prescriptions requested or ordered in this encounter       Imaging & Referrals:  None         Zee Finn MD  11/29/2023

## 2023-12-03 DIAGNOSIS — Z79.4 TYPE 2 DIABETES MELLITUS WITH STAGE 3A CHRONIC KIDNEY DISEASE, WITH LONG-TERM CURRENT USE OF INSULIN (HCC): ICD-10-CM

## 2023-12-03 DIAGNOSIS — E11.22 TYPE 2 DIABETES MELLITUS WITH STAGE 3A CHRONIC KIDNEY DISEASE, WITH LONG-TERM CURRENT USE OF INSULIN (HCC): ICD-10-CM

## 2023-12-03 DIAGNOSIS — N18.31 TYPE 2 DIABETES MELLITUS WITH STAGE 3A CHRONIC KIDNEY DISEASE, WITH LONG-TERM CURRENT USE OF INSULIN (HCC): ICD-10-CM

## 2023-12-05 RX ORDER — INSULIN DEGLUDEC INJECTION 100 U/ML
INJECTION, SOLUTION SUBCUTANEOUS
Qty: 45 ML | Refills: 3 | Status: SHIPPED | OUTPATIENT
Start: 2023-12-05

## 2023-12-05 RX ORDER — BLOOD SUGAR DIAGNOSTIC
STRIP MISCELLANEOUS
Qty: 300 STRIP | Refills: 3 | Status: SHIPPED | OUTPATIENT
Start: 2023-12-05

## 2023-12-05 NOTE — TELEPHONE ENCOUNTER
Refill passed per [facility] [place] protocol. Requested Prescriptions   Pending Prescriptions Disp Refills    ACCU-CHEK JOSE PLUS In Vitro Strip [Pharmacy Med Name: ACCU-CHEK JOSE PLUS TEST STRP] 300 strip 3     Sig: INJECT 1 DEVICE INTO THE SKIN 3 (THREE) TIMES DAILY AS NEEDED. DX E11.9       Diabetic Supplies Protocol Passed - 12/3/2023 12:20 PM        Passed - In person appointment or virtual visit in the past 12 mos or appointment in next 3 mos     Recent Outpatient Visits              6 days ago Encounter for screening colonoscopy    [address] [facility], Vikash Faith [facility]    Office Visit    1 month ago [facility] annual wellness visit, subsequent    [address], Todd Judge MD    Office Visit    2 months ago Primary osteoarthritis involving multiple joints    [facility] [address], Todd Judge MD    Office Visit    2 months ago Primary osteoarthritis involving multiple joints    [facility] [place] [facility]    Office Visit    3 months ago Primary osteoarthritis involving multiple joints    [address], [address], Todd Judge MD    Office Visit          Future Appointments         Provider Department Appt Notes    In 3 days Kush Robin MD [address] [facility], [facility] f/up    In 2 weeks ADO VEENA RM1; ADO DEXA [address] last dexa 5/3/2016  XR dexa bone density    In 2 months Kush Robin MD [address] [facility], [place] [address]                 TRESIBA FLEXTOUCH 100 UNIT/ML Subcutaneous Solution Pen-injector [Pharmacy Med Name: Chales Bryan 100 UNIT/ML]  3     Sig: Inject 48 Units/day into the skin daily.        Diabetes Medication Protocol Passed - 12/3/2023 12:20 PM        Passed - Last A1C < 7.5 and within past 6 months     Lab Results   Component Value Date    A1C 7.2 (H) 10/11/2023             Passed - In person appointment or virtual visit in the past 6 mos or appointment in next 3 mos     Recent Outpatient Visits              6 days ago Encounter for screening colonoscopy    Vianney Segal Joetta Go, MD    Office Visit    1 month ago Frank Awan annual wellness visit, subsequent    [place] Segal MD    Office Visit    2 months ago Primary osteoarthritis involving multiple joints    Saba Segal MD    Office Visit    2 months ago Primary osteoarthritis involving multiple joints    [facility] [place], PT    Office Visit    3 months ago Primary osteoarthritis involving multiple joints    [address], [address], Saba Dupree MD    Office Visit          Future Appointments         Provider Department Appt Notes    In 3 days Maxwell Martinez MD [address], [address], [place] [facility] f/up    In 2 weeks ADO VEENA RM1; ADO DEXA [address] last dexa 5/3/2016  XR dexa bone density    In 2 months Maxwell Martinez MD [address], [address], [place] [address]               Passed - [facility] or GFRNAA > 50     GFR Evaluation  EGFRCR: 58 , resulted on 11/22/2023          Passed - GFR in the past 12 months           Recent Outpatient Visits              6 days ago Encounter for screening colonoscopy    [address], [address], Rashard Young MD    Office Visit    1 month ago Frank Awan annual wellness visit, subsequent    Saba Segal MD    Office Visit    2 months ago Primary osteoarthritis involving multiple joints    Saba Segal MD    Office Visit    2 months ago Primary osteoarthritis involving multiple joints    [facility] [place], PT    Office Visit    3 months ago Primary osteoarthritis involving multiple joints    [address], [place] [address], Crescencio Gaffney MD    Office Visit          Future Appointments         Provider Department Appt Notes    In 3 days MD [facility] f/up    In 2 weeks ADO VEENA RM1; ADO DEXA [address] last dexa 5/3/2016  XR dexa bone density    In 2 months MD Juan Alberto Munoz [facility] [address] [place] [address]

## 2023-12-08 ENCOUNTER — OFFICE VISIT (OUTPATIENT)
Dept: FAMILY MEDICINE CLINIC | Facility: CLINIC | Age: 83
End: 2023-12-08
Payer: MEDICARE

## 2023-12-08 VITALS
DIASTOLIC BLOOD PRESSURE: 73 MMHG | HEIGHT: 63 IN | WEIGHT: 150.38 LBS | BODY MASS INDEX: 26.64 KG/M2 | SYSTOLIC BLOOD PRESSURE: 135 MMHG | HEART RATE: 76 BPM

## 2023-12-08 DIAGNOSIS — N18.31 TYPE 2 DIABETES MELLITUS WITH STAGE 3A CHRONIC KIDNEY DISEASE, WITH LONG-TERM CURRENT USE OF INSULIN (HCC): Primary | ICD-10-CM

## 2023-12-08 DIAGNOSIS — I10 ESSENTIAL HYPERTENSION: ICD-10-CM

## 2023-12-08 DIAGNOSIS — Z79.4 TYPE 2 DIABETES MELLITUS WITH STAGE 3A CHRONIC KIDNEY DISEASE, WITH LONG-TERM CURRENT USE OF INSULIN (HCC): Primary | ICD-10-CM

## 2023-12-08 DIAGNOSIS — E11.22 TYPE 2 DIABETES MELLITUS WITH STAGE 3A CHRONIC KIDNEY DISEASE, WITH LONG-TERM CURRENT USE OF INSULIN (HCC): Primary | ICD-10-CM

## 2023-12-08 DIAGNOSIS — Z12.11 COLON CANCER SCREENING: ICD-10-CM

## 2023-12-08 PROCEDURE — 99213 OFFICE O/P EST LOW 20 MIN: CPT | Performed by: FAMILY MEDICINE

## 2023-12-08 PROCEDURE — 1125F AMNT PAIN NOTED PAIN PRSNT: CPT | Performed by: FAMILY MEDICINE

## 2023-12-23 ENCOUNTER — HOSPITAL ENCOUNTER (OUTPATIENT)
Dept: BONE DENSITY | Age: 83
Discharge: HOME | End: 2023-12-23
Attending: FAMILY MEDICINE
Payer: MEDICARE

## 2023-12-23 DIAGNOSIS — M85.88 OSTEOPENIA OF LUMBAR SPINE: ICD-10-CM

## 2023-12-23 PROCEDURE — 77080 DXA BONE DENSITY AXIAL: CPT | Performed by: FAMILY MEDICINE

## 2023-12-23 NOTE — TELEPHONE ENCOUNTER
Patient had an appointment on 12/8/2023 and forgot to mention about the form. Patient is requesting for an update on form

## 2023-12-26 NOTE — TELEPHONE ENCOUNTER
Spoke to Daughter Selena on verbal release, Noemi lainez obtained, issue resolved with insurance no need for a letter, I advise if there is anything else that comes up to let us know. This encounter is ready to be closed.

## 2024-02-12 ENCOUNTER — HOSPITAL ENCOUNTER (OUTPATIENT)
Dept: GENERAL RADIOLOGY | Age: 84
End: 2024-02-12
Attending: FAMILY MEDICINE
Payer: MEDICARE

## 2024-02-12 ENCOUNTER — OFFICE VISIT (OUTPATIENT)
Dept: FAMILY MEDICINE CLINIC | Facility: CLINIC | Age: 84
End: 2024-02-12

## 2024-02-12 ENCOUNTER — HOSPITAL ENCOUNTER (OUTPATIENT)
Dept: GENERAL RADIOLOGY | Age: 84
Discharge: HOME | End: 2024-02-12
Attending: FAMILY MEDICINE
Payer: MEDICARE

## 2024-02-12 VITALS
HEART RATE: 79 BPM | DIASTOLIC BLOOD PRESSURE: 73 MMHG | WEIGHT: 151 LBS | SYSTOLIC BLOOD PRESSURE: 147 MMHG | BODY MASS INDEX: 26.75 KG/M2 | HEIGHT: 63 IN

## 2024-02-12 DIAGNOSIS — M15.9 PRIMARY OSTEOARTHRITIS INVOLVING MULTIPLE JOINTS: ICD-10-CM

## 2024-02-12 DIAGNOSIS — M15.9 PRIMARY OSTEOARTHRITIS INVOLVING MULTIPLE JOINTS: Primary | ICD-10-CM

## 2024-02-12 DIAGNOSIS — M81.0 POST-MENOPAUSAL OSTEOPOROSIS: ICD-10-CM

## 2024-02-12 DIAGNOSIS — I10 PRIMARY HYPERTENSION: ICD-10-CM

## 2024-02-12 PROCEDURE — 99214 OFFICE O/P EST MOD 30 MIN: CPT | Performed by: FAMILY MEDICINE

## 2024-02-12 PROCEDURE — 73502 X-RAY EXAM HIP UNI 2-3 VIEWS: CPT | Performed by: FAMILY MEDICINE

## 2024-02-12 RX ORDER — LATANOPROST 50 UG/ML
SOLUTION/ DROPS OPHTHALMIC
COMMUNITY
Start: 2024-01-25

## 2024-02-12 RX ORDER — CHLORTHALIDONE 25 MG/1
25 TABLET ORAL DAILY
Qty: 30 TABLET | Refills: 3 | Status: SHIPPED | OUTPATIENT
Start: 2024-02-12

## 2024-02-12 RX ORDER — NAPROXEN 250 MG/1
250 TABLET ORAL 2 TIMES DAILY WITH MEALS
Qty: 60 TABLET | Refills: 1 | Status: SHIPPED | OUTPATIENT
Start: 2024-02-12

## 2024-02-12 RX ORDER — OLMESARTAN MEDOXOMIL 40 MG/1
40 TABLET ORAL DAILY
Qty: 90 TABLET | Refills: 1 | Status: SHIPPED | OUTPATIENT
Start: 2024-02-12

## 2024-02-12 RX ORDER — AMLODIPINE BESYLATE 5 MG/1
5 TABLET ORAL DAILY
Qty: 30 TABLET | Refills: 2 | Status: SHIPPED | OUTPATIENT
Start: 2024-02-12

## 2024-02-12 NOTE — PROGRESS NOTES
2/12/2024  10:51 AM    Noemi Powell is a 83 year old female.    Chief complaint(s):   Chief Complaint   Patient presents with    Low Back Pain     Follow up    Leg Pain     LT leg pain follow up    Note     Note for insurance stating she is okay to drive    Follow - Up     HPI:     Noemi Powell primary complaint is regarding back pain.     Noemi Powell is a 83 year old female present  complaining of Low back pain.  Pain is located at left low back. Pain started over  8 months.  Pain is described as aching. Severity is moderate, 8 on a scale of 1-10. The pain radiates to left leg. Pain was precipitated by fall, 08/2023  Pain is worsened by bending, walking. Gets relief of back pain with nothing provides relief. Prior back pain hx: recurrent self limited episodes of low back pain in the past.   Associated symptoms include;   positive numbness or tingling sensation on her lower extremities, negative hematuria, negative fever, negative lower extremities weakness.   Noemi Tijerina a 83 year old female presents with hypertension.  This was first diagnosed more than 10 years ago.  Current nonpharmacologic treatment includes low sodium diet, exercise, and meditation.  Her current cardiac medication(s) regimen includes: Losartan- hydrochlorothiazide 50-12.5 mg .  She has kept a blood pressure diary, but states that her blood pressures have been fair controll.  she is tolerating her medication(s)  well without side effects.  Compliance with treatment has been good.       HISTORY:  Past Medical History:   Diagnosis Date    Cataract     Diabetes (HCC)     Diverticulosis large intestine w/o perforation or abscess w/o bleeding 12/6/2018    Dupuytren's contracture of left hand 11-18-16     LMF Ray    Essential hypertension     High blood pressure     High cholesterol     Hyperlipidemia     Internal hemorrhoids without complication 12/6/2018    MI (myocardial infarction) (HCC)     Stroke (HCC)       Past  Surgical History:   Procedure Laterality Date    APPENDECTOMY      CATARACT      CHOLECYSTECTOMY      COLONOSCOPY      COLONOSCOPY N/A 12/6/2018    Procedure: COLONOSCOPY;  Surgeon: Maricruz Benavides MD;  Location: [facility] ENDOSCOPY    PALMAR FASCIECTOMY Left 11-18-16    L Dupuytren's  LMF Ray    TUBAL LIGATION  1977      Family History   Problem Relation Age of Onset    Cancer Sister         skin      Social History:   Social History     Socioeconomic History    Marital status: Legally    Tobacco Use    Smoking status: Never    Smokeless tobacco: Never   Vaping Use    Vaping Use: Never used   Substance and Sexual Activity    Alcohol use: No     Alcohol/week: 0.0 standard drinks of alcohol    Drug use: Never   Other Topics Concern    Caffeine Concern Yes     Comment: coffee        Immunizations:   Immunization History   Administered Date(s) Administered    Covid-19 Vaccine Pfizer 30 mcg/0.3 ml 02/17/2021, 03/10/2021, 11/11/2021    Covid-19 Vaccine Pfizer Rich-Sucrose 30 mcg/0.3 ml 06/09/2022    FLU VAC High Dose 65 YRS & Older PRSV Free (09404) 10/20/2017, 09/11/2018, 09/20/2023    FLUAD High Dose 65 yr and older (11002) 08/16/2019, 10/02/2020    FLULAVAL 6 months & older 0.5 ml Prefilled syringe (84238) 10/14/2022    FLUZONE 6 months and older PFS 0.5 ml (60772) 09/12/2016    Fluzone Vaccine Medicare () 10/15/2017, 10/20/2017, 09/11/2018    HIB (3 Dose) 04/09/2016    HIGH DOSE FLU 65 YRS AND OLDER PRSV FREE SINGLE D (91532) FLU CLINIC 09/12/2016, 10/16/2017, 09/24/2021    Haemophilus B Polysac Conj Vac Im 04/09/2016    Influenza 10/23/2015    Meningococcal-Menactra 04/09/2016    Pneumococcal (Prevnar 13) 08/29/2017    Pneumococcal Conjugate PCV20 11/14/2022    Pneumovax 23 11/18/2005, 04/09/2016       Medications (Active prior to today's visit):  Current Outpatient Medications   Medication Sig Dispense Refill    latanoprost 0.005 % Ophthalmic Solution PONGA JESUSITA GOTA EN LOS DOS OJOS AL ACOSTARSE       Olmesartan Medoxomil 40 MG Oral Tab Take 1 tablet (40 mg total) by mouth daily. 90 tablet 1    chlorthalidone 25 MG Oral Tab Take 1 tablet (25 mg total) by mouth daily. 30 tablet 3    amLODIPine 5 MG Oral Tab Take 1 tablet (5 mg total) by mouth daily. 30 tablet 2    naproxen 250 MG Oral Tab Take 1 tablet (250 mg total) by mouth 2 (two) times daily with meals. 60 tablet 1    Glucose Blood (ACCU-CHEK JOSE PLUS) In Vitro Strip INJECT 1 DEVICE INTO THE SKIN 3 (THREE) TIMES DAILY AS NEEDED. 300 strip 3    Insulin Degludec (TRESIBA FLEXTOUCH) 100 UNIT/ML Subcutaneous Solution Pen-injector INJECT 48 UNITS/DAY INTO THE SKIN DAILY. 45 mL 3    Hydrocortisone 2.5 % External Lotion Apply 1 Application topically 2 (two) times daily. 118 mL 1    alendronate 35 MG Oral Tab Take 1 tablet (35 mg total) by mouth every 7 days. Takes on mondays 12 tablet 3    ibuprofen 600 MG Oral Tab Take 1 tablet (600 mg total) by mouth every 6 (six) hours as needed for Pain. Always take it with food. 60 tablet 0    metFORMIN HCl 1000 MG Oral Tab TOME JESUSITA TABLETA DOS VECES AL CHRISTINE CON ALIMENTO 180 tablet 1    metoprolol succinate  MG Oral Tablet 24 Hr Take 1 tablet (100 mg total) by mouth daily. 90 tablet 1    pravastatin 20 MG Oral Tab Take 1 tablet (20 mg total) by mouth daily. 90 tablet 3    Psyllium 58.6 % Oral Powder Take 2 tablespoons in 6 oz of water at nightly. 660 g 7    triamcinolone 0.1 % External Ointment 1 Ring every 28 days. FOR 21 DAYS IN, THEN REMOVE FOR 7 DAYS      cyclobenzaprine 5 MG Oral Tab Take 1 tablet (5 mg total) by mouth 3 (three) times daily as needed for Muscle spasms. 30 tablet 0    Insulin Pen Needle (BD PEN NEEDLE AGA 2ND GEN) 32G X 4 MM Does not apply Misc 1 strip by In Vitro route daily. 100 each 3    meclizine 12.5 MG Oral Tab Take 1 tablet (12.5 mg total) by mouth 3 (three) times daily as needed. 40 tablet 1    aspirin ([employer] ASPIRIN ADULT LOW DOSE) 81 MG Oral Chew Tab Chew 1 tablet (81 mg total) by mouth  daily. 90 tablet 1    Accu-Chek Softclix Lancets Does not apply Misc Use three times daily as directed. 200 each 5    Multiple Vitamins-Minerals (CENTRUM SILVER) Oral Tab Take 1 tablet by mouth daily.      Multiple Vitamins-Minerals (PRESERVISION AREDS) Oral Tab Take 2 tablets by mouth daily.         Allergies:  No Known Allergies      ROS:   Review of Systems   Constitutional:  Negative for appetite change and fever.   Eyes:  Negative for visual disturbance.   Respiratory:  Negative for shortness of breath.    Cardiovascular:  Negative for chest pain.   Gastrointestinal:  Negative for abdominal pain, nausea and vomiting.   Musculoskeletal:  Positive for arthralgias and myalgias. Negative for back pain.        Left hip pain   Skin:  Negative for rash.   Neurological:  Negative for dizziness and headaches.       PHYSICAL EXAM:   VS: /73   Pulse 79   Ht 5' 3\" (1.6 m)   Wt 151 lb (68.5 kg)   BMI 26.75 kg/m²     Physical Exam  Vitals reviewed.   Constitutional:       General: She is not in acute distress.     Appearance: Normal appearance.   HENT:      Head: Normocephalic.   Eyes:      Conjunctiva/sclera: Conjunctivae normal.   Cardiovascular:      Rate and Rhythm: Normal rate and regular rhythm.      Heart sounds: Normal heart sounds.   Pulmonary:      Effort: Pulmonary effort is normal.      Breath sounds: Normal breath sounds.   Musculoskeletal:      Cervical back: Neck supple.      Comments: Left hip tender to ROM  + SLR LLE   Skin:     Findings: No rash.   Psychiatric:         Mood and Affect: Mood normal.         LABORATORY RESULTS:      EKG / Spirometry : -     Radiology: No results found.     ASSESSMENT/PLAN:   Assessment   Encounter Diagnoses   Name Primary?    Primary osteoarthritis involving multiple joints Yes    Post-menopausal osteoporosis     Primary hypertension        1. Primary osteoarthritis involving multiple joints  2. Post-menopausal osteoporosis      MEDICATIONS:    latanoprost 0.005 %  Ophthalmic Solution, PONGA JESUSITA GOTA EN LOS DOS OJOS AL ACOSTARSE, Disp: , Rfl:     Olmesartan Medoxomil 40 MG Oral Tab, Take 1 tablet (40 mg total) by mouth daily., Disp: 90 tablet, Rfl: 1    chlorthalidone 25 MG Oral Tab, Take 1 tablet (25 mg total) by mouth daily., Disp: 30 tablet, Rfl: 3    amLODIPine 5 MG Oral Tab, Take 1 tablet (5 mg total) by mouth daily., Disp: 30 tablet, Rfl: 2    naproxen 250 MG Oral Tab, Take 1 tablet (250 mg total) by mouth 2 (two) times daily with meals., Disp: 60 tablet, Rfl: 1    Glucose Blood (ACCU-CHEK JOSE PLUS) In Vitro Strip, INJECT 1 DEVICE INTO THE SKIN 3 (THREE) TIMES DAILY AS NEEDED., Disp: 300 strip, Rfl: 3    Insulin Degludec (TRESIBA FLEXTOUCH) 100 UNIT/ML Subcutaneous Solution Pen-injector, INJECT 48 UNITS/DAY INTO THE SKIN DAILY., Disp: 45 mL, Rfl: 3    Hydrocortisone 2.5 % External Lotion, Apply 1 Application topically 2 (two) times daily., Disp: 118 mL, Rfl: 1    alendronate 35 MG Oral Tab, Take 1 tablet (35 mg total) by mouth every 7 days. Takes on mondays, Disp: 12 tablet, Rfl: 3    ibuprofen 600 MG Oral Tab, Take 1 tablet (600 mg total) by mouth every 6 (six) hours as needed for Pain. Always take it with food., Disp: 60 tablet, Rfl: 0    metFORMIN HCl 1000 MG Oral Tab, TOME JESUSITA TABLETA DOS VECES AL CHRISTINE CON ALIMENTO, Disp: 180 tablet, Rfl: 1    metoprolol succinate  MG Oral Tablet 24 Hr, Take 1 tablet (100 mg total) by mouth daily., Disp: 90 tablet, Rfl: 1    pravastatin 20 MG Oral Tab, Take 1 tablet (20 mg total) by mouth daily., Disp: 90 tablet, Rfl: 3    Psyllium 58.6 % Oral Powder, Take 2 tablespoons in 6 oz of water at nightly., Disp: 660 g, Rfl: 7    triamcinolone 0.1 % External Ointment, 1 Ring every 28 days. FOR 21 DAYS IN, THEN REMOVE FOR 7 DAYS, Disp: , Rfl:     cyclobenzaprine 5 MG Oral Tab, Take 1 tablet (5 mg total) by mouth 3 (three) times daily as needed for Muscle spasms., Disp: 30 tablet, Rfl: 0    Insulin Pen Needle (BD PEN NEEDLE AGA 2ND  GEN) 32G X 4 MM Does not apply Misc, 1 strip by In Vitro route daily., Disp: 100 each, Rfl: 3    meclizine 12.5 MG Oral Tab, Take 1 tablet (12.5 mg total) by mouth 3 (three) times daily as needed., Disp: 40 tablet, Rfl: 1    aspirin ([employer]S ASPIRIN ADULT LOW DOSE) 81 MG Oral Chew Tab, Chew 1 tablet (81 mg total) by mouth daily., Disp: 90 tablet, Rfl: 1    Accu-Chek Softclix Lancets Does not apply Misc, Use three times daily as directed., Disp: 200 each, Rfl: 5    Multiple Vitamins-Minerals (CENTRUM SILVER) Oral Tab, Take 1 tablet by mouth daily., Disp: , Rfl:     Multiple Vitamins-Minerals (PRESERVISION AREDS) Oral Tab, Take 2 tablets by mouth daily., Disp: , Rfl:          Refills Given today:     naproxen 250 MG Oral Tab 60 tablet 1     Sig: Take 1 tablet (250 mg total) by mouth 2 (two) times daily with meals.     REFERRALS: PHYSICAL THERAPY - INTERNAL,       Procedures    PHYSICAL THERAPY - INTERNAL    XR HIP W OR WO PELVIS 2 OR 3 VIEWS, LEFT (CPT=73502)        RECOMMENDATIONS given include: Patient was reassured of  her medical condition and all questions and concerns were answered. Patient was informed to please, call our office with any new or further questions or concerns that may come up in the near future. Notify Dr Choudhary or the [facility] if there is a deterioration or worsening of the medical condition. Also, inform the doctor with any new symptoms or medications' side effects.      FOLLOW-UP: Schedule a follow-up visit in  prn / [facility].          3. Primary hypertension    MEDICATIONS:   Requested Prescriptions     Signed Prescriptions Disp Refills    Olmesartan Medoxomil 40 MG Oral Tab 90 tablet 1     Sig: Take 1 tablet (40 mg total) by mouth daily.    chlorthalidone 25 MG Oral Tab 30 tablet 3     Sig: Take 1 tablet (25 mg total) by mouth daily.    amLODIPine 5 MG Oral Tab 30 tablet 2     Sig: Take 1 tablet (5 mg total) by mouth daily.   Stop Losartan -HCTZ  RECOMMENDATIONS given include: avoid  pseudoephedrine or other stimulants/decongestants in common cold remedies, decrease consumption of alcohol, perform routine monitoring of blood pressure with home blood pressure cuff, exercise, reduction of dietary salt intake, take medication as prescribed, try not to miss doses, smoking cessation, weight loss, and stress reduction.    FOLLOW-UP: Schedule a follow-up visit in 3 weeks.               Orders This Visit:  No orders of the defined types were placed in this encounter.      Meds This Visit:  Requested Prescriptions     Signed Prescriptions Disp Refills    Olmesartan Medoxomil 40 MG Oral Tab 90 tablet 1     Sig: Take 1 tablet (40 mg total) by mouth daily.    chlorthalidone 25 MG Oral Tab 30 tablet 3     Sig: Take 1 tablet (25 mg total) by mouth daily.    amLODIPine 5 MG Oral Tab 30 tablet 2     Sig: Take 1 tablet (5 mg total) by mouth daily.    naproxen 250 MG Oral Tab 60 tablet 1     Sig: Take 1 tablet (250 mg total) by mouth 2 (two) times daily with meals.       Imaging & Referrals:  PHYSICAL THERAPY - INTERNAL         RADHA FLOYD MD

## 2024-02-13 ENCOUNTER — TELEPHONE (OUTPATIENT)
Dept: PHYSICAL THERAPY | Facility: HOSPITAL | Age: 84
End: 2024-02-13

## 2024-03-12 ENCOUNTER — OFFICE VISIT (OUTPATIENT)
Dept: PHYSICAL THERAPY | Age: 84
End: 2024-03-12
Attending: FAMILY MEDICINE
Payer: MEDICARE

## 2024-03-12 DIAGNOSIS — M15.9 PRIMARY OSTEOARTHRITIS INVOLVING MULTIPLE JOINTS: Primary | ICD-10-CM

## 2024-03-12 PROCEDURE — 97110 THERAPEUTIC EXERCISES: CPT

## 2024-03-12 PROCEDURE — 97161 PT EVAL LOW COMPLEX 20 MIN: CPT

## 2024-03-12 NOTE — PROGRESS NOTES
P.TIsaac EVALUATION:   Referring Physician: Dr. Choudhary  Diagnosis: Primary osteoarthritis involving multiple joints (M15.9)       Date of Onset: 2023 Date of Service: 3/12/2024     PATIENT SUMMARY   Noemi Powell is a 83 year old y/o female who presents to therapy today with complaints of low back pain with LLE symptoms  Pt describes pain level 6/10 at least  History of current condition: Patient reports pain is located in her back, left hip, and travels down to her lower leg and toes. Pt reports this pain began in August.  She reports some pain in her right, but not as bad.  She reports her leg feels numb and painful.   Current functional limitations include lying down/sleeping, walking, bending, transfers, stair negotiation, bed mobility  Noemi describes prior level of function independent Pt goals include pain relief  Past medical history was reviewed with Noemi. Patient has a past medical history of Cataract, Diabetes ([facility]), Diverticulosis large intestine w/o perforation or abscess w/o bleeding (12/6/2018), Dupuytren's contracture of left hand (11-18-16), Essential hypertension, High blood pressure, High cholesterol, Hyperlipidemia, Internal hemorrhoids without complication (12/6/2018), MI (myocardial infarction) ([facility]), and Stroke ([facility]). Other co-morbidities include   Social hx: Pt lives alone & states family lives nearby.  She is Ind with ADLs and functional mobility activities.  Language: Pashto;  name: Cierra; [id number]     ASSESSMENT:   Patient presents to PT services due to low back pain with LLE symptoms beginning in August of 2023.  Patient demos decreased lumbar ROM, decreased BLE strength, intermittent altered LE sensation, hypomobility and tenderness of lumbar spine, altered posture, altered gait, and pain.  These impairments are functionally limiting pt's ability to sleep, walk, bend, transfer, lift, bed mobility, and negotiate stairs.  Noemi would benefit from skilled Physical  Therapy to address the above impairments to relieve pain.     Precautions:  DM   OBJECTIVE:   Observation: pt ambulates into clinic independently    Sensation: numbness to LLE    AROM:   Lumbar ROM:  Flx: mod loss   Ext: min loss  Rot: R mod loss, L WNL  Lat flx: R min loss, L WNL    Accessory motion:   PA assessment:  Lumbar spine: hypomobile and tender throughout    Strength/MMT:   Hip flx: R 5/5, L 4+/5  HIp abd: not tested  Hip ext: B 3/5  Knee ext: R 5/5, L 5/5  Knee flx: R 4+/5, L 4+/5  Ankle df: R 5/5, L 4+/5    Posture: slight slouched sitting posture    Gait: pt ambulates into clinic independently; decreased L hip flexion, decreased L foot push off    Today’s Treatment and Response:  Patient education provided on PT eval findings, treatment plan, goals, HEP  Patient received there ex, heat pad to low back in prone x 8 minutes, education on condition and activity modification, posture techniques  Charges: PT Eval, TE 2      Total Timed Treatment: 50 min     Total Treatment Time: 50 min      PT Eval Low Complexity     PLAN OF CARE:    Goals:    1- Pt will be I with maintenance and progression of HEP  2- Pt will demo increase in lumbar ROM to WNL to ease ability for pt to perform transfers and bed mobility  3- Pt will report abolishment of LE symptoms to ease ability for pt to negotiate stairs  4- Pt will demo increase in BLE strength to 4+/5 or better to allow pt to lift objects with less difficulty    Frequency / Duration: Patient will be seen for 2x/week or a total of 10-12 visits over a 90 day period. Treatment will include: Modalities prn, manual therapy, therapeutic exercises, therapeutic activity, and instructions on a home program.     Education or treatment limitation: None  Rehab Potential:good    Patient was advised of these findings, precautions, and treatment options and has agreed to actively participate in planning and for this course of care.    Thank you for your referral. Please co-sign or  sign and return this letter via fax as soon as possible to [phone]. If you have any questions, please contact me at Dept: [phone]    Sincerely,  Electronically signed by therapist: Nhung Wright PT, DPT    Physician's certification required: Yes  I certify the need for these services furnished under this plan of treatment and while under my care.    X___________________________________________________ Date____________________    Certification From: 3/12/2024  To:6/10/2024

## 2024-03-14 ENCOUNTER — OFFICE VISIT (OUTPATIENT)
Dept: PHYSICAL THERAPY | Age: 84
End: 2024-03-14
Attending: FAMILY MEDICINE
Payer: MEDICARE

## 2024-03-14 PROCEDURE — 97110 THERAPEUTIC EXERCISES: CPT

## 2024-03-14 NOTE — PROGRESS NOTES
Diagnosis: Primary osteoarthritis involving multiple joints (M15.9)        Next MD visit: April 2nd, 2024  Fall Risk: standard         Precautions: n/a          Medication Changes since last visit?: No    Subjective: Patient reports she is feeling a little better than the other day.  Patient reports pain is located in her low back, hips, and her entire left leg.   Pt rates this pain as 6-7/10.      Objective:    Language line utilized for Danish   Name: Cierra Walters  ID#: [id number]    3/14/2024:  Notable increased tension L quad muscle     Date: 3/14/2024  Visit #: 2/10 (medicare/medicaid)   HEP   - updated HEP - see pt instructions section   Therapeutic Exercise   - supine R/L LTR 10x ea  - prone R/L knee flexion 10x ea  - prone R/L hip extension 5x ea  - prone R/L quad stretch by clinician 3 x 10 sec holds each  - prone gluteal squeezes 20x 5  - JD 2 minutes   - standing lumbar extension 10x  - seated slouch overcorrect 10x  - standing R/L hip abduction/hip extension 5x ea   Manual Therapy   -   Therapeutic Activity   - education on sitting posture with lumbar roll   Modalities                Assessment: Patient reporting reduced leg symptoms post interventions today.      Plan: continue PT    Charges: Ex 3     Total Timed Treatment: 40 min  Total Treatment Time: 45 min

## 2024-03-19 ENCOUNTER — OFFICE VISIT (OUTPATIENT)
Dept: PHYSICAL THERAPY | Age: 84
End: 2024-03-19
Attending: FAMILY MEDICINE
Payer: MEDICARE

## 2024-03-19 PROCEDURE — 97110 THERAPEUTIC EXERCISES: CPT

## 2024-03-19 NOTE — PROGRESS NOTES
Diagnosis: Primary osteoarthritis involving multiple joints (M15.9)        Next MD visit: April 2nd, 2024  Fall Risk: standard         Precautions: n/a          Medication Changes since last visit?: No    Subjective: Patient reports she is feeling better, but is having right knee pain.  Pt reports only 4/10 right knee pain.  She reports compliance with her home exercises 2x/day.    Objective:    Language line utilized for Arabic   Name: Cierra  ID#: [id number]     Date: 3/19/2024  Visit #: 3/10 (medicare/medicaid) Date: 3/14/2024  Visit #: 2/10 (medicare/medicaid)   HEP    - updated HEP - see pt instructions section   Therapeutic Exercise   - NuStep level 3 (UEs and LEs) x 5 minutes  - supine R/L SKTC 10x ea  - supine R/L LTR 10x ea  - supine B gluteal squeezes 10x 5 sec holds   - hooklying R/L hip abduction/ER with GTB above knees 10x ea  - prone lying 2 minutes   - prone R/L knee flexion 10x ea  - prone R/L hip extension 10x ea  - standing R/L hip abduction 10x ea  - standing R/L hip extension 10x ea  - seated slouch overcorrect 10x  - standing lumbar extension 10x  - repeat standing lumbar extension   - standing B gastroc stretch on slant board level 3 3 x 30 sec holds ea - supine R/L LTR 10x ea  - prone R/L knee flexion 10x ea  - prone R/L hip extension 5x ea  - prone R/L quad stretch by clinician 3 x 10 sec holds each  - prone gluteal squeezes 20x 5  - JD 2 minutes   - standing lumbar extension 10x  - seated slouch overcorrect 10x  - standing R/L hip abduction/hip extension 5x ea   Manual Therapy    -   Therapeutic Activity    - education on sitting posture with lumbar roll   Modalities                  Assessment: Patient demos improved strength noted during performance of all therapeutic exercises.  Increased reps of current interventions and pt able to perform with less difficulty overall.      Plan: continue PT    Charges: Ex 3     Total Timed Treatment: 38 min  Total Treatment Time: 38 min

## 2024-03-21 ENCOUNTER — OFFICE VISIT (OUTPATIENT)
Dept: PHYSICAL THERAPY | Age: 84
End: 2024-03-21
Attending: FAMILY MEDICINE
Payer: MEDICARE

## 2024-03-21 PROCEDURE — 97110 THERAPEUTIC EXERCISES: CPT

## 2024-03-21 NOTE — PROGRESS NOTES
Diagnosis: Primary osteoarthritis involving multiple joints (M15.9)        Next MD visit: April 2nd, 2024  Fall Risk: standard         Precautions: n/a          Medication Changes since last visit?: No    Subjective: Pt reports 5/10 back and left leg pain today.  Pt reports she has a bike in her building similar to the NuStep that she can use.    Objective:    Language line utilized for [place]se   Name: Johny  ID#: [id number]     Date: 3/21/2024  Visit #: 4/10 (medicare/medicaid) Date: 3/19/2024  Visit #: 3/10 (medicare/medicaid) Date: 3/14/2024  Visit #: 2/10 (medicare/medicaid)   HEP     - updated HEP - see pt instructions section   Therapeutic Exercise   - NuStep level 3 (UEs and LEs) x 5 minutes  - supine R/L SKTC 10x ea  - supine R/L LTR 10x ea  - supine bridges 2 x 10  - hooklying R/L hip abduction/ER with GTB above knees 10x ea  - prone lying 2 minutes   - prone R/L knee flexion 10x ea  - prone R/L hip extension 10x ea  - standing R/L hip abduction with YTB above knees 10x ea  - standing R/L hip abduction with YTB above knees 10x ea  - standing lumbar extension 10x  - repeat standing lumbar extension 10x  - repeat standing lumbar extension   - standing B gastroc stretch on slant board level 3 3 x 30 sec holds ea - NuStep level 3 (UEs and LEs) x 5 minutes  - supine R/L SKTC 10x ea  - supine R/L LTR 10x ea  - supine B gluteal squeezes 10x 5 sec holds   - hooklying R/L hip abduction/ER with GTB above knees 10x ea  - prone lying 2 minutes   - prone R/L knee flexion 10x ea  - prone R/L hip extension 10x ea  - standing R/L hip abduction 10x ea  - standing R/L hip extension 10x ea  - seated slouch overcorrect 10x  - standing lumbar extension 10x  - repeat standing lumbar extension   - standing B gastroc stretch on slant board level 3 3 x 30 sec holds ea - supine R/L LTR 10x ea  - prone R/L knee flexion 10x ea  - prone R/L hip extension 5x ea  - prone R/L quad stretch by clinician 3 x 10 sec holds each  - prone  gluteal squeezes 20x 5  - JD 2 minutes   - standing lumbar extension 10x  - seated slouch overcorrect 10x  - standing R/L hip abduction/hip extension 5x ea   Manual Therapy     -   Therapeutic Activity     - education on sitting posture with lumbar roll   Modalities                    Assessment: Pt reporting abolishment of leg pain post interventions.  She reports the exercises have been helping.       Plan: continue PT - one more session and transition to home program    Charges: Ex 2     Total Timed Treatment: 35 min  Total Treatment Time: 35 min

## 2024-03-26 ENCOUNTER — OFFICE VISIT (OUTPATIENT)
Dept: PHYSICAL THERAPY | Age: 84
End: 2024-03-26
Attending: FAMILY MEDICINE
Payer: MEDICARE

## 2024-03-26 PROCEDURE — 97110 THERAPEUTIC EXERCISES: CPT

## 2024-03-26 PROCEDURE — 97140 MANUAL THERAPY 1/> REGIONS: CPT

## 2024-03-26 NOTE — PROGRESS NOTES
Physical Therapy Progress Summary  Diagnosis: Primary osteoarthritis involving multiple joints (M15.9)        Next MD visit: April 2nd, 2024  Fall Risk: standard         Precautions: n/a          Medication Changes since last visit?: No  Assessment: Patient seen for 5 PT sessions due to low back pain with LE symptoms.  Patient demos improved lumbar ROM, improved posture with reduced upper trunk guarding, improved BLE strength, reports reduced frequency and intensity of LE symptoms, reduced pain, and has made progress with PT goals.  She continues to be limited with prolonged ambulation and prolonged standing, but this has improved since start of PT.  She continues to experience return of LLE despite interventions and will follow up with MD for further recommendations.  Pt has been compliant with her home program and was provided comprehensive program to continue at this time.   Subjective: Pt reports 4-5/10 low back and left leg pain today.  She reports she feels this pain mostly when she is standing and walking.  Pt reports she is following up with her MD later today.  She would like to continue home exercises and see what her MD recommends.    Objective:    3/26/2024:   MMT:  Hip flx: R 5/5, L 5/5  Knee ext: R 5/5, L 4+/5  Knee flx: R 5/5, L 4/5  Ankle df: R 5/5, L 5/5    Lumbar ROM:  Flx: Mod loss (slight pain)  Ext: WNL (slight pain)  Rot: R WNL, L min loss  Lat flx: R min loss, L WNL      Date: 3/26/2024  Visit #: 5/10 (medicare/medicaid) Date: 3/21/2024  Visit #: 4/10 (medicare/medicaid) Date: 3/19/2024  Visit #: 3/10 (medicare/medicaid)   HEP   - updated HEP - see pt instructions section     Therapeutic Exercise   X 15 minutes  - reassessment  - seated lumbar extension 10x  - standing lumbar extension 10x  - seated LLE nerve glide/floss 10x - NuStep level 3 (UEs and LEs) x 5 minutes  - supine R/L SKTC 10x ea  - supine R/L LTR 10x ea  - supine bridges 2 x 10  - hooklying R/L hip abduction/ER with GTB above  knees 10x ea  - prone lying 2 minutes   - prone R/L knee flexion 10x ea  - prone R/L hip extension 10x ea  - standing R/L hip abduction with YTB above knees 10x ea  - standing R/L hip abduction with YTB above knees 10x ea  - standing lumbar extension 10x  - repeat standing lumbar extension 10x  - repeat standing lumbar extension   - standing B gastroc stretch on slant board level 3 3 x 30 sec holds ea - NuStep level 3 (UEs and LEs) x 5 minutes  - supine R/L SKTC 10x ea  - supine R/L LTR 10x ea  - supine B gluteal squeezes 10x 5 sec holds   - hooklying R/L hip abduction/ER with GTB above knees 10x ea  - prone lying 2 minutes   - prone R/L knee flexion 10x ea  - prone R/L hip extension 10x ea  - standing R/L hip abduction 10x ea  - standing R/L hip extension 10x ea  - seated slouch overcorrect 10x  - standing lumbar extension 10x  - repeat standing lumbar extension   - standing B gastroc stretch on slant board level 3 3 x 30 sec holds ea   Manual Therapy   X 15 minutes  - foam roll to left piriformis, gluteals, TFL/ITB   - prone lumbar spine mobilizations grade 2-3     Therapeutic Activity        Modalities                  Goals:  1- Pt will be I with maintenance and progression of HEP  - MET  2- Pt will demo increase in lumbar ROM to WNL to ease ability for pt to perform transfers and bed mobility - IMPROVED  3- Pt will report abolishment of LE symptoms to ease ability for pt to negotiate stairs - NOT MET  4- Pt will demo increase in BLE strength to 4+/5 or better to allow pt to lift objects with less difficulty - IMPROVED    Plan: pt to continue with home program and would like to follow up with MD today for further recs    Charges: Ex 1 Man 1     Total Timed Treatment: 30 min  Total Treatment Time: 30 min

## 2024-04-02 ENCOUNTER — OFFICE VISIT (OUTPATIENT)
Dept: FAMILY MEDICINE CLINIC | Facility: CLINIC | Age: 84
End: 2024-04-02

## 2024-04-02 ENCOUNTER — LAB ENCOUNTER (OUTPATIENT)
Dept: LAB | Age: 84
End: 2024-04-02
Attending: FAMILY MEDICINE
Payer: MEDICARE

## 2024-04-02 VITALS
HEART RATE: 76 BPM | DIASTOLIC BLOOD PRESSURE: 64 MMHG | BODY MASS INDEX: 26.47 KG/M2 | WEIGHT: 149.38 LBS | SYSTOLIC BLOOD PRESSURE: 117 MMHG | HEIGHT: 63 IN

## 2024-04-02 DIAGNOSIS — N18.31 TYPE 2 DIABETES MELLITUS WITH STAGE 3A CHRONIC KIDNEY DISEASE, WITH LONG-TERM CURRENT USE OF INSULIN (HCC): ICD-10-CM

## 2024-04-02 DIAGNOSIS — Z79.4 TYPE 2 DIABETES MELLITUS WITH STAGE 3A CHRONIC KIDNEY DISEASE, WITH LONG-TERM CURRENT USE OF INSULIN (HCC): Primary | ICD-10-CM

## 2024-04-02 DIAGNOSIS — Z79.4 TYPE 2 DIABETES MELLITUS WITH STAGE 3A CHRONIC KIDNEY DISEASE, WITH LONG-TERM CURRENT USE OF INSULIN (HCC): ICD-10-CM

## 2024-04-02 DIAGNOSIS — E11.22 TYPE 2 DIABETES MELLITUS WITH STAGE 3A CHRONIC KIDNEY DISEASE, WITH LONG-TERM CURRENT USE OF INSULIN (HCC): Primary | ICD-10-CM

## 2024-04-02 DIAGNOSIS — I10 PRIMARY HYPERTENSION: ICD-10-CM

## 2024-04-02 DIAGNOSIS — E11.22 TYPE 2 DIABETES MELLITUS WITH STAGE 3A CHRONIC KIDNEY DISEASE, WITH LONG-TERM CURRENT USE OF INSULIN (HCC): ICD-10-CM

## 2024-04-02 DIAGNOSIS — N18.31 TYPE 2 DIABETES MELLITUS WITH STAGE 3A CHRONIC KIDNEY DISEASE, WITH LONG-TERM CURRENT USE OF INSULIN (HCC): Primary | ICD-10-CM

## 2024-04-02 LAB
ANION GAP SERPL CALC-SCNC: 8 MMOL/L (ref 0–18)
BUN BLD-MCNC: 15 MG/DL (ref 9–23)
BUN/CREAT SERPL: 16.9 (ref 10–20)
CALCIUM BLD-MCNC: 9.3 MG/DL (ref 8.7–10.4)
CARTRIDGE LOT#: ABNORMAL NUMERIC
CHLORIDE SERPL-SCNC: 105 MMOL/L (ref 98–112)
CO2 SERPL-SCNC: 26 MMOL/L (ref 21–32)
CREAT BLD-MCNC: 0.89 MG/DL
CREAT UR-SCNC: 161 MG/DL
EGFRCR SERPLBLD CKD-EPI 2021: 64 ML/MIN/1.73M2 (ref 60–?)
FASTING STATUS PATIENT QL REPORTED: YES
GLUCOSE BLD-MCNC: 90 MG/DL (ref 70–99)
HEMOGLOBIN A1C: 8 % (ref 4.3–5.6)
MICROALBUMIN UR-MCNC: 0.3 MG/DL
MICROALBUMIN/CREAT 24H UR-RTO: 1.9 UG/MG (ref ?–30)
OSMOLALITY SERPL CALC.SUM OF ELEC: 288 MOSM/KG (ref 275–295)
POTASSIUM SERPL-SCNC: 4.1 MMOL/L (ref 3.5–5.1)
SODIUM SERPL-SCNC: 139 MMOL/L (ref 136–145)

## 2024-04-02 PROCEDURE — 82043 UR ALBUMIN QUANTITATIVE: CPT

## 2024-04-02 PROCEDURE — 82570 ASSAY OF URINE CREATININE: CPT

## 2024-04-02 PROCEDURE — 83036 HEMOGLOBIN GLYCOSYLATED A1C: CPT | Performed by: FAMILY MEDICINE

## 2024-04-02 PROCEDURE — 36415 COLL VENOUS BLD VENIPUNCTURE: CPT

## 2024-04-02 PROCEDURE — 99214 OFFICE O/P EST MOD 30 MIN: CPT | Performed by: FAMILY MEDICINE

## 2024-04-02 PROCEDURE — 80048 BASIC METABOLIC PNL TOTAL CA: CPT

## 2024-04-02 RX ORDER — EMPAGLIFLOZIN 25 MG/1
1 TABLET, FILM COATED ORAL DAILY
Qty: 90 TABLET | Refills: 2 | Status: SHIPPED | OUTPATIENT
Start: 2024-04-02

## 2024-04-02 RX ORDER — INSULIN DEGLUDEC 100 U/ML
INJECTION, SOLUTION SUBCUTANEOUS
Qty: 45 ML | Refills: 3 | Status: SHIPPED | OUTPATIENT
Start: 2024-04-02

## 2024-04-02 NOTE — PROGRESS NOTES
4/2/2024  9:00 AM    Noemi Powell is a 83 year old female.    Chief complaint(s):   Chief Complaint   Patient presents with    Hypertension     F/u     Diabetes     F/u low sugar this morning 66     HPI:     Noemi Powell primary complaint is regarding multiple complaints.     Patient Noemi Powell is a 83 year old female is here to be evaluated for type 2 diabetes.  Specifically, female has type 2, insulin requiring diabetes. Compliance with treatment has been fair.  Patient's diabetes was first diagnosed 27 years ago.  Patient follows a 2000 calorie ADA diet.  Patient report experiencing the following diabetes related symptoms; Negative for polyuria, Negative for polydipsia, Negative for blurred vision.  Depression symptoms include none.  Tobacco screen: none  smoker.  Current meds include :  oral hypoglycemic include: Metformin 1000 mg BID  insulin/injectable : Tresiba 48 unit Q am .  Hypoglycemia severity is not applicable.she reports home blood glucose readings have been 120 (#s) and believes  having good glucose control.  Most recent lab results include glycohemoglobin 7.2 %, microalbuminuria have been -.  In regard to preventative care, her last ophthalmology exam was in < 1 months ago.  Opthalmic evaluation have shown sanjay pathology.  Concurrent relative health problems include HTN.  Also complaining of burning legs.         Noemi Tijerina a 83 year old female presents with hypertension.  This was first diagnosed more than 10 years ago.  Current nonpharmacologic treatment includes low sodium diet, exercise, and meditation.  Her current cardiac medication(s) regimen includes: Metoprolol  mg, Olmesartan 40 mg, Chlorthalidone 25 mg . Amlodipine 5 mg placed on hold.  She has kept a blood pressure diary, but states that her blood pressures have been fair controll.  she is tolerating her medication(s)  well without side effects.  Compliance with treatment has been good.        HISTORY:  Past  Medical History:   Diagnosis Date    Cataract     Diabetes (HCC)     Diverticulosis large intestine w/o perforation or abscess w/o bleeding 12/6/2018    Dupuytren's contracture of left hand 11-18-16     LMF [facility]    Essential hypertension     High blood pressure     High cholesterol     Hyperlipidemia     Internal hemorrhoids without complication 12/6/2018    MI (myocardial infarction) (HCC)     Stroke (HCC)       Past Surgical History:   Procedure Laterality Date    APPENDECTOMY      CATARACT      CHOLECYSTECTOMY      COLONOSCOPY      COLONOSCOPY N/A 12/6/2018    Procedure: COLONOSCOPY;  Surgeon: Maricruz Benavides MD;  Location: [facility] ENDOSCOPY    PALMAR FASCIECTOMY Left 11-18-16    L Dupuytren's  LMF [facility]    TUBAL LIGATION  1977      Family History   Problem Relation Age of Onset    Cancer Sister         skin      Social History:   Social History     Socioeconomic History    Marital status: Legally    Tobacco Use    Smoking status: Never    Smokeless tobacco: Never   Vaping Use    Vaping Use: Never used   Substance and Sexual Activity    Alcohol use: No     Alcohol/week: 0.0 standard drinks of alcohol    Drug use: Never   Other Topics Concern    Caffeine Concern Yes     Comment: coffee        Immunizations:   Immunization History   Administered Date(s) Administered    Covid-19 Vaccine Pfizer 30 mcg/0.3 ml 02/17/2021, 03/10/2021, 11/11/2021    Covid-19 Vaccine Pfizer Rich-Sucrose 30 mcg/0.3 ml 06/09/2022    FLU VAC High Dose 65 YRS & Older PRSV Free (32209) 10/20/2017, 09/11/2018, 09/20/2023    FLUAD High Dose 65 yr and older (68258) 08/16/2019, 10/02/2020    FLULAVAL 6 months & older 0.5 ml Prefilled syringe (61674) 10/14/2022    FLUZONE 6 months and older PFS 0.5 ml (67985) 09/12/2016    Fluzone Vaccine Medicare () 10/15/2017, 10/20/2017, 09/11/2018    HIB (3 Dose) 04/09/2016    HIGH DOSE FLU 65 YRS AND OLDER PRSV FREE SINGLE D (17123) FLU CLINIC 09/12/2016, 10/16/2017, 09/24/2021     Haemophilus B Polysac Conj Vac Im 04/09/2016    Influenza 10/23/2015    Meningococcal-Menactra 04/09/2016    Pfizer Covid-19 Vaccine 30mcg/0.3ml 12yrs+ ([id number]) 12/14/2023    Pneumococcal (Prevnar 13) 08/29/2017    Pneumococcal Conjugate PCV20 11/14/2022    Pneumovax 23 11/18/2005, 04/09/2016       Medications (Active prior to today's visit):  Current Outpatient Medications   Medication Sig Dispense Refill    diclofenac 1 % External Gel Apply 4 g topically 4 (four) times daily. Apply to affected area(s). 60 g 2    insulin degludec (TRESIBA FLEXTOUCH) 100 UNIT/ML Subcutaneous Solution Pen-injector INJECT 50 UNITS/DAY INTO THE SKIN DAILY. 45 mL 3    Empagliflozin (JARDIANCE) 25 MG Oral Tab Take 1 tablet by mouth daily. 90 tablet 2    latanoprost 0.005 % Ophthalmic Solution PONGA JESUSITA GOTA EN LOS DOS OJOS AL ACOSTARSE      Glucose Blood (ACCU-CHEK JOSE PLUS) In Vitro Strip INJECT 1 DEVICE INTO THE SKIN 3 (THREE) TIMES DAILY AS NEEDED. 300 strip 3    Hydrocortisone 2.5 % External Lotion Apply 1 Application topically 2 (two) times daily. 118 mL 1    alendronate 35 MG Oral Tab Take 1 tablet (35 mg total) by mouth every 7 days. Takes on mondays 12 tablet 3    metFORMIN HCl 1000 MG Oral Tab TOME JESUSITA TABLETA DOS VECES AL CHRISTINE CON ALIMENTO 180 tablet 1    metoprolol succinate  MG Oral Tablet 24 Hr Take 1 tablet (100 mg total) by mouth daily. 90 tablet 1    pravastatin 20 MG Oral Tab Take 1 tablet (20 mg total) by mouth daily. 90 tablet 3    Psyllium 58.6 % Oral Powder Take 2 tablespoons in 6 oz of water at nightly. 660 g 7    triamcinolone 0.1 % External Ointment 1 Ring every 28 days. FOR 21 DAYS IN, THEN REMOVE FOR 7 DAYS      Insulin Pen Needle (BD PEN NEEDLE AGA 2ND GEN) 32G X 4 MM Does not apply Misc 1 strip by In Vitro route daily. 100 each 3    meclizine 12.5 MG Oral Tab Take 1 tablet (12.5 mg total) by mouth 3 (three) times daily as needed. 40 tablet 1    aspirin ([employer] ASPIRIN ADULT LOW DOSE) 81 MG Oral Chew  Tab Chew 1 tablet (81 mg total) by mouth daily. 90 tablet 1    Accu-Chek Softclix Lancets Does not apply Misc Use three times daily as directed. 200 each 5    Multiple Vitamins-Minerals (CENTRUM SILVER) Oral Tab Take 1 tablet by mouth daily.      Multiple Vitamins-Minerals (PRESERVISION AREDS) Oral Tab Take 2 tablets by mouth daily.      Olmesartan Medoxomil 40 MG Oral Tab Take 1 tablet (40 mg total) by mouth daily. (Patient not taking: Reported on 4/2/2024) 90 tablet 1    chlorthalidone 25 MG Oral Tab Take 1 tablet (25 mg total) by mouth daily. (Patient not taking: Reported on 4/2/2024) 30 tablet 3    amLODIPine 5 MG Oral Tab Take 1 tablet (5 mg total) by mouth daily. (Patient not taking: Reported on 4/2/2024) 30 tablet 2       Allergies:  No Known Allergies      ROS:   Review of Systems   Constitutional:  Negative for appetite change and fever.   Eyes:  Negative for visual disturbance.   Respiratory:  Negative for shortness of breath.    Cardiovascular:  Negative for chest pain.   Gastrointestinal:  Negative for abdominal pain, nausea and vomiting.   Endocrine: Negative for polydipsia and polyuria.   Musculoskeletal:  Positive for arthralgias. Negative for back pain.   Skin:  Negative for rash.   Neurological:  Negative for dizziness and headaches.       PHYSICAL EXAM:   VS: /64   Pulse 76   Ht 5' 3\" (1.6 m)   Wt 149 lb 6.4 oz (67.8 kg)   BMI 26.47 kg/m²     Physical Exam  Vitals reviewed.   Constitutional:       General: She is not in acute distress.     Appearance: Normal appearance.   HENT:      Head: Normocephalic.   Eyes:      Conjunctiva/sclera: Conjunctivae normal.   Cardiovascular:      Rate and Rhythm: Normal rate and regular rhythm.      Heart sounds: Normal heart sounds.   Pulmonary:      Effort: Pulmonary effort is normal.      Breath sounds: Normal breath sounds.   Musculoskeletal:      Cervical back: Neck supple.   Feet:      Right foot:      Protective Sensation: 10 sites tested.  10 sites  sensed.      Left foot:      Protective Sensation: 10 sites tested.  10 sites sensed.   Skin:     Findings: No rash.   Psychiatric:         Mood and Affect: Mood normal.         LABORATORY RESULTS:      Results for orders placed or performed in visit on 04/02/24   POC Glycohemoglobin [51384]   Result Value Ref Range    HEMOGLOBIN A1C 8.0 (A) 4.3 - 5.6 %    Cartridge Lot# [id number] Numeric    Cartridge Expiration Date 11/30/2025 Date     EKG / Spirometry : -     Radiology: No results found.     ASSESSMENT/PLAN:   Assessment   Encounter Diagnoses   Name Primary?    Type 2 diabetes mellitus with stage 3a chronic kidney disease, with long-term current use of insulin (HCC) Yes    Primary hypertension        1. Type 2 diabetes mellitus with stage 3a chronic kidney disease, with long-term current use of insulin (HCC)    DIABETES A&P    LABORATORY & ORDERS: Blood test(s) ordered today ;   Orders Placed This Encounter   Procedures    POC Glycohemoglobin [69885]    Basic Metabolic Panel (8)    Microalb/Creat Ratio, Random Urine     Additional orders include: increased insulin to 50u and added Jardiance 25 mg  MEDICATIONS:    diclofenac 1 % External Gel, Apply 4 g topically 4 (four) times daily. Apply to affected area(s)., Disp: 60 g, Rfl: 2    insulin degludec (TRESIBA FLEXTOUCH) 100 UNIT/ML Subcutaneous Solution Pen-injector, INJECT 50 UNITS/DAY INTO THE SKIN DAILY., Disp: 45 mL, Rfl: 3    Empagliflozin (JARDIANCE) 25 MG Oral Tab, Take 1 tablet by mouth daily., Disp: 90 tablet, Rfl: 2    latanoprost 0.005 % Ophthalmic Solution, PONGA JESUSITA GOTA EN LOS DOS OJOS AL ACOSTARSE, Disp: , Rfl:     Glucose Blood (ACCU-CHEK JOSE PLUS) In Vitro Strip, INJECT 1 DEVICE INTO THE SKIN 3 (THREE) TIMES DAILY AS NEEDED., Disp: 300 strip, Rfl: 3    Hydrocortisone 2.5 % External Lotion, Apply 1 Application topically 2 (two) times daily., Disp: 118 mL, Rfl: 1    alendronate 35 MG Oral Tab, Take 1 tablet (35 mg total) by mouth every 7 days. Takes  on mondays, Disp: 12 tablet, Rfl: 3    metFORMIN HCl 1000 MG Oral Tab, TOME JESUSITA TABLETA DOS VECES AL CHRISTINE CON ALIMENTO, Disp: 180 tablet, Rfl: 1    metoprolol succinate  MG Oral Tablet 24 Hr, Take 1 tablet (100 mg total) by mouth daily., Disp: 90 tablet, Rfl: 1    pravastatin 20 MG Oral Tab, Take 1 tablet (20 mg total) by mouth daily., Disp: 90 tablet, Rfl: 3    Psyllium 58.6 % Oral Powder, Take 2 tablespoons in 6 oz of water at nightly., Disp: 660 g, Rfl: 7    triamcinolone 0.1 % External Ointment, 1 Ring every 28 days. FOR 21 DAYS IN, THEN REMOVE FOR 7 DAYS, Disp: , Rfl:     Insulin Pen Needle (BD PEN NEEDLE AGA 2ND GEN) 32G X 4 MM Does not apply Misc, 1 strip by In Vitro route daily., Disp: 100 each, Rfl: 3    meclizine 12.5 MG Oral Tab, Take 1 tablet (12.5 mg total) by mouth 3 (three) times daily as needed., Disp: 40 tablet, Rfl: 1    aspirin ([employer] ASPIRIN ADULT LOW DOSE) 81 MG Oral Chew Tab, Chew 1 tablet (81 mg total) by mouth daily., Disp: 90 tablet, Rfl: 1    Accu-Chek Softclix Lancets Does not apply Misc, Use three times daily as directed., Disp: 200 each, Rfl: 5    Multiple Vitamins-Minerals (CENTRUM SILVER) Oral Tab, Take 1 tablet by mouth daily., Disp: , Rfl:     Multiple Vitamins-Minerals (PRESERVISION AREDS) Oral Tab, Take 2 tablets by mouth daily., Disp: , Rfl:     Olmesartan Medoxomil 40 MG Oral Tab, Take 1 tablet (40 mg total) by mouth daily. (Patient not taking: Reported on 4/2/2024), Disp: 90 tablet, Rfl: 1    chlorthalidone 25 MG Oral Tab, Take 1 tablet (25 mg total) by mouth daily. (Patient not taking: Reported on 4/2/2024), Disp: 30 tablet, Rfl: 3    amLODIPine 5 MG Oral Tab, Take 1 tablet (5 mg total) by mouth daily. (Patient not taking: Reported on 4/2/2024), Disp: 30 tablet, Rfl: 2.  Requested Prescriptions     Signed Prescriptions Disp Refills    diclofenac 1 % External Gel 60 g 2     Sig: Apply 4 g topically 4 (four) times daily. Apply to affected area(s).    insulin degludec (TRESIBA  FLEXTOUCH) 100 UNIT/ML Subcutaneous Solution Pen-injector 45 mL 3     Sig: INJECT 50 UNITS/DAY INTO THE SKIN DAILY.    Empagliflozin (JARDIANCE) 25 MG Oral Tab 90 tablet 2     Sig: Take 1 tablet by mouth daily.      REFERRALS:       Procedures    POC Glycohemoglobin [90861]    Basic Metabolic Panel (8)    Microalb/Creat Ratio, Random Urine     RECOMMENDATIONS: instructed in use of glucometer ( check fasting glucose multiple times a day), return for training in administering insulin injections, adherence to an 1800 calorie ADA diet,  5 pound weight loss, a graduated exercise program, HgbA1C level checked quarterly, daily foot self-inspection, need for yearly flu shots, and avoid all sodas, juices, candy, chocolates, cakes, ice cream, etc.      FOLLOW-UP: Schedule a follow-up visit in 6 weeks.       COUNSELING: The patient was counseled concerning the relationship between diabetes control and macrovascular disease including cardiovascular, cerebrovascular and peripheral vascular disease. The patient was counseled concerning the relationship between diabetes control and retinopathy, nephropathy, and neuropathy. Advised as to the targets of pre-meal glucoses ( mg/dl) and post meal glucoses (<140-160 mg/dl) Home glucose testing discussed. The A1c target of <7% according to ADA and <6.5% according to AACE were discussed.        2. Primary hypertension    MEDICATIONS: Metoprolol 100 mg, Olmesartan 40 mg, Chlorthalidone 25 mg     LABORATORY & ORDERS:   Orders Placed This Encounter   Procedures    POC Glycohemoglobin [[id number]]    Basic Metabolic Panel (8)    Microalb/Creat Ratio, Random Urine     RECOMMENDATIONS given include: avoid pseudoephedrine or other stimulants/decongestants in common cold remedies, decrease consumption of alcohol, perform routine monitoring of blood pressure with home blood pressure cuff, exercise, reduction of dietary salt intake, take medication as prescribed, try not to miss doses, smoking  cessation, weight loss, and stress reduction.    FOLLOW-UP: Schedule a follow-up visit in 6 weeks.               Orders This Visit:  Orders Placed This Encounter   Procedures    POC Glycohemoglobin [69702]    Basic Metabolic Panel (8)    Microalb/Creat Ratio, Random Urine       Meds This Visit:  Requested Prescriptions     Signed Prescriptions Disp Refills    diclofenac 1 % External Gel 60 g 2     Sig: Apply 4 g topically 4 (four) times daily. Apply to affected area(s).    insulin degludec (TRESIBA FLEXTOUCH) 100 UNIT/ML Subcutaneous Solution Pen-injector 45 mL 3     Sig: INJECT 50 UNITS/DAY INTO THE SKIN DAILY.    Empagliflozin (JARDIANCE) 25 MG Oral Tab 90 tablet 2     Sig: Take 1 tablet by mouth daily.       Imaging & Referrals:  None         RADHA FLOYD MD

## 2024-04-10 DIAGNOSIS — I10 ESSENTIAL (PRIMARY) HYPERTENSION: ICD-10-CM

## 2024-04-11 RX ORDER — METOPROLOL SUCCINATE 100 MG/1
100 TABLET, EXTENDED RELEASE ORAL DAILY
Qty: 90 TABLET | Refills: 3 | Status: SHIPPED | OUTPATIENT
Start: 2024-04-11

## 2024-04-11 NOTE — TELEPHONE ENCOUNTER
Refill passed per [facility] protocol.      Requested Prescriptions   Pending Prescriptions Disp Refills    METOPROLOL SUCCINATE  MG Oral Tablet 24 Hr [Pharmacy Med Name: METOPROLOL SUCC  MG TAB] 90 tablet 1     Sig: LAUREL STANFORD       Hypertension Medications Protocol Passed - 4/10/2024  2:48 PM        Passed - CMP or BMP in past 12 months        Passed - Last BP reading less than 140/90     BP Readings from Last 1 Encounters:   04/02/24 117/64               Passed - In person appointment or virtual visit in the past 12 mos or appointment in next 3 mos     Recent Outpatient Visits              1 week ago Type 2 diabetes mellitus with stage 3a chronic kidney disease, with long-term current use of insulin ([facility])    [facility], Jose Gomez MD    Office Visit    2 weeks ago     [place]  [facility] [place] Nhung Wright, PT    Office Visit    3 weeks ago     [place]  [facility] [place] N[place] Wright, PT    Office Visit    3 weeks ago     [place]  [facility] [place] N[place] Wright, PT    Office Visit    4 weeks ago     [place]  [facility] [place] Nhung Wright, PT    Office Visit          Future Appointments         Provider Department Appt Notes    In 1 month Jose Choudhary MD [facility] 6wk follow up                    Passed - EGFRCR or GFRNAA > 50     GFR Evaluation  EGFRCR: 64 , resulted on 4/2/2024                Future Appointments         Provider Department Appt Notes    In 1 month Jose Choudhary MD [facility] 6wk follow up            Recent Outpatient Visits              1 week ago Type 2 diabetes mellitus with stage 3a chronic kidney disease, with long-term current use of insulin [facility]    Office Visit    2 weeks ago     [place]   [facility] [place] Nhung Wright, PT    Office Visit    3 weeks ago     [place]  [facility] [place], PT    Office Visit    3 weeks ago     [place]  [facility] [place], PT    Office Visit    4 weeks ago     [place]  [facility] [place], PT    Office Visit

## 2024-04-21 DIAGNOSIS — E11.22 TYPE 2 DIABETES MELLITUS WITH DIABETIC CHRONIC KIDNEY DISEASE (HCC): ICD-10-CM

## 2024-04-23 NOTE — TELEPHONE ENCOUNTER
Please review. Protocol Failed; No Protocol    Requested Prescriptions   Pending Prescriptions Disp Refills    METFORMIN HCL 1000 MG Oral Tab [Pharmacy Med Name: METFORMIN HCL 1,000 MG TABLET] 180 tablet 1     Sig: TOME JESUSITA TABLETA 2 VECES AL CHRISTINE CON LAS COMIDAS       Diabetes Medication Protocol Failed - 4/21/2024  6:59 AM        Failed - Last A1C < 7.5 and within past 6 months     Lab Results   Component Value Date    A1C 8.0 (A) 04/02/2024             Passed - In person appointment or virtual visit in the past 6 mos or appointment in next 3 mos     Recent Outpatient Visits              3 weeks ago Type 2 diabetes mellitus with stage 3a chronic kidney disease, with long-term current use of insulin ([facility])    [facility] Jose Choudhary MD    Office Visit    4 weeks ago     [place]  [facility] [place], Amy, PT    Office Visit    1 month ago     [place]  [facility]    Office Visit    1 month ago     [place]  [facility]    Office Visit    1 month ago     [place]  [facility] Visit          Future Appointments         Provider Department Appt Notes    In 3 weeks Jose Choudhary MD [facility] 6wk follow up                    Passed - Microalbumin procedure in past 12 months or taking ACE/ARB        Passed - EGFRCR or GFRNAA > 50     GFR Evaluation  EGFRCR: 64 , resulted on 4/2/2024          Passed - GFR in the past 12 months               Future Appointments         Provider Department Appt Notes    In 3 weeks Jose Choudhary MD [facility] 6wk follow up          Recent Outpatient Visits              3 weeks ago Type 2 diabetes mellitus with stage 3a chronic kidney disease, with long-term current use of insulin ([facility])    [facility]  Jose Gomez MD    Office Visit    4 weeks ago     [place]  [facility] [place], PT    Office Visit    1 month ago     [place]  [facility] [place], PT    Office Visit    1 month ago     [place]  [facility] [place], PT    Office Visit    1 month ago     [place]  [facility] [place], PT    Office Visit

## 2024-05-10 RX ORDER — CHLORTHALIDONE 25 MG/1
25 TABLET ORAL DAILY
Qty: 90 TABLET | Refills: 3 | Status: SHIPPED | OUTPATIENT
Start: 2024-05-10

## 2024-05-10 NOTE — TELEPHONE ENCOUNTER
Refill passed per [facility] protocol.  Requested Prescriptions   Pending Prescriptions Disp Refills    CHLORTHALIDONE 25 MG Oral Tab [Pharmacy Med Name: CHLORTHALIDONE 25 MG TABLET] 90 tablet 1     Sig: Take 1 tablet (25 mg total) by mouth daily.       Hypertension Medications Protocol Passed - 5/10/2024 12:08 AM        Passed - CMP or BMP in past 12 months        Passed - Last BP reading less than 140/90     BP Readings from Last 1 Encounters:   04/02/24 117/64               Passed - In person appointment or virtual visit in the past 12 mos or appointment in next 3 mos     Recent Outpatient Visits              1 month ago Type 2 diabetes mellitus with stage 3a chronic kidney disease, with long-term current use of insulin ([facility])    [facility], Jose Gomez MD    Office Visit    1 month ago     [place]  [facility] [place], [place]    Office Visit    1 month ago     [place]  [facility] [place] Nhung Wright, PT    Office Visit    1 month ago     [place]  [facility] [place], PT    Office Visit    1 month ago     [place]  [facility] [place], [place]    Office Visit          Future Appointments         Provider Department Appt Notes    In 4 days Jose Choudhary MD [facility] 6wk follow up                    Passed - EGFRCR or GFRNAA > 50     GFR Evaluation  EGFRCR: 64 , resulted on 4/2/2024             Recent Outpatient Visits              1 month ago Type 2 diabetes mellitus with stage 3a chronic kidney disease, with long-term current use of insulin ([facility]    Office Visit    1 month ago     [place]  [facility] [place] Nhung Wright, PT    Office Visit    1 month ago     [place]  [facility] [place] Nhung Wright, PT    Office Visit    1 month ago     [place]  [facility] in [place], PT    Office Visit    1 month ago     [place]  [facility] [place], PT    Office Visit          Future Appointments         Provider Department Appt Notes    In 4 days Jose Choudhary MD [facility] 6wk follow up

## 2024-05-13 ENCOUNTER — MED REC SCAN ONLY (OUTPATIENT)
Dept: FAMILY MEDICINE CLINIC | Facility: CLINIC | Age: 84
End: 2024-05-13

## 2024-05-13 RX ORDER — AMLODIPINE BESYLATE 5 MG/1
5 TABLET ORAL DAILY
Qty: 90 TABLET | Refills: 0 | OUTPATIENT
Start: 2024-05-13

## 2024-05-14 ENCOUNTER — OFFICE VISIT (OUTPATIENT)
Dept: FAMILY MEDICINE CLINIC | Facility: CLINIC | Age: 84
End: 2024-05-14

## 2024-05-14 VITALS
DIASTOLIC BLOOD PRESSURE: 74 MMHG | HEIGHT: 63 IN | WEIGHT: 150 LBS | SYSTOLIC BLOOD PRESSURE: 127 MMHG | HEART RATE: 78 BPM | BODY MASS INDEX: 26.58 KG/M2

## 2024-05-14 DIAGNOSIS — M54.32 SCIATICA OF LEFT SIDE: ICD-10-CM

## 2024-05-14 DIAGNOSIS — E11.9 TYPE 2 DIABETES MELLITUS WITHOUT COMPLICATION, WITH LONG-TERM CURRENT USE OF INSULIN (HCC): Primary | ICD-10-CM

## 2024-05-14 DIAGNOSIS — Z79.4 TYPE 2 DIABETES MELLITUS WITHOUT COMPLICATION, WITH LONG-TERM CURRENT USE OF INSULIN (HCC): Primary | ICD-10-CM

## 2024-05-14 DIAGNOSIS — M81.0 AGE-RELATED OSTEOPOROSIS WITHOUT CURRENT PATHOLOGICAL FRACTURE: ICD-10-CM

## 2024-05-14 DIAGNOSIS — M15.9 PRIMARY OSTEOARTHRITIS INVOLVING MULTIPLE JOINTS: ICD-10-CM

## 2024-05-14 PROCEDURE — 99213 OFFICE O/P EST LOW 20 MIN: CPT | Performed by: FAMILY MEDICINE

## 2024-05-14 RX ORDER — NAPROXEN 250 MG/1
250 TABLET ORAL 2 TIMES DAILY WITH MEALS
Qty: 60 TABLET | Refills: 1 | Status: SHIPPED | OUTPATIENT
Start: 2024-05-14

## 2024-05-14 RX ORDER — ALENDRONATE SODIUM 70 MG/1
70 TABLET ORAL
Qty: 12 TABLET | Refills: 3 | Status: SHIPPED | OUTPATIENT
Start: 2024-05-14

## 2024-05-14 NOTE — PROGRESS NOTES
5/14/2024  11:47 AM    Noemi Powell is a 83 year old female.    Chief complaint(s):   Chief Complaint   Patient presents with    Follow - Up    Back Pain     HPI:     Noemi Powell primary complaint is regarding back pain, DM.     Patient Noemi Powell is a 83 year old female is here to be evaluated for type 2 diabetes.  Specifically, female has type 2, insulin requiring diabetes. Compliance with treatment has been fair.  Patient's diabetes was first diagnosed 27 years ago.  Patient follows a 2000 calorie ADA diet.  Patient report experiencing the following diabetes related symptoms; Negative for polyuria, Negative for polydipsia, Negative for blurred vision.  Depression symptoms include none.  Tobacco screen: none  smoker.  Current meds include :  oral hypoglycemic include: Metformin 1000 mg BID  insulin/injectable : Tresiba 50 unit Q am .  Hypoglycemia severity is not applicable.she reports home blood glucose readings have been 90s-120 -159(#s) and believes  having good glucose control.  Most recent lab results include glycohemoglobin 8.0 %, microalbuminuria have been -.  In regard to preventative care, her last ophthalmology exam was in < 1 months ago.  Opthalmic evaluation have shown sanjay pathology.  Concurrent relative health problems include HTN.  Also complaining of burning legs.     Noemi Powell is a 83 year old female present  complaining of Low back pain.  Pain is located at left low back. Pain started over  10 months.  Pain is described as aching, cramping, shooting. Severity is moderate, 6-10 on a scale of 1-10. The pain radiates to left leg. Pain was precipitated by unknown.  Pain is worsened by bending, twisting, lying down. Gets relief of back pain with medication: NSAID: Tyelnol . Prior back pain hx: recurrent self limited episodes of low back pain in the past.   Associated symptoms include;   positive numbness or tingling sensation on her lower extremities, negative hematuria,  negative fever, negative lower extremities weakness. Cayla has osteoporosis and only taking Alendronate 35 mg a week.       HISTORY:  Past Medical History:    Cataract    Diabetes (HCC)    Diverticulosis large intestine w/o perforation or abscess w/o bleeding    Dupuytren's contracture of left hand     LMF Ray    Essential hypertension    High blood pressure    High cholesterol    Hyperlipidemia    Internal hemorrhoids without complication    MI (myocardial infarction) (HCC)    Stroke (HCC)      Past Surgical History:   Procedure Laterality Date    Appendectomy      Cataract      Cholecystectomy      Colonoscopy      Colonoscopy N/A 12/6/2018    Procedure: COLONOSCOPY;  Surgeon: Maricruz Benavides MD;  Location: [facility] ENDOSCOPY    Palmar fasciectomy Left 11-18-16    L Dupuytren's  LMF Ray    Tubal ligation  1977      Family History   Problem Relation Age of Onset    Cancer Sister         skin      Social History:   Social History     Socioeconomic History    Marital status: Legally    Tobacco Use    Smoking status: Never    Smokeless tobacco: Never   Vaping Use    Vaping status: Never Used   Substance and Sexual Activity    Alcohol use: No     Alcohol/week: 0.0 standard drinks of alcohol    Drug use: Never   Other Topics Concern    Caffeine Concern Yes     Comment: coffee        Immunizations:   Immunization History   Administered Date(s) Administered    Covid-19 Vaccine Pfizer 30 mcg/0.3 ml 02/17/2021, 03/10/2021, 11/11/2021    Covid-19 Vaccine Pfizer Rich-Sucrose 30 mcg/0.3 ml 06/09/2022    FLU VAC High Dose 65 YRS & Older PRSV Free (14285) 10/20/2017, 09/11/2018, 09/20/2023    FLUAD High Dose 65 yr and older (03363) 08/16/2019, 10/02/2020    FLULAVAL 6 months & older 0.5 ml Prefilled syringe (26318) 10/14/2022    FLUZONE 6 months and older PFS 0.5 ml (44011) 09/12/2016    Fluzone Vaccine Medicare () 10/15/2017, 10/20/2017, 09/11/2018    HIB 04/09/2016    HIGH DOSE FLU 65 YRS AND OLDER PRSV  FREE SINGLE D (39537) FLU CLINIC 09/12/2016, 10/16/2017, 09/24/2021    Haemophilus B Polysac Conj Vac Im 04/09/2016    Influenza 10/23/2015    Meningococcal-Menactra 04/09/2016    Pfizer Covid-19 Vaccine 30mcg/0.3ml 12yrs+ (9074-1058) 12/14/2023    Pneumococcal (Prevnar 13) 08/29/2017    Pneumococcal Conjugate PCV20 11/14/2022    Pneumovax 23 11/18/2005, 04/09/2016       Medications (Active prior to today's visit):  Current Outpatient Medications   Medication Sig Dispense Refill    naproxen 250 MG Oral Tab Take 1 tablet (250 mg total) by mouth 2 (two) times daily with meals. 60 tablet 1    alendronate 70 MG Oral Tab Take 1 tablet (70 mg total) by mouth every 7 days. 12 tablet 3    chlorthalidone 25 MG Oral Tab Take 1 tablet (25 mg total) by mouth daily. 90 tablet 3    metFORMIN HCl 1000 MG Oral Tab TOME JESUSITA TABLETA 2 VECES AL CHRISTINE CON LAS COMIDAS 180 tablet 3    metoprolol succinate  MG Oral Tablet 24 Hr Take 1 tablet (100 mg total) by mouth daily. 90 tablet 3    insulin degludec (TRESIBA FLEXTOUCH) 100 UNIT/ML Subcutaneous Solution Pen-injector INJECT 50 UNITS/DAY INTO THE SKIN DAILY. 45 mL 3    Olmesartan Medoxomil 40 MG Oral Tab Take 1 tablet (40 mg total) by mouth daily. 90 tablet 1    pravastatin 20 MG Oral Tab Take 1 tablet (20 mg total) by mouth daily. 90 tablet 3    Insulin Pen Needle (BD PEN NEEDLE AGA 2ND GEN) 32G X 4 MM Does not apply Misc 1 strip by In Vitro route daily. 100 each 3    aspirin ([facility] ASPIRIN ADULT LOW DOSE) 81 MG Oral Chew Tab Chew 1 tablet (81 mg total) by mouth daily. 90 tablet 1    Accu-Chek Softclix Lancets Does not apply Misc Use three times daily as directed. 200 each 5    diclofenac 1 % External Gel Apply 4 g topically 4 (four) times daily. Apply to affected area(s). (Patient not taking: Reported on 5/14/2024) 60 g 2    Empagliflozin (JARDIANCE) 25 MG Oral Tab Take 1 tablet by mouth daily. (Patient not taking: Reported on 5/14/2024) 90 tablet 2    latanoprost 0.005 % Ophthalmic  Solution PONGA JESUSITA GOTA EN LOS DOS OJOS AL ACOSTARSE (Patient not taking: Reported on 5/14/2024)      amLODIPine 5 MG Oral Tab Take 1 tablet (5 mg total) by mouth daily. (Patient not taking: Reported on 4/2/2024) 30 tablet 2    Glucose Blood (ACCU-CHEK JOSE PLUS) In Vitro Strip INJECT 1 DEVICE INTO THE SKIN 3 (THREE) TIMES DAILY AS NEEDED. (Patient not taking: Reported on 5/14/2024) 300 strip 3    Hydrocortisone 2.5 % External Lotion Apply 1 Application topically 2 (two) times daily. (Patient not taking: Reported on 5/14/2024) 118 mL 1    Psyllium 58.6 % Oral Powder Take 2 tablespoons in 6 oz of water at nightly. (Patient not taking: Reported on 5/14/2024) 660 g 7    triamcinolone 0.1 % External Ointment 1 Ring every 28 days. FOR 21 DAYS IN, THEN REMOVE FOR 7 DAYS (Patient not taking: Reported on 5/14/2024)      meclizine 12.5 MG Oral Tab Take 1 tablet (12.5 mg total) by mouth 3 (three) times daily as needed. (Patient not taking: Reported on 5/14/2024) 40 tablet 1    Multiple Vitamins-Minerals (CENTRUM SILVER) Oral Tab Take 1 tablet by mouth daily. (Patient not taking: Reported on 5/14/2024)      Multiple Vitamins-Minerals (PRESERVISION AREDS) Oral Tab Take 2 tablets by mouth daily. (Patient not taking: Reported on 5/14/2024)         Allergies:  No Known Allergies      ROS:   Review of Systems   Constitutional:  Negative for appetite change and fever.   Eyes:  Negative for visual disturbance.   Respiratory:  Negative for shortness of breath.    Cardiovascular:  Negative for chest pain.   Gastrointestinal:  Negative for abdominal pain, nausea and vomiting.   Endocrine: Negative for polydipsia and polyuria.   Musculoskeletal:  Positive for arthralgias, back pain and myalgias.   Skin:  Negative for rash.   Neurological:  Negative for dizziness and headaches.       PHYSICAL EXAM:   VS: /74   Pulse 78   Ht 5' 3\" (1.6 m)   Wt 150 lb (68 kg)   BMI 26.57 kg/m²     Physical Exam  Vitals reviewed.   Constitutional:        General: She is not in acute distress.     Appearance: Normal appearance.   HENT:      Head: Normocephalic.   Eyes:      Conjunctiva/sclera: Conjunctivae normal.   Cardiovascular:      Rate and Rhythm: Normal rate.   Pulmonary:      Effort: Pulmonary effort is normal.   Musculoskeletal:      Cervical back: Neck supple.      Comments: Normal SLR   Skin:     Findings: No rash.   Psychiatric:         Mood and Affect: Mood normal.         LABORATORY RESULTS:      Results for orders placed or performed in visit on 04/02/24   Basic Metabolic Panel (8)   Result Value Ref Range    Glucose 90 70 - 99 mg/dL    Sodium 139 136 - 145 mmol/L    Potassium 4.1 3.5 - 5.1 mmol/L    Chloride 105 98 - 112 mmol/L    CO2 26.0 21.0 - 32.0 mmol/L    Anion Gap 8 0 - 18 mmol/L    BUN 15 9 - 23 mg/dL    Creatinine 0.89 0.55 - 1.02 mg/dL    BUN/CREA Ratio 16.9 10.0 - 20.0    Calcium, Total 9.3 8.7 - 10.4 mg/dL    Calculated Osmolality 288 275 - 295 mOsm/kg    eGFR-Cr 64 >=60 mL/min/1.73m2    Patient Fasting for BMP? Yes    Microalb/Creat Ratio, Random Urine   Result Value Ref Range    Microalbumin, Urine 0.30 mg/dL    Creatinine Ur Random 161.00 mg/dL    Malb/Cre Calc 1.9 <=30.0 ug/mg     EKG / Spirometry : -     Radiology: No results found.     ASSESSMENT/PLAN:   Assessment   Encounter Diagnoses   Name Primary?    Type 2 diabetes mellitus without complication, with long-term current use of insulin ([facility]) Yes    Sciatica of left side     Primary osteoarthritis involving multiple joints     Age-related osteoporosis without current pathological fracture        1. Type 2 diabetes mellitus without complication, with long-term current use of insulin ([facility])      MEDICATIONS:    naproxen 250 MG Oral Tab, Take 1 tablet (250 mg total) by mouth 2 (two) times daily with meals., Disp: 60 tablet, Rfl: 1    alendronate 70 MG Oral Tab, Take 1 tablet (70 mg total) by mouth every 7 days., Disp: 12 tablet, Rfl: 3    chlorthalidone 25 MG Oral Tab, Take 1  tablet (25 mg total) by mouth daily., Disp: 90 tablet, Rfl: 3    metFORMIN HCl 1000 MG Oral Tab, TOME JESUSITA TABLETA 2 VECES AL CHRISTINE CON LAS COMIDAS, Disp: 180 tablet, Rfl: 3    metoprolol succinate  MG Oral Tablet 24 Hr, Take 1 tablet (100 mg total) by mouth daily., Disp: 90 tablet, Rfl: 3    insulin degludec (TRESIBA FLEXTOUCH) 100 UNIT/ML Subcutaneous Solution Pen-injector, INJECT 50 UNITS/DAY INTO THE SKIN DAILY., Disp: 45 mL, Rfl: 3    Olmesartan Medoxomil 40 MG Oral Tab, Take 1 tablet (40 mg total) by mouth daily., Disp: 90 tablet, Rfl: 1    pravastatin 20 MG Oral Tab, Take 1 tablet (20 mg total) by mouth daily., Disp: 90 tablet, Rfl: 3    Insulin Pen Needle (BD PEN NEEDLE AGA 2ND GEN) 32G X 4 MM Does not apply Misc, 1 strip by In Vitro route daily., Disp: 100 each, Rfl: 3    aspirin ([employer] ASPIRIN ADULT LOW DOSE) 81 MG Oral Chew Tab, Chew 1 tablet (81 mg total) by mouth daily., Disp: 90 tablet, Rfl: 1    Accu-Chek Softclix Lancets Does not apply Misc, Use three times daily as directed., Disp: 200 each, Rfl: 5    diclofenac 1 % External Gel, Apply 4 g topically 4 (four) times daily. Apply to affected area(s). (Patient not taking: Reported on 5/14/2024), Disp: 60 g, Rfl: 2    Empagliflozin (JARDIANCE) 25 MG Oral Tab, Take 1 tablet by mouth daily. (Patient not taking: Reported on 5/14/2024), Disp: 90 tablet, Rfl: 2    latanoprost 0.005 % Ophthalmic Solution, PONGA JESUSITA GOTA EN LOS DOS OJOS AL ACOSTARSE (Patient not taking: Reported on 5/14/2024), Disp: , Rfl:     amLODIPine 5 MG Oral Tab, Take 1 tablet (5 mg total) by mouth daily. (Patient not taking: Reported on 4/2/2024), Disp: 30 tablet, Rfl: 2    Glucose Blood (ACCU-CHEK JOSE PLUS) In Vitro Strip, INJECT 1 DEVICE INTO THE SKIN 3 (THREE) TIMES DAILY AS NEEDED. (Patient not taking: Reported on 5/14/2024), Disp: 300 strip, Rfl: 3    Hydrocortisone 2.5 % External Lotion, Apply 1 Application topically 2 (two) times daily. (Patient not taking: Reported on  5/14/2024), Disp: 118 mL, Rfl: 1    Psyllium 58.6 % Oral Powder, Take 2 tablespoons in 6 oz of water at nightly. (Patient not taking: Reported on 5/14/2024), Disp: 660 g, Rfl: 7    triamcinolone 0.1 % External Ointment, 1 Ring every 28 days. FOR 21 DAYS IN, THEN REMOVE FOR 7 DAYS (Patient not taking: Reported on 5/14/2024), Disp: , Rfl:     meclizine 12.5 MG Oral Tab, Take 1 tablet (12.5 mg total) by mouth 3 (three) times daily as needed. (Patient not taking: Reported on 5/14/2024), Disp: 40 tablet, Rfl: 1    Multiple Vitamins-Minerals (CENTRUM SILVER) Oral Tab, Take 1 tablet by mouth daily. (Patient not taking: Reported on 5/14/2024), Disp: , Rfl:     Multiple Vitamins-Minerals (PRESERVISION AREDS) Oral Tab, Take 2 tablets by mouth daily. (Patient not taking: Reported on 5/14/2024), Disp: , Rfl:        RECOMMENDATIONS given include: Patient was reassured of  her medical condition and all questions and concerns were answered. Patient was informed to please, call our office with any new or further questions or concerns that may come up in the near future. Notify Dr Choudhary or the [facility] if there is a deterioration or worsening of the medical condition. Also, inform the doctor with any new symptoms or medications' side effects.  Follow 1800 ADA diet.     FOLLOW-UP: Schedule a follow-up visit in 2 months.            2. Sciatica of left side  3. Primary osteoarthritis involving multiple joints    MEDICATIONS:     Requested Prescriptions     Signed Prescriptions Disp Refills    naproxen 250 MG Oral Tab 60 tablet 1     Sig: Take 1 tablet (250 mg total) by mouth 2 (two) times daily with meals.     REFERRALS: PHYSIATRY - INTERNAL,       Procedures    Physiatry Referral - [facility]       RECOMMENDATIONS given include: Patient was reassured of  her medical condition and all questions and concerns were answered. Patient was informed to please, call our office with any new or further questions or concerns that may come  up in the near future. Notify Dr Floyd or the [facility] if there is a deterioration or worsening of the medical condition. Also, inform the doctor with any new symptoms or medications' side effects.      FOLLOW-UP: Schedule a follow-up visit in  [facility].         4. Age-related osteoporosis without current pathological fracture    MEDICATIONS:   Vit D   alendronate 70 MG Oral Tab 12 tablet 3     Sig: Take 1 tablet (70 mg total) by mouth every 7 days.     RECOMMENDATIONS given include: Patient was reassured of  her medical condition and all questions and concerns were answered. Patient was informed to please, call our office with any new or further questions or concerns that may come up in the near future. Notify Dr Floyd or the [facility] if there is a deterioration or worsening of the medical condition. Also, inform the doctor with any new symptoms or medications' side effects.      FOLLOW-UP: Schedule a follow-up visit in  [facility].             Orders This Visit:  No orders of the defined types were placed in this encounter.      Meds This Visit:  Requested Prescriptions     Signed Prescriptions Disp Refills    naproxen 250 MG Oral Tab 60 tablet 1     Sig: Take 1 tablet (250 mg total) by mouth 2 (two) times daily with meals.    alendronate 70 MG Oral Tab 12 tablet 3     Sig: Take 1 tablet (70 mg total) by mouth every 7 days.       Imaging & Referrals:  PHYSIATRY - INTERNAL         RADHA FLOYD MD

## 2024-06-06 ENCOUNTER — OFFICE VISIT (OUTPATIENT)
Dept: FAMILY MEDICINE CLINIC | Facility: CLINIC | Age: 84
End: 2024-06-06

## 2024-06-06 ENCOUNTER — TELEPHONE (OUTPATIENT)
Dept: FAMILY MEDICINE CLINIC | Facility: CLINIC | Age: 84
End: 2024-06-06

## 2024-06-06 VITALS
DIASTOLIC BLOOD PRESSURE: 70 MMHG | BODY MASS INDEX: 26.54 KG/M2 | HEART RATE: 79 BPM | SYSTOLIC BLOOD PRESSURE: 145 MMHG | HEIGHT: 63 IN | WEIGHT: 149.81 LBS

## 2024-06-06 DIAGNOSIS — M25.552 PAIN OF LEFT HIP: ICD-10-CM

## 2024-06-06 DIAGNOSIS — M25.552 PAIN OF LEFT HIP: Primary | ICD-10-CM

## 2024-06-06 DIAGNOSIS — M79.652 PAIN OF LEFT THIGH: ICD-10-CM

## 2024-06-06 PROCEDURE — 99213 OFFICE O/P EST LOW 20 MIN: CPT | Performed by: FAMILY MEDICINE

## 2024-06-06 PROCEDURE — 96372 THER/PROPH/DIAG INJ SC/IM: CPT | Performed by: FAMILY MEDICINE

## 2024-06-06 RX ORDER — KETOROLAC TROMETHAMINE 30 MG/ML
30 INJECTION, SOLUTION INTRAMUSCULAR; INTRAVENOUS ONCE
Status: COMPLETED | OUTPATIENT
Start: 2024-06-06 | End: 2024-06-06

## 2024-06-06 RX ADMIN — KETOROLAC TROMETHAMINE 30 MG: 30 INJECTION, SOLUTION INTRAMUSCULAR; INTRAVENOUS at 10:15:00

## 2024-06-06 NOTE — TELEPHONE ENCOUNTER
please see message below. Patient is requesting for the medication to be sent to [facility]  Medication is pended for your review and approval

## 2024-06-06 NOTE — TELEPHONE ENCOUNTER
Patient asking medication to be sent to [facility]       [id number] - [place], [place] - [address] AT [facility] [address],       Medication Detail    Medication Quantity Refills Start End   tramadol-acetaminophen 37.5-325 MG Oral Tab 40 tablet 0 6/6/2024 --   Sig:   Take 1 tablet by mouth every 6 (six) hours as needed for Pain.     Route:   Oral     PRN Reason(s):   Pain     Order #:   [id number]     Medication Detail    Medication Ordered Dose Frequency Start End   ketorolac (Toradol) 30 MG/ML injection 30 mg 30 mg Once 6/6/2024 1015 6/8/2024 7979   Route:   Intramuscular     Admin Amount:   1 mL = 30 mg of 30 mg/mL     Volume:   1 mL     Non-formulary Exception Code:   *Substitute Formulary Alternative     Number of Expected Doses:   1     Order #:   [id number]

## 2024-06-06 NOTE — PROGRESS NOTES
6/6/2024  10:00 AM    Noemi Powell is a 83 year old female.    Chief complaint(s):   Chief Complaint   Patient presents with    Abdominal Pain     X2 weeks left side      HPI:     Noemi Powell primary complaint is regarding left hip pain.     Patient is a 83-year-old female presents complaining of right hip pain for the past 2 weeks.  It is getting worse over the past 24 hours.  Pain shoots down the knee and then down the leg.  Pain goes down mostly anteriorly now posteriorly not associated with any lower back pain.  Denies any buttocks pain as well.  Denies any recent fall accidents or injuries.  Patient had an x-ray done a few months ago of the hip that was essentially normal.  She does have history of osteoporosis for which she is on alendronate.      HISTORY:  Past Medical History:    Cataract    Diabetes (HCC)    Diverticulosis large intestine w/o perforation or abscess w/o bleeding    Dupuytren's contracture of left hand     LMF Ray    Essential hypertension    High blood pressure    High cholesterol    Hyperlipidemia    Internal hemorrhoids without complication    MI (myocardial infarction) (HCC)    Stroke (HCC)      Past Surgical History:   Procedure Laterality Date    Appendectomy      Cataract      Cholecystectomy      Colonoscopy      Colonoscopy N/A 12/6/2018    Procedure: COLONOSCOPY;  Surgeon: Maricruz Benavides MD;  Location: [facility] ENDOSCOPY    Palmar fasciectomy Left 11-18-16    L Dupuytren's  LMF Ray    Tubal ligation  1977      Family History   Problem Relation Age of Onset    Cancer Sister         skin      Social History:   Social History     Socioeconomic History    Marital status: Legally    Tobacco Use    Smoking status: Never    Smokeless tobacco: Never   Vaping Use    Vaping status: Never Used   Substance and Sexual Activity    Alcohol use: No     Alcohol/week: 0.0 standard drinks of alcohol    Drug use: Never   Other Topics Concern    Caffeine Concern Yes      Comment: coffee        Immunizations:   Immunization History   Administered Date(s) Administered    Covid-19 Vaccine Pfizer 30 mcg/0.3 ml 02/17/2021, 03/10/2021, 11/11/2021    Covid-19 Vaccine Pfizer Rich-Sucrose 30 mcg/0.3 ml 06/09/2022    FLU VAC High Dose 65 YRS & Older PRSV Free (94396) 10/20/2017, 09/11/2018, 09/20/2023    FLUAD High Dose 65 yr and older (06875) 08/16/2019, 10/02/2020    FLULAVAL 6 months & older 0.5 ml Prefilled syringe (28208) 10/14/2022    FLUZONE 6 months and older PFS 0.5 ml (17164) 09/12/2016    Fluzone Vaccine Medicare () 10/15/2017, 10/20/2017, 09/11/2018    HIB 04/09/2016    HIGH DOSE FLU 65 YRS AND OLDER PRSV FREE SINGLE D (82390) FLU CLINIC 09/12/2016, 10/16/2017, 09/24/2021    Haemophilus B Polysac Conj Vac Im 04/09/2016    Influenza 10/23/2015    Meningococcal-Menactra 04/09/2016    Pfizer Covid-19 Vaccine 30mcg/0.3ml 12yrs+ (3282-0461) 12/14/2023    Pneumococcal (Prevnar 13) 08/29/2017    Pneumococcal Conjugate PCV20 11/14/2022    Pneumovax 23 11/18/2005, 04/09/2016       Medications (Active prior to today's visit):  Current Outpatient Medications   Medication Sig Dispense Refill    aspirin ([facility] ASPIRIN ADULT LOW DOSE) 81 MG Oral Chew Tab Chew 1 tablet (81 mg total) by mouth daily. 90 tablet 1    naproxen 250 MG Oral Tab Take 1 tablet (250 mg total) by mouth 2 (two) times daily with meals. 60 tablet 1    alendronate 70 MG Oral Tab Take 1 tablet (70 mg total) by mouth every 7 days. 12 tablet 3    chlorthalidone 25 MG Oral Tab Take 1 tablet (25 mg total) by mouth daily. 90 tablet 3    metFORMIN HCl 1000 MG Oral Tab TOME JESUSITA TABLETA 2 VECES AL CHRISTINE CON LAS COMIDAS 180 tablet 3    metoprolol succinate  MG Oral Tablet 24 Hr Take 1 tablet (100 mg total) by mouth daily. 90 tablet 3    diclofenac 1 % External Gel Apply 4 g topically 4 (four) times daily. Apply to affected area(s). (Patient not taking: Reported on 5/14/2024) 60 g 2    insulin degludec (TRESIBA FLEXTOUCH) 100  UNIT/ML Subcutaneous Solution Pen-injector INJECT 50 UNITS/DAY INTO THE SKIN DAILY. 45 mL 3    Empagliflozin (JARDIANCE) 25 MG Oral Tab Take 1 tablet by mouth daily. (Patient not taking: Reported on 5/14/2024) 90 tablet 2    latanoprost 0.005 % Ophthalmic Solution PONGA JESUSITA GOTA EN LOS DOS OJOS AL ACOSTARSE (Patient not taking: Reported on 5/14/2024)      Olmesartan Medoxomil 40 MG Oral Tab Take 1 tablet (40 mg total) by mouth daily. 90 tablet 1    amLODIPine 5 MG Oral Tab Take 1 tablet (5 mg total) by mouth daily. (Patient not taking: Reported on 4/2/2024) 30 tablet 2    Glucose Blood (ACCU-CHEK JOSE PLUS) In Vitro Strip INJECT 1 DEVICE INTO THE SKIN 3 (THREE) TIMES DAILY AS NEEDED. (Patient not taking: Reported on 5/14/2024) 300 strip 3    Hydrocortisone 2.5 % External Lotion Apply 1 Application topically 2 (two) times daily. (Patient not taking: Reported on 5/14/2024) 118 mL 1    pravastatin 20 MG Oral Tab Take 1 tablet (20 mg total) by mouth daily. 90 tablet 3    Psyllium 58.6 % Oral Powder Take 2 tablespoons in 6 oz of water at nightly. (Patient not taking: Reported on 5/14/2024) 660 g 7    triamcinolone 0.1 % External Ointment 1 Ring every 28 days. FOR 21 DAYS IN, THEN REMOVE FOR 7 DAYS (Patient not taking: Reported on 5/14/2024)      Insulin Pen Needle (BD PEN NEEDLE AGA 2ND GEN) 32G X 4 MM Does not apply Misc 1 strip by In Vitro route daily. 100 each 3    meclizine 12.5 MG Oral Tab Take 1 tablet (12.5 mg total) by mouth 3 (three) times daily as needed. (Patient not taking: Reported on 5/14/2024) 40 tablet 1    Accu-Chek Softclix Lancets Does not apply Misc Use three times daily as directed. 200 each 5    Multiple Vitamins-Minerals (CENTRUM SILVER) Oral Tab Take 1 tablet by mouth daily. (Patient not taking: Reported on 5/14/2024)      Multiple Vitamins-Minerals (PRESERVISION AREDS) Oral Tab Take 2 tablets by mouth daily. (Patient not taking: Reported on 5/14/2024)         Allergies:  No Known  Allergies      ROS:   Review of Systems   Constitutional:  Negative for appetite change and fever.   Eyes:  Negative for visual disturbance.   Respiratory:  Negative for shortness of breath.    Cardiovascular:  Negative for chest pain.   Gastrointestinal:  Negative for abdominal pain, nausea and vomiting.   Musculoskeletal:  Positive for arthralgias. Negative for back pain.        Left hip pain   Skin:  Negative for rash.   Neurological:  Negative for dizziness and headaches.       PHYSICAL EXAM:   VS: /70   Pulse 79   Ht 5' 3\" (1.6 m)   Wt 149 lb 12.8 oz (67.9 kg)   BMI 26.54 kg/m²     Physical Exam  Vitals reviewed.   Constitutional:       General: She is not in acute distress.     Appearance: Normal appearance.   HENT:      Head: Normocephalic.   Eyes:      Conjunctiva/sclera: Conjunctivae normal.   Cardiovascular:      Rate and Rhythm: Normal rate.   Pulmonary:      Effort: Pulmonary effort is normal.   Abdominal:      General: Bowel sounds are normal.      Tenderness: There is no abdominal tenderness.   Musculoskeletal:      Cervical back: Neck supple.      Comments: ++ right hip- right femur tenderness   Skin:     Findings: No rash.   Psychiatric:         Mood and Affect: Mood normal.         LABORATORY RESULTS:     EKG / Spirometry : -     Radiology: No results found.     ASSESSMENT/PLAN:   Assessment   No diagnosis found.    MEDICATIONS:     Requested Prescriptions     Signed Prescriptions Disp Refills    tramadol-acetaminophen 37.5-325 MG Oral Tab 40 tablet 0     Sig: Take 1 tablet by mouth every 6 (six) hours as needed for Pain.   Toradol 30 mg IM giveb    REFERRALS: CT HIP(BONE) LEFT (CPT=73700),       Procedures    CT HIP(BONE) LEFT (CPT=73700)        RECOMMENDATIONS given include: Patient was reassured of  her medical condition and all questions and concerns were answered. Patient was informed to please, call our office with any new or further questions or concerns that may come up in the near  future. Notify Dr Floyd or the [facility] if there is a deterioration or worsening of the medical condition. Also, inform the doctor with any new symptoms or medications' side effects.      FOLLOW-UP: Schedule a follow-up visit in  2 weeks.            Orders This Visit:  No orders of the defined types were placed in this encounter.      Meds This Visit:  Requested Prescriptions      No prescriptions requested or ordered in this encounter       Imaging & Referrals:  None         RADHA FLOYD MD

## 2024-06-10 ENCOUNTER — TELEPHONE (OUTPATIENT)
Dept: FAMILY MEDICINE CLINIC | Facility: CLINIC | Age: 84
End: 2024-06-10

## 2024-06-10 NOTE — TELEPHONE ENCOUNTER
[facility]

## 2024-06-10 NOTE — TELEPHONE ENCOUNTER
Pharmacy is requesting alternative for the following medication as it is not covered by patients plan.      tramadol-acetaminophen 37.5-325 MG Oral Tab, Take 1 tablet by mouth every 6 (six) hours as needed for Pain., Disp: 40 tablet, Rfl: 0    Please call/fax the pharmacy to change medication along with strength, directions, quantity, and refills .

## 2024-06-11 ENCOUNTER — OFFICE VISIT (OUTPATIENT)
Dept: PHYSICAL MEDICINE AND REHAB | Facility: CLINIC | Age: 84
End: 2024-06-11
Payer: MEDICARE

## 2024-06-11 DIAGNOSIS — M47.816 LUMBAR SPONDYLOSIS: ICD-10-CM

## 2024-06-11 DIAGNOSIS — M25.562 CHRONIC PAIN OF BOTH KNEES: ICD-10-CM

## 2024-06-11 DIAGNOSIS — G89.29 CHRONIC PAIN OF BOTH KNEES: ICD-10-CM

## 2024-06-11 DIAGNOSIS — S79.922A THIGH INJURY, LEFT, INITIAL ENCOUNTER: ICD-10-CM

## 2024-06-11 DIAGNOSIS — M54.16 LUMBAR RADICULOPATHY: Primary | ICD-10-CM

## 2024-06-11 DIAGNOSIS — M54.50 MYOFASCIAL LOW BACK PAIN: ICD-10-CM

## 2024-06-11 DIAGNOSIS — M25.561 CHRONIC PAIN OF BOTH KNEES: ICD-10-CM

## 2024-06-11 PROCEDURE — 99214 OFFICE O/P EST MOD 30 MIN: CPT | Performed by: PHYSICAL MEDICINE & REHABILITATION

## 2024-06-11 NOTE — PROGRESS NOTES
RETURN PATIENT VISIT    CHIEF COMPLAINT  Left leg pain paresthesia  Right knee pain     INTERVAL HISTORY  Noemi Powell is a 83 year old who was last seen in clinic on 10/10/2022, at that visit patient was complaining of some symptomatic cervical radiculitis and there are plans for a cervical interlaminar injection however this was not completed.  Patient returns today in follow-up with new complaints of multijoint osteoarthritis as well as left-sided sciatica.  She is referred back to my office through her primary care office complaining of left-sided low back pain over the last 10 months with radiation into the left leg.  This was precipitated by a fall in August 2023.  Pain made worse by bending and walking.  She endorsed numbness and tingling in the lower extremity on the left side.    Patient states that she has been significantly limited in her functional capabilities secondary to pain in the low back and pain in the left lower extremity.    Additionally patient states that she was stretching in bed felt a significant pain in the medial aspect of the left thigh and noticed a bruise the next day.    REVIEW OF SYSTEMS  Review of systems was completed with the patient today as pertinent to today's visit    PHYSICAL EXAMINATION  CONSTITUTIONAL: Well-appearing, in no apparent distress  EYES: No scleral icterus or conjunctival hemorrhage  CARDIOVASCULAR: Skin warm and well-perfused, no peripheral edema  RESPIRATORY: Breathing unlabored without accessory muscle use  PSYCHIATRIC: Alert, cooperative, appropriate mood and affect  SKIN: Patient with significant ecchymosis in the medial aspect of the left thigh concerning for muscle tear.  MUSCULOSKELETAL: Patient has tenderness palpation over the left thigh bruise.  Additionally she has positive neural tension signs, decreased range of motion of the lumbar spine in flexion and extension with exacerbation of axial low back pain.  She has tenderness to palpation over  the medial greater than lateral joint lines of the right knee.  Mild crepitus in the right knee.  NEUROLOGIC: Patient has stable strength and sensation in the bilateral lower extremities.    REVIEW OF PRIOR X-RAYS/STUDIES  Independently reviewed the plain film radiographs of the left hip and pelvis dated 2/12/2024 which reveals osteopenia and mild degenerative change in the left hip with unremarkable soft tissues.    IMPRESSION/DIAGNOSIS  1.    Encounter Diagnoses   Name Primary?    Lumbar radiculopathy Yes    Lumbar spondylosis     Myofascial low back pain     Chronic pain of both knees     Thigh injury, left, initial encounter          TREATMENT/PLAN  Would like to investigate her acute injury in the left thigh with significant ecchymosis bruising and decreased function.  I have ordered an MRI of the left thigh to evaluate the soft tissue structures in this area.  Additionally as she has been refractory to physical therapy course for her low back and left lower extremity radicular complaints have ordered an MRI of the lumbar spine.  X-ray of the right need to be completed today.  Will follow-up after these results are finalized.    Education was provided regarding the above impression/diagnosis and treatment options/plan were discussed.  All questions were answered during today's visit.  Patient will contact clinic if any other questions or concerns.          Vladimir Marquez,   Interventional Spine and Sports Medicine Specialist   Physical Medicine and Rehabilitation  [place] [id number] [place] [id number]    [address] [place] [address]

## 2024-06-13 ENCOUNTER — HOSPITAL ENCOUNTER (OUTPATIENT)
Dept: GENERAL RADIOLOGY | Age: 84
End: 2024-06-13
Attending: FAMILY MEDICINE
Payer: MEDICARE

## 2024-06-13 ENCOUNTER — HOSPITAL ENCOUNTER (OUTPATIENT)
Dept: GENERAL RADIOLOGY | Age: 84
Discharge: HOME | End: 2024-06-13
Attending: PHYSICAL MEDICINE & REHABILITATION
Payer: MEDICARE

## 2024-06-13 DIAGNOSIS — G89.29 CHRONIC PAIN OF BOTH KNEES: ICD-10-CM

## 2024-06-13 DIAGNOSIS — M25.562 CHRONIC PAIN OF BOTH KNEES: ICD-10-CM

## 2024-06-13 DIAGNOSIS — M25.561 CHRONIC PAIN OF BOTH KNEES: ICD-10-CM

## 2024-06-13 PROCEDURE — 73564 X-RAY EXAM KNEE 4 OR MORE: CPT | Performed by: PHYSICAL MEDICINE & REHABILITATION

## 2024-06-25 ENCOUNTER — OFFICE VISIT (OUTPATIENT)
Dept: FAMILY MEDICINE CLINIC | Facility: CLINIC | Age: 84
End: 2024-06-25

## 2024-06-25 VITALS
HEART RATE: 80 BPM | DIASTOLIC BLOOD PRESSURE: 76 MMHG | BODY MASS INDEX: 26.54 KG/M2 | WEIGHT: 149.81 LBS | HEIGHT: 63 IN | SYSTOLIC BLOOD PRESSURE: 153 MMHG

## 2024-06-25 DIAGNOSIS — M17.11 PRIMARY OSTEOARTHRITIS OF RIGHT KNEE: ICD-10-CM

## 2024-06-25 DIAGNOSIS — Z79.4 TYPE 2 DIABETES MELLITUS WITH STAGE 3A CHRONIC KIDNEY DISEASE, WITH LONG-TERM CURRENT USE OF INSULIN (HCC): ICD-10-CM

## 2024-06-25 DIAGNOSIS — E11.22 TYPE 2 DIABETES MELLITUS WITH DIABETIC CHRONIC KIDNEY DISEASE (HCC): ICD-10-CM

## 2024-06-25 DIAGNOSIS — N18.31 TYPE 2 DIABETES MELLITUS WITH STAGE 3A CHRONIC KIDNEY DISEASE, WITH LONG-TERM CURRENT USE OF INSULIN (HCC): ICD-10-CM

## 2024-06-25 DIAGNOSIS — E11.22 TYPE 2 DIABETES MELLITUS WITH STAGE 3A CHRONIC KIDNEY DISEASE, WITH LONG-TERM CURRENT USE OF INSULIN (HCC): ICD-10-CM

## 2024-06-25 DIAGNOSIS — K59.01 SLOW TRANSIT CONSTIPATION: ICD-10-CM

## 2024-06-25 LAB
CARTRIDGE EXPIRATION DATE: ABNORMAL DATE
HEMOGLOBIN A1C: 7.1 % (ref 4.3–5.6)

## 2024-06-25 PROCEDURE — 83036 HEMOGLOBIN GLYCOSYLATED A1C: CPT | Performed by: FAMILY MEDICINE

## 2024-06-25 PROCEDURE — 99214 OFFICE O/P EST MOD 30 MIN: CPT | Performed by: FAMILY MEDICINE

## 2024-06-25 RX ORDER — PEN NEEDLE, DIABETIC 32GX 5/32"
1 NEEDLE, DISPOSABLE MISCELLANEOUS DAILY
Qty: 100 EACH | Refills: 3 | Status: SHIPPED | OUTPATIENT
Start: 2024-06-25

## 2024-06-25 RX ORDER — ASPIRIN 81 MG/1
81 TABLET, CHEWABLE ORAL DAILY
Qty: 90 TABLET | Refills: 1 | Status: SHIPPED | OUTPATIENT
Start: 2024-06-25

## 2024-06-25 RX ORDER — OLMESARTAN MEDOXOMIL 40 MG/1
40 TABLET ORAL DAILY
COMMUNITY

## 2024-06-25 RX ORDER — INSULIN DEGLUDEC 100 U/ML
INJECTION, SOLUTION SUBCUTANEOUS
Qty: 45 ML | Refills: 3 | Status: SHIPPED | OUTPATIENT
Start: 2024-06-25

## 2024-06-25 RX ORDER — LANCETS
EACH MISCELLANEOUS
Qty: 200 EACH | Refills: 5 | Status: SHIPPED | OUTPATIENT
Start: 2024-06-25

## 2024-06-25 RX ORDER — ALENDRONATE SODIUM 70 MG/1
70 TABLET ORAL
Qty: 12 TABLET | Refills: 3 | Status: SHIPPED | OUTPATIENT
Start: 2024-06-25

## 2024-06-25 RX ORDER — BLOOD SUGAR DIAGNOSTIC
STRIP MISCELLANEOUS
Qty: 300 STRIP | Refills: 3 | Status: SHIPPED | OUTPATIENT
Start: 2024-06-25

## 2024-06-25 NOTE — PROGRESS NOTES
6/25/2024  10:55 AM    Noemi Powell is a 83 year old female.    Chief complaint(s):   Chief Complaint   Patient presents with    Abdominal Pain     Pt c/o right side pain     Knee Pain     Pt c/o right knee     Medication Request     Olmesartan 20mg      HPI:     Noemi Powell primary complaint is regarding multiple complaints.       Patient Noemi Powell is a 83 year old female is here to be evaluated for type 2 diabetes.  Specifically, female has type 2, insulin requiring diabetes. Compliance with treatment has been fair.  Patient's diabetes was first diagnosed 27 years ago.  Patient follows a 2000 calorie ADA diet.  Patient report experiencing the following diabetes related symptoms; Negative for polyuria, Negative for polydipsia, Negative for blurred vision.  Depression symptoms include none.  Tobacco screen: none  smoker.  Current meds include :  oral hypoglycemic include: Metformin 1000 mg BID  insulin/injectable : Tresiba 50 unit Q am .  Hypoglycemia severity is not applicable.she reports home blood glucose readings have been 90s-120 -159(#s) and believes  having good glucose control.  Most recent lab results include glycohemoglobin 8.0 %, microalbuminuria have been -.  In regard to preventative care, her last ophthalmology exam was in < 1 months ago.  Opthalmic evaluation have shown sanjay pathology.  Concurrent relative health problems include HTN.  Also complaining of burning legs.     In addition patient is following up regarding her osteoarthritis that is primarily affecting her knees and her hips.  X-ray done recently did confirm osteoarthritis tricompartment disease.  Reports that the pain at present time is minimal being controlled well with Voltaren gel.  Has not had her hip x-ray done yet.    Also complains of generalized abdominal pain associated with constipation.  Denies any fever, vomiting, diarrhea or blood in the stools.    HISTORY:  Past Medical History:    Cataract    Diabetes  (HCC)    Diverticulosis large intestine w/o perforation or abscess w/o bleeding    Dupuytren's contracture of left hand     LMF Ray    Essential hypertension    High blood pressure    High cholesterol    Hyperlipidemia    Internal hemorrhoids without complication    MI (myocardial infarction) (HCC)    Stroke (HCC)      Past Surgical History:   Procedure Laterality Date    Appendectomy      Cataract      Cholecystectomy      Colonoscopy      Colonoscopy N/A 12/6/2018    Procedure: COLONOSCOPY;  Surgeon: Maricruz Benavides MD;  [facility] ENDOSCOPY    Palmar fasciectomy Left 11-18-16    L Dupuytren's  LMF Ray    Tubal ligation  1977      Family History   Problem Relation Age of Onset    Cancer Sister         skin      Social History:   Social History     Socioeconomic History    Marital status: Legally    Tobacco Use    Smoking status: Never    Smokeless tobacco: Never   Vaping Use    Vaping status: Never Used   Substance and Sexual Activity    Alcohol use: No     Alcohol/week: 0.0 standard drinks of alcohol    Drug use: Never   Other Topics Concern    Caffeine Concern Yes     Comment: coffee        Immunizations:   Immunization History   Administered Date(s) Administered    Covid-19 Vaccine Pfizer 30 mcg/0.3 ml 02/17/2021, 03/10/2021, 11/11/2021    Covid-19 Vaccine Pfizer Rich-Sucrose 30 mcg/0.3 ml 06/09/2022    FLU VAC High Dose 65 YRS & Older PRSV Free (23768) 10/20/2017, 09/11/2018, 09/20/2023    FLUAD High Dose 65 yr and older (08563) 08/16/2019, 10/02/2020    FLULAVAL 6 months & older 0.5 ml Prefilled syringe (21934) 10/14/2022    FLUZONE 6 months and older PFS 0.5 ml (13997) 09/12/2016    Fluzone Vaccine Medicare () 10/15/2017, 10/20/2017, 09/11/2018    HIB 04/09/2016    HIGH DOSE FLU 65 YRS AND OLDER PRSV FREE SINGLE D (46358) FLU CLINIC 09/12/2016, 10/16/2017, 09/24/2021    Haemophilus B Polysac Conj Vac Im 04/09/2016    Influenza 10/23/2015    Meningococcal-Menactra 04/09/2016     Pfizer Covid-19 Vaccine 30mcg/0.3ml 12yrs+ (4773-7331) 12/14/2023    Pneumococcal (Prevnar 13) 08/29/2017    Pneumococcal Conjugate PCV20 11/14/2022    Pneumovax 23 11/18/2005, 04/09/2016       Medications (Active prior to today's visit):  Current Outpatient Medications   Medication Sig Dispense Refill    Olmesartan Medoxomil 40 MG Oral Tab Take 1 tablet (40 mg total) by mouth daily.      diclofenac 1 % External Gel Apply 4 g topically 4 (four) times daily. Apply to affected area(s). 60 g 2    aspirin ([employer] ASPIRIN ADULT LOW DOSE) 81 MG Oral Chew Tab Chew 1 tablet (81 mg total) by mouth daily. 90 tablet 1    Glucose Blood (ACCU-CHEK JOSE PLUS) In Vitro Strip INJECT 1 DEVICE INTO THE SKIN 3 (THREE) TIMES DAILY AS NEEDED. 300 strip 3    Accu-Chek Softclix Lancets Does not apply Misc Use three times daily as directed. 200 each 5    insulin degludec (TRESIBA FLEXTOUCH) 100 UNIT/ML Subcutaneous Solution Pen-injector INJECT 50 UNITS/DAY INTO THE SKIN DAILY. 45 mL 3    Insulin Pen Needle (BD PEN NEEDLE AGA 2ND GEN) 32G X 4 MM Does not apply Misc 1 strip by In Vitro route daily. 100 each 3    metFORMIN HCl 1000 MG Oral Tab TOME JESUSITA TABLETA 2 VECES AL CHRISTINE CON LAS COMIDAS 180 tablet 3    alendronate 70 MG Oral Tab Take 1 tablet (70 mg total) by mouth every 7 days. 12 tablet 3    Psyllium 58.6 % Oral Powder Take 2 tablespoons in 6 oz of water at nightly. 660 g 7    chlorthalidone 25 MG Oral Tab Take 1 tablet (25 mg total) by mouth daily. 90 tablet 3    metoprolol succinate  MG Oral Tablet 24 Hr Take 1 tablet (100 mg total) by mouth daily. 90 tablet 3    latanoprost 0.005 % Ophthalmic Solution       Hydrocortisone 2.5 % External Lotion Apply 1 Application topically 2 (two) times daily. 118 mL 1    pravastatin 20 MG Oral Tab Take 1 tablet (20 mg total) by mouth daily. 90 tablet 3    Multiple Vitamins-Minerals (CENTRUM SILVER) Oral Tab Take 1 tablet by mouth daily.      Multiple Vitamins-Minerals (PRESERVISION AREDS) Oral  Tab Take 2 tablets by mouth daily.         Allergies:  No Known Allergies      ROS:   Review of Systems   Constitutional:  Negative for appetite change and fever.   Eyes:  Negative for visual disturbance.   Respiratory:  Negative for shortness of breath.    Cardiovascular:  Negative for chest pain.   Gastrointestinal:  Positive for abdominal pain and constipation. Negative for blood in stool, nausea and vomiting.   Genitourinary:  Negative for dysuria.   Musculoskeletal:  Positive for arthralgias (knees). Negative for back pain.   Skin:  Negative for rash.   Neurological:  Negative for dizziness and headaches.       PHYSICAL EXAM:   VS: /76   Pulse 80   Ht 5' 3\" (1.6 m)   Wt 149 lb 12.8 oz (67.9 kg)   BMI 26.54 kg/m²     Physical Exam  Vitals reviewed.   Constitutional:       General: She is not in acute distress.     Appearance: Normal appearance.   HENT:      Head: Normocephalic.   Eyes:      Conjunctiva/sclera: Conjunctivae normal.   Cardiovascular:      Rate and Rhythm: Normal rate.   Pulmonary:      Effort: Pulmonary effort is normal.   Musculoskeletal:      Cervical back: Neck supple.   Skin:     Findings: No rash.   Psychiatric:         Mood and Affect: Mood normal.         LABORATORY RESULTS:   Urine Color   Date Value Ref Range Status   12/09/2020 Yellow  Final     Urine clarity   Date Value Ref Range Status   12/09/2020 Clear  Final     Leukocyte esterase urine   Date Value Ref Range Status   12/09/2020 Negative Negative Final     Nitrite Urine   Date Value Ref Range Status   12/09/2020 Negative Negative Final     Blood Urine   Date Value Ref Range Status   12/09/2020 Negative Negative Final      Results for orders placed or performed in visit on 06/25/24   POC Glycohemoglobin [22128]   Result Value Ref Range    HEMOGLOBIN A1C 7.1 (A) 4.3 - 5.6 %    Cartridge Lot# [id number] Numeric    Cartridge Expiration Date 12,426 Date       EKG / Spirometry : -     Radiology: XR KNEE COMPLETE BILAT  EM(CPT=73564-50)    Result Date: 6/13/2024  PROCEDURE: XR KNEE COMPLETE BILAT EM  COMPARISON: [facility], XR KNEE (1 OR 2 VIEWS), LEFT (CPT=73560), 5/03/2023, 12:10 PM.  INDICATIONS: Bilateral general knee pain x10 months post fall.  TECHNIQUE: Bilateral AP PA lateral and bilateral patellar weight-bearing views views were obtained.   FINDINGS:  BONES: No acute fracture or dislocation. Mild tricompartment osteoarthritis bilateral knees.  Bilateral chondromalacia patella. SOFT TISSUES: Vascular calcifications noted. EFFUSION: None visible. OTHER: Negative.         CONCLUSION:  1. No acute fracture or dislocation. 2. Mild bilateral tricompartment osteoarthritis.  Bilateral chondromalacia patella    Dictated by (CST): Shravan Downey MD on 6/13/2024 at 4:21 PM     Finalized by (CST): Shravan Downey MD on 6/13/2024 at 4:22 PM             ASSESSMENT/PLAN:   Assessment   Encounter Diagnoses   Name Primary?    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 Type 2 diabetes mellitus with stage 3a chronic kidney disease, with long-term current use of insulin (HCC)  2. Type 2 diabetes mellitus with diabetic chronic kidney disease (HCC)    DIABETES A&P    LABORATORY & ORDERS: Blood test(s) ordered today ;   Orders Placed This Encounter   Procedures    POC Glycohemoglobin [[id number]]     Additional orders include:   MEDICATIONS:    Olmesartan Medoxomil 40 MG Oral Tab, Take 1 tablet (40 mg total) by mouth daily., Disp: , Rfl:     diclofenac 1 % External Gel, Apply 4 g topically 4 (four) times daily. Apply to affected area(s)., Disp: 60 g, Rfl: 2    aspirin ([employer]S ASPIRIN ADULT LOW DOSE) 81 MG Oral Chew Tab, Chew 1 tablet (81 mg total) by mouth daily., Disp: 90 tablet, Rfl: 1    Glucose Blood (ACCU-CHEK  JOSE PLUS) In Vitro Strip, INJECT 1 DEVICE INTO THE SKIN 3 (THREE) TIMES DAILY AS NEEDED., Disp: 300 strip, Rfl: 3    Accu-Chek Softclix Lancets Does not apply Misc, Use three times daily as directed., Disp: 200 each, Rfl: 5    insulin degludec (TRESIBA FLEXTOUCH) 100 UNIT/ML Subcutaneous Solution Pen-injector, INJECT 50 UNITS/DAY INTO THE SKIN DAILY., Disp: 45 mL, Rfl: 3    Insulin Pen Needle (BD PEN NEEDLE AGA 2ND GEN) 32G X 4 MM Does not apply Misc, 1 strip by In Vitro route daily., Disp: 100 each, Rfl: 3    metFORMIN HCl 1000 MG Oral Tab, TOME JESUSITA TABLETA 2 VECES AL CHRISTINE CON LAS COMIDAS, Disp: 180 tablet, Rfl: 3    alendronate 70 MG Oral Tab, Take 1 tablet (70 mg total) by mouth every 7 days., Disp: 12 tablet, Rfl: 3    Psyllium 58.6 % Oral Powder, Take 2 tablespoons in 6 oz of water at nightly., Disp: 660 g, Rfl: 7    chlorthalidone 25 MG Oral Tab, Take 1 tablet (25 mg total) by mouth daily., Disp: 90 tablet, Rfl: 3    metoprolol succinate  MG Oral Tablet 24 Hr, Take 1 tablet (100 mg total) by mouth daily., Disp: 90 tablet, Rfl: 3    latanoprost 0.005 % Ophthalmic Solution, , Disp: , Rfl:     Hydrocortisone 2.5 % External Lotion, Apply 1 Application topically 2 (two) times daily., Disp: 118 mL, Rfl: 1    pravastatin 20 MG Oral Tab, Take 1 tablet (20 mg total) by mouth daily., Disp: 90 tablet, Rfl: 3    Multiple Vitamins-Minerals (CENTRUM SILVER) Oral Tab, Take 1 tablet by mouth daily., Disp: , Rfl:     Multiple Vitamins-Minerals (PRESERVISION AREDS) Oral Tab, Take 2 tablets by mouth daily., Disp: , Rfl: .  Requested Prescriptions     Signed Prescriptions Disp Refills    diclofenac 1 % External Gel 60 g 2     Sig: Apply 4 g topically 4 (four) times daily. Apply to affected area(s).    aspirin (CVS ASPIRIN ADULT LOW DOSE) 81 MG Oral Chew Tab 90 tablet 1     Sig: Chew 1 tablet (81 mg total) by mouth daily.    Glucose Blood (ACCU-CHEK JOSE PLUS) In Vitro Strip 300 strip 3     Sig: INJECT 1 DEVICE INTO THE  SKIN 3 (THREE) TIMES DAILY AS NEEDED.    Accu-Chek Softclix Lancets Does not apply Misc 200 each 5     Sig: Use three times daily as directed.    insulin degludec (TRESIBA FLEXTOUCH) 100 UNIT/ML Subcutaneous Solution Pen-injector 45 mL 3     Sig: INJECT 50 UNITS/DAY INTO THE SKIN DAILY.    Insulin Pen Needle (BD PEN NEEDLE AGA 2ND GEN) 32G X 4 MM Does not apply Misc 100 each 3     Si strip by In Vitro route daily.    metFORMIN HCl 1000 MG Oral Tab 180 tablet 3     Sig: TOME JESUSITA TABLETA 2 VECES AL CHRISTINE CON LAS COMIDAS    alendronate 70 MG Oral Tab 12 tablet 3     Sig: Take 1 tablet (70 mg total) by mouth every 7 days.    Psyllium 58.6 % Oral Powder 660 g 7     Sig: Take 2 tablespoons in 6 oz of water at nightly.      REFERRALS:       Procedures    POC Glycohemoglobin [[id number]]     RECOMMENDATIONS: instructed in use of glucometer ( check fasting glucose daily), return for training in administering insulin injections, adherence to an 1800 calorie ADA diet,  a graduated exercise program, HgbA1C level checked quarterly, daily foot self-inspection, need for yearly flu shots, and avoid all sodas, juices, candy, chocolates, cakes, ice cream, etc.      FOLLOW-UP: Schedule a follow-up visit in 6 months.       COUNSELING: The patient was counseled concerning the relationship between diabetes control and macrovascular disease including cardiovascular, cerebrovascular and peripheral vascular disease. The patient was counseled concerning the relationship between diabetes control and retinopathy, nephropathy, and neuropathy. Advised as to the targets of pre-meal glucoses ( mg/dl) and post meal glucoses (<140-160 mg/dl) Home glucose testing discussed. The A1c target of <7% according to ADA and <6.5% according to AACE were discussed.          3. Slow transit constipation    Senokot   High fiber diet      4. Primary osteoarthritis of right knee    MEDICATIONS:     Requested Prescriptions     Signed Prescriptions Disp Refills     diclofenac 1 % External Gel 60 g 2     Sig: Apply 4 g topically 4 (four) times daily. Apply to affected area(s).        RECOMMENDATIONS given include: Patient was reassured of  her medical condition and all questions and concerns were answered. Patient was informed to please, call our office with any new or further questions or concerns that may come up in the near future. Notify Dr Floyd or the [facility] if there is a deterioration or worsening of the medical condition. Also, inform the doctor with any new symptoms or medications' side effects.      FOLLOW-UP: Schedule a follow-up visit in  [facility]/ [facility].             Orders This Visit:  Orders Placed This Encounter   Procedures    POC Glycohemoglobin [[id number]]       Meds This Visit:  Requested Prescriptions     Signed Prescriptions Disp Refills    diclofenac 1 % External Gel 60 g 2     Sig: Apply 4 g topically 4 (four) times daily. Apply to affected area(s).    aspirin ([employer]S ASPIRIN ADULT LOW DOSE) 81 MG Oral Chew Tab 90 tablet 1     Sig: Chew 1 tablet (81 mg total) by mouth daily.    Glucose Blood (ACCU-CHEK JOSE PLUS) In Vitro Strip 300 strip 3     Sig: INJECT 1 DEVICE INTO THE SKIN 3 (THREE) TIMES DAILY AS NEEDED.    Accu-Chek Softclix Lancets Does not apply Misc 200 each 5     Sig: Use three times daily as directed.    insulin degludec (TRESIBA FLEXTOUCH) 100 UNIT/ML Subcutaneous Solution Pen-injector 45 mL 3     Sig: INJECT 50 UNITS/DAY INTO THE SKIN DAILY.    Insulin Pen Needle (BD PEN NEEDLE AGA 2ND GEN) 32G X 4 MM Does not apply Misc 100 each 3     Si strip by In Vitro route daily.    metFORMIN HCl 1000 MG Oral Tab 180 tablet 3     Sig: TOME JESUSITA TABLETA 2 VECES AL CHRISTINE CON LAS COMIDAS    alendronate 70 MG Oral Tab 12 tablet 3     Sig: Take 1 tablet (70 mg total) by mouth every 7 days.    Psyllium 58.6 % Oral Powder 660 g 7     Sig: Take 2 tablespoons in 6 oz of water at nightly.       Imaging & Referrals:  None         RADHA FLOYD MD

## 2024-07-17 ENCOUNTER — MED REC SCAN ONLY (OUTPATIENT)
Dept: FAMILY MEDICINE CLINIC | Facility: CLINIC | Age: 84
End: 2024-07-17

## 2024-07-26 ENCOUNTER — HOSPITAL ENCOUNTER (OUTPATIENT)
Dept: MRI IMAGING | Age: 84
Discharge: HOME | End: 2024-07-26
Attending: PHYSICAL MEDICINE & REHABILITATION
Payer: MEDICARE

## 2024-07-26 DIAGNOSIS — M54.16 LUMBAR RADICULOPATHY: ICD-10-CM

## 2024-07-26 DIAGNOSIS — M54.50 MYOFASCIAL LOW BACK PAIN: ICD-10-CM

## 2024-07-26 DIAGNOSIS — M47.816 LUMBAR SPONDYLOSIS: ICD-10-CM

## 2024-07-26 DIAGNOSIS — S79.922A THIGH INJURY, LEFT, INITIAL ENCOUNTER: ICD-10-CM

## 2024-07-26 PROCEDURE — 72148 MRI LUMBAR SPINE W/O DYE: CPT | Performed by: PHYSICAL MEDICINE & REHABILITATION

## 2024-07-26 PROCEDURE — 73718 MRI LOWER EXTREMITY W/O DYE: CPT | Performed by: PHYSICAL MEDICINE & REHABILITATION

## 2024-08-26 NOTE — PROGRESS NOTES
8/26/2024  9:23 AM    Noemi Hudson is a 83 year old female.    Chief complaint(s):   Chief Complaint   Patient presents with    Test Results     Discuss test results from Dr Marquez, MRI     Side Effect     Per pt states that alendronate makes her dizzy  and nauseous.     Arthritis     C/o her arthritis pain getting worse tylenol not helping      HPI:     Noemi Hudson primary complaint is regarding arthritis + abnl MRI.     Patient is a 83-year-old female with long history of osteoarthritis, osteoporosis who was referred to see a physiatrist for lower back pain.  She underwent MRI which also have spinal stenosis and chondromalacia of the left hip.  Instead of following with the specialist she is following up with me and I refer her back to the physiatrist.  I advised her to also take acetaminophen 1 to 2 tablets every 8 hours as needed for pain.  She also informed me that she is unable to tolerate alendronate and want to switch to other medication.  Will switch her to Actonel.    Incidental finding of a left ovarian cyst on MRI.  No related symptoms.  Will order a pelvic ultrasound for further evaluation that ovarian cyst.      HISTORY:  Past Medical History:    Cataract    Diabetes (HCC)    Diverticulosis large intestine w/o perforation or abscess w/o bleeding    Dupuytren's contracture of left hand     LMF Ray    Essential hypertension    High blood pressure    High cholesterol    Hyperlipidemia    Internal hemorrhoids without complication    MI (myocardial infarction) (HCC)    Stroke (HCC)      Past Surgical History:   Procedure Laterality Date    Appendectomy      Cataract      Cholecystectomy      Colonoscopy      Colonoscopy N/A 12/6/2018    Procedure: COLONOSCOPY;  Surgeon: Maricruz Benavides MD;  Location: [facility] ENDOSCOPY    Palmar fasciectomy Left 11-18-16    L Dupuytren's  LMF Ray    Tubal ligation  1977      Family History   Problem Relation Age of Onset    Cancer Sister         skin       Social History:   Social History     Socioeconomic History    Marital status: Legally    Tobacco Use    Smoking status: Never    Smokeless tobacco: Never   Vaping Use    Vaping status: Never Used   Substance and Sexual Activity    Alcohol use: No     Alcohol/week: 0.0 standard drinks of alcohol    Drug use: Never   Other Topics Concern    Caffeine Concern Yes     Comment: coffee        Immunizations:   Immunization History   Administered Date(s) Administered    Covid-19 Vaccine Pfizer 30 mcg/0.3 ml 02/17/2021, 03/10/2021, 11/11/2021    Covid-19 Vaccine Pfizer Rich-Sucrose 30 mcg/0.3 ml 06/09/2022    FLU VAC High Dose 65 YRS & Older PRSV Free (05301) 10/20/2017, 09/11/2018, 09/20/2023    FLUAD High Dose 65 yr and older (29737) 08/16/2019, 10/02/2020    FLULAVAL 6 months & older 0.5 ml Prefilled syringe (08873) 10/14/2022    FLUZONE 6 months and older PFS 0.5 ml (65584) 09/12/2016    Fluzone Vaccine Medicare () 10/15/2017, 10/20/2017, 09/11/2018    HIB PRP-OMP 04/09/2016    Haemophilus B Polysac Conj Vac Im 04/09/2016    High Dose Fluzone Influenza Vaccine, 65yr+ PF 0.5mL (15327) 09/12/2016, 10/16/2017, 09/24/2021    Influenza 10/23/2015    Meningococcal-Menactra 04/09/2016    Pfizer Covid-19 Vaccine 30mcg/0.3ml 12yrs+ (2883-7822) 12/14/2023    Pneumococcal (Prevnar 13) 08/29/2017    Pneumococcal Conjugate PCV20 11/14/2022    Pneumovax 23 11/18/2005, 04/09/2016       Medications (Active prior to today's visit):  Current Outpatient Medications   Medication Sig Dispense Refill    Risedronate Sodium (ACTONEL) 150 MG Oral Tab Take 1 tablet (150 mg total) by mouth every 30 (thirty) days. 3 tablet 4    Olmesartan Medoxomil 40 MG Oral Tab Take 1 tablet (40 mg total) by mouth daily.      diclofenac 1 % External Gel Apply 4 g topically 4 (four) times daily. Apply to affected area(s). 60 g 2    aspirin ([employer] ASPIRIN ADULT LOW DOSE) 81 MG Oral Chew Tab Chew 1 tablet (81 mg total) by mouth daily. 90 tablet 1     Glucose Blood (ACCU-CHEK JOSE PLUS) In Vitro Strip INJECT 1 DEVICE INTO THE SKIN 3 (THREE) TIMES DAILY AS NEEDED. 300 strip 3    Accu-Chek Softclix Lancets Does not apply Misc Use three times daily as directed. 200 each 5    insulin degludec (TRESIBA FLEXTOUCH) 100 UNIT/ML Subcutaneous Solution Pen-injector INJECT 50 UNITS/DAY INTO THE SKIN DAILY. 45 mL 3    Insulin Pen Needle (BD PEN NEEDLE AGA 2ND GEN) 32G X 4 MM Does not apply Misc 1 strip by In Vitro route daily. 100 each 3    metFORMIN HCl 1000 MG Oral Tab TOME JESUSITA TABLETA 2 VECES AL CHRISTINE CON LAS COMIDAS 180 tablet 3    Psyllium 58.6 % Oral Powder Take 2 tablespoons in 6 oz of water at nightly. 660 g 7    chlorthalidone 25 MG Oral Tab Take 1 tablet (25 mg total) by mouth daily. 90 tablet 3    metoprolol succinate  MG Oral Tablet 24 Hr Take 1 tablet (100 mg total) by mouth daily. 90 tablet 3    latanoprost 0.005 % Ophthalmic Solution       Hydrocortisone 2.5 % External Lotion Apply 1 Application topically 2 (two) times daily. 118 mL 1    pravastatin 20 MG Oral Tab Take 1 tablet (20 mg total) by mouth daily. 90 tablet 3    Multiple Vitamins-Minerals (CENTRUM SILVER) Oral Tab Take 1 tablet by mouth daily.      Multiple Vitamins-Minerals (PRESERVISION AREDS) Oral Tab Take 2 tablets by mouth daily.         Allergies:  No Known Allergies      ROS:   Review of Systems   Constitutional:  Negative for appetite change and fever.   Eyes:  Negative for visual disturbance.   Respiratory:  Negative for shortness of breath.    Cardiovascular:  Negative for chest pain.   Gastrointestinal:  Negative for abdominal pain, nausea and vomiting.   Musculoskeletal:  Positive for arthralgias and back pain.   Skin:  Negative for rash.   Neurological:  Negative for dizziness and headaches.       PHYSICAL EXAM:   VS: /70 (BP Location: Right arm, Patient Position: Sitting, Cuff Size: adult)   Pulse 78   Ht 5' 3\" (1.6 m)   Wt 148 lb (67.1 kg)   BMI 26.22 kg/m²     Physical  Exam  Vitals reviewed.   Constitutional:       General: She is not in acute distress.     Appearance: Normal appearance.   HENT:      Head: Normocephalic.   Eyes:      Conjunctiva/sclera: Conjunctivae normal.   Cardiovascular:      Rate and Rhythm: Normal rate.   Pulmonary:      Effort: Pulmonary effort is normal.   Musculoskeletal:      Cervical back: Neck supple.   Skin:     Findings: No rash.   Psychiatric:         Mood and Affect: Mood normal.         LABORATORY RESULTS:     EKG / Spirometry : -     Radiology: No results found.     ASSESSMENT/PLAN:   Assessment   Encounter Diagnoses   Name Primary?    Spinal stenosis of lumbar region, unspecified whether neurogenic claudication present Yes    Chondromalacia of hip, left     Age-related osteoporosis without current pathological fracture     Primary osteoarthritis of right knee     Cyst of ovary, unspecified laterality        1. Spinal stenosis of lumbar region, unspecified whether neurogenic claudication present  2. Chondromalacia of hip, left  3. Age-related osteoporosis without current pathological fracture  4. Primary osteoarthritis of right knee    MEDICATIONS:   Stop Alendronate  Requested Prescriptions     Signed Prescriptions Disp Refills    Risedronate Sodium (ACTONEL) 150 MG Oral Tab 3 tablet 4     Sig: Take 1 tablet (150 mg total) by mouth every 30 (thirty) days.   Acetaminophen 500 mg Q 8hrs prn     REFERRALS: PHYSIATRY - INTERNAL        Procedures    Physiatry Referral - [facility]   RECOMMENDATIONS given include: Patient was reassured of  her medical condition and all questions and concerns were answered. Patient was informed to please, call our office with any new or further questions or concerns that may come up in the near future. Notify Dr Choudhary or the [facility] if there is a deterioration or worsening of the medical condition. Also, inform the doctor with any new symptoms or medications' side effects.      FOLLOW-UP: Schedule a follow-up  visit in  prn.       5. Cyst of ovary, unspecified laterality    Referral:    - US PELVIS (TRANSABDOMINAL PELVIS)  (CPT=76856); Future          Orders This Visit:  No orders of the defined types were placed in this encounter.      Meds This Visit:  Requested Prescriptions     Signed Prescriptions Disp Refills    Risedronate Sodium (ACTONEL) 150 MG Oral Tab 3 tablet 4     Sig: Take 1 tablet (150 mg total) by mouth every 30 (thirty) days.       Imaging & Referrals:  PHYSIATRY - INTERNAL  US PELVIS (TRANSABDOMINAL PELVIS)  (CPT=76856)         RADHA FLOYD MD

## 2024-08-26 NOTE — TELEPHONE ENCOUNTER
Patient was called and inform of  message below and she verbalized understanding.   Medication has been updated in pt med list.

## 2024-08-26 NOTE — TELEPHONE ENCOUNTER
Per pharmacy, plan does not cover medication      Risedronate Sodium (ACTONEL) 150 MG Oral Tab, Take 1 tablet (150 mg total) by mouth every 30 (thirty) days., Disp: 3 tablet, Rfl: 4    ALTERNATIVE; ALENDRONATE SODIUM

## 2024-08-26 NOTE — TELEPHONE ENCOUNTER
Started to speak with patient with the help of the  when the patient told Norbert to \"tell Dr. Choudhary to have somebody call back who speaks Italian.\" Norbert explained to the patient that he does speak Italian and was on the line to help when the patient ended the call.    *was unable to inform patient of medication instructions    *will reach out and ask if a Italian speaking nurse can call patient

## 2024-08-27 NOTE — TELEPHONE ENCOUNTER
Used  line ref #[id number]  Cynthia to communicate with patient. Patient states that she has an order from Dr. Choudhary to get an ultrasound. Please advise.

## 2024-08-30 NOTE — TELEPHONE ENCOUNTER
Please review. Protocol Failed; No Protocol    Re pended due to 8/26/2024 script was sent over historically     Requested Prescriptions   Pending Prescriptions Disp Refills    alendronate 70 MG Oral Tab  0     Sig: Take 1 tablet (70 mg total) by mouth every 7 days.       There is no refill protocol information for this order      Refused Prescriptions Disp Refills    ALENDRONATE 70 MG Oral Tab [Pharmacy Med Name: ALENDRONATE 70MG TABLETS] 12 tablet 0     Sig: TAKE 1 TABLET(70 MG) BY MOUTH EVERY 7 DAYS       Osteoporosis Medication Protocol Passed - 8/29/2024 10:16 AM        Passed - In person appointment or virtual visit in the past 6 mos or appointment in next 3 mos     Recent Outpatient Visits              4 days ago Spinal stenosis of lumbar region, unspecified whether neurogenic claudication present    [facility]    2 months ago Type 2 diabetes mellitus with stage 3a chronic kidney disease, with long-term current use of insulin ([facility])    [facility]    [facility]    2 months ago [facility]bar radicul[facility]    [facility]    2 months ago Pain of left hip    [facility]    Office Visit    3 months ago Type 2 diabetes mellitus without complication, with long-term current use of insulin ([facility])    [facility]          Future Appointments         Provider Department Appt Notes    In 1 month [facility]                    Passed - DEXA scan within past 2 years        Passed - CMP within the past 12 months        Passed - Calcium level between 8.3 and 10.3     Lab Results   Component Value Date    CA 9.3  04/02/2024               Passed - GFR level greater than 35     GFR Evaluation  EGFRCR: 64 , resulted on 4/2/2024                 Future Appointments         Provider Department Appt Notes    In 1 month [facility]          Recent Outpatient Visits              4 days ago Spinal stenosis of lumbar region, unspecified whether neurogenic claudication present    [facility]    [facility] Visit    2 months ago Type 2 diabetes mellitus with stage 3a chronic kidney disease, with long-term current use of insulin ([facility])    [facility] [place] [facility], [facility]    Office Visit    2 months ago Lumbar radiculopathy    [facility]    Office Visit    2 months ago Pain of left hip    [facility] [place] Ricardo, [facility]    Office Visit    3 months ago Type 2 diabetes mellitus without complication, with long-term current use of insulin ([facility])    [facility] [place] Ricardo, [place]    Office Visit

## 2024-10-25 NOTE — PROGRESS NOTES
Subjective:   Noemi Hudson is a 84 year old female who presents for a Medicare Subsequent Annual Wellness visit (Pt already had Initial Annual Wellness) and scheduled follow up of multiple significant but stable problems.     Noemi Powell is a 84 year old female present today for a routine periodic Medicare health complete physical examination.  Her last physical exam was many year(s) ago.  Noemi Powell is G 12, P10, Ab 2.   She has a history of veneral infection significant for none.   She performs breast self-exams none .  Her last TDAP (orTD) booster was many years ago.  She is current with her influenza immunization.  Her last Pap smear was many year(s) ago and was normal .  Her last mammogram was 1 year ago and was normal.  Regarding colon cancer  screening test she underwent colonoscopy 6 year(s) ago, findings were normal.       History/Other:   Fall Risk Assessment:   She has been screened for Falls and is low risk.      Cognitive Assessment:   Abnormal  What day of the week is this?: Correct  What month is it?: Correct  What year is it?: Correct  Recall \"Ball\": Correct  Recall \"Flag\": Correct  Recall \"Tree\": Incorrect      Functional Ability/Status:   Noemi Hudson has some abnormal functions as listed below:  She has Hearing problems based on screening of functional status.She has Vision problems based on screening of functional status.       Depression Screening (PHQ):  PHQ-9 TOTAL SCORE: 19  , done 10/25/2024   Little interest or pleasure in doing things: 3    Feeling down, depressed, or hopeless: 3    Trouble falling or staying asleep, or sleeping too much: 1     Feeling tired or having little energy: 3    Poor appetite or overeating: 3    Feeling bad about yourself - or that you are a failure or have let yourself or your family down: 3    Trouble concentrating on things, such as reading the newspaper or watching television: 3    If you checked off any problems, how difficult have  these problems made it for you to do your work, take care of things at home, or get along with other people?: Very difficult    Last [place] Suicide Screening on 10/25/2024 was No Risk.          Advanced Directives:   She does NOT have a Living Will. [Do you have a living will?: No]  She does NOT have a Power of  for Health Care. [Do you have a healthcare power of ?: No]  Discussed Advance Care Planning with patient (and family/surrogate if present). Standard forms made available to patient in After Visit Summary.      Patient Active Problem List   Diagnosis    Essential hypertension    Mixed hyperlipidemia    Type 2 diabetes mellitus with stage 3a chronic kidney disease, with long-term current use of insulin (HCC)    History of TIA (transient ischemic attack)    Pancreatic cyst (HCC)    Drusen (degenerative) of retina    History of colon polyps    Diverticulosis large intestine w/o perforation or abscess w/o bleeding    Internal hemorrhoids without complication    CKD (chronic kidney disease) stage 3, GFR 30-59 ml/min ([facility])    Post-menopausal osteoporosis    Non-STEMI (non-ST elevated myocardial infarction) ([facility])    Primary osteoarthritis involving multiple joints    Fibromyalgia    Weakness    Cervical radiculopathy     Allergies:  She has No Known Allergies.    Current Medications:  Outpatient Medications Marked as Taking for the 10/25/24 encounter (Office Visit) with Jose Choudhary MD   Medication Sig    celecoxib (CELEBREX) 200 MG Oral Cap Take 1 capsule (200 mg total) by mouth daily.    Glucose Blood (ACCU-CHEK JOSE PLUS) In Vitro Strip INJECT 1 DEVICE INTO THE SKIN 3 (THREE) TIMES DAILY AS NEEDED.    Accu-Chek Softclix Lancets Does not apply Misc Use three times daily as directed.    insulin degludec (TRESIBA FLEXTOUCH) 100 UNIT/ML Subcutaneous Solution Pen-injector INJECT 50 UNITS/DAY INTO THE SKIN DAILY.    Insulin Pen Needle (BD PEN NEEDLE AGA 2ND GEN) 32G X 4 MM Does not apply Misc  1 strip by In Vitro route daily.    metFORMIN HCl 1000 MG Oral Tab TOME JESUSITA TABLETA 2 VECES AL CHRISTINE CON LAS COMIDAS    pravastatin 20 MG Oral Tab Take 1 tablet (20 mg total) by mouth daily.       Medical History:  She  has a past medical history of Cataract, Diabetes (HCC), Diverticulosis large intestine w/o perforation or abscess w/o bleeding (12/6/2018), Dupuytren's contracture of left hand (11-18-16), Essential hypertension, High blood pressure, High cholesterol, Hyperlipidemia, Internal hemorrhoids without complication (12/6/2018), MI (myocardial infarction) (HCC), and Stroke (HCC).  Surgical History:  She  has a past surgical history that includes tubal ligation (1977); cholecystectomy; appendectomy; palmar fasciectomy (Left, 11-18-16); colonoscopy; colonoscopy (N/A, 12/6/2018); and cataract.   Family History:  Her family history includes Cancer in her sister.  Social History:  She  reports that she has never smoked. She has never used smokeless tobacco. She reports that she does not drink alcohol and does not use drugs.    Tobacco:  She has never smoked tobacco.    CAGE Alcohol Screen:   Cage screening not needed because reported NO to Alcohol use on social history.      Patient Care Team:  Jose Choudhary MD as PCP - General (Family Medicine)    Review of Systems   Constitutional:  Negative for appetite change, diaphoresis, fatigue and fever.   HENT:  Negative for hearing loss and nosebleeds.    Eyes:  Negative for visual disturbance.   Respiratory:  Negative for shortness of breath.    Cardiovascular:  Negative for chest pain and palpitations.   Gastrointestinal:  Negative for abdominal pain.   Endocrine: Negative for polydipsia and polyuria.   Genitourinary:  Negative for hematuria.   Musculoskeletal:  Positive for arthralgias.   Skin:  Negative for rash.   Neurological:  Negative for dizziness and headaches.   Psychiatric/Behavioral:  Negative for dysphoric mood and sleep disturbance.            Objective:   Physical Exam  Vitals reviewed.   Constitutional:       Appearance: She is well-developed.   HENT:      Head: Normocephalic.      Right Ear: Hearing, tympanic membrane, ear canal and external ear normal.      Left Ear: Hearing, tympanic membrane, ear canal and external ear normal.      Nose: Nose normal.      Mouth/Throat:      Mouth: Mucous membranes are moist.   Eyes:      Extraocular Movements: Extraocular movements intact.      Conjunctiva/sclera: Conjunctivae normal.      Pupils: Pupils are equal, round, and reactive to light.   Neck:      Thyroid: No thyromegaly.   Cardiovascular:      Rate and Rhythm: Normal rate and regular rhythm.      Heart sounds: Normal heart sounds, S1 normal and S2 normal. No murmur heard.  Pulmonary:      Effort: Pulmonary effort is normal.      Breath sounds: Normal breath sounds.   Chest:   Breasts:     Right: No mass.      Left: No mass.   Abdominal:      General: Bowel sounds are normal.      Palpations: Abdomen is soft. There is no mass.      Tenderness: There is no abdominal tenderness.      Hernia: No hernia is present.   Musculoskeletal:      Cervical back: Neck supple.      Comments: Spine without scoliosis or kyphosis.  Range of motions of both upper and lower extremities are normal.   Lymphadenopathy:      Cervical: No cervical adenopathy.      Comments: LEs no edema    Skin:     Findings: No rash.   Neurological:      General: No focal deficit present.      Mental Status: She is alert.      Deep Tendon Reflexes:      Reflex Scores:       Patellar reflexes are 2+ on the right side and 2+ on the left side.  Psychiatric:         Mood and Affect: Mood and affect normal.            /71 (BP Location: Left arm, Patient Position: Sitting, Cuff Size: adult)   Pulse 77   Ht 5' 3\" (1.6 m)   Wt 150 lb 9.6 oz (68.3 kg)   BMI 26.68 kg/m²  Estimated body mass index is 26.68 kg/m² as calculated from the following:    Height as of this encounter: 5' 3\" (1.6 m).     Weight as of this encounter: 150 lb 9.6 oz (68.3 kg).    Medicare Hearing Assessment:   Hearing Screening    Screening Method: Questionnaire  I have a problem hearing over the telephone: No I have trouble following the conversations when two or more people are talking at the same time: Yes   I have trouble understanding things on the TV: No I have to strain to understand conversations: Yes   I have to worry about missing the telephone ring or doorbell: Yes I have trouble hearing conversations in a noisy background such as a crowded room or restaurant: Yes   I get confused about where sounds come from: Sometimes I misunderstand some words in a sentence and need to ask people to repeat themselves: Yes   I especially have trouble understanding the speech of women and children: No I have trouble understanding the speaker in a large room such as at a meeting or place of Sikh: Yes   Many people I talk to seem to mumble (or don't speak clearly): Yes People get annoyed because I misunderstand what they say: Yes   I misunderstand what others are saying and make inappropriate responses: Yes I avoid social activities because I cannot hear well and fear I will reply improperly: Yes   Family members and friends have told me they think I may have hearing loss: Yes             Visual Acuity:   Right Eye Visual Acuity: Uncorrected Right Eye Chart Acuity: 20/50   Left Eye Visual Acuity: Uncorrected Left Eye Chart Acuity: 20/70   Both Eyes Visual Acuity: Uncorrected Both Eyes Chart Acuity: 20/50   Able To Tolerate Visual Acuity: Yes        Assessment & Plan:   Noemi Hudson is a 84 year old female who presents for a Medicare Assessment.     1. Medicare annual wellness visit, subsequent (Primary)  2. Type 2 diabetes mellitus with stage 3a chronic kidney disease, with long-term current use of insulin (HCC)  -     Hemoglobin A1C; Future; Expected date: 10/25/2024  3. Non-STEMI (non-ST elevated myocardial infarction) (HCC)  -      Lipid Panel; Future; Expected date: 10/25/2024  4. Primary hypertension  -     CBC With Differential With Platelet; Future; Expected date: 10/25/2024  -     Comp Metabolic Panel (14); Future; Expected date: 10/25/2024  -     Urinalysis with Culture Reflex; Future; Expected date: 10/25/2024  5. Stage 3a chronic kidney disease (HCC)  6. Mixed hyperlipidemia  -     TSH W Reflex To Free T4; Future; Expected date: 10/25/2024  7. Colon cancer screening  8. History of colon polyps  9. Slow transit constipation  10. Pancreatic cyst (HCC)  11. Diverticulosis large intestine w/o perforation or abscess w/o bleeding  12. Internal hemorrhoids without complication  13. History of TIA (transient ischemic attack)  14. Primary osteoarthritis involving multiple joints  15. Cyst of ovary, unspecified laterality  16. Age-related osteoporosis without current pathological fracture  17. Vertigo  18. Vitamin D deficiency  -     Vitamin D; Future; Expected date: 10/25/2024  19. Risk for falls  Other orders  -     Celecoxib; Take 1 capsule (200 mg total) by mouth daily.  Dispense: 90 capsule; Refill: 1      1. Medicare annual wellness visit, subsequent    Assessment and Plan:     [facility] checkup as follows:    LABORATORY & ORDERS:   Orders Placed This Encounter   Procedures    CBC With Differential With Platelet    Comp Metabolic Panel (14)    Hemoglobin A1C    Lipid Panel    TSH W Reflex To Free T4    Vitamin D    Urinalysis with Culture Reflex      REFERRALS: generated today : None .    IMMUNIZATIONS ordered and given today include: none.    RECOMMENDATIONS given include: ANTICIPATORY GUIDANCE  topics covered today include: safety (i.e. seat belts, helmets, sunscreen, protective sports gear ), nutrition (i.e. healthy meals and snacks (i.e. avoid junk food and high-carbohydrate foods); athletic conditioning, fluids; low fat milk, limit to less than 20 oz. a day; dental care with her dentist), and healthy habits & social  competence & responsibilities: Recommendations on physical activity; exercise daily or at least 3 times a week for 30-60 minutes doing cardiovascular exercise. Patient educated on self breast examination to be done on a monthly basis.  REFUSALS:  Although recommended, the patient refuses the following: none .      FOLLOW-UP: Schedule a follow-up visit in 12 months.       2. Type 2 diabetes mellitus with stage 3a chronic kidney disease, with long-term current use of insulin (HCC)  Doing well  CPM  Follow up KPA  Lab:  Hemoglobin A1C; Future    3. Non-STEMI (non-ST elevated myocardial infarction) (HCC)  Doing well  CPM  Follow up KPA  Lab:  Lipid Panel; Future    4. Primary hypertension  Doing well  CPM  Follow up KPA  Lab:   - CBC With Differential With Platelet; Future  - Comp Metabolic Panel (14); Future  - Urinalysis with Culture Reflex; Future    5. Stage 3a chronic kidney disease (HCC)  Lab: CMP    6. Mixed hyperlipidemia  Doing well  CPM  Follow up KPA  Lab:  TSH W Reflex To Free T4; Future    7. Colon cancer screening  8. History of colon polyps  S/p colonosocpy    9. Slow transit constipation  stable    10. Pancreatic cyst (HCC)  Resolved    11. Diverticulosis large intestine w/o perforation or abscess w/o bleeding  Asymptomatic    12. Internal hemorrhoids without complication  Stable    13. History of TIA (transient ischemic attack)  Resolved    14. Primary osteoarthritis involving multiple joints  Doing well  CPM  Follow up KPA      15. Cyst of ovary, unspecified laterality  Request records    16. Age-related osteoporosis without current pathological fracture  Doing well  CPM  Follow up KPA      17. Vertigo  Resolved    18. Vitamin D deficiency  Lab:  Vitamin D; Future    19. Risk for falls  Moderate risk, precautions gievn     The patient indicates understanding of these issues and agrees to the plan.  Further testing ordered.  Imaging studies ordered.  Lab work ordered.  Reinforced healthy diet,  lifestyle, and exercise.      Return in about 6 months (around 4/25/2025).     RADHA FLOYD MD, 10/25/2024     Supplementary Documentation:   General Health:  In the past six months, have you lost more than 10 pounds without trying?: 2 - No  Has your appetite been poor?: Yes  Type of Diet: Balanced  How does the patient maintain a good energy level?: Appropriate Exercise  How would you describe your daily physical activity?: Moderate  How would you describe your current health state?: Fair  How do you maintain positive mental well-being?: Visiting Family  On a scale of 0 to 10, with 0 being no pain and 10 being severe pain, what is your pain level?: 8 - (Severe)  In the past six months, have you experienced urine leakage?: 0-No  At any time do you feel concerned for the safety/well-being of yourself and/or your children, in your home or elsewhere?: No  Have you had any immunizations at another office such as Influenza, Hepatitis B, Tetanus, or Pneumococcal?: No    Health Maintenance   Topic Date Due    Zoster Vaccines (1 of 2) Never done    COVID-19 Vaccine (6 - 2023-24 season) 09/01/2024    Influenza Vaccine (1) 10/01/2024    Annual Physical  10/11/2024    Colorectal Cancer Screening  12/30/2024    Diabetes Care A1C  12/25/2024    Diabetes Care Dilated Eye Exam  03/21/2025    Diabetes Care Foot Exam  04/02/2025    Diabetes Care: GFR  04/02/2025    Diabetes Care: Microalb/Creat Ratio  04/02/2025    DEXA Scan  Completed    Annual Depression Screening  Completed    Fall Risk Screening (Annual)  Completed    Pneumococcal Vaccine: 65+ Years  Completed

## 2024-12-11 NOTE — TELEPHONE ENCOUNTER
Please review. Protocol Pass    Please advise if refill is appropriate.    Requested Prescriptions   Pending Prescriptions Disp Refills    ASPIRIN 81 MG Oral Chew Tab [Pharmacy Med Name: ASPIRIN 81MG CHEWABLE TABLETS] 90 tablet 1     Sig: CHEW AND SWALLOW 1 TABLET(81 MG) BY MOUTH DAILY       Aspirin Protocol Passed - 12/11/2024  3:05 PM        Passed - In person appointment or virtual visit in the past 6 mos or appointment in next 3 mos     Recent Outpatient Visits              1 month ago Medicare annual wellness visit, subsequent    [facility]    [facility]    3 months ago Spinal stenosis of lumbar region, unspecified whether neurogenic claudication present    [facility] [place], [place]    Office Visit    5 months ago Type 2 diabetes mellitus with stage 3a chronic kidney disease, with long-term current use of insulin ([facility])    [facility]    Office Visit    6 months ago Lumbar radiculopathy    [facility]    [facility] Visit    6 months ago Pain of left hip    [facility]    [facility]          Future Appointments         Provider Department Appt Notes    In 4 months Jose Choudhary MD [facility], [address], [place] Return in about 6 months (around 4/25/2025).                           Future Appointments         Provider Department Appt Notes    In 4 months Jose Choudhary MD [facility], [address], [place] Return in about 6 months (around 4/25/2025).          Recent Outpatient Visits              1 month ago Medicare annual wellness visit, subsequent    [facility]    Office Visit    3 months ago Spinal stenosis of  lumbar region, unspecified whether neurogenic claudication present    [facility] [address], Jose Gomez MD    Office Visit    5 months ago Type 2 diabetes mellitus with stage 3a chronic kidney disease, with long-term current use of insulin ([facility])    [facility] [place], [place]    Office Visit    6 months ago Lumbar radiculopathy    [facility] [place]    Office Visit    6 months ago Pain of left hip    [facility] [address], Jose Gomez MD    Office Visit

## 2025-01-07 NOTE — TELEPHONE ENCOUNTER
Pharmacy requesting refill     insulin degludec (TRESIBA FLEXTOUCH) 100 UNIT/ML Subcutaneous Solution Pen-injector INJECT 50 UNITS/DAY INTO THE SKIN DAILY. 45 mL 3

## 2025-01-07 NOTE — TELEPHONE ENCOUNTER
Patient is requesting a refill for pravastatin 20 MG Oral Tab and insulin degludec (TRESIBA FLEXTOUCH) 100 UNIT/ML Subcutaneous Solution Pen-injector       Patient is out of insulin.   [id number] - [place], [place] - [address] AT [facility] [address], [phone], [phone]

## 2025-01-07 NOTE — TELEPHONE ENCOUNTER
Tresiba is on backorder, no information when it will be back in stock    Alternative needed.      (Will create separate encounter for pravastatin refill)

## 2025-01-08 NOTE — TELEPHONE ENCOUNTER
Spoke to Pharmacist , Pamella-stated many options-lantus solostar but put to pharmacy may use generic, most insurances are covering rx  Next 2 are Glargine and basaglar     Rx pending - need sig//dosage /QTY

## 2025-01-08 NOTE — TELEPHONE ENCOUNTER
Refill passed per [facility] protocols.    Requested Prescriptions   Pending Prescriptions Disp Refills    pravastatin 20 MG Oral Tab 90 tablet 3     Sig: Take 1 tablet (20 mg total) by mouth daily.       Cholesterol Medication Protocol Passed - 1/8/2025  4:39 PM        Passed - ALT < 80     Lab Results   Component Value Date    ALT 11 10/25/2024             Passed - ALT resulted within past year        Passed - Lipid panel within past 12 months     Lab Results   Component Value Date    CHOLEST 164 10/25/2024    TRIG 235 (H) 10/25/2024    HDL 34 (L) 10/25/2024    LDL 90 10/25/2024    VLDL 38 (H) 10/25/2024    NONHDLC 130 (H) 10/25/2024             Passed - In person appointment or virtual visit in the past 12 mos or appointment in next 3 mos     Recent Outpatient Visits              2 months ago Medicare annual wellness visit, subsequent    [facility] [place], [place]    Office Visit    4 months ago Spinal stenosis of lumbar region, unspecified whether neurogenic claudication present    [facility] [place], [place]    Office Visit    6 months ago Type 2 diabetes mellitus with stage 3a chronic kidney disease, with long-term current use of insulin (HCC)    [facility]    Office Visit    7 months ago Lumbar radiculopathy    [facility]    Office Visit    7 months ago Pain of left hip    [facility] [place], [place]    Office Visit          Future Appointments         Provider Department Appt Notes    In 3 months Jose Choudhary MD [facility] [address] Return in about 6 months (around 4/25/2025).                    Passed - Medication is active on med list

## 2025-02-18 NOTE — PROGRESS NOTES
2/18/2025  9:19 AM    Noemi Hudson is a 84 year old female.    Chief complaint(s):   Chief Complaint   Patient presents with    Dizziness     C/o dizziness, headaches, and shoulder/ neck pain tension    x1 week      HPI:     Noemi Hudson primary complaint is regarding DM, dizziness and arthritis.     Patient Noemi Powell is a 84 year old female is here to be evaluated for type 2 diabetes.  Specifically, female has type 2, insulin requiring diabetes. Compliance with treatment has been fair.  Patient's diabetes was first diagnosed > 28 years ago.  Patient follows a 2000 calorie ADA diet.  Patient report experiencing the following diabetes related symptoms; Negative for polyuria, Negative for polydipsia, Negative for blurred vision.  Depression symptoms include none.  Tobacco screen: none  smoker.  Current meds include :  oral hypoglycemic include: Metformin 1000 mg BID  insulin/injectable : Tresiba 50 unit Q am .  Hypoglycemia severity is not applicable.she reports home blood glucose readings have been 90s-120 -159(#s) and believes  having good glucose control.  Most recent lab results include glycohemoglobin 7.6 %, microalbuminuria have been -.  In regard to preventative care, her last ophthalmology exam was in < 1 months ago.  Opthalmic evaluation have shown sanjay pathology.  Concurrent relative health problems include HTN.  Also complaining of burning legs.     In addition patient is complaining of feeling dizzy for the past few weeks.  She describes it as spinning sensation not associate with any nausea or vomiting.  Denies any headaches, fever or diplopia.  At present times there is no dizziness.  She has been having taken any medication for this even though she has had vertigo in the past.    Also complaining of increased arthritis with this cold weather especially on her shoulders.  Request refills on her medication Voltaren gel.      HISTORY:  Past Medical History:    Cataract    Diabetes (HCC)     Diverticulosis large intestine w/o perforation or abscess w/o bleeding    Dupuytren's contracture of left hand     LMF Ray    Essential hypertension    High blood pressure    High cholesterol    Hyperlipidemia    Internal hemorrhoids without complication    MI (myocardial infarction) (HCC)    Stroke (HCC)      Past Surgical History:   Procedure Laterality Date    Appendectomy      Cataract      Cholecystectomy      Colonoscopy      Colonoscopy N/A 12/6/2018    Procedure: COLONOSCOPY;  Surgeon: Maricruz Benavides MD;  Location: [facility] ENDOSCOPY    Palmar fasciectomy Left 11-18-16    L Dupuytren's  LMF Ray    Tubal ligation  1977      Family History   Problem Relation Age of Onset    Cancer Sister         skin      Social History:   Social History     Socioeconomic History    Marital status: Legally    Tobacco Use    Smoking status: Never     Passive exposure: Never    Smokeless tobacco: Never   Vaping Use    Vaping status: Never Used   Substance and Sexual Activity    Alcohol use: No     Alcohol/week: 0.0 standard drinks of alcohol    Drug use: Never   Other Topics Concern    Caffeine Concern Yes     Comment: coffee        Immunizations:   Immunization History   Administered Date(s) Administered    Covid-19 Vaccine Pfizer 30 mcg/0.3 ml 02/17/2021, 03/10/2021, 11/11/2021    Covid-19 Vaccine Pfizer Rich-Sucrose 30 mcg/0.3 ml 06/09/2022    FLU VAC High Dose 65 YRS & Older PRSV Free (59125) 10/20/2017, 09/11/2018, 09/20/2023    FLUAD High Dose 65 yr and older (86167) 08/16/2019, 10/02/2020    FLULAVAL 6 months & older 0.5 ml Prefilled syringe (26016) 10/14/2022    FLUZONE 6 months and older PFS 0.5 ml (68236) 09/12/2016    Fluzone Vaccine Medicare () 10/15/2017, 10/20/2017, 09/11/2018    HIB PRP-OMP 04/09/2016    Haemophilus B Polysac Conj Vac Im 04/09/2016    High Dose Fluzone Influenza Vaccine, 65yr+ PF 0.5mL (93564) 09/12/2016, 10/16/2017, 09/24/2021    Influenza 10/23/2015     Meningococcal-Menactra 04/09/2016    Pfizer Covid-19 Vaccine 30mcg/0.3ml 12yrs+ 12/14/2023    Pneumococcal (Prevnar 13) 08/29/2017    Pneumococcal Conjugate PCV20 11/14/2022    Pneumovax 23 11/18/2005, 04/09/2016       Medications (Active prior to today's visit):  Current Outpatient Medications   Medication Sig Dispense Refill    Olmesartan Medoxomil 40 MG Oral Tab Take 0.5 tablets (20 mg total) by mouth daily. 45 tablet 2    insulin degludec (TRESIBA FLEXTOUCH) 100 UNIT/ML Subcutaneous Solution Pen-injector INJECT 50 UNITS/DAY INTO THE SKIN DAILY. 45 mL 3    meclizine 12.5 MG Oral Tab Take 1 tablet (12.5 mg total) by mouth 3 (three) times daily as needed. 40 tablet 1    diclofenac 1 % External Gel Apply 4 g topically 4 (four) times daily. Apply to affected area(s). 60 g 2    pravastatin 20 MG Oral Tab Take 1 tablet (20 mg total) by mouth daily. 90 tablet 3    aspirin 81 MG Oral Chew Tab Chew 1 tablet (81 mg total) by mouth daily. 90 tablet 1    alendronate 70 MG Oral Tab Take 1 tablet (70 mg total) by mouth every 7 days. 12 tablet 2    Glucose Blood (ACCU-CHEK JOSE PLUS) In Vitro Strip INJECT 1 DEVICE INTO THE SKIN 3 (THREE) TIMES DAILY AS NEEDED. 300 strip 3    Accu-Chek Softclix Lancets Does not apply Misc Use three times daily as directed. 200 each 5    Insulin Pen Needle (BD PEN NEEDLE AGA 2ND GEN) 32G X 4 MM Does not apply Misc 1 strip by In Vitro route daily. 100 each 3    metFORMIN HCl 1000 MG Oral Tab TOME JESUSITA TABLETA 2 VECES AL CRHISTINE CON LAS COMIDAS 180 tablet 3    chlorthalidone 25 MG Oral Tab Take 1 tablet (25 mg total) by mouth daily. 90 tablet 3    metoprolol succinate  MG Oral Tablet 24 Hr Take 1 tablet (100 mg total) by mouth daily. 90 tablet 3    latanoprost 0.005 % Ophthalmic Solution       Multiple Vitamins-Minerals (CENTRUM SILVER) Oral Tab Take 1 tablet by mouth daily.      Multiple Vitamins-Minerals (PRESERVISION AREDS) Oral Tab Take 2 tablets by mouth daily.      celecoxib (CELEBREX) 200  MG Oral Cap Take 1 capsule (200 mg total) by mouth daily. (Patient not taking: Reported on 2/18/2025) 90 capsule 1    Hydrocortisone 2.5 % External Lotion Apply 1 Application topically 2 (two) times daily. (Patient not taking: Reported on 2/18/2025) 118 mL 1       Allergies:  Allergies[1]      ROS:   Review of Systems   Constitutional:  Negative for appetite change and fever.   Eyes:  Negative for visual disturbance.   Respiratory:  Negative for shortness of breath.    Cardiovascular:  Negative for chest pain.   Gastrointestinal:  Negative for abdominal pain, nausea and vomiting.   Endocrine: Negative for polydipsia and polyuria.   Musculoskeletal:  Positive for arthralgias. Negative for back pain.   Skin:  Negative for rash.   Neurological:  Positive for dizziness. Negative for headaches.       PHYSICAL EXAM:   VS: /72 (BP Location: Right arm, Patient Position: Sitting, Cuff Size: adult)   Pulse 80   Ht 5' 3\" (1.6 m)   Wt 152 lb (68.9 kg)   BMI 26.93 kg/m²     Physical Exam  Vitals reviewed.   Constitutional:       General: She is not in acute distress.     Appearance: Normal appearance.   HENT:      Head: Normocephalic.      Right Ear: Tympanic membrane normal.      Left Ear: Tympanic membrane normal.      Ears:      Comments: Left external ear skin lesion  Eyes:      Conjunctiva/sclera: Conjunctivae normal.   Cardiovascular:      Rate and Rhythm: Normal rate.   Pulmonary:      Effort: Pulmonary effort is normal.   Musculoskeletal:      Cervical back: Neck supple.   Feet:      Right foot:      Protective Sensation: 10 sites tested.  6 sites sensed.      Left foot:      Protective Sensation: 10 sites tested.  6 sites sensed.   Skin:     Findings: No rash.   Psychiatric:         Mood and Affect: Mood normal.         LABORATORY RESULTS:        EKG / Spirometry : -     Radiology: No results found.     ASSESSMENT/PLAN:   Assessment   Encounter Diagnoses   Name Primary?    Type 2 diabetes mellitus with stage  3a chronic kidney disease, with long-term current use of insulin (HCC) Yes    Type 2 diabetes mellitus with diabetic polyneuropathy, with long-term current use of insulin (HCC)     Vertigo     Primary osteoarthritis involving multiple joints     Skin lesion of right ear        1. Type 2 diabetes mellitus with stage 3a chronic kidney disease, with long-term current use of insulin ([facility])  2. Type 2 diabetes mellitus with diabetic polyneuropathy, with long-term current use of insulin (HCC)    DIABETES A&P    LABORATORY & ORDERS: Blood test(s) ordered today ;   Orders Placed This Encounter   Procedures    Microalb/Creat Ratio, Random Urine    Basic Metabolic Panel (8)     Additional orders include: CPM  MEDICATIONS:    Olmesartan Medoxomil 40 MG Oral Tab, Take 0.5 tablets (20 mg total) by mouth daily., Disp: 45 tablet, Rfl: 2    insulin degludec (TRESIBA FLEXTOUCH) 100 UNIT/ML Subcutaneous Solution Pen-injector, INJECT 50 UNITS/DAY INTO THE SKIN DAILY., Disp: 45 mL, Rfl: 3    meclizine 12.5 MG Oral Tab, Take 1 tablet (12.5 mg total) by mouth 3 (three) times daily as needed., Disp: 40 tablet, Rfl: 1    diclofenac 1 % External Gel, Apply 4 g topically 4 (four) times daily. Apply to affected area(s)., Disp: 60 g, Rfl: 2    pravastatin 20 MG Oral Tab, Take 1 tablet (20 mg total) by mouth daily., Disp: 90 tablet, Rfl: 3    aspirin 81 MG Oral Chew Tab, Chew 1 tablet (81 mg total) by mouth daily., Disp: 90 tablet, Rfl: 1    alendronate 70 MG Oral Tab, Take 1 tablet (70 mg total) by mouth every 7 days., Disp: 12 tablet, Rfl: 2    Glucose Blood (ACCU-CHEK JOSE PLUS) In Vitro Strip, INJECT 1 DEVICE INTO THE SKIN 3 (THREE) TIMES DAILY AS NEEDED., Disp: 300 strip, Rfl: 3    Accu-Chek Softclix Lancets Does not apply Misc, Use three times daily as directed., Disp: 200 each, Rfl: 5    Insulin Pen Needle (BD PEN NEEDLE AGA 2ND GEN) 32G X 4 MM Does not apply Misc, 1 strip by In Vitro route daily., Disp: 100 each, Rfl: 3    metFORMIN  HCl 1000 MG Oral Tab, TOME JESUSITA TABLETA 2 VECES AL CHRISTINE CON LAS COMIDAS, Disp: 180 tablet, Rfl: 3    chlorthalidone 25 MG Oral Tab, Take 1 tablet (25 mg total) by mouth daily., Disp: 90 tablet, Rfl: 3    metoprolol succinate  MG Oral Tablet 24 Hr, Take 1 tablet (100 mg total) by mouth daily., Disp: 90 tablet, Rfl: 3    latanoprost 0.005 % Ophthalmic Solution, , Disp: , Rfl:     Multiple Vitamins-Minerals (CENTRUM SILVER) Oral Tab, Take 1 tablet by mouth daily., Disp: , Rfl:     Multiple Vitamins-Minerals (PRESERVISION AREDS) Oral Tab, Take 2 tablets by mouth daily., Disp: , Rfl:     celecoxib (CELEBREX) 200 MG Oral Cap, Take 1 capsule (200 mg total) by mouth daily. (Patient not taking: Reported on 2/18/2025), Disp: 90 capsule, Rfl: 1    Hydrocortisone 2.5 % External Lotion, Apply 1 Application topically 2 (two) times daily. (Patient not taking: Reported on 2/18/2025), Disp: 118 mL, Rfl: 1.  Requested Prescriptions     Signed Prescriptions Disp Refills    Olmesartan Medoxomil 40 MG Oral Tab 45 tablet 2     Sig: Take 0.5 tablets (20 mg total) by mouth daily.    insulin degludec (TRESIBA FLEXTOUCH) 100 UNIT/ML Subcutaneous Solution Pen-injector 45 mL 3     Sig: INJECT 50 UNITS/DAY INTO THE SKIN DAILY.    meclizine 12.5 MG Oral Tab 40 tablet 1     Sig: Take 1 tablet (12.5 mg total) by mouth 3 (three) times daily as needed.    diclofenac 1 % External Gel 60 g 2     Sig: Apply 4 g topically 4 (four) times daily. Apply to affected area(s).     REFERRALS:       Procedures    Microalb/Creat Ratio, Random Urine    Basic Metabolic Panel (8)         RECOMMENDATIONS: instructed in use of glucometer ( check fasting glucose daily), return for training in administering insulin injections, adherence to an 1800 calorie ADA diet,  10 pound weight loss, a graduated exercise program, HgbA1C level checked quarterly, daily foot self-inspection, need for yearly flu shots, and avoid all sodas, juices, candy, chocolates, cakes, ice  cream, etc.      FOLLOW-UP: Schedule a follow-up visit in 3 months.         3. Vertigo    MEDICATIONS:      meclizine 12.5 MG Oral Tab 40 tablet 1     Sig: Take 1 tablet (12.5 mg total) by mouth 3 (three) times daily as needed.        RECOMMENDATIONS given include: Patient was reassured of  her medical condition and all questions and concerns were answered. Patient was informed to please, call our office with any new or further questions or concerns that may come up in the near future. Notify Dr Floyd or the [facility] if there is a deterioration or worsening of the medical condition. Also, inform the doctor with any new symptoms or medications' side effects.      FOLLOW-UP: Schedule a follow-up visit in  prn       4. Primary osteoarthritis involving multiple joints    Doing well  CPM  Refills Voltaren gel    5. Skin lesion of right ear    Referral:  DERM - EXTERNAL          Orders This Visit:  Orders Placed This Encounter   Procedures    Microalb/Creat Ratio, Random Urine    Basic Metabolic Panel (8)       Meds This Visit:  Requested Prescriptions     Signed Prescriptions Disp Refills    Olmesartan Medoxomil 40 MG Oral Tab 45 tablet 2     Sig: Take 0.5 tablets (20 mg total) by mouth daily.    insulin degludec (TRESIBA FLEXTOUCH) 100 UNIT/ML Subcutaneous Solution Pen-injector 45 mL 3     Sig: INJECT 50 UNITS/DAY INTO THE SKIN DAILY.    meclizine 12.5 MG Oral Tab 40 tablet 1     Sig: Take 1 tablet (12.5 mg total) by mouth 3 (three) times daily as needed.    diclofenac 1 % External Gel 60 g 2     Sig: Apply 4 g topically 4 (four) times daily. Apply to affected area(s).       Imaging & Referrals:  DERM - EXTERNAL         RADHA FLOYD MD       [1] No Known Allergies

## 2025-03-06 ENCOUNTER — APPOINTMENT (OUTPATIENT)
Dept: URBAN - METROPOLITAN AREA CLINIC 244 | Age: 85
Setting detail: DERMATOLOGY
End: 2025-03-06

## 2025-03-06 DIAGNOSIS — D485 NEOPLASM OF UNCERTAIN BEHAVIOR OF SKIN: ICD-10-CM

## 2025-03-06 DIAGNOSIS — H61.03 CHONDRITIS OF EXTERNAL EAR: ICD-10-CM

## 2025-03-06 PROBLEM — H61.031 CHONDRITIS OF RIGHT EXTERNAL EAR: Status: ACTIVE | Noted: 2025-03-06

## 2025-03-06 PROBLEM — D48.5 NEOPLASM OF UNCERTAIN BEHAVIOR OF SKIN: Status: ACTIVE | Noted: 2025-03-06

## 2025-03-06 PROCEDURE — OTHER PRESCRIPTION: OTHER

## 2025-03-06 PROCEDURE — 99204 OFFICE O/P NEW MOD 45 MIN: CPT | Mod: 25

## 2025-03-06 PROCEDURE — 69100 BIOPSY OF EXTERNAL EAR: CPT | Mod: RT

## 2025-03-06 PROCEDURE — OTHER BIOPSY BY SHAVE METHOD: OTHER

## 2025-03-06 PROCEDURE — OTHER PRESCRIPTION MEDICATION MANAGEMENT: OTHER

## 2025-03-06 PROCEDURE — OTHER COUNSELING: OTHER

## 2025-03-06 RX ORDER — CLOBETASOL PROPIONATE 0.5 MG/G
CREAM TOPICAL
Qty: 15 | Refills: 0 | Status: ERX | COMMUNITY
Start: 2025-03-06

## 2025-03-06 ASSESSMENT — LOCATION DETAILED DESCRIPTION DERM: LOCATION DETAILED: RIGHT SCAPHA

## 2025-03-06 ASSESSMENT — LOCATION SIMPLE DESCRIPTION DERM: LOCATION SIMPLE: RIGHT EAR

## 2025-03-06 ASSESSMENT — LOCATION ZONE DERM: LOCATION ZONE: EAR

## 2025-03-06 NOTE — PROCEDURE: BIOPSY BY SHAVE METHOD
Detail Level: Detailed
Depth Of Biopsy: dermis
Was A Bandage Applied: Yes
Size Of Lesion In Cm: 0
Biopsy Type: H and E
Biopsy Method: Dermablade
Anesthesia Type: 1% lidocaine with epinephrine
Anesthesia Volume In Cc: 0.5
Hemostasis: Drysol
Wound Care: Petrolatum
Dressing: bandage
Destruction After The Procedure: No
Type Of Destruction Used: Curettage
Curettage Text: The wound bed was treated with curettage after the biopsy was performed.
Cryotherapy Text: The wound bed was treated with cryotherapy after the biopsy was performed.
Electrodesiccation Text: The wound bed was treated with electrodesiccation after the biopsy was performed.
Electrodesiccation And Curettage Text: The wound bed was treated with electrodesiccation and curettage after the biopsy was performed.
Silver Nitrate Text: The wound bed was treated with silver nitrate after the biopsy was performed.
Lab: -[id number]
Medical Necessity Information: It is in your best interest to select a reason for this procedure from the list below. All of these items fulfill various CMS LCD requirements except the new and changing color options.
Consent: Written consent was obtained and risks were reviewed including but not limited to scarring, infection, bleeding, scabbing, incomplete removal, nerve damage and allergy to anesthesia.
Post-Care Instructions: I reviewed with the patient in detail post-care instructions. Patient is to keep the biopsy site dry overnight, and then apply bacitracin twice daily until healed. Patient may apply hydrogen peroxide soaks to remove any crusting.
Notification Instructions: Patient will be notified of biopsy results. However, patient instructed to call the office if not contacted within 2 weeks.
Billing Type: Third-Party Bill
Information: Selecting Yes will display possible errors in your note based on the variables you have selected. This validation is only offered as a suggestion for you. PLEASE NOTE THAT THE VALIDATION TEXT WILL BE REMOVED WHEN YOU FINALIZE YOUR NOTE. IF YOU WANT TO FAX A PRELIMINARY NOTE YOU WILL NEED TO TOGGLE THIS TO 'NO' IF YOU DO NOT WANT IT IN YOUR FAXED NOTE.

## 2025-03-06 NOTE — PROCEDURE: PRESCRIPTION MEDICATION MANAGEMENT
Render In Strict Bullet Format?: No
Detail Level: Zone
Initiate Treatment: topical clobetasol bid 2 wks on 2 wks off

## 2025-03-06 NOTE — HPI: EVALUATION OF SKIN LESION(S)
How Severe Are Your Spot(S)?: mild
Hpi Title: Evaluation of a Skin Lesion
Additional History: Pt mentioned that AA was removed about 3 years ago but now has grown back and is painful. Pt states that doctor that had done removal said lesion was not cancerous

## 2025-03-26 NOTE — H&P
[facility]    PATIENT'S NAME: SHAI PENALOZA   ATTENDING PHYSICIAN: Guilherme Bledsoe MD   PATIENT ACCOUNT#:   [id number]    LOCATION:  [address]  MEDICAL RECORD #:   [id number]       YOB: 1940  ADMISSION DATE:       03/26/2025    HISTORY AND PHYSICAL EXAMINATION    CHIEF COMPLAINT:  Abdominal pain, elevated lipase, and underlying pancreatic cyst.    HISTORY OF PRESENT ILLNESS:  Patient is an 84-year-old  female, who came into the emergency department for evaluation of complaints of midepigastric pain radiating to her mid chest and she feels it also in her throat since last night after eating toast and cream cheese.  In the emergency room, CBC and chemistry were unremarkable.  GFR noted to be 51, which is around her baseline.  Liver function tests were unremarkable.  Troponin was negative.  EKG showed sinus tachycardia.  Glucose 331.  GeneXpert viral panel was negative.  Lipase 945.  CT scan of the abdomen and pelvis showed stable complex cystic lesion in the pancreatic tail, otherwise no acute findings.  CT scan of the chest showed no pulmonary embolism.  Chest x-ray was unremarkable.  Patient will be admitted to the hospital for further management.     PAST MEDICAL HISTORY:  Reviewing the records, patient has a history of pancreatic tail complex cyst, has been stable on followup MRIs up to 2019.  She has a history of anxiety, osteoarthritis, hypertension, hyperlipidemia, diabetes mellitus type 2.  Questionable multiple transient ischemic attacks with negative repetitive MRIs.  She had cardiac angiogram in 2020 which showed no coronary artery disease, mild bridging phenomenon.    PAST SURGICAL HISTORY:  Appendectomy, cataract procedure, cholecystectomy.  She had a tubal ligation and left palmar fasciotomy.     MEDICATIONS:  Please see medication reconciliation list.     ALLERGIES:  No known drug allergies.     FAMILY HISTORY:  Positive for hypertension.     SOCIAL  HISTORY:  No tobacco, alcohol, or drug use.  Independent for basic activities of daily living.     REVIEW OF SYSTEMS:  Overall poor historian.  Very anxious.  She describes midepigastric pressure and tight sensation radiating to her midsternal area and then she felt it in her throat as being constant, waxing and waning since last night after eating cream cheese and toast.  No nausea or vomiting.  No shortness of breath.  Other 12-point review of systems is negative.        PHYSICAL EXAMINATION:    GENERAL:  Alert and oriented to time, place and person.  Very anxious.    VITAL SIGNS:  Temperature 98.1; pulse 114, sinus tachycardia on monitor; respiratory rate 21; blood pressure 150/66; pulse ox 97% on room air.  HEENT:  Atraumatic.  Oropharynx clear.  Moist mucous membranes.  Ears and nose normal.  Eyes:  Anicteric sclerae.   NECK:  Supple.  No lymphadenopathy.  Trachea midline.  Full range of motion.   LUNGS:  Clear to auscultation bilaterally.  Normal respiratory effort.   HEART:  Regular rate and rhythm.  S1 and S2 auscultated.  Tachycardic.   ABDOMEN:  Soft, nondistended.  No tenderness.  Positive bowel sounds.   EXTREMITIES:  No peripheral edema, clubbing or cyanosis.   NEUROLOGIC:  Motor and sensory intact.      ASSESSMENT:    1.   Epigastric discomfort with elevated lipase and complex pancreatic tail cyst.  2.   Diabetes mellitus type 2.  3.   Severe anxiety disorder.  4.   Mild renal insufficiency.    PLAN:  Patient will be admitted to general medical floor.  Obtain MRCP with and without contrast.  IV fluids.  Pain control.  Repeat lipase, CBC, and CMP in the morning.  Also obtain triglyceride level.  Gastroenterology consult.  Further recommendations to follow.     Dictated By Lizzy Mata MD  d: 03/26/2025 14:55:14  t: 03/26/2025 15:17:51  Job [id number]  /

## 2025-03-26 NOTE — ED INITIAL ASSESSMENT (HPI)
From home. To ED via [facility] for c/o \"not feeling well\". Per EMS, BP in 200's and blood sugar 370. EMS gave 500 ml of IVF. Denies chest pain and endorses shortness of breath.

## 2025-03-26 NOTE — ED PROVIDER NOTES
Patient Seen in: [facility] 3w/sw      History     Chief Complaint   Patient presents with    Hypertension    Hyperglycemia     Stated Complaint: Dizziness    Subjective:   HPI      Patient presents to the emergency department with upper abdominal pressure and lower chest discomfort.  She has had some nausea.  There is mild dizziness or lightheadedness.  There is no fever or chills.  Her blood sugars been elevated.  There is no other aggravating or alleviating factors.    Objective:     Past Medical History:    Cataract    Diabetes (HCC)    Diverticulosis large intestine w/o perforation or abscess w/o bleeding    Dupuytren's contracture of left hand     LMF Ray    Essential hypertension    High blood pressure    High cholesterol    Hyperlipidemia    Internal hemorrhoids without complication    MI (myocardial infarction) (HCC)    Stroke (HCC)              Past Surgical History:   Procedure Laterality Date    Appendectomy      Cataract      Cholecystectomy      Colonoscopy      Colonoscopy N/A 12/6/2018    Procedure: COLONOSCOPY;  Surgeon: Maricruz Benavides MD;  Location: [facility] ENDOSCOPY    Palmar fasciectomy Left 11-18-16    L Dupuytren's  LMF Ray    Tubal ligation  1977                Social History     Socioeconomic History    Marital status: Single   Tobacco Use    Smoking status: Never     Passive exposure: Never    Smokeless tobacco: Never   Vaping Use    Vaping status: Never Used   Substance and Sexual Activity    Alcohol use: No     Alcohol/week: 0.0 standard drinks of alcohol    Drug use: Never   Other Topics Concern    Caffeine Concern Yes     Comment: coffee     Social Drivers of Health     Food Insecurity: No Food Insecurity (3/26/2025)    NCSS - Food Insecurity     Worried About Running Out of Food in the Last Year: No     Ran Out of Food in the Last Year: No   Transportation Needs: No Transportation Needs (3/26/2025)    NCSS - Transportation     Lack of Transportation: No   Housing  Stability: Not At Risk (3/26/2025)    NCSS - Housing/Utilities     Has Housing: Yes     Worried About Losing Housing: No     Unable to Get Utilities: No                  Physical Exam     ED Triage Vitals [03/26/25 1104]   BP (!) 194/72   Pulse 115   Resp 19   Temp 98.1 °F (36.7 °C)   Temp src Temporal   SpO2 97 %   O2 Device None (Room air)       Current Vitals:   Vital Signs  BP: (!) 164/70  Pulse: 105  Resp: 22  Temp: 98.1 °F (36.7 °C)  Temp src: Oral  MAP (mmHg): 92    Oxygen Therapy  SpO2: 97 %  O2 Device: None (Room air)        Physical Exam  Vitals and nursing note reviewed.   Constitutional:       General: She is not in acute distress.     Appearance: She is well-developed.   HENT:      Head: Normocephalic.      Nose: Nose normal.      Mouth/Throat:      Mouth: Mucous membranes are moist.   Eyes:      Conjunctiva/sclera: Conjunctivae normal.   Cardiovascular:      Rate and Rhythm: Normal rate and regular rhythm.      Heart sounds: No murmur heard.  Pulmonary:      Effort: Pulmonary effort is normal. No respiratory distress.      Breath sounds: Normal breath sounds.   Abdominal:      General: There is no distension.      Palpations: Abdomen is soft.      Tenderness: There is no abdominal tenderness.   Musculoskeletal:         General: No tenderness. Normal range of motion.      Cervical back: Normal range of motion and neck supple.   Skin:     General: Skin is warm and dry.      Capillary Refill: Capillary refill takes less than 2 seconds.      Findings: No rash.   Neurological:      General: No focal deficit present.      Mental Status: She is alert and oriented to person, place, and time.      Cranial Nerves: No cranial nerve deficit.      Sensory: No sensory deficit.      Motor: No weakness.      Coordination: Coordination normal.             ED Course     Labs Reviewed   BASIC METABOLIC PANEL (8) - Abnormal; Notable for the following components:       Result Value    Glucose 331 (*)     Sodium 134 (*)      Creatinine 1.07 (*)     Calcium, Total 8.6 (*)     eGFR-Cr 51 (*)     All other components within normal limits   HEPATIC FUNCTION PANEL (7) - Abnormal; Notable for the following components:    Alkaline Phosphatase 50 (*)     All other components within normal limits   URINALYSIS, ROUTINE - Abnormal; Notable for the following components:    Urine Color Colorless (*)     Glucose Urine 100 (*)     Nitrite Urine 1+ (*)     WBC Urine 6-10 (*)     Bacteria Urine 1+ (*)     Squamous Epi. Cells Few (*)     All other components within normal limits   LIPASE - Abnormal; Notable for the following components:    Lipase 945 (*)     All other components within normal limits   TRIGLYCERIDES - Abnormal; Notable for the following components:    Triglycerides 384 (*)     All other components within normal limits   POCT GLUCOSE - Abnormal; Notable for the following components:    POC Glucose  336 (*)     All other components within normal limits   POCT GLUCOSE - Abnormal; Notable for the following components:    POC Glucose  155 (*)     All other components within normal limits   TROPONIN I HIGH SENSITIVITY - Normal   SARS-COV-2/FLU A AND B/RSV BY PCR (GENEXPERT) - Normal    Narrative:     This test is intended for the qualitative detection and differentiation of SARS-CoV-2, influenza A, influenza B, and respiratory syncytial virus (RSV) viral RNA in nasopharyngeal or nares swabs from individuals suspected of respiratory viral infection consistent with COVID-19 by their healthcare provider. Signs and symptoms of respiratory viral infection due to SARS-CoV-2, influenza, and RSV can be similar.    Test performed using the Xpert Xpress SARS-CoV-2/FLU/RSV (real time RT-PCR)  assay on the GeneXpert instrument, [employer], [place] [address].   This test is being used under the Food and Drug Administration's Emergency Use Authorization.    The authorized Fact Sheet for Healthcare Providers for this assay is available upon request from the  laboratory.   CBC WITH DIFFERENTIAL WITH PLATELET   HEMOGLOBIN A1C   RAINBOW DRAW LAVENDER   RAINBOW DRAW LIGHT GREEN   RAINBOW DRAW BLUE   RAINBOW DRAW GOLD     EKG    Rate, intervals and axes as noted on EKG Report.  Rate: 110 bpm  Rhythm: Sinus Rhythm  Reading: Sinus tachycardia, abnormal                       MDM          Admission disposition: 3/26/2025  2:32 PM           Medical Decision Making  Differential diagnosis considered for PE, pneumonia, electrolyte disturbance, dehydration.    Problems Addressed:  Chest pain of uncertain etiology: acute illness or injury  Dizziness: acute illness or injury  Hyperglycemia: acute illness or injury  Pancreatic mass (HCC): acute illness or injury    Amount and/or Complexity of Data Reviewed  Labs: ordered. Decision-making details documented in ED Course.     Details: Elevated lipase.  CBC and chemistry panel unremarkable with the exception of elevated blood sugar.  Radiology: ordered and independent interpretation performed. Decision-making details documented in ED Course.     Details: CT of the chest shows no PE or dissection.  CT of the abdomen shows cystic pancreatic lesion.  ECG/medicine tests: ordered and independent interpretation performed. Decision-making details documented in ED Course.  Discussion of management or test interpretation with external provider(s): Hospitalist evaluated in the emergency department.  GI notified of consultation regarding pancreatic mass.  Possibility of a low-grade pancreatitis exists.    Risk  Decision regarding hospitalization.        Disposition and Plan     Clinical Impression:  1. Chest pain of uncertain etiology    2. Dizziness    3. Hyperglycemia    4. Pancreatic mass (HCC)         Disposition:  Admit  3/26/2025  2:32 pm    Follow-up:  No follow-up provider specified.  We recommend that you schedule follow up care with a primary care provider within the next three months to obtain basic health screening including reassessment  of your blood pressure.      Medications Prescribed:  Current Discharge Medication List              Supplementary Documentation:         Hospital Problems       Present on Admission  Date Reviewed: 2/18/2025            ICD-10-CM Noted POA    * (Principal) Chest pain of uncertain etiology R07.9 3/26/2025 Unknown    Acute pancreatitis (HCC) K85.90 3/26/2025 Unknown    Dizziness R42 3/26/2025 Unknown    Hyperglycemia R73.9 3/26/2025 Unknown    Hyponatremia E87.1 3/26/2025 Yes    Pancreatic mass (HCC) K86.89 3/26/2025 Unknown

## 2025-03-27 NOTE — PROGRESS NOTES
RRT    *See RRT Documentation Record*    Reason the RRT was called: Patient sustaining HR in the 140's and  BP up to 190's even after receiving prn Hydralazine  Assessment of patient leading up to RRT: Dyspnea  Interventions/Testing: IV Ativan given  Patient Outcome/Disposition: Remained in floor, HR on monitor seems to be coming down as well as  BP  Family Notified: yes  Name of family notified: Kelli Walters

## 2025-03-27 NOTE — PLAN OF CARE
Problem: Patient Centered Care  Goal: Patient preferences are identified and integrated in the patient's plan of care  Description: Interventions:- What would you like us to know as we care for you? From home alone - Provide timely, complete, and accurate information to patient/family- Incorporate patient and family knowledge, values, beliefs, and cultural backgrounds into the planning and delivery of care- Encourage patient/family to participate in care and decision-making at the level they choose- Honor patient and family perspectives and choices  Outcome: Progressing     Problem: Diabetes/Glucose Control  Goal: Glucose maintained within prescribed range  Description: INTERVENTIONS:- Monitor Blood Glucose as ordered- Assess for signs and symptoms of hyperglycemia and hypoglycemia- Administer ordered medications to maintain glucose within target range- Assess barriers to adequate nutritional intake and initiate nutrition consult as needed- Instruct patient on self management of diabetes  Outcome: Progressing     Problem: Patient/Family Goals  Goal: Patient/Family Long Term Goal  Description: Patient's Long Term Goal: to go home Interventions:- follow MD orders- See additional Care Plan goals for specific interventions  Outcome: Progressing  Goal: Patient/Family Short Term Goal  Description: Patient's Short Term Goal: manage abd pain Interventions: - follow MD orders - See additional Care Plan goals for specific interventions  Outcome: Progressing

## 2025-03-27 NOTE — PLAN OF CARE
Pt is alertx4, Family @ bedside, Clear liquid advanced to soft, No complaints, Plan for pt to DC home once medically clear.   Problem: Diabetes/Glucose Control  Goal: Glucose maintained within prescribed range  Description: INTERVENTIONS:- Monitor Blood Glucose as ordered- Assess for signs and symptoms of hyperglycemia and hypoglycemia- Administer ordered medications to maintain glucose within target range- Assess barriers to adequate nutritional intake and initiate nutrition consult as needed- Instruct patient on self management of diabetes  Outcome: Progressing

## 2025-03-27 NOTE — CONSULTS
Is this a shared or split note between Advanced Practice Provider and Physician? Yes       [facility]   Gastroenterology Consultation Note    Noemi Hudson  Patient Status:    Observation  Date of Admission:         3/26/2025, Hospital day #0  Attending:   Av Smith MD  PCP:     RADHA FLOYD MD    Reason for Consultation:  Abdominal pain, pancreatic cyst    History of Present Illness:  Noemi Hudson is a 84 year old female w/ PMHx of pancreatic tail complex cyst, anxiety, OA, hypertension, hyperlipidemia, DM who presented to ER for epigastric and chest pain. At time of ER evaluation, lipase elevated to over 900, hepatic panel within normal limits. Today, patient no longer has upper abdominal pain, however does note mild lower abdominal pain often with straining with BM. She complains of constipation. BM are daily however small and piece like. No nausea or vomiting. No acid reflux, nausea or vomiting. No fevers or chills.     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     Pertinent Family Hx:  - No known history of esophageal, gastric or colon cancers  - No known IBD  - No known liver pathologies    Social Hx:  - No tobacco use/No ETOH  - Denies illicit drug use  - Lives with: family   - Occupation: retired       History:  Past Medical History:    Cataract    Diabetes (HCC)    Diverticulosis large intestine w/o perforation or abscess w/o bleeding    Dupuytren's contracture of left hand     LMF Ray    Essential hypertension    High blood pressure    High cholesterol    Hyperlipidemia    Internal hemorrhoids without complication    MI (myocardial infarction) (HCC)    Stroke (HCC)     Past Surgical History:   Procedure Laterality Date    Appendectomy      Cataract      Cholecystectomy      Colonoscopy      Colonoscopy N/A 12/6/2018    Procedure: COLONOSCOPY;  Surgeon: Maricruz Benavides MD;  Location: [facility] ENDOSCOPY     Palmar fasciectomy Left 11-18-16    L Dupuytren's  LMF Ray    Tubal ligation  1977     Family History   Problem Relation Age of Onset    Cancer Sister         skin      reports that she has never smoked. She has never been exposed to tobacco smoke. She has never used smokeless tobacco. She reports that she does not drink alcohol and does not use drugs.    Allergies:  Allergies[1]    Medications:    Current Facility-Administered Medications:     sodium chloride 0.9 % IV bolus 1,000 mL, 1,000 mL, Intravenous, Once    lactated ringers infusion, , Intravenous, Continuous    acetaminophen (Tylenol Extra Strength) tab 500 mg, 500 mg, Oral, Q4H PRN    ondansetron (Zofran) 4 MG/2ML injection 4 mg, 4 mg, Intravenous, Q6H PRN    metoclopramide (Reglan) 5 mg/mL injection 5 mg, 5 mg, Intravenous, Q8H PRN    temazepam (Restoril) cap 15 mg, 15 mg, Oral, Nightly PRN    morphINE PF 2 MG/ML injection 1 mg, 1 mg, Intravenous, Q2H PRN **OR** morphINE PF 2 MG/ML injection 2 mg, 2 mg, Intravenous, Q2H PRN **OR** morphINE PF 4 MG/ML injection 4 mg, 4 mg, Intravenous, Q2H PRN    LORazepam (Ativan) 2 mg/mL injection 0.5 mg, 0.5 mg, Intravenous, Q6H PRN    glucose (Dex4) 15 GM/59ML oral liquid 15 g, 15 g, Oral, Q15 Min PRN **OR** glucose (Glutose) 40% oral gel 15 g, 15 g, Oral, Q15 Min PRN **OR** glucose-vitamin C (Dex-4) chewable tab 4 tablet, 4 tablet, Oral, Q15 Min PRN **OR** dextrose 50% injection 50 mL, 50 mL, Intravenous, Q15 Min PRN **OR** glucose (Dex4) 15 GM/59ML oral liquid 30 g, 30 g, Oral, Q15 Min PRN **OR** glucose (Glutose) 40% oral gel 30 g, 30 g, Oral, Q15 Min PRN **OR** glucose-vitamin C (Dex-4) chewable tab 8 tablet, 8 tablet, Oral, Q15 Min PRN    insulin regular human (Novolin R, Humulin R) 100 UNIT/ML injection 1-11 Units, 1-11 Units, Subcutaneous, 4 times per day    latanoprost (Xalatan) 0.005 % ophthalmic solution 1 drop, 1 drop, Both Eyes, Nightly    metoprolol succinate ER (Toprol XL) 24 hr tab 100 mg, 100 mg,  Oral, Daily    losartan (Cozaar) tab 100 mg, 100 mg, Oral, Nightly    hydrALAzine (Apresoline) 20 mg/mL injection 10 mg, 10 mg, Intravenous, Q4H PRN    metoprolol succinate ER (Toprol XL) 24 hr tab 100 mg, 100 mg, Oral, Once    Review of Systems:   CONSTITUTIONAL:  negative for fevers, chills, unintentional weight loss   EYES:  negative for diplopia or change in vision   RESPIRATORY:  negative for severe shortness of breath  CARDIOVASCULAR:  negative for crushing sub-sternal chest pain  GASTROINTESTINAL:  see HPI  GENITOURINARY:  negative for dysuria or gross hematuria  INTEGUMENT/BREAST:  SKIN:  negative for jaundice or new rash   ALLERGIC/IMMUNOLOGIC:  negative for hay fever   ENDOCRINE:  negative for cold intolerance and heat intolerance  MUSCULOSKELETAL:  negative for joint effusion/severe erythema  NEURO: negative for new loss of consciousness or dizziness   BEHAVIOR/PSYCH:  negative for psychotic behavior    Physical Exam:    Blood pressure 143/78, pulse 99, temperature 98.3 °F (36.8 °C), temperature source Oral, resp. rate 16, height 5' 3\" (1.6 m), weight 153 lb (69.4 kg), SpO2 97%, not currently breastfeeding. Body mass index is 27.1 kg/m².    Gen: awake, alert patient, NAD  HEENT: EOMI, the sclera appears anicteric, oropharynx clear, mucus membranes appear moist  CV: RRR  Lung: no conversational dyspnea   Abdomen: soft NTND abdomen with NABS appreciated   Back: No CVA tenderness   Skin: dry, warm, no jaundice  Ext: no LE edema is evident  Neuro: Alert and interactive  Psych: calm, cooperative    Laboratory Data:  Lab Results   Component Value Date    WBC 6.8 03/27/2025    HGB 12.5 03/27/2025    HCT 35.7 03/27/2025    .0 03/27/2025    CREATSERUM 0.91 03/27/2025    BUN 13 03/27/2025     03/27/2025    K 4.0 03/27/2025     03/27/2025    CO2 25.0 03/27/2025    GLU 84 03/27/2025    CA 8.8 03/27/2025    ALB 4.0 03/27/2025    ALKPHO 43 03/27/2025    BILT 0.7 03/27/2025    TP 6.4 03/27/2025    AST  22 03/27/2025    ALT 16 03/27/2025    LIP 49 03/27/2025       Imaging:  MRI ABDOMEN AND MRCP W/3D (W+W0)(CPT=74183/35152)    Result Date: 3/27/2025  CONCLUSION:   Stable multilobulated cystic lesion in the pancreatic tail, 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  Hepatic steatosis.  No focal suspicious lesion.  Status post cholecystectomy.  Stable mild dilatation of the common bile duct likely relating to reservoir effect.  Additional chronic or incidental findings are described in the body of this report.    elm-remote    Dictated by (CST): Morgan Martinez MD on 3/27/2025 at 7:15 AM     Finalized by (CST): Morgan Martinez MD on 3/27/2025 at 7:23 AM          CT ABDOMEN+PELVIS(CONTRAST ONLY)(CPT=74177)    Result Date: 3/26/2025  CONCLUSION: No acute pancreatitis.  Stable complex cystic lesion in the pancreatic tail     Dictated by (CST): Dilshad Smith MD on 3/26/2025 at 1:37 PM     Finalized by (CST): Dilshad Smith MD on 3/26/2025 at 1:41 PM          XR CHEST AP PORTABLE  (CPT=71045)    Result Date: 3/26/2025  CONCLUSION:   No focal opacity, pleural effusion, or pneumothorax.    Dictated by (CST): Horacio Anaya MD on 3/26/2025 at 1:03 PM     Finalized by (CST): Horacio Anaya MD on 3/26/2025 at 1:04 PM           Assessment & Plan   Noemi Hudson is a 84 year old female w/ PMHx of pancreatic tail complex cyst, anxiety, OA, hypertension, hyperlipidemia, DM who presented to ER for epigastric and chest pain. At time of ER evaluation, lipase elevated to over 900, hepatic panel within normal limits.     #constipation  #lower abdominal pain  -at this time discussed daily bowel regiment and will start miralax daily   -last colonoscopy om 12/2018, noted diverticulosis and hemorrhoids     #?pancreatitis  #pancreatic cyst  -cyst stable from prior imagining however in setting that it is over 4 cm in size will plan for follow up with intervention GI provider to discuss possible EUS/ plan for future monitoring   -no further epigastric  pain today, ok to advance diet and once tolerating can stop fluids    Recommend:  -ADAT  -once tolerating ok to stop IV fluids  -miralax BID  -TE sent for follow up with GI interventional provider to discuss possible EUS for pancreatic cyst  -cardiology following     Thank you for the opportunity to participate in the care of this patient.    Case discussed with Altagracia Sears MD.     Verena Sarmiento PA-C  [facility]  3/27/2025         [1] No Known Allergies

## 2025-03-27 NOTE — PROGRESS NOTES
[facility]  part of [facility]     Hospitalist Progress Note     Noemi Hudson Patient Status:  Inpatient    10/10/1940 MRN [id number]   Location [facility] 3W/SW Attending Av Smith MD   Hosp Day # 1 PCP RADHA FLOYD MD     Chief Complaint:   Chief Complaint   Patient presents with    Hypertension    Hyperglycemia        Subjective:     Patient seen lying in bed.  No family at bedside.  Patient denies acute events overnight.  Patient reports still having some mild abdominal pain, but improved from previous.  Denies current nausea or vomiting.    Objective:      Vital signs:  Vitals:    25 2155 25 0400 25 0423 25 0440   BP: 113/52  143/78    BP Location: Right arm  Right arm    Pulse: 114 92 99    Resp:   16    Temp:   98.3 °F (36.8 °C)    TempSrc:   Oral    SpO2:   97%    Weight:    153 lb (69.4 kg)   Height:         No intake or output data in the 24 hours ending 25 1045        Physical Exam:    GENERAL:  Awake and alert, in no acute distress.  HEART:  Regular rhythm, regular rate  LUNGS:  Air entry was minimally decreased.  No increased work of breathing or wheezes   ABDOMEN: Soft and mild tenderness to palpation, more in lower abdomen  PSYCHIATRIC: Normal mood    Diagnostic Data:    Labs:    Recent Labs   Lab 25  1105 25  0640   WBC 7.8 6.8   HGB 12.6 12.5   MCV 87.1 87.7   .0 185.0       Recent Labs   Lab 25  1105 25  0640   * 84   BUN 17 13   CREATSERUM 1.07* 0.91   CA 8.6* 8.8   ALB 4.2 4.0   * 138   K 4.3 4.0    103   CO2 23.0 25.0   ALKPHO 50* 43*   AST 23 22   ALT 17 16   BILT 0.5 0.7   TP 6.8 6.4           Estimated Creatinine Clearance: 37.4 mL/min (based on SCr of 0.91 mg/dL).    No results for input(s): \"PTP\", \"INR\" in the last 168 hours.         COVID-19  Lab Results   Component Value Date    COVID19 Not Detected 2025    COVID19 Not Detected 2022    COVID19 Not  Detected 10/02/2020       Pro-Calcitonin  No results for input(s): \"PCT\" in the last 168 hours.    Cardiac  No results for input(s): \"TROP\", \"PBNP\" in the last 168 hours.    Inflammatory Markers  No results for input(s): \"CRP\", \"KRIS\", \"LDH\", \"DDIMER\" in the last 168 hours.    Culture:  No results found for this visit on 03/26/25.    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  Hepatic steatosis.  No focal suspicious lesion.  Status post cholecystectomy.  Stable mild dilatation of the common bile duct likely relating to reservoir effect.  Additional chronic or incidental findings are described in the body of this report.    elm-remote    Dictated by (CST): Morgan Martinez MD on 3/27/2025 at 7:15 AM     Finalized by (CST): Morgan Martinez MD on 3/27/2025 at 7:23 AM          CT ABDOMEN+PELVIS(CONTRAST ONLY)(CPT=74177)    Result Date: 3/26/2025  CONCLUSION: No acute pancreatitis.  Stable complex cystic lesion in the pancreatic tail     Dictated by (CST): Dilshad Smith MD on 3/26/2025 at 1:37 PM     Finalized by (CST): Dilshad Smith MD on 3/26/2025 at 1:41 PM          XR CHEST AP PORTABLE  (CPT=71045)    Result Date: 3/26/2025  CONCLUSION:   No focal opacity, pleural effusion, or pneumothorax.    Dictated by (CST): Horacio Anaya MD on 3/26/2025 at 1:03 PM     Finalized by (CST): Horacio Anaya MD on 3/26/2025 at 1:04 PM           EKG 12 Lead    Result Date: 3/26/2025  Sinus tachycardia Otherwise normal ECG When compared with ECG of 22-NOV-2023 19:46, No significant change was found Confirmed by LAURA MILLARD JORDAN ([id number]) on 3/26/2025 3:24:15 PM     Medications:    pantoprazole  40 mg Intravenous Q12H    polyethylene glycol (PEG 3350)  17 g Oral BID    sodium chloride  1,000 mL Intravenous Once    insulin regular human  1-11 Units Subcutaneous 4 times per day    latanoprost  1 drop  Both Eyes Nightly    metoprolol succinate ER  100 mg Oral Daily    losartan  100 mg Oral Nightly    metoprolol succinate ER  100 mg Oral Once       Assessment & Plan:      Abdominal pain  -Patient presenting with abdominal pain  -Patient with history of complex pancreatic tail cyst/lesion  -Possible acute pancreatitis.  As patient with elevated lipase levels.  -Some concern for relation to constipation as well.  -GI consulted, recommending initiation of bowel regimen.    -MRCP reviewed.  Noted cyst/lesion was stable from prior imaging.  -GI recommending further follow-up as outpatient with interventional GI, possible EUS in future.  -Symptom relief as able  -Advancing diet as tolerated  -Weaning off IV fluids  -Bowel regimen  -Continue to monitor     Possible acute pancreatitis  History of pancreatic cyst  -cyst stable from prior imagining   -As lesion is 4 cm in size, GI is recommending follow up with intervention GI provider as outpatient to discuss possible EUS/ plan for future monitoring   -Abdominal pain improving.    -Advancing diet as tolerated.      HTN  - controlled  - CPM  - Monitor and adjust as needed    Type 2 DM   - Monitoring Blood glucose with POC checks  - SSI to cover hyperglycemia  - Hypoglycemia protocol  - Will monitor and adjust agents as needed.      Anxiety  -Resume home regimen      Plan of care discussed with patient at bedside . Discussed management/test result(s) with Rn and GI consultant    Quality:  DVT Prophylaxis: SCDs  CODE status: Full  Estimated date of discharge: TBD  Discharge is dependent on: clinical stability    Av Smith MD          This note was prepared using Dragon Medical voice recognition dictation software. As a result errors may occur. When identified these errors have been corrected. While every attempt is made to correct errors during dictation discrepancies may still exist

## 2025-03-27 NOTE — TELEPHONE ENCOUNTER
Nursing: Patient will need follow up, hopefully with Dr. Toussaint to discuss if EUS is indicated at this time for pancreatic lesion.     Verena Sarmiento PA-C

## 2025-03-28 NOTE — DISCHARGE SUMMARY
[facility]  part of [facility]    Discharge Summary    Noemi Hudson Patient Status:  Inpatient    10/10/1940 MRN [id number]   Location [facility] 3W/SW Attending Kacy Abarca MD   Hosp Day # 2 PCP RADHA FLOYD MD     Date of Admission: 3/26/2025      Date of Discharge: 25      Admitting Diagnosis: Dizziness [R42]  Hyperglycemia [R73.9]  Pancreatic mass (HCC) [K86.89]  Chest pain of uncertain etiology [R07.9]    Hospital Discharge Diagnoses:  Abd pain    Lace+ Score: 55  59-90 High Risk  29-58 Medium Risk  0-28   Low Risk.    TCM Follow-Up Recommendation:  LACE 29-58: Moderate Risk of readmission after discharge from the hospital.          Problem List:   Patient Active Problem List   Diagnosis    Essential hypertension    Mixed hyperlipidemia    Type 2 diabetes mellitus with stage 3a chronic kidney disease, with long-term current use of insulin (HCC)    History of TIA (transient ischemic attack)    Pancreatic cyst (HCC)    Drusen (degenerative) of retina    History of colon polyps    Diverticulosis large intestine w/o perforation or abscess w/o bleeding    Internal hemorrhoids without complication    CKD (chronic kidney disease) stage 3, GFR 30-59 ml/min (HCC)    Post-menopausal osteoporosis    Non-STEMI (non-ST elevated myocardial infarction) (HCC)    Primary osteoarthritis involving multiple joints    Fibromyalgia    Weakness    Cervical radiculopathy    Hyponatremia    Chest pain of uncertain etiology    Dizziness    Hyperglycemia    Pancreatic mass (HCC)    Acute pancreatitis (HCC)         Physical Exam:     Gen: No acute distress  Pulm: Lungs clear, normal respiratory effort  CV: Heart with regular rate and rhythm  Abd: Abdomen soft, nontender, nondistended, bowel sounds present  Neuro: No acute focal deficits  MSK: moves extremities  Skin: Warm and dry  Psych: Normal affect  Ext: no c/c/e      History of Present Illness: Per Dr Mata    Patient is  an 84-year-old  female, who came into the emergency department for evaluation of complaints of midepigastric pain radiating to her mid chest and she feels it also in her throat since last night after eating toast and cream cheese. In the emergency room, CBC and chemistry were unremarkable. GFR noted to be 51, which is around her baseline. Liver function tests were unremarkable. Troponin was negative. EKG showed sinus tachycardia. Glucose 331. GeneXpert viral panel was negative. Lipase 945. CT scan of the abdomen and pelvis showed stable complex cystic lesion in the pancreatic tail, otherwise no acute findings. CT scan of the chest showed no pulmonary embolism. Chest x-ray was unremarkable. Patient will be admitted to the hospital for further management.     Hospital Course:     Abdominal pain  -Patient presenting with abdominal pain  -Patient with history of complex pancreatic tail cyst/lesion  -Possible acute pancreatitis.  As patient with elevated lipase levels.  -Some concern for relation to constipation as well.  -GI consulted, recommending initiation of bowel regimen.    -MRCP reviewed.  Noted cyst/lesion was stable from prior imaging.  -GI recommending further follow-up as outpatient with interventional GI, possible EUS in future.  -Symptom relief now  -Advanced diet as tolerated  -Weaned off IV fluids  -Bowel regimen     Possible acute pancreatitis  History of pancreatic cyst  -cyst stable from prior imagining   -As lesion is 4 cm in size, GI is recommending follow up with intervention GI provider as outpatient to discuss possible EUS/ plan for future monitoring   -Abdominal pain improving.    -Advancing diet as tolerated.       HTN  - controlled  - CPM  - Monitor and adjust as needed     Type 2 DM   - Monitoring Blood glucose with POC checks  - SSI to cover hyperglycemia  - Hypoglycemia protocol  - Will monitor and adjust agents as needed.      Anxiety  -Resume home regimen          Discharge  Condition: Stable    Discharge Medications:      Discharge Medications        START taking these medications        Instructions Prescription details   pantoprazole 40 MG Tbec  Commonly known as: Protonix      Take 1 tablet (40 mg total) by mouth daily.   Quantity: 30 tablet  Refills: 0            CONTINUE taking these medications        Instructions Prescription details   Accu-Chek Tara Plus Strp      INJECT 1 DEVICE INTO THE SKIN 3 (THREE) TIMES DAILY AS NEEDED.   Quantity: 300 strip  Refills: 3     Accu-Chek Softclix Lancets Misc      Use three times daily as directed.   Quantity: 200 each  Refills: 5     alendronate 70 MG Tabs  Commonly known as: Fosamax      Take 1 tablet (70 mg total) by mouth every 7 days.   Quantity: 12 tablet  Refills: 2     aspirin 81 MG Chew      Chew 1 tablet (81 mg total) by mouth daily.   Quantity: 90 tablet  Refills: 1     BD Pen Needle Karine 2nd Gen 32G X 4 MM Misc  Generic drug: Insulin Pen Needle      1 strip by In Vitro route daily.   Quantity: 100 each  Refills: 3     chlorthalidone 25 MG Tabs  Commonly known as: Hygroton      Take 1 tablet (25 mg total) by mouth daily.   Quantity: 90 tablet  Refills: 3     latanoprost 0.005 % Soln  Commonly known as: Xalatan      Place 1 drop into both eyes nightly.   Refills: 0     meclizine 12.5 MG Tabs  Commonly known as: Antivert      Take 1 tablet (12.5 mg total) by mouth 3 (three) times daily as needed.   Quantity: 40 tablet  Refills: 1     metFORMIN HCl 1000 MG Tabs  Commonly known as: GLUCOPHAGE      TOME JESUSITA TABLETA 2 VECES AL CHRISTINE CON LAS COMIDAS   Quantity: 180 tablet  Refills: 3     metoprolol succinate  MG Tb24  Commonly known as: Toprol XL      Take 1 tablet (100 mg total) by mouth daily.   Quantity: 90 tablet  Refills: 3     Olmesartan Medoxomil 40 MG Tabs  Commonly known as: BENICAR      Take 0.5 tablets (20 mg total) by mouth daily.   Quantity: 45 tablet  Refills: 2     pravastatin 20 MG Tabs  Commonly known as: Pravachol       Take 1 tablet (20 mg total) by mouth daily.   Quantity: 90 tablet  Refills: 3     PreserVision AREDS Tabs      Take 2 tablets by mouth daily.   Refills: 0     Tresiba FlexTouch 100 UNIT/ML Sopn  Generic drug: insulin degludec      INJECT 50 UNITS/DAY INTO THE SKIN DAILY.   Quantity: 45 mL  Refills: 3               Where to Get Your Medications        These medications were sent to [id number] - [place], [place] - [address] AT [facility] [address], [phone], [phone]  [address], [facility] [address]      Phone: [phone]   pantoprazole 40 MG Tbec             Kacy Abarca MD  3/28/2025  12:14 PM    Greater than 30 minutes spent on preparation and coordination of this discharge

## 2025-03-28 NOTE — PLAN OF CARE
Pt. A/Ox3, on RA. No acute events. Pt educated on call light use, within reach. Safety measures in place.     Problem: Patient Centered Care  Goal: Patient preferences are identified and integrated in the patient's plan of care  Description: Interventions:- What would you like us to know as we care for you? From home alone- Provide timely, complete, and accurate information to patient/family- Incorporate patient and family knowledge, values, beliefs, and cultural backgrounds into the planning and delivery of care- Encourage patient/family to participate in care and decision-making at the level they choose- Honor patient and family perspectives and choices  Outcome: Progressing     Problem: Diabetes/Glucose Control  Goal: Glucose maintained within prescribed range  Description: INTERVENTIONS:- Monitor Blood Glucose as ordered- Assess for signs and symptoms of hyperglycemia and hypoglycemia- Administer ordered medications to maintain glucose within target range- Assess barriers to adequate nutritional intake and initiate nutrition consult as needed- Instruct patient on self management of diabetes  Outcome: Progressing     Problem: GASTROINTESTINAL - ADULT  Goal: Minimal or absence of nausea and vomiting  Description: INTERVENTIONS:- Maintain adequate hydration with IV or PO as ordered and tolerated- Nasogastric tube to low intermittent suction as ordered- Evaluate effectiveness of ordered antiemetic medications- Provide nonpharmacologic comfort measures as appropriate- Advance diet as tolerated, if ordered- Obtain nutritional consult as needed- Evaluate fluid balance  Outcome: Progressing  Goal: Maintains or returns to baseline bowel function  Description: INTERVENTIONS:- Assess bowel function- Maintain adequate hydration with IV or PO as ordered and tolerated- Evaluate effectiveness of GI medications- Encourage mobilization and activity- Obtain nutritional consult as needed- Establish a toileting routine/schedule-  Consider collaborating with pharmacy to review patient's medication profile  Outcome: Progressing     Problem: METABOLIC/FLUID AND ELECTROLYTES - ADULT  Goal: Electrolytes maintained within normal limits  Description: INTERVENTIONS:- Monitor labs and rhythm and assess patient for signs and symptoms of electrolyte imbalances- Administer electrolyte replacement as ordered- Monitor response to electrolyte replacements, including rhythm and repeat lab results as appropriate- Fluid restriction as ordered- Instruct patient on fluid and nutrition restrictions as appropriate  Outcome: Progressing

## 2025-03-29 NOTE — TELEPHONE ENCOUNTER
Review of the chart notes this cyst has been present since at least 2015 while perhaps some slight growth since then there is no significant concerning features described on the most recent imaging. I'm happy to see the patient in a routine/non-urgent manner. Probably makes the most sense to be seen after her primary care visit which appears to be 5/19/25. Can offer a 24 hour res on a Thursday in May after the above.    Thanks    MD Darek [facility]  [facility] - Gastroenterology  3/29/2025  5:54 PM

## 2025-03-31 NOTE — TELEPHONE ENCOUNTER
[place] Language Line: Anthony ID number [id number]. Patient called, verified Name and . Requesting to schedule an appointment with PCP for hospital followup. Admitted at [facility] for dizziness (3/26 to 3/28). States she did not do well at the hospital and was advised to follow up with PCP as soon as able.     Dr. Choudhary earliest TCM appointment is not until . Please advise if okay to use any Res 24 for hospital followup.

## 2025-03-31 NOTE — TELEPHONE ENCOUNTER
0 (no pain/absence of nonverbal indicators of pain) Talked to patient via language line in Nauruan appointment made.

## 2025-03-31 NOTE — TELEPHONE ENCOUNTER
CSS: please accommodate patient.   Jose Choudhary MD     3/31/25 10:58 AM   Ok to use any open slot.    ANA

## 2025-03-31 NOTE — PROGRESS NOTES
Physician Clarification    Additional information related to the patient's condition    Mild hyponatremia    This note is part of the patient's medical record.

## 2025-03-31 NOTE — PROGRESS NOTES
Left message on mailbox for patient to call care manager back for transitional care management/hospital follow-up call.  Care  information included in message.

## 2025-03-31 NOTE — PROGRESS NOTES
Transitions of Care Navigation  Discharge Date: 3/28/25  Contact Date: 3/31/2025    Transitions of Care Assessment:  JP Initial Assessment    General:  Assessment completed with: Patient  Patient Subjective: NCM spoke with pt states is feeling okay. Patient states is not feeling so well, she continues to feel a little dizzy/lightheaded. She denies any abdominal pain, chest pain, fevers, chills, nausea, vomiting, vision changes or any others. Patient reports lower extremity and upper extremity numbness, slight weakness and feeling shaky. Patient states her BP this morning at 117/70, HR at 100 and FBS at 200. She denies any question or concerns at this time. She has reached out to PCP office for appt. Patient is scheduled with PCP on 25. Patient is agreeable to further outreaches.  Chief Complaint: Chest pain of uncertain etiology  Verify patient name and  with patient/ caregiver: Yes    Hospital Stay/Discharge:  Tell me what you understand of why you were in the hospital or emergency department: Pt reports to ED with chest pain.  Prior to leaving the hospital were your Discharge Instructions reviewed with you?: Yes  Did you receive a copy of your written Discharge Instructions?: Yes  What questions do you have about your Discharge Instructions?: Patient denies any questions at this time.  Do you feel better or worse since you left the hospital or emergency department?: Same    Follow - Up Appointment:  Do you have a follow-up appointment?: Yes  Date: 25  Physician: PCP  Are there any barriers to getting to your follow-up appointment?: No    Home Health/DME:  Prior to leaving the hospital was Home Health (HH) arranged for you?: N/A  Are HH needs identified by staff during the assessment?: No     Prior to leaving the hospital or emergency department was Durable Medical Equipment (DME), medical supplies, or infusions arranged for you?: N/A  Are DME/medical supply/infusions needs identified by staff during  this assessment?: No     Medications/Diet:  Did any of your medications change, during or after your hospital stay or ED visit?: No  Do you understand what your medications are for and possible side effects?: Yes  Are there any reasons that keep you from taking your medication as prescribed?: No  Any concerns about medication refills?: No    Were you given a different diet per your Discharge Instructions?: No  Reason: n/a     Questions/Concerns:  Do you have any questions or concerns that have not been discussed?: No   JP Follow-up Assessment    General:  Assessment completed with: Patient  Patient Subjective: NCM spoke with pt states is feeling okay. Patient states is not feeling so well, she continues to feel a little dizzy/lightheaded. She denies any abdominal pain, chest pain, fevers, chills, nausea, vomiting, vision changes or any others. Patient reports lower extremity and upper extremity numbness, slight weakness and feeling shaky. Patient states her BP this morning at 117/70, HR at 100 and FBS at 200. She denies any question or concerns at this time. She has reached out to PCP office for appt. Patient is scheduled with PCP on 4/2/25. Patient is agreeable to further outreaches.  Chief Complaint: Chest pain of uncertain etiology      Nursing Interventions:  All discharge instructions reviewed with the patient. Reviewed when to call MD vs when to call 911 or go the ED. Educated patient on the importance of taking all meds as prescribed as well as close f/u with PCP/specialists. Pt verbalized understanding and will contact the office with any further questions or concerns. Patient denies fevers, chills, nausea, vomiting, shortness of breath, chest pain, or any other symptoms at this time.  NCM attempted to schedule HFU, patient declined will call PCP/TCC office directly. NC sent TE to PCP office re: assistance in scheduling HFU appt. [facility] provided contact information for any further questions/concerns. Patient  verbalized understanding and agreeable.          Medications:  Medication Reconciliation:  I am aware of an inpatient discharge within the last 30 days.  The discharge medication list has been reconciled with the patient's current medication list and reviewed by me. See medication list for additions of new medication, and changes to current doses of medications and discontinued medications.  Current Outpatient Medications   Medication Sig Dispense Refill    pantoprazole 40 MG Oral Tab EC Take 1 tablet (40 mg total) by mouth daily. 30 tablet 0    Olmesartan Medoxomil 40 MG Oral Tab Take 0.5 tablets (20 mg total) by mouth daily. 45 tablet 2    insulin degludec (TRESIBA FLEXTOUCH) 100 UNIT/ML Subcutaneous Solution Pen-injector INJECT 50 UNITS/DAY INTO THE SKIN DAILY. 45 mL 3    meclizine 12.5 MG Oral Tab Take 1 tablet (12.5 mg total) by mouth 3 (three) times daily as needed. 40 tablet 1    pravastatin 20 MG Oral Tab Take 1 tablet (20 mg total) by mouth daily. 90 tablet 3    aspirin 81 MG Oral Chew Tab Chew 1 tablet (81 mg total) by mouth daily. 90 tablet 1    alendronate 70 MG Oral Tab Take 1 tablet (70 mg total) by mouth every 7 days. 12 tablet 2    Glucose Blood (ACCU-CHEK JOSE PLUS) In Vitro Strip INJECT 1 DEVICE INTO THE SKIN 3 (THREE) TIMES DAILY AS NEEDED. 300 strip 3    Accu-Chek Softclix Lancets Does not apply Misc Use three times daily as directed. 200 each 5    Insulin Pen Needle (BD PEN NEEDLE AGA 2ND GEN) 32G X 4 MM Does not apply Misc 1 strip by In Vitro route daily. 100 each 3    metFORMIN HCl 1000 MG Oral Tab TOME JESUSITA TABLETA 2 VECES AL CHRISTINE CON LAS COMIDAS 180 tablet 3    chlorthalidone 25 MG Oral Tab Take 1 tablet (25 mg total) by mouth daily. 90 tablet 3    metoprolol succinate  MG Oral Tablet 24 Hr Take 1 tablet (100 mg total) by mouth daily. 90 tablet 3    latanoprost 0.005 % Ophthalmic Solution Place 1 drop into both eyes nightly.      Multiple Vitamins-Minerals (PRESERVISION AREDS) Oral Tab  Take 2 tablets by mouth daily.           Follow-up Appointments:  Your appointments       Date & Time Appointment Department ([place])    Apr 02, 2025 2:00 PM CDT Hospital Follow Up with Jose Choudhary MD [facility]        May 19, 2025 8:00 AM CDT Follow Up Visit with Jose Choudhary MD [facility]              [facility]  [address] [address]  [phone]            Transitional [facility] Clinic  Was TCC Ordered: No      Primary Care Provider (If no TCC appointment)  Does patient already have a PCP appointment scheduled? No  Nurse Care Manager Attempted to schedule PCP office ER Follow-up appointment with patient   -If no appointment scheduled: Explain : no availability with PCP.     Specialist  Does the patient have any other follow-up appointment(s) need to be scheduled? No       [x]  Patient verbally agrees to additional follow-up calls from Nurse Care Manager    Book By Date: 4/4/25

## 2025-03-31 NOTE — TELEPHONE ENCOUNTER
Spoke to patient for Transitions of Care call today.  Patient does not have an appointment scheduled at this time.  TCM/JP appointment needed by 4/4/25.  Please advise.    BOOK BY DATE: 4/4/25    Clinical staff:  Please follow-up with patient and try to get them to schedule as patient would greatly benefit from TCM/JP.  Thank you!         Pt requesting appointment as soon as possible. Please advise.

## 2025-04-01 NOTE — TELEPHONE ENCOUNTER
Dr. Toussaint,    Per notes patient will be seen by her pcp tomorrow. Do you want to see her sooner or still schedule for May?

## 2025-04-01 NOTE — TELEPHONE ENCOUNTER
Spoke to patient and patients daughter appointment offered in May. Agreed to appointment. No further action.

## 2025-04-01 NOTE — TELEPHONE ENCOUNTER
Can offer earlier appt at 24 hr res in May     Thanks    Lj Toussaint MD  [facility] - Gastroenterology  4/1/2025  10:08 AM

## 2025-04-04 NOTE — TELEPHONE ENCOUNTER
Call from Josie, pharmacist at [facility].  Verified patient's name/.  Insurance no longer covers Lantus or Tresiba.  It will cover Semglee which is similar to Lantus.  Come in 3 ml pen, U-100.  Please send new script.  Patient presently only has 3-5 days left on current pen.    Tresiba was ordered for 50 units daily.

## 2025-04-04 NOTE — TELEPHONE ENCOUNTER
No further action required, new prescription confirmed:    E-Prescribing Status: Receipt confirmed by pharmacy (4/4/2025  6:06 PM CDT)

## 2025-04-05 NOTE — CONSULTS
[facility]  part of [facility]    Report of Consultation    Date of Admission:  4/4/2025  Date of Consult:  4/5/2025   Reason for Consultation:     Hyponatremia    History of Present Illness:   Patient is a 84 year old female with past medical history of hypertension, diabetes, recent admission for pancreatitis who presented to the emergency room with fatigue    Patient was drinking 1 L of fluid-stated poor oral intake.  Denies any chest pain or shortness of breath.  No urinary complaint.  In ED serum sodium was noted to be 117 - patient received IV fluid and on NS IVF currently     Currently sitting comfortably in chair.  Translation with the help of nursing  Past Medical History  Past Medical History:    Cataract    Diabetes (HCC)    Diverticulosis large intestine w/o perforation or abscess w/o bleeding    Dupuytren's contracture of left hand     LMF Ray    Essential hypertension    High blood pressure    High cholesterol    Hyperlipidemia    Internal hemorrhoids without complication    MI (myocardial infarction) (HCC)    Stroke (HCC)       Past Surgical History  Past Surgical History:   Procedure Laterality Date    Appendectomy      Cataract      Cholecystectomy      Colonoscopy      Colonoscopy N/A 12/6/2018    Procedure: COLONOSCOPY;  Surgeon: Maricruz Benavides MD;  Location: [facility] ENDOSCOPY    Palmar fasciectomy Left 11-18-16    L Dupuytren's  LMF Ray    Tubal ligation  1977       Family History  Family History   Problem Relation Age of Onset    Cancer Sister         skin       Social History  Pediatric History   Patient Parents    Not on file     Other Topics Concern     Service Not Asked    Blood Transfusions Not Asked    Caffeine Concern Yes     Comment: coffee    Occupational Exposure Not Asked    Hobby Hazards Not Asked    Sleep Concern Not Asked    Stress Concern Not Asked    Weight Concern Not Asked    Special Diet Not Asked    Back Care Not Asked    Exercise Not  Asked    Bike Helmet Not Asked    Seat Belt Not Asked    Self-Exams Not Asked   Social History Narrative    Not on file           Current Medications:  Current Facility-Administered Medications   Medication Dose Route Frequency    ondansetron (Zofran) 4 MG/2ML injection 4 mg  4 mg Intravenous Q6H PRN    heparin (Porcine) 5000 UNIT/ML injection 5,000 Units  5,000 Units Subcutaneous Q12H    hydrALAzine (Apresoline) 20 mg/mL injection 10 mg  10 mg Intravenous Q4H PRN    HYDROcodone-acetaminophen (Norco) 5-325 MG per tab 1 tablet  1 tablet Oral Q6H PRN    alum-mag hydroxide-simethicone (Maalox) 200-200-20 MG/5ML oral suspension 30 mL  30 mL Oral QID PRN    aspirin chewable tab 81 mg  81 mg Oral Daily    latanoprost (Xalatan) 0.005 % ophthalmic solution 1 drop  1 drop Both Eyes Nightly    metoprolol succinate ER (Toprol XL) 24 hr tab 100 mg  100 mg Oral Daily    losartan (Cozaar) tab 100 mg  100 mg Oral Daily    pantoprazole (Protonix) DR tab 40 mg  40 mg Oral Daily    pravastatin (Pravachol) tab 20 mg  20 mg Oral Daily    glucose (Dex4) 15 GM/59ML oral liquid 15 g  15 g Oral Q15 Min PRN    Or    glucose (Glutose) 40% oral gel 15 g  15 g Oral Q15 Min PRN    Or    glucose-vitamin C (Dex-4) chewable tab 4 tablet  4 tablet Oral Q15 Min PRN    Or    dextrose 50% injection 50 mL  50 mL Intravenous Q15 Min PRN    Or    glucose (Dex4) 15 GM/59ML oral liquid 30 g  30 g Oral Q15 Min PRN    Or    glucose (Glutose) 40% oral gel 30 g  30 g Oral Q15 Min PRN    Or    glucose-vitamin C (Dex-4) chewable tab 8 tablet  8 tablet Oral Q15 Min PRN    insulin aspart (NovoLOG) 100 Units/mL FlexPen 1-7 Units  1-7 Units Subcutaneous TID CC and HS    melatonin tab 6 mg  6 mg Oral Nightly PRN       Allergies  Allergies[1]    Review of Systems:   Constitutional: negative for fatigue, fevers and weight loss  Eyes: negative for irritation, redness and visual disturbance  Ears, nose, mouth, throat, and face: negative for hearing loss and sore  throat  Respiratory: negative for cough, hemoptysis and wheezing  Cardiovascular: negative for chest pain, exertional dyspnea, lower extremity edema and palpitations  Gastrointestinal: negative for abdominal pain, diarrhea and nausea  Genitourinary:negative for dysuria, frequency and hematuria  Hematologic/lymphatic: negative for bleeding and easy bruising  Musculoskeletal:negative for back pain, bone pain and muscle weakness  Neurological: negative for gait problems, memory problems and seizures  Behavioral/Psych: negative for anxiety and depression  Endocrine: negative for polyuria and weight loss     Physical Exam:   Height: 5' 3\" (160 cm) (04/05 0004)  Weight: 149 lb 0.5 oz (67.6 kg) (04/05 0041)  BSA (Calculated - sq m): 1.71 sq meters (04/05 0004)  Pulse: 110 (04/05 0857)  BP: 134/66 (04/05 0857)  Temp: 98.2 °F (36.8 °C) (04/05 0857)  Do Not Use - Resp Rate: --  SpO2: 95 % (04/05 0857)  Temp:  [97.4 °F (36.3 °C)-98.2 °F (36.8 °C)] 98.2 °F (36.8 °C)  Pulse:  [] 110  Resp:  [18-25] 18  BP: (134-177)/(52-79) 134/66  SpO2:  [92 %-97 %] 95 %  SpO2: 95 %     Intake/Output Summary (Last 24 hours) at 4/5/2025 1332  Last data filed at 4/5/2025 1331  Gross per 24 hour   Intake 953 ml   Output 960 ml   Net -7 ml     Wt Readings from Last 5 Encounters:   04/05/25 149 lb 0.5 oz (67.6 kg)   04/02/25 151 lb (68.5 kg)   03/28/25 150 lb 1.6 oz (68.1 kg)   02/18/25 152 lb (68.9 kg)   10/25/24 150 lb 9.6 oz (68.3 kg)       General appearance: alert, appears stated age and cooperative  Head: Normocephalic, atraumatic  Eyes: conjunctivae/corneas clear  Throat: lips, mucosa, and tongue normal; teeth and gums normal  Neck:  no JVD, supple, symmetrical  Pulmonary:  clear to auscultation bilaterally  Cardiovascular: S1, S2 normal, no rub, regular rate and rhythm  Abdominal: soft, non-tender; non distended, bowel sounds normal   Extremities: extremities normal, no edema  Pulses: pedal pulses palpable  Skin: No rashes or  lesions  Lymph nodes: Cervical, supraclavicular normal  Neurologic: Grossly normal  Psychiatric: calm    Results:     Laboratory Data:  Recent Labs   Lab 04/04/25 2052 04/05/25  0607   RBC 4.30 4.00   HGB 12.7 12.6   HCT 35.8 34.3*   MCV 83.3 85.8   NEPRELIM 8.70* 6.38   WBC 11.4* 8.6   .0 226.0     Recent Labs   Lab 04/04/25 2052 04/05/25  0607 04/05/25  1125   * 109*  --    BUN 17 12  --    CREATSERUM 0.96 0.75  --    CA 8.8 8.1*  --    ALB 4.6  --   --    * 122* 122*   K 4.4 4.2  --    CL 86* 91*  --    CO2 22.0 21.0  --    ALKPHO 50*  --   --    AST 25  --   --    ALT 19  --   --    BILT 0.6  --   --    TP 7.0  --   --      PTT   Date Value Ref Range Status   07/24/2019 28.5 23.2 - 35.3 seconds Final     INR   Date Value Ref Range Status   07/24/2019 1.02 0.90 - 1.20 Final     Comment:       Only the INR (not the Protime value) should be utilized for   the monitoring of oral anticoagulant therapy.     Recommended therapeutic ranges for anticoagulant therapy are   as follows:   2.0 - 3.0 All indications except for mechanical prosthetic   cardiac valves.     2.5 - 3.5 Mechanical prosthetic cardiac valves.       No results for input(s): \"BNP\" in the last 168 hours.  Lab Results   Component Value Date    COLORUR Colorless 04/04/2025    CLARITY Clear 04/04/2025    SPECGRAVITY 1.005 04/04/2025    GLUUR 150 04/04/2025    BILUR Negative 04/04/2025    KETUR Negative 04/04/2025    BLOODURINE Negative 04/04/2025    PHURINE 6.5 04/04/2025    PROUR Negative 04/04/2025    UROBILINOGEN Normal 04/04/2025    NITRITE Negative 04/04/2025    LEUUR Negative 04/04/2025       Imaging:  No results found.       Impression:     Patient is a 84 year old female with past medical history of hypertension, diabetes, recent admission for pancreatitis who presented to the emergency room with fatigue     1.hyponatremia:  Serum sodium mostly lowish/low normal at baseline 132-137  Admit sodium 117-s/p normal saline IV  fluid  Repeat sodium 122 and stable  DC IV fluid-s/p 1.5 L  Repeat sodium Later today  Urine osmolality consistent with excess fluid intake-fluid restriction 1.5 L  UA with lowish specific gravity  Chlorthalidone Lexapro on hold-will assess      2.hypertension:  Hold chlorthalidone.  Continue metoprolol and losartan    3.muscle cramps: Likely secondary to hyponatremia.  Potassium within normal limit.  Check serum magnesium with repeat check of serum potassium    Discussed with nursing and Dr. Joey Madrid with the help of nursing    Thank you for allowing me to participate in the care of your patient.    Felipe Wright MD           [1] No Known Allergies

## 2025-04-05 NOTE — ED PROVIDER NOTES
Patient Seen in: [facility] Emergency Department    History     Chief Complaint   Patient presents with    Headache    Fatigue       HPI    84-year-old female presents to the ER with complaint of weakness and bodyaches.  Patient also complained of dizziness.  Patient states that she denies any abdominal pain.  Patient with recent admission for pancreatitis and was discharged a week ago.    History reviewed.   Past Medical History:    Cataract    Diabetes (HCC)    Diverticulosis large intestine w/o perforation or abscess w/o bleeding    Dupuytren's contracture of left hand     LMF Ray    Essential hypertension    High blood pressure    High cholesterol    Hyperlipidemia    Internal hemorrhoids without complication    MI (myocardial infarction) (HCC)    Stroke (HCC)       History reviewed.   Past Surgical History:   Procedure Laterality Date    Appendectomy      Cataract      Cholecystectomy      Colonoscopy      Colonoscopy N/A 12/6/2018    Procedure: COLONOSCOPY;  Surgeon: Maricruz Benavides MD;  Location: [facility] ENDOSCOPY    Palmar fasciectomy Left 11-18-16    L Dupuytren's  LMF Ray    Tubal ligation  1977         Medications :  Prescriptions Prior to Admission[1]     Family History   Problem Relation Age of Onset    Cancer Sister         skin       Smoking Status:   Social History     Socioeconomic History    Marital status: Single   Tobacco Use    Smoking status: Never     Passive exposure: Never    Smokeless tobacco: Never   Vaping Use    Vaping status: Never Used   Substance and Sexual Activity    Alcohol use: No     Alcohol/week: 0.0 standard drinks of alcohol    Drug use: Never   Other Topics Concern    Caffeine Concern Yes     Comment: coffee       ROS  All pertinent positives for the review of systems are mentioned in the HPI  All other organ systems are reviewed and are negative.    Constitutional and vital signs reviewed.      Social History and Family History elements reviewed from today,  pertinent positives to the presenting problem noted.    Physical Exam     ED Triage Vitals [04/04/25 2017]   BP (!) 173/77   Pulse 105   Resp 22   Temp 97.4 °F (36.3 °C)   Temp src Oral   SpO2 96 %   O2 Device None (Room air)       All measures to prevent infection transmission during my interaction with the patient were taken. The patient was already wearing a droplet mask on my arrival to the room. Personal protective equipment including droplet mask, eye protection, and gloves were worn throughout the duration of the exam.  Handwashing was performed prior to and after the exam.  Stethoscope and any equipment used during my examination was cleaned with super sani-cloth germicidal wipes following the exam.     Physical Exam  Vitals and nursing note reviewed.   Constitutional:       Appearance: She is well-developed.   HENT:      Head: Normocephalic and atraumatic.      Right Ear: External ear normal.      Left Ear: External ear normal.      Nose: Nose normal.   Eyes:      Conjunctiva/sclera: Conjunctivae normal.      Pupils: Pupils are equal, round, and reactive to light.   Cardiovascular:      Rate and Rhythm: Normal rate and regular rhythm.      Heart sounds: Normal heart sounds.   Pulmonary:      Effort: Pulmonary effort is normal.      Breath sounds: Normal breath sounds.   Abdominal:      General: Bowel sounds are normal.      Palpations: Abdomen is soft.   Musculoskeletal:         General: Normal range of motion.      Cervical back: Normal range of motion and neck supple.   Skin:     General: Skin is warm and dry.   Neurological:      Mental Status: She is alert and oriented to person, place, and time.      Deep Tendon Reflexes: Reflexes are normal and symmetric.   Psychiatric:         Behavior: Behavior normal.         Thought Content: Thought content normal.         Judgment: Judgment normal.         ED Course        Labs Reviewed   BASIC METABOLIC PANEL (8) - Abnormal; Notable for the following components:        Result Value    Glucose 188 (*)     Sodium 117 (*)     Chloride 86 (*)     Calculated Osmolality 251 (*)     eGFR-Cr 58 (*)     All other components within normal limits   HEPATIC FUNCTION PANEL (7) - Abnormal; Notable for the following components:    Alkaline Phosphatase 50 (*)     All other components within normal limits   CBC WITH DIFFERENTIAL WITH PLATELET - Abnormal; Notable for the following components:    WBC 11.4 (*)     RDW-SD 34.8 (*)     Neutrophil Absolute Prelim 8.70 (*)     Neutrophil Absolute 8.70 (*)     All other components within normal limits   URINALYSIS WITH CULTURE REFLEX - Abnormal; Notable for the following components:    Urine Color Colorless (*)     Glucose Urine 150 (*)     All other components within normal limits   LIPASE - Abnormal; Notable for the following components:    Lipase 89 (*)     All other components within normal limits   MAGNESIUM - Normal   RAINBOW DRAW BLUE   RAINBOW DRAW GOLD         Imaging Results Available and Reviewed while in ED: No results found.  ED Medications Administered:   Medications   sodium chloride 0.9 % IV bolus 1,000 mL (1,000 mL Intravenous New Bag 4/4/25 2056)         MDM     Vitals:    04/04/25 2017 04/04/25 2030 04/04/25 2100 04/04/25 2115   BP: (!) 173/77 (!) 173/77 (!) 166/63 158/64   Pulse: 105 102 101 101   Resp: 22 24 25 22   Temp: 97.4 °F (36.3 °C)      TempSrc: Oral      SpO2: 96% 92% 96% 96%     *I personally reviewed and interpreted all ED vitals.  I also personally reviewed all labs and imaging if ordered    Pulse Ox: 92%, Room air, Normal     Monitor Interpretation:   normal sinus rhythm    Differential Diagnosis/ Diagnostic Considerations: Dehydration, UTI, pancreatitis, electrolyte abnormality    Medical Record Review: I personally reviewed available prior medical records for any recent pertinent discharge summaries, testing, and procedures and reviewed those reports.    Complicating Factors: The patient already has does not have  any pertinent problems on file. to contribute to the complexity of this ED evaluation.    Medical Decision Making  84-year-old female presents ER with complaint of generalized weakness and dizziness.  Patient denies any abdominal pain at this time.  Patient's blood work shows her to be hyponatremic at 117.  Patient given a bolus of normal saline IV fluids.  Nephrology notified of the consult.  Patient will be admitted for further evaluation.  [place] hospitalist notified of the admission.    Total Critical Care Time: 31 minutes including time spent examining and re-evaluating the patient, ordering and reviewing laboratory tests, documenting, reviewing previous records, obtaining information from the family, and speaking with consultants, admitting doctors, nurses and medics and excludes any time spent on procedures.      Problems Addressed:  Hyponatremia: acute illness or injury  Weakness generalized: acute illness or injury    Amount and/or Complexity of Data Reviewed  Independent Historian:      Details: Medical history obtained from  because patient is [place]-speaking only.  External Data Reviewed: labs, radiology and notes.     Details: Previous notes and imaging reviewed.  Patient with admission previously for similar complaints of weakness but epigastric pain.  Patient was found to have pancreatitis at the time as well as a mass in the pancreatic head  Labs: ordered. Decision-making details documented in ED Course.        Condition upon leaving the department: Stable    Disposition and Plan     Clinical Impression:  1. Hyponatremia    2. Weakness generalized        Disposition:  Admit    Follow-up:  No follow-up provider specified.    Medications Prescribed:  Current Discharge Medication List          Hospital Problems       Present on Admission  Date Reviewed: 4/2/2025            ICD-10-CM Noted POA    * (Principal) Hyponatremia E87.1 3/26/2025 Unknown               [1] (Not in a hospital  admission)

## 2025-04-05 NOTE — ED INITIAL ASSESSMENT (HPI)
Pt BIB EMS from home for complaint of headache, dizziness, and weakness for 2 weeks. Pt was hospitalized last Wednesday and discharged last Friday morning for similar symptoms, but pt states she has not been improving. Pt A&O x3 upon arrival. Pt states she lives at home alone with a daughter that checks in occasionally.

## 2025-04-05 NOTE — ED QUICK NOTES
Orders for admission, patient is aware of plan and ready to go upstairs. Any questions, please call ED RN Libia at extension [phone].     Patient Covid vaccination status: Fully vaccinated     COVID Test Ordered in ED: None    COVID Suspicion at Admission: N/A    Running Infusions:      Mental Status/LOC at time of transport: A&O x3    Other pertinent information:   CIWA score: N/A   NIH score:  N/A

## 2025-04-05 NOTE — OCCUPATIONAL THERAPY NOTE
OCCUPATIONAL THERAPY EVALUATION - INPATIENT     Room Number: 530/530-A  Evaluation Date: 4/5/2025  Type of Evaluation: Initial  Presenting Problem: Fatigue. PMHx includes but is not limited to hypertension, diabetes, and recent admission for pancreatitis.    Physician Order: IP Consult to Occupational Therapy  Reason for Therapy: ADL/IADL Dysfunction and Discharge Planning    OCCUPATIONAL THERAPY ASSESSMENT   Patient is a 84 year old female admitted 4/4/2025.     Orders received. Chart review complete. RN approved patient participation. Prior to admission, patient's baseline is independent.  Patient is currently functioning below baseline with toileting, bathing, upper body dressing, lower body dressing, grooming, eating, bed mobility, transfers, static sitting balance, dynamic sitting balance, static standing balance, dynamic standing balance, maintaining seated position, functional standing tolerance, energy conservation strategies, aerobic capacity, and performing household tasks.  Patient is requiring contact guard assist as a result of the following impairments: decreased functional strength, decreased functional reach, decreased endurance, pain, impaired balance, impaired coordination, impaired motor planning, decreased muscular endurance, medical status, decreased compliance/participation, increased O2 needs from baseline, decreased insight to deficits, and decreased safety awareness. Occupational Therapy will continue to follow for duration of hospitalization.    Patient will benefit from continued skilled OT Services at discharge to promote prior level of function and safety with additional support and return home with home health OT. Recommending 24 hr supervision upon DC.     PLAN DURING HOSPITALIZATION  OT Device Recommendations: TBD  OT Treatment Plan: Balance activities, Energy conservation/work simplification techniques, ADL training, IADL training, Functional transfer training, Visual perceptual  training, UE strengthening/ROM, Endurance training, Patient/Family education, Patient/Family training, Equipment eval/education, Neuromuscluar reeducation, Fine motor coordination activities, Compensatory technique education, Continued evaluation     OCCUPATIONAL THERAPY MEDICAL/SOCIAL HISTORY   Problem List  Principal Problem:    Hyponatremia  Active Problems:    Weakness generalized    HOME SITUATION  Type of Home: Independent living facility  Home Layout: One level  Lives With: Alone  Toilet and Equipment: Comfort height toilet; Grab bar  Shower/Tub and Equipment: Tub-shower combo; Tub seat  Occupation/Status: PLOF: Independent  Drives: No    SUBJECTIVE  Patient agreeable to participate in skilled OT.     OCCUPATIONAL THERAPY EXAMINATION      OBJECTIVE  Precautions: Bed/chair alarm  Fall Risk: High fall risk    PAIN ASSESSMENT  Location: Bilateral LE pain (right greater than left), abdominal pain, and upper back pain. Patient did not rate numerically this date.  Management Techniques: Activity promotion; Body mechanics; Breathing techniques; Repositioning; Relaxation; Nurse notified; Other (Comment) (Self-made abdominal wrap)    NOTE: Notified RN of patients \"self-made abdominal wrap\" , patient reports this helps decrease her pain and shortness of breath.     ACTIVITY TOLERANCE     Heart Rate Source: Monitor     BP: 137/63 ((141/67 seated ; 134/65 standing ; 131/53 post activity))  BP Location: Right arm  BP Method: Automatic  Patient Position: Lying    COGNITION  Overall Cognitive Status:  WFL - within functional limits    VISION  Current Vision: Patient complains of \"blurry vision\"    RANGE OF MOTION   Upper extremity ROM is within functional limits     STRENGTH ASSESSMENT  Upper extremity strength is within functional limits     COORDINATION  Gross Motor: WFL   Fine Motor: WFL     ACTIVITIES OF DAILY LIVING ASSESSMENT  AM-PAC ‘6-Clicks’ Inpatient Daily Activity Short Form  How much help from another person  does the patient currently need…  -   Putting on and taking off regular lower body clothing?: A Little  -   Bathing (including washing, rinsing, drying)?: A Little  -   Toileting, which includes using toilet, bedpan or urinal? : A Little  -   Putting on and taking off regular upper body clothing?: A Little  -   Taking care of personal grooming such as brushing teeth?: A Little  -   Eating meals?: A Little    AM-PAC Score:  Score: 18  Approx Degree of Impairment: 46.65%  Standardized Score (AM-PAC Scale): 38.66  CMS Modifier (G-Code): CK    FUNCTIONAL TRANSFER ASSESSMENT  Sit to Stand: Edge of Bed; Chair  Edge of Bed: Contact Guard Assist  Chair: Contact Guard Assist  Toilet Transfer: Contact Guard Assist    NOTE: Bed <> toilet transfer without AE. 1 x loss of balance noted requiring min A to correct. Utilized RW from bathroom to bed. No overt loss of balance. Recommending use of RW for further functional ambulation to maximize stability and balance.     BED MOBILITY  Supine to Sit : Contact Guard Assist    BALANCE ASSESSMENT  Static Sitting: Stand-by Assist    FUNCTIONAL ADL ASSESSMENT  LB Dressing Seated: Stand-by Assist  Toileting Seated: Stand-by Assist  Toileting Standing: Contact Guard Assist    EDUCATION PROVIDED  Patient Education : Role of Occupational Therapy; Plan of Care; Discharge Recommendations; DME Recommendations; Functional Transfer Techniques; Fall Prevention; Posture/Positioning; Edema Reduction; Energy Conservation; Proper Body Mechanics; Abdominal Protective Strategies  Patient's Response to Education: Verbalized Understanding; Requires Further Education    The patient's Approx Degree of Impairment: 46.65% has been calculated based on documentation in the [facility] '6 clicks' Inpatient Daily Activity Short Form.  Research supports that patients with this level of impairment may benefit from HHOT.    Final disposition will be made by interdisciplinary medical team.     Patient End of Session: Up in  chair, Needs met, Call light within reach, RN aware of session/findings, All patient questions and concerns addressed, Hospital anti-slip socks, Alarm set, Family present    OT Goals  Patients self stated goal is: To improve function and return home.      Patient will complete functional transfer with SUP  Comment:     Patient will complete toileting with SUP  Comment:     Patient will tolerate standing for 5 minutes in prep for adls with SUP   Comment:    Patient will complete item retrieval with SUP  Comment:          Goals  on: 25  Frequency: 5 x wk     Patient Evaluation Complexity Level:   Occupational Profile/Medical History LOW - Brief history including review of medical or therapy records    Specific performance deficits impacting engagement in ADL/IADL LOW  1 - 3 performance deficits    Client Assessment/Performance Deficits LOW - No comorbidities nor modifications of tasks    Clinical Decision Making LOW - Analysis of occupational profile, problem-focused assessments, limited treatment options    Overall Complexity LOW     Self-Care Home Management: 23 minutes  Therapeutic Activity: 15 minutes    CODY Gutierres/L  [facility]  PRN - Staff

## 2025-04-05 NOTE — PROGRESS NOTES
[facility]  part of [facility]    Progress Note    Noemi Hudson Patient Status:  Inpatient    10/10/1940 MRN [id number]   Location [facility] 5SW/SE Attending Jan Cook MD   Hosp Day # 0 PCP RADHA FLOYD MD     Chief Complaint:   Chief Complaint   Patient presents with    Headache    Fatigue   HA dizziness x 2 weeks.     Subjective:   Noemi Hudson is feeling weak. She had blurry vision at home. Appetite poor. Has cramping and numbness in legs. Drinks a lot of water       Objective:   Objective:    Blood pressure 134/66, pulse 110, temperature 98.2 °F (36.8 °C), temperature source Oral, resp. rate 18, height 5' 3\" (1.6 m), weight 149 lb 0.5 oz (67.6 kg), SpO2 95%, not currently breastfeeding.    Physical Exam:    General: No acute distress.   Respiratory: Clear to auscultation bilaterally. No wheezes. No rhonchi.  Cardiovascular: S1, S2. Regular rate and rhythm. No murmurs, rubs or gallops.   Abdomen: Soft, nontender, nondistended.  Positive bowel sounds. No rebound or guarding.  Neurologic: No focal neurological deficits.   Musculoskeletal: Moves all extremities.  Extremities: No edema.      Results:   Results:    Labs:  Recent Labs   Lab 25  0607   WBC 11.4* 8.6   HGB 12.7 12.6   MCV 83.3 85.8   .0 226.0       Recent Labs   Lab 25  0607 25  1125   * 109*  --    BUN 17 12  --    CREATSERUM 0.96 0.75  --    CA 8.8 8.1*  --    ALB 4.6  --   --    * 122* 122*   K 4.4 4.2  --    CL 86* 91*  --    CO2 22.0 21.0  --    ALKPHO 50*  --   --    AST 25  --   --    ALT 19  --   --    BILT 0.6  --   --    TP 7.0  --   --        Estimated Creatinine Clearance: 45.4 mL/min (based on SCr of 0.75 mg/dL).    No results for input(s): \"PTP\", \"INR\" in the last 168 hours.         Culture:  No results found for this visit on 25.    Cardiac  No results for input(s): \"TROP\", \"PBNP\" in the last 168  hours.      Imaging: Imaging data reviewed in Epic.  No results found.    Medications:    heparin  5,000 Units Subcutaneous Q12H    aspirin  81 mg Oral Daily    latanoprost  1 drop Both Eyes Nightly    metoprolol succinate ER  100 mg Oral Daily    losartan  100 mg Oral Daily    pantoprazole  40 mg Oral Daily    pravastatin  20 mg Oral Daily    insulin aspart  1-7 Units Subcutaneous TID CC and HS    insulin degludec  50 Units Subcutaneous Daily         Assessment and Plan:   Assessment & Plan:      Severe hyponatremia   Likely secondary to Thiazide diuretic and SNRI as well as excessive water intake (drinks 1-1.5L/day of water) and poor PO intake. Both meds stopped.   Stop IVF  Free water restirction   Renal on consult.   Serial BMP   Dizziness  Weakness  Muscle cramps   Secondary to above.   CT Head   Neuro checks.   Replace electrolytes.   PT/OT   Essential HTN   Losartan, metoprolol   Dm II   A1c 7.6  Tresiba 10units daily (take 50 units at home)   ISS  Anxiety d/o   Monitor for now.   Lexapro   Other medical problems  H/o TIA  CAD  CKD III  Dyslipidemia     >55min spent, >50% spent counseling and coordinating care in the form of educating pt/family and d/w consultants and staff. Most of the time spent discussing the above plan.        Plan of care discussed with patient or family at bedside.    Jan Cook MD  Hospitalist          Supplementary Documentation:     Quality:  DVT Prophylaxis: heparin   CODE status:Full   Dispo: per clinical course            Estimated date of discharge: TBD  Discharge is dependent on: clinical stability  At this point Ms. Hudson is expected to be discharge to: TBD        **Certification      PHYSICIAN Certification of Need for Inpatient Hospitalization - Initial Certification    Patient will require inpatient services that will reasonably be expected to span two midnight's based on the clinical documentation in H+P.   Based on patients current state of illness, I anticipate  that, after discharge, patient will require TBD.

## 2025-04-05 NOTE — PLAN OF CARE
Problem: Patient Centered Care  Goal: Patient preferences are identified and integrated in the patient's plan of care  Description: Interventions:- What would you like us to know as we care for you?   - Provide timely, complete, and accurate information to patient/family- Incorporate patient and family knowledge, values, beliefs, and cultural backgrounds into the planning and delivery of care- Encourage patient/family to participate in care and decision-making at the level they choose- Honor patient and family perspectives and choices  Outcome: Progressing     Problem: METABOLIC/FLUID AND ELECTROLYTES - ADULT  Goal: Electrolytes maintained within normal limits  Description: INTERVENTIONS:- Monitor labs and rhythm and assess patient for signs and symptoms of electrolyte imbalances- Administer electrolyte replacement as ordered- Monitor response to electrolyte replacements, including rhythm and repeat lab results as appropriate- Fluid restriction as ordered- Instruct patient on fluid and nutrition restrictions as appropriate  Outcome: Progressing     Problem: SKIN/TISSUE INTEGRITY - ADULT  Goal: Skin integrity remains intact  Description: INTERVENTIONS- Assess and document risk factors for pressure ulcer development- Assess and document skin integrity- Monitor for areas of redness and/or skin breakdown- Initiate interventions, skin care algorithm/standards of care as needed  Outcome: Progressing     Problem: Impaired Communication  Goal: Patient will achieve maximal communication potential  Description: Interventions:  Outcome: Progressing     Problem: SAFETY ADULT - FALL  Goal: Free from fall injury  Description: INTERVENTIONS:- Assess pt frequently for physical needs- Identify cognitive and physical deficits and behaviors that affect risk of falls.- [place] fall precautions as indicated by assessment.- Educate pt/family on patient safety including physical limitations- Instruct pt to call for assistance with  activity based on assessment- Modify environment to reduce risk of injury- Provide assistive devices as appropriate- Consider OT/PT consult to assist with strengthening/mobility- Encourage toileting schedule  Outcome: Progressing     Problem: DISCHARGE PLANNING  Goal: Discharge to home or other facility with appropriate resources  Description: INTERVENTIONS:- Identify barriers to discharge w/pt and caregiver- Include patient/family/discharge partner in discharge planning- Arrange for needed discharge resources and transportation as appropriate- Identify discharge learning needs (meds, wound care, etc)- Arrange for interpreters to assist at discharge as needed- Consider post-discharge preferences of patient/family/discharge partner- Complete POLST form as appropriate- Assess patient's ability to be responsible for managing their own health- Refer to Case Management Department for coordinating discharge planning if the patient needs post-hospital services based on physician/LIP order or complex needs related to functional status, cognitive ability or social support system  Outcome: Progressing

## 2025-04-05 NOTE — PLAN OF CARE
Problem: Patient Centered Care  Goal: Patient preferences are identified and integrated in the patient's plan of care  Description: Interventions:- What would you like us to know as we care for you? From home alone - Provide timely, complete, and accurate information to patient/family- Incorporate patient and family knowledge, values, beliefs, and cultural backgrounds into the planning and delivery of care- Encourage patient/family to participate in care and decision-making at the level they choose- Honor patient and family perspectives and choices  Outcome: Progressing     Problem: METABOLIC/FLUID AND ELECTROLYTES - ADULT  Goal: Electrolytes maintained within normal limits  Description: INTERVENTIONS:- Monitor labs and rhythm and assess patient for signs and symptoms of electrolyte imbalances- Administer electrolyte replacement as ordered- Monitor response to electrolyte replacements, including rhythm and repeat lab results as appropriate- Fluid restriction as ordered- Instruct patient on fluid and nutrition restrictions as appropriate  Outcome: Progressing     Problem: SKIN/TISSUE INTEGRITY - ADULT  Goal: Skin integrity remains intact  Description: INTERVENTIONS- Assess and document risk factors for pressure ulcer development- Assess and document skin integrity- Monitor for areas of redness and/or skin breakdown- Initiate interventions, skin care algorithm/standards of care as needed  Outcome: Progressing     Problem: Impaired Communication  Goal: Patient will achieve maximal communication potential  Description: Interventions: Utilize translation line and services   Outcome: Progressing     Problem: SAFETY ADULT - FALL  Goal: Free from fall injury  Description: INTERVENTIONS:- Assess pt frequently for physical needs- Identify cognitive and physical deficits and behaviors that affect risk of falls.- [place] fall precautions as indicated by assessment.- Educate pt/family on patient safety including physical  limitations- Instruct pt to call for assistance with activity based on assessment- Modify environment to reduce risk of injury- Provide assistive devices as appropriate- Consider OT/PT consult to assist with strengthening/mobility- Encourage toileting schedule  Outcome: Progressing     Problem: DISCHARGE PLANNING  Goal: Discharge to home or other facility with appropriate resources  Description: INTERVENTIONS:- Identify barriers to discharge w/pt and caregiver- Include patient/family/discharge partner in discharge planning- Arrange for needed discharge resources and transportation as appropriate- Identify discharge learning needs (meds, wound care, etc)- Arrange for interpreters to assist at discharge as needed- Consider post-discharge preferences of patient/family/discharge partner- Complete POLST form as appropriate- Assess patient's ability to be responsible for managing their own health- Refer to Case Management Department for coordinating discharge planning if the patient needs post-hospital services based on physician/LIP order or complex needs related to functional status, cognitive ability or social support system  Outcome: Progressing

## 2025-04-05 NOTE — H&P
[facility]  part of [facility]    History & Physical    Noemi Hudson Patient Status:  Emergency    10/10/1940 MRN [id number]   Location [facility] EMERGENCY DEPARTMENT Attending Sean Gaitan, DO   Hosp Day # 0 PCP RADHA FLOYD MD     Date:  2025  Date of Admission:  2025    Chief Complaint:  Chief Complaint   Patient presents with    Headache    Fatigue       History of Present Illness:  Noemi Hudson is a(n) 84 year old Danish-speaking female, with a past medical history significant for hypertension, coronary artery disease, diabetes was recently discharged from the hospital after an episode of acute on chronic pancreatitis presents today with increasing weakness and bodyaches ongoing since her discharge from the hospital.  Claims she has been experiencing difficulty getting around at home, she lives alone.  She also complains of numbness and tingling in all extremities also present around the mouth with a cottonmouth feeling as well.  Claims this tends to happen when she gets anxious.  Denies any chest pain palpitations shortness of breath nausea vomiting or diaphoresis.  Workup in ER indicated significant drop in her sodium levels.    History:  Past Medical History:    Cataract    Diabetes (HCC)    Diverticulosis large intestine w/o perforation or abscess w/o bleeding    Dupuytren's contracture of left hand     LMF Ray    Essential hypertension    High blood pressure    High cholesterol    Hyperlipidemia    Internal hemorrhoids without complication    MI (myocardial infarction) (HCC)    Stroke (HCC)     Past Surgical History:   Procedure Laterality Date    Appendectomy      Cataract      Cholecystectomy      Colonoscopy      Colonoscopy N/A 2018    Procedure: COLONOSCOPY;  Surgeon: Maricruz Benavides MD;  Location: [facility] ENDOSCOPY    Palmar fasciectomy Left 11-18-16    L Dupuytren's  LMF Ray    Tubal ligation       Family History   Problem  Relation Age of Onset    Cancer Sister         skin      reports that she has never smoked. She has never been exposed to tobacco smoke. She has never used smokeless tobacco. She reports that she does not drink alcohol and does not use drugs.    Allergies:  Allergies[1]    Home Medications:  Prior to Admission Medications   Prescriptions Last Dose Informant Patient Reported? Taking?   Accu-Chek Softclix Lancets Does not apply Misc   No No   Sig: Use three times daily as directed.   Acetaminophen 500 MG Oral Cap   No No   Sig: Take 1 capsule (500 mg total) by mouth every 6 (six) hours as needed.   Glucose Blood (ACCU-CHEK JOSE PLUS) In Vitro Strip   No No   Sig: INJECT 1 DEVICE INTO THE SKIN 3 (THREE) TIMES DAILY AS NEEDED.   Glucose Blood (ACCU-CHEK GUIDE TEST) In Vitro Strip   No No   Sig: Use Q day   Insulin Glargine-yfgn (SEMGLEE, YFGN,) 100 UNIT/ML Subcutaneous Solution Pen-injector   No No   Sig: Inject 50 Units into the skin daily. Using insulin pens   Insulin Pen Needle (BD PEN NEEDLE AGA 2ND GEN) 32G X 4 MM Does not apply Misc   No No   Si strip by In Vitro route daily.   MAGNESIUM COMPLEX HIGH POTENCY OR   Yes No   Sig: Take by mouth.   Multiple Vitamins-Minerals (PRESERVISION AREDS) Oral Tab   Yes No   Sig: Take 2 tablets by mouth daily.   Olmesartan Medoxomil 40 MG Oral Tab   No No   Sig: Take 0.5 tablets (20 mg total) by mouth daily.   alendronate 70 MG Oral Tab   No No   Sig: Take 1 tablet (70 mg total) by mouth every 7 days.   aspirin 81 MG Oral Chew Tab   No No   Sig: Chew 1 tablet (81 mg total) by mouth daily.   chlorthalidone 25 MG Oral Tab   No No   Sig: Take 1 tablet (25 mg total) by mouth daily.   escitalopram (LEXAPRO) 20 MG Oral Tab   No No   Sig: Take 1 tablet (20 mg total) by mouth daily.   insulin degludec (TRESIBA FLEXTOUCH) 100 UNIT/ML Subcutaneous Solution Pen-injector   No No   Sig: INJECT 50 UNITS/DAY INTO THE SKIN DAILY.   latanoprost 0.005 % Ophthalmic Solution   Yes No   Sig:  Place 1 drop into both eyes nightly.   meclizine 12.5 MG Oral Tab   No No   Sig: Take 1 tablet (12.5 mg total) by mouth 3 (three) times daily as needed.   Patient not taking: Reported on 4/2/2025   metFORMIN HCl 1000 MG Oral Tab   No No   Sig: TOME JESUSITA TABLETA 2 VECES AL CHRISTINE CON LAS COMIDAS   metoprolol succinate  MG Oral Tablet 24 Hr   No No   Sig: Take 1 tablet (100 mg total) by mouth daily.   pantoprazole 40 MG Oral Tab EC   No No   Sig: Take 1 tablet (40 mg total) by mouth daily.   pravastatin 20 MG Oral Tab   No No   Sig: Take 1 tablet (20 mg total) by mouth daily.      Facility-Administered Medications: None       Review of Systems:  Constitutional:  Weakness, Fatigue.  Eye:  Negative.  Ear/Nose/Mouth/Throat:  Negative.  Respiratory:  Negative  Cardiovascular: Negative  Gastrointestinal:  Negative.  Genitourinary:  Negative  Endocrine:  Negative.  Immunologic:  Negative.  Musculoskeletal:  Negative.  Integumentary:  Negative.  Neurologic: Numbness and tingling  Psychiatric:  Negative.  ROS reviewed as documented in chart    Physical Exam:  Temp:  [97.4 °F (36.3 °C)] 97.4 °F (36.3 °C)  Pulse:  [101-105] 101  Resp:  [19-25] 20  BP: (158-177)/(63-79) 164/70  SpO2:  [92 %-96 %] 96 %    General:  Alert and oriented.  Diffuse skin problem:  None.  Eye:  Pupils are equal, round and reactive to light, extraocular movements are intact, Normal conjunctiva.  HENT:  Normocephalic, oral mucosa is moist.  Head:  Normocephalic, atraumatic.  Neck:  Supple, non-tender, no carotid bruit, no jugular venous distention, no lymphadenopathy, no thyromegaly.  Respiratory:  Lungs are clear to auscultation, respirations are non-labored, breath sounds are equal, symmetrical chest wall expansion.  Cardiovascular:  Normal rate, regular rhythm, no murmur, no edema.  Gastrointestinal:  Soft, non-tender, non-distended, normal bowel sounds, no organomegaly.  Lymphatics:  No lymphadenopathy neck, axilla, groin.  Musculoskeletal: Normal  range of motion.  normal strength.  Feet:  Normal pulses.  Neurologic:  Alert, oriented, no focal deficits, cranial nerves II-XII are grossly intact.  Cognition and Speech:  Oriented, speech clear and coherent.  Psychiatric:  Cooperative, appropriate mood & affect.      Laboratory Data:   Lab Results   Component Value Date    WBC 11.4 04/04/2025    HGB 12.7 04/04/2025    HCT 35.8 04/04/2025    .0 04/04/2025    CREATSERUM 0.96 04/04/2025    BUN 17 04/04/2025     04/04/2025    K 4.4 04/04/2025    CL 86 04/04/2025    CO2 22.0 04/04/2025     04/04/2025    CA 8.8 04/04/2025    ALB 4.6 04/04/2025    ALKPHO 50 04/04/2025    BILT 0.6 04/04/2025    TP 7.0 04/04/2025    AST 25 04/04/2025    ALT 19 04/04/2025    LIP 89 04/04/2025    MG 1.7 04/04/2025       Imaging:  No results found.     Assessment and Plan:    Severe hyponatremia  Will hold diuretic and SNRI for now, nephrology has been consulted.  IV fluids initiated, will continue to monitor sodium levels closely.  Check urine for sodium and osmolality.    Generalized asthenia  Likely multifactorial, PT/OT to evaluate and treat.    Benign hypertension  Blood pressure well-controlled at this time, resume home meds.    Uncontrolled diabetes  Blood sugar suboptimally controlled, resume home dose of insulin, use sliding scale coverage as needed.  Check A1c    Generalized anxiety with panic attack  Will give Ativan 0.5 mg IV x 1 now, if no improvement consider psych consult, Lexapro currently on hold for hyponatremia    Prophylaxis  Subcutaneous heparin    CODE STATUS  Full    Primary care physician  RADHA FLOYD MD    MDM: High, severe exacerbation of chronic illness posing threat to life.  IV medications requiring close inpatient monitoring  60 minutes spent on this admission - examining patient, obtaining history, reviewing previous medical records, going over test results/imaging and discussing plan of care. All questions answered.      Disposition  Clinical course will dictate outcome      NISH TYSON MD  4/4/2025  11:29 PM         [1] No Known Allergies

## 2025-04-06 NOTE — PROGRESS NOTES
[facility]  part of [facility]    Progress Note    Noemi Hudson Patient Status:  Inpatient    10/10/1940 MRN [id number]   Location [facility] 5SW/SE Attending Jan Cook MD   Hosp Day # 1 PCP RADHA FLOYD MD     Chief Complaint:   Chief Complaint   Patient presents with    Headache    Fatigue   HA dizziness x 2 weeks.     Subjective:   Noemi Hudson is dong better. Just has HA today. Cramping and numbnes in legs better. Mostly has cramping pain in calves at night. No F/C. No more vision changes. Feels stronger. Eating well. Family at bedside     Objective:   Objective:    Blood pressure 134/59, pulse 79, temperature 98 °F (36.7 °C), temperature source Oral, resp. rate 18, height 5' 3\" (1.6 m), weight 149 lb 0.5 oz (67.6 kg), SpO2 94%, not currently breastfeeding.    Physical Exam:    General: No acute distress.   Respiratory: Clear to auscultation bilaterally. No wheezes. No rhonchi.  Cardiovascular: S1, S2. Regular rate and rhythm. No murmurs, rubs or gallops.   Abdomen: Soft, nontender, nondistended.  Positive bowel sounds. No rebound or guarding.  Neurologic: No focal neurological deficits.   Musculoskeletal: Moves all extremities.  Extremities: No edema.      Results:   Results:    Labs:  Recent Labs   Lab 25  0607 25  0623   WBC 11.4* 8.6 7.2   HGB 12.7 12.6 11.8*   MCV 83.3 85.8 83.1   .0 226.0 226.0       Recent Labs   Lab 25  0607 25  1125 25  1757 25  0623   * 109*  --   --  86   BUN 17 12  --   --  15   CREATSERUM 0.96 0.75  --   --  0.88   CA 8.8 8.1*  --   --  8.1*   ALB 4.6  --   --   --  4.0   * 122* 122* 118* 122*   K 4.4 4.2  --   --  4.1   CL 86* 91*  --   --  90*   CO2 22.0 21.0  --   --  23.0   ALKPHO 50*  --   --   --   --    AST 25  --   --   --   --    ALT 19  --   --   --   --    BILT 0.6  --   --   --   --    TP 7.0  --   --   --   --        Estimated  Creatinine Clearance: 38.7 mL/min (based on SCr of 0.88 mg/dL).    No results for input(s): \"PTP\", \"INR\" in the last 168 hours.         Culture:  No results found for this visit on 04/04/25.    Cardiac  No results for input(s): \"TROP\", \"PBNP\" in the last 168 hours.      Imaging: Imaging data reviewed in [facility].  CT BRAIN OR HEAD (CPT=70450)    Result Date: 4/5/2025  CONCLUSION:  1. No acute intracranial finding.    Dictated by (CST): Leonardo Bush MD on 4/05/2025 at 9:29 PM     Finalized by (CST): Leonardo Bush MD on 4/05/2025 at 9:34 PM           Medications:    heparin  5,000 Units Subcutaneous Q12H    aspirin  81 mg Oral Daily    latanoprost  1 drop Both Eyes Nightly    metoprolol succinate ER  100 mg Oral Daily    losartan  100 mg Oral Daily    pantoprazole  40 mg Oral Daily    pravastatin  20 mg Oral Daily    insulin aspart  1-7 Units Subcutaneous TID CC and HS         Assessment and Plan:   Assessment & Plan:      Severe hyponatremia   Likely secondary to Thiazide diuretic and SNRI as well as excessive water intake (drinks 1-1.5L/day of water) and poor PO intake. Both meds stopped.   Salt tabs, tolvaptan x 1   Free water restirction   Renal on consult.   Serial BMP   Dizziness  Weakness  Muscle cramps   Secondary to above.   CT Head neg  Replace electrolytes.   PT/OT HHC   ? RLS  Essential HTN   Losartan, metoprolol   Dm II   A1c 7.6  Tresiba 10units daily (take 50 units at home)   BG low. ? If pt was getting hypoglycemic at home with poor PO intake   ISS  Anxiety d/o   Monitor for now.   Lexapro  on hold   Other medical problems  H/o TIA  CAD  CKD III  Dyslipidemia     >55min spent, >50% spent counseling and coordinating care in the form of educating pt/family and d/w consultants and staff. Most of the time spent discussing the above plan.        Plan of care discussed with patient or family at bedside.    Jan Cook MD  Hospitalist          Supplementary Documentation:     Quality:  DVT Prophylaxis:  heparin   CODE status:Full   Dispo: per clinical course            Estimated date of discharge: TBD  Discharge is dependent on: clinical stability  At this point Ms. Hudson is expected to be discharge to: TBD

## 2025-04-06 NOTE — PLAN OF CARE
Problem: Patient Centered Care  Goal: Patient preferences are identified and integrated in the patient's plan of care  Description: Interventions:- What would you like us to know as we care for you? - Provide timely, complete, and accurate information to patient/family- Incorporate patient and family knowledge, values,beliefs, and cultural backgrounds into the planning and delivery of care- Encourage patient/family to participate in care and decision-making at the level they choose- Honor patient and family perspectives and choices  Outcome: Progressing     Problem: METABOLIC/FLUID AND ELECTROLYTES - ADULT  Goal: Electrolytes maintained within normal limits  Description: INTERVENTIONS:- Monitor labs and rhythm and assess patient for signs and symptoms of electrolyte imbalances- Administer electrolyte replacement as ordered- Monitor response to electrolyte replacements, including rhythm and repeat lab results as appropriate- Fluid restriction as ordered- Instruct patient on fluid and nutrition restrictions as appropriate  Outcome: Progressing     Problem: SKIN/TISSUE INTEGRITY - ADULT  Goal: Skin integrity remains intact  Description: INTERVENTIONS- Assess and document risk factors for pressure ulcer development- Assess and document skin integrity- Monitor for areas of redness and/or skin breakdown- Initiate interventions, skin care algorithm/standards of care as needed  Outcome: Progressing     Problem: Impaired Communication  Goal: Patient will achieve maximal communication potential  Description: Interventions:  Outcome: Progressing     Problem: SAFETY ADULT - FALL  Goal: Free from fall injury  Description: INTERVENTIONS:- Assess pt frequently for physical needs- Identify cognitive and physical deficits and behaviors that affect risk of falls.- [place] fall precautions as indicated by assessment.- Educate pt/family on patient safety including physical limitations- Instruct pt to call for assistance with  activity based on assessment- Modify environment to reduce risk of injury- Provide assistive devices as appropriate- Consider OT/PT consult to assist with strengthening/mobility- Encourage toileting schedule  Outcome: Progressing     Problem: DISCHARGE PLANNING  Goal: Discharge to home or other facility with appropriate resources  Description: INTERVENTIONS:- Identify barriers to discharge w/pt and caregiver- Include patient/family/discharge partner in discharge planning- Arrange for needed discharge resources and transportation as appropriate- Identify discharge learning needs (meds, wound care, etc)- Arrange for interpreters to assist at discharge as needed- Consider post-discharge preferences of patient/family/discharge partner- Complete POLST form as appropriate- Assess patient's ability to be responsible for managing their own health- Refer to Case Management Department for coordinating discharge planning if the patient needs post-hospital services based on physician/LIP order or complex needs related to functional status, cognitive ability or social support system  Outcome: Progressing

## 2025-04-06 NOTE — PROGRESS NOTES
[facility]  part of [facility]    Progress Note      Subjective:           Review of Systems:     Constitutional: negative for fatigue, fevers and weight loss  Eyes: negative for irritation, redness and visual disturbance  Ears, nose, mouth, throat, and face: negative for hearing loss and sore throat  Respiratory: negative for cough, hemoptysis and wheezing  Cardiovascular: negative for chest pain, exertional dyspnea, lower extremity edema and palpitations  Gastrointestinal: negative for abdominal pain, diarrhea and nausea  Genitourinary:negative for dysuria, frequency and hematuria  Hematologic/lymphatic: negative for bleeding and easy bruising  Musculoskeletal:negative for back pain, bone pain and muscle weakness  Neurological: negative for gait problems, memory problems and seizures  Behavioral/Psych: negative for anxiety and depression  Endocrine: negative for polyuria and weight loss     Objective:   Temp:  [97.7 °F (36.5 °C)-98 °F (36.7 °C)] 98 °F (36.7 °C)  Pulse:  [77-83] 79  Resp:  [18] 18  BP: (117-154)/(59-76) 134/59  SpO2:  [94 %-97 %] 94 %  SpO2: 94 %     Intake/Output Summary (Last 24 hours) at 4/6/2025 1218  Last data filed at 4/6/2025 1210  Gross per 24 hour   Intake 1050 ml   Output 1010 ml   Net 40 ml     Wt Readings from Last 3 Encounters:   04/05/25 149 lb 0.5 oz (67.6 kg)   04/02/25 151 lb (68.5 kg)   03/28/25 150 lb 1.6 oz (68.1 kg)       General appearance: alert, appears stated age and cooperative  Head: Normocephalic, atraumatic  Eyes: conjunctivae/corneas clear  Throat: lips, mucosa, and tongue normal; teeth and gums normal  Neck:  no JVD, supple, symmetrical  Pulmonary:  clear to auscultation   Extremities: extremities normal, no edema  Skin: No rashes or lesions  Neurologic: Grossly normal  Psychiatric: calm    Medications:  Current Facility-Administered Medications   Medication Dose Route Frequency    ondansetron (Zofran) 4 MG/2ML injection 4 mg  4 mg Intravenous  Q6H PRN    heparin (Porcine) 5000 UNIT/ML injection 5,000 Units  5,000 Units Subcutaneous Q12H    hydrALAzine (Apresoline) 20 mg/mL injection 10 mg  10 mg Intravenous Q4H PRN    HYDROcodone-acetaminophen (Norco) 5-325 MG per tab 1 tablet  1 tablet Oral Q6H PRN    alum-mag hydroxide-simethicone (Maalox) 200-200-20 MG/5ML oral suspension 30 mL  30 mL Oral QID PRN    aspirin chewable tab 81 mg  81 mg Oral Daily    latanoprost (Xalatan) 0.005 % ophthalmic solution 1 drop  1 drop Both Eyes Nightly    metoprolol succinate ER (Toprol XL) 24 hr tab 100 mg  100 mg Oral Daily    losartan (Cozaar) tab 100 mg  100 mg Oral Daily    pantoprazole (Protonix) DR tab 40 mg  40 mg Oral Daily    pravastatin (Pravachol) tab 20 mg  20 mg Oral Daily    glucose (Dex4) 15 GM/59ML oral liquid 15 g  15 g Oral Q15 Min PRN    Or    glucose (Glutose) 40% oral gel 15 g  15 g Oral Q15 Min PRN    Or    glucose-vitamin C (Dex-4) chewable tab 4 tablet  4 tablet Oral Q15 Min PRN    Or    dextrose 50% injection 50 mL  50 mL Intravenous Q15 Min PRN    Or    glucose (Dex4) 15 GM/59ML oral liquid 30 g  30 g Oral Q15 Min PRN    Or    glucose (Glutose) 40% oral gel 30 g  30 g Oral Q15 Min PRN    Or    glucose-vitamin C (Dex-4) chewable tab 8 tablet  8 tablet Oral Q15 Min PRN    insulin aspart (NovoLOG) 100 Units/mL FlexPen 1-7 Units  1-7 Units Subcutaneous TID CC and HS    melatonin tab 6 mg  6 mg Oral Nightly PRN          Results:     Recent Labs   Lab 04/04/25 2052 04/05/25  0607 04/06/25  0623   RBC 4.30 4.00 3.91   HGB 12.7 12.6 11.8*   HCT 35.8 34.3* 32.5*   MCV 83.3 85.8 83.1   NEPRELIM 8.70* 6.38  --    WBC 11.4* 8.6 7.2   .0 226.0 226.0     Recent Labs   Lab 04/04/25 2052 04/05/25  0607 04/05/25  1125 04/05/25  1757 04/06/25  0623   * 109*  --   --  86   BUN 17 12  --   --  15   CREATSERUM 0.96 0.75  --   --  0.88   CA 8.8 8.1*  --   --  8.1*   ALB 4.6  --   --   --  4.0   * 122* 122* 118* 122*   K 4.4 4.2  --   --  4.1   CL  86* 91*  --   --  90*   CO2 22.0 21.0  --   --  23.0   ALKPHO 50*  --   --   --   --    AST 25  --   --   --   --    ALT 19  --   --   --   --    BILT 0.6  --   --   --   --    TP 7.0  --   --   --   --      PTT   Date Value Ref Range Status   07/24/2019 28.5 23.2 - 35.3 seconds Final     INR   Date Value Ref Range Status   07/24/2019 1.02 0.90 - 1.20 Final     Comment:       Only the INR (not the Protime value) should be utilized for   the monitoring of oral anticoagulant therapy.     Recommended therapeutic ranges for anticoagulant therapy are   as follows:   2.0 - 3.0 All indications except for mechanical prosthetic   cardiac valves.     2.5 - 3.5 Mechanical prosthetic cardiac valves.       No results for input(s): \"BNP\" in the last 168 hours.  Recent Labs   Lab 04/04/25 2052 04/05/25 1757 04/06/25  0623   MG 1.7 1.8 1.9   PHOS  --   --  3.0        Recent Labs   Lab 04/04/25 2052 04/06/25  0623   PHOS  --  3.0   ALB 4.6 4.0       CT BRAIN OR HEAD (CPT=70450)    Result Date: 4/5/2025  CONCLUSION:  1. No acute intracranial finding.    Dictated by (CST): Leonardo Bush MD on 4/05/2025 at 9:29 PM     Finalized by (CST): Leonardo Bush MD on 4/05/2025 at 9:34 PM               Assessment and Plan:     Patient is a 84 year old female with past medical history of hypertension, diabetes, recent admission for pancreatitis who presented to the emergency room with fatigue      1.hyponatremia:  Serum sodium mostly lowish/low normal at baseline 132-137  Admit sodium 117-s/p normal saline IV fluid  Repeat sodium 122 and stable   Will give a small dose of tolvaptan and repeat sodium later today  Urine osmolality consistent with excess fluid intake-fluid restriction 1.5 L  UA with lowish specific gravity  Chlorthalidone Lexapro on hold-will assess     2.hypertension:  Hold chlorthalidone.  Continue metoprolol and losartan BP stable        Discussed with nursing   Translation with the help of bedside    Per daughter patient did  not eat 3 days-counseled patient to eat more nutritious food       Felipe Wright MD

## 2025-04-06 NOTE — PLAN OF CARE
Problem: Patient Centered Care  Goal: Patient preferences are identified and integrated in the patient's plan of care  Description: Interventions:- What would you like us to know as we care for you? - Provide timely, complete, and accurate information to patient/family- Incorporate patient and family knowledge, values, beliefs, and cultural backgrounds into the planning and delivery of care- Encourage patient/family to participate in care and decision-making at the level they choose- Honor patient and family perspectives and choices  Outcome: Progressing     Problem: METABOLIC/FLUID AND ELECTROLYTES - ADULT  Goal: Electrolytes maintained within normal limits  Description: INTERVENTIONS:- Monitor labs and rhythm and assess patient for signs and symptoms of electrolyte imbalances- Administer electrolyte replacement as ordered- Monitor response to electrolyte replacements, including rhythm and repeat lab results as appropriate- Fluid restriction as ordered- Instruct patient on fluid and nutrition restrictions as appropriate  Outcome: Progressing     Problem: SKIN/TISSUE INTEGRITY - ADULT  Goal: Skin integrity remains intact  Description: INTERVENTIONS- Assess and document risk factors for pressure ulcer development- Assess and document skin integrity- Monitor for areas of redness and/or skin breakdown- Initiate interventions, skin care algorithm/standards of care as needed  Outcome: Progressing     Problem: Impaired Communication  Goal: Patient will achieve maximal communication potential  Description: Interventions:  Outcome: Progressing     Problem: SAFETY ADULT - FALL  Goal: Free from fall injury  Description: INTERVENTIONS:- Assess pt frequently for physical needs- Identify cognitive and physical deficits and behaviors that affect risk of falls.- [place] fall precautions as indicated by assessment.- Educate pt/family on patient safety including physical limitations- Instruct pt to call for assistance with  activity based on assessment- Modify environment to reduce risk of injury- Provide assistive devices as appropriate- Consider OT/PT consult to assist with strengthening/mobility- Encourage toileting schedule  Outcome: Progressing     Problem: DISCHARGE PLANNING  Goal: Discharge to home or other facility with appropriate resources  Description: INTERVENTIONS:- Identify barriers to discharge w/pt and caregiver- Include patient/family/discharge partner in discharge planning- Arrange for needed discharge resources and transportation as appropriate- Identify discharge learning needs (meds, wound care, etc)- Arrange for interpreters to assist at discharge as needed- Consider post-discharge preferences of patient/family/discharge partner- Complete POLST form as appropriate- Assess patient's ability to be responsible for managing their own health- Refer to Case Management Department for coordinating discharge planning if the patient needs post-hospital services based on physician/LIP order or complex needs related to functional status, cognitive ability or social support system  Outcome: Progressing

## 2025-04-06 NOTE — PHYSICAL THERAPY NOTE
PHYSICAL THERAPY EVALUATION - INPATIENT     Room Number: 530/530-A  Evaluation Date: 4/6/2025  Type of Evaluation: Initial   Physician Order: PT Eval and Treat    Presenting Problem: Admitted w/ abnormal lab, poor intake and blurry vision (Hyponatremia, CTH (-))  Co-Morbidities : Patient w/ recent admission due t pancreatitis, tia and CAD.  Reason for Therapy: Mobility Dysfunction and Discharge Planning    PHYSICAL THERAPY ASSESSMENT   Patient is a 84 year old female admitted 4/4/2025 w/ abnormal lab, poor intake and blurry vision (Hyponatremia, CTH (-),  patient w/ recent admission due to pancreatitis.  Patient is Nepali speaking, communicated via , ID #[id number].   Patient denied any visual impairment, able to scan and read clock accurately, AXO x4. Patient demo intact dynamic balance, mild tremor/ shaking in head and upper trunk noted during mobility, chronic per patient.  Prior to admission, patient's baseline is independent without assistive device.  Patient is currently functioning near baseline with bed mobility, transfers, gait, and standing prolonged periods.  Patient is requiring stand-by assist and contact guard assist as a result of the following impairments: decreased functional strength and impaired coordination.  Physical Therapy will continue to follow for duration of hospitalization.  Observed patient able to perform toilet hygiene w/ standby assist after using restroom during session.  Patient will benefit from continued skilled PT Services at discharge to promote prior level of function and safety with additional support and return home with home health PT.    Educated patient on general home safety precautions, fall prevention, proper footwear, energy conservation strategies, proper body mechanics, transfer techniques,  car transfer technique and the need for initial increased to perform mobility safely, verbalized understanding, reported supportive daughter able to assist as  needed.    PLAN DURING HOSPITALIZATION  Nursing Mobility Recommendation : 1 Assist  PT Device Recommendation: None  PT Treatment Plan: Bed mobility, Body mechanics, Energy conservation, Patient education, Gait training, Range of motion, Strengthening, Transfer training, Balance training  Rehab Potential : Good  Frequency (Obs): 3-5x/week     PHYSICAL THERAPY MEDICAL/SOCIAL HISTORY   History related to current admission:      Problem List  Principal Problem:    Hyponatremia  Active Problems:    Weakness generalized      HOME SITUATION  Type of Home: Condo (senior building)  Home Layout: Elevator  Stairs to Enter : 0        Stairs to Bedroom: 0         Lives With: Alone    Drives: No (reported did not wish to go through car repairment one month ago, otherwise drove until last month)   Patient Regularly Uses: None     Prior Level of [place]: Patient reported residing in a senior building with elevator access, fully independent without assistive device w/ ambulation, ADLS and most IADLS w/ h/o one fall in last past year, managed meds independently, drove until last month prior to her car breaking down. Supportive daughter providing assistance w/ driving/ shopping in company of patient since then, no DME.    SUBJECTIVE    PHYSICAL THERAPY EXAMINATION   OBJECTIVE  Precautions: Bed/chair alarm  Fall Risk: Standard fall risk    WEIGHT BEARING RESTRICTION       PAIN ASSESSMENT  Ratin          COGNITION  Overall Cognitive Status:  WFL - within functional limits  Orientation Level:  oriented x4  Memory:  appears intact  Following Commands:  follows one step commands consistently  Safety Judgement:  good awareness of safety precautions    RANGE OF MOTION AND STRENGTH ASSESSMENT  BLE gross muscle testing 3/5, light touch sensation intact, ROM WFL.    BALANCE  Static Sitting: Good  Dynamic Sitting: Good  Static Standing: Good  Dynamic Standing: Fair    ADDITIONAL TESTS                                    NEUROLOGICAL  FINDINGS                      ACTIVITY TOLERANCE  Pulse: 83  Heart Rate Source: Monitor     BP: 138/62  BP Location: Right arm  BP Method: Automatic  Patient Position: Standing    O2 WALK  Oxygen Therapy  SPO2% on Room Air at Rest: 98    AM-PAC '6-Clicks' INPATIENT SHORT FORM - BASIC MOBILITY  How much difficulty does the patient currently have...  Patient Difficulty: Turning over in bed (including adjusting bedclothes, sheets and blankets)?: None   Patient Difficulty: Sitting down on and standing up from a chair with arms (e.g., wheelchair, bedside commode, etc.): A Little   Patient Difficulty: Moving from lying on back to sitting on the side of the bed?: None   How much help from another person does the patient currently need...   Help from Another: Moving to and from a bed to a chair (including a wheelchair)?: A Little   Help from Another: Need to walk in hospital room?: A Little   Help from Another: Climbing 3-5 steps with a railing?: A Lot     AM-PAC Score:  Raw Score: 19   Approx Degree of Impairment: 41.77%   Standardized Score (AM-PAC Scale): 45.44   CMS Modifier (G-Code): CK    FUNCTIONAL ABILITY STATUS  Functional Mobility/Gait Assessment  Gait Assistance: Contact guard assist, Supervision  Distance (ft): 45-15  Assistive Device: None  Pattern: Comment (step to, fairly stedy, decreased john)  Stairs: Other (comment) (Deferred, no stairs to negotiate at home.)  Rolling: independent  Supine to Sit: independent  Sit to Supine:  NT  Sit to Stand: stand-by assist    Exercise/Education Provided:  Bed mobility  Body mechanics  Gait training  Posture  Strengthening  Transfer training    Skilled Therapy Provided: bed mobility, transfer technique, gait training, fall prevention    The patient's Approx Degree of Impairment: 41.77% has been calculated based on documentation in the [facility] '6 clicks' Inpatient Basic Mobility Short Form.  Research supports that patients with this level of impairment may benefit from  Home PT.  Final disposition will be made by interdisciplinary medical team.    Patient End of Session: Up in chair, Needs met, Call light within reach, RN aware of session/findings, Alarm set    CURRENT GOALS  Goals to be met by: 4.15.25  Patient Goal Patient's self-stated goal is: Returning home   Goal #1 Patient is able to demonstrate supine - sit EOB @ level: independent     Goal #1   Current Status    Goal #2 Patient is able to demonstrate transfers Sit to/from Stand at assistance level: independent with none     Goal #2  Current Status    Goal #3 Patient is able to ambulate 150 feet with assist device: none at assistance level: supervision   Goal #3   Current Status    Goal #4 Patient will negotiate 0 stairs/one curb w/ assistive device and supervision   Goal #4   Current Status    Goal #5 Patient to demonstrate independence with home activity/exercise instructions provided to patient in preparation for discharge.   Goal #5   Current Status    Goal #6    Goal #6  Current Status      Patient Evaluation Complexity Level:  History Low - no personal factors and/or co-morbidities   Examination of body systems Low -  addressing 1-2 elements   Clinical Presentation Low- Stable   Clinical Decision Making  Low Complexity     Gait Trainin minutes  Therapeutic Activity:  0 minutes  Neuromuscular Re-education: 0 minutes  Therapeutic Exercise: 0 minutes  Canalith Repositionin minutes  Manual Therapy: 0 minutes  Can add/delete any of these

## 2025-04-07 NOTE — PROGRESS NOTES
----- Message from Lili Goodson RN sent at 10/9/2019  3:28 PM CDT -----  Regarding: Due for a colon  Hello,  Would you be able to call this pt to schedule a f/u colonoscopy - looks like she is due next month for another colon.  Thank you!    Lili MALDONADO     [facility]  part of [facility]    Progress Note    Noemi Hudson Patient Status:  Inpatient    10/10/1940 MRN [id number]   Location [facility] 5SW/SE Attending Jan Cook MD   Hosp Day # 2 PCP RADHA FLOYD MD     Chief Complaint:   Chief Complaint   Patient presents with    Headache    Fatigue   HA dizziness x 2 weeks.     Subjective:   Noemi Hudson is doing well. Has posterior HA still but better. No dizziness. Mild muscle cramps in leg but better. Slept well. Appetite good eatnig better  Objective:   Objective:    Blood pressure 137/58, pulse 70, temperature 98.3 °F (36.8 °C), temperature source Oral, resp. rate 18, height 5' 3\" (1.6 m), weight 149 lb 0.5 oz (67.6 kg), SpO2 92%, not currently breastfeeding.    Physical Exam:    General: No acute distress.   Respiratory: Clear to auscultation bilaterally. No wheezes. No rhonchi.  Cardiovascular: S1, S2. Regular rate and rhythm. No murmurs, rubs or gallops.   Abdomen: Soft, nontender, nondistended.  Positive bowel sounds. No rebound or guarding.  Neurologic: No focal neurological deficits.   Musculoskeletal: Moves all extremities.  Extremities: No edema.      Results:   Results:    Labs:  Recent Labs   Lab 25  0623 25  0546   WBC 11.4* 8.6 7.2 7.9   HGB 12.7 12.6 11.8* 11.9*   MCV 83.3 85.8 83.1 86.6   .0 226.0 226.0 243.0       Recent Labs   Lab 25  0607 25  1125 25  0623 25  1801 25  0546   * 109*  --  86  --  137*   BUN 17 12  --  15  --  21   CREATSERUM 0.96 0.75  --  0.88  --  1.04*   CA 8.8 8.1*  --  8.1*  --  8.3*   ALB 4.6  --   --  4.0  --   --    * 122*   < > 122* 125* 127*   K 4.4 4.2  --  4.1  --  4.2   CL 86* 91*  --  90*  --  94*   CO2 22.0 21.0  --  23.0  --  25.0   ALKPHO 50*  --   --   --   --   --    AST 25  --   --   --   --   --    ALT 19  --   --   --   --   --    BILT 0.6  --   --   --    --   --    TP 7.0  --   --   --   --   --     < > = values in this interval not displayed.       Estimated Creatinine Clearance: 32.7 mL/min (A) (based on SCr of 1.04 mg/dL (H)).    No results for input(s): \"PTP\", \"INR\" in the last 168 hours.         Culture:  No results found for this visit on 04/04/25.    Cardiac  No results for input(s): \"TROP\", \"PBNP\" in the last 168 hours.      Imaging: Imaging data reviewed in [facility].  CT BRAIN OR HEAD (CPT=70450)    Result Date: 4/5/2025  CONCLUSION:  1. No acute intracranial finding.    Dictated by (CST): Leonardo Bush MD on 4/05/2025 at 9:29 PM     Finalized by (CST): Leonardo Bush MD on 4/05/2025 at 9:34 PM           Medications:    heparin  5,000 Units Subcutaneous Q12H    aspirin  81 mg Oral Daily    latanoprost  1 drop Both Eyes Nightly    metoprolol succinate ER  100 mg Oral Daily    losartan  100 mg Oral Daily    pantoprazole  40 mg Oral Daily    pravastatin  20 mg Oral Daily    insulin aspart  1-7 Units Subcutaneous TID CC and HS         Assessment and Plan:   Assessment & Plan:      Severe hyponatremia   Likely secondary to Thiazide diuretic and SNRI as well as excessive water intake (drinks 1-1.5L/day of water) and poor PO intake. Both meds stopped.   Salt tab daily, s/p tolvaptan x 1 dose consider repeating defer to renal  Free water restirction   Renal on consult.   Serial BMP sodium better.   Dizziness  Weakness  Muscle cramps   Secondary to above.   CT Head neg  Replace electrolytes.   PT/OT HHC   ? RLS  Essential HTN   Losartan, metoprolol   Monitor BP as low normal   Dm II   A1c 7.6  Tresiba 10units daily (take 50 units at home)   BG low. ? If pt was getting hypoglycemic at home with poor PO intake   ISS  Anxiety d/o   Monitor for now.   Lexapro  on hold   HA  Fioricet PRN   Other medical problems  H/o TIA  CAD  CKD III  Dyslipidemia     >55min spent, >50% spent counseling and coordinating care in the form of educating pt/family and d/w consultants and staff.  Most of the time spent discussing the above plan.        Plan of care discussed with patient or family at bedside.    Jan Cook MD  Hospitalist          Supplementary Documentation:     Quality:  DVT Prophylaxis: heparin   CODE status:Full   Dispo: per clinical course            Estimated date of discharge: TBD  Discharge is dependent on: clinical stability  At this point Ms. Hudson is expected to be discharge to: TBD           none

## 2025-04-07 NOTE — CM/SW NOTE
04/07/25 1600   CM/SW Referral Data   Referral Source Social Work (self-referral)   Reason for Referral Discharge planning   Informant Patient;EMR;Clinical Staff Member   Readmission Assessment   Factors that patient feels contributed to this readmission Acute/Chronic Clinical Presentation   Pt's living situation prior to admission? Home alone   Pt's level of independence at discharge? No assist/independent (minimal)   Pt. received education on diagnoses at time of discharge? Yes   Did you know who and how to call someone if you felt worse? Yes   Did any new symptoms or issues develop after you were discharged? Yes   ----Post D/C symptoms: Symptoms/issue related to previous hospitalization Yes   Did you understand your discharge instructions? Yes   Were medications taken as indicated on discharge instructions? Yes   Did you have a follow-up appointment scheduled at time of discharge? Yes   ----F/U appt: Did you go to that appointment? Yes   ---- F/U appt: How many days after discharge was follow-up appointment? 0-14 days   Was pt. discharged w/out services? Yes      Reason for  Pt. is Limited English Proficient   Limited English- Service Selected On-site    utilized for: Discharge planning   Medical Hx   Does patient have an established PCP? Yes  (Dr. Jose Choudhary)   Significant Past Medical/Mental Health Hx HTN   Patient Info   Advanced directives? No   Patient's Current Mental Status at Time of Assessment Alert;Oriented   Patient's Home Environment Condo/Apt with elevator   Number of Levels in Home 1   Patient lives with Alone   Patient Status Prior to Admission   Independent with ADLs and Mobility Yes   Discharge Needs   Anticipated D/C needs Home health care     SW self-referred to this case for discharge planning.    Pt was admitted 3/26-3/28 for pancreatitis.  Pt saw her PCP Dr. Choudhary 4/2 in office.    Anticipated therapy need: Home with Home  Healthcare.    BRADFORD sent [place] referrals in [place].  Face to face certification uploaded.    BRADFORD met with pt bedside to complete assessment.    Pt Libyan speaking only- SW completed assessment in Libyan.    SW confirmed address and PCP on file.    Pt is from home alone senior living apartment complex.   At baseline, pt is independent with ADLs ambulatory w/o an assistive device.  Pt recently stopped driving- has supportive daughters in the area who are able to drive her as needed.    BRADFORD explained home health care Medicare/Medicaid coverage.  Pt agreeable to [facility].  [facility] liaison Janki chance.    PLAN: DC home w/[facility] pending med clear    / to remain available for support and/or discharge planning.     Emma LENNON, LSW  Discharge Planner

## 2025-04-07 NOTE — PLAN OF CARE
Problem: Patient Centered Care  Goal: Patient preferences are identified and integrated in the patient's plan of care  Description: Interventions:- What would you like us to know as we care for you? - Provide timely, complete, and accurate information to patient/family- Incorporate patient and family knowledge, values, beliefs, and cultural backgrounds into the planning and delivery of care- Encourage patient/family to participate in care and decision-making at the level they choose- Honor patient and family perspectives and choices  Outcome: Progressing     Problem: METABOLIC/FLUID AND ELECTROLYTES - ADULT  Goal: Electrolytes maintained within normal limits  Description: INTERVENTIONS:- Monitor labs and rhythm and assess patient for signs and symptoms of electrolyte imbalances- Administer electrolyte replacement as ordered- Monitor response to electrolyte replacements, including rhythm and repeat lab results as appropriate- Fluid restriction as ordered- Instruct patient on fluid and nutrition restrictions as appropriate  Outcome: Progressing     Problem: SKIN/TISSUE INTEGRITY - ADULT  Goal: Skin integrity remains intact  Description: INTERVENTIONS- Assess and document risk factors for pressure ulcer development- Assess and document skin integrity- Monitor for areas of redness and/or skin breakdown- Initiate interventions, skin care algorithm/standards of care as needed  Outcome: Progressing     Problem: Impaired Communication  Goal: Patient will achieve maximal communication potential  Description: Interventions:  Outcome: Progressing     Problem: SAFETY ADULT - FALL  Goal: Free from fall injury  Description: INTERVENTIONS:- Assess pt frequently for physical needs- Identify cognitive and physical deficits and behaviors that affect risk of falls.- [place] fall precautions as indicated by assessment.- Educate pt/family on patient safety including physical limitations- Instruct pt to call for assistance with  activity based on assessment- Modify environment to reduce risk of injury- Provide assistive devices as appropriate- Consider OT/PT consult to assist with strengthening/mobility- Encourage toileting schedule  Outcome: Progressing     Problem: DISCHARGE PLANNING  Goal: Discharge to home or other facility with appropriate resources  Description: INTERVENTIONS:- Identify barriers to discharge w/pt and caregiver- Include patient/family/discharge partner in discharge planning- Arrange for needed discharge resources and transportation as appropriate- Identify discharge learning needs (meds, wound care, etc)- Arrange for interpreters to assist at discharge as needed- Consider post-discharge preferences of patient/family/discharge partner- Complete POLST form as appropriate- Assess patient's ability to be responsible for managing their own health- Refer to Case Management Department for coordinating discharge planning if the patient needs post-hospital services based on physician/LIP order or complex needs related to functional status, cognitive ability or social support system  Outcome: Progressing

## 2025-04-07 NOTE — PLAN OF CARE
Problem: Patient Centered Care  Goal: Patient preferences are identified and integrated in the patient's plan of care  Description: Interventions:- What would you like us to know as we care for you?   From home - Provide timely, complete, and accurate information to patient/family- Incorporate patient and family knowledge, values, beliefs, and cultural backgrounds into the planning and delivery of care- Encourage patient/family to participate in care and decision-making at the level they choose- Honor patient and family perspectives and choices  Outcome: Progressing     Problem: METABOLIC/FLUID AND ELECTROLYTES - ADULT  Goal: Electrolytes maintained within normal limits  Description: INTERVENTIONS:- Monitor labs and rhythm and assess patient for signs and symptoms of electrolyte imbalances- Administer electrolyte replacement as ordered- Monitor response to electrolyte replacements, including rhythm and repeat lab results as appropriate- Fluid restriction as ordered- Instruct patient on fluid and nutrition restrictions as appropriate  Outcome: Progressing     Problem: SKIN/TISSUE INTEGRITY - ADULT  Goal: Skin integrity remains intact  Description: INTERVENTIONS- Assess and document risk factors for pressure ulcer development- Assess and document skin integrity- Monitor for areas of redness and/or skin breakdown- Initiate interventions, skin care algorithm/standards of care as needed  Outcome: Progressing     Problem: Impaired Communication  Goal: Patient will achieve maximal communication potential  Description: Interventions:- Encourage use of alternative/augmentative communication to express basic wants and needs (use of communication/letter board/or smartphone/tablet/handwriting)  Outcome: Progressing     Problem: SAFETY ADULT - FALL  Goal: Free from fall injury  Description: INTERVENTIONS:- Assess pt frequently for physical needs- Identify cognitive and physical deficits and behaviors that affect risk of  falls.- [place] fall precautions as indicated by assessment.- Educate pt/family on patient safety including physical limitations- Instruct pt to call for assistance with activity based on assessment- Modify environment to reduce risk of injury- Provide assistive devices as appropriate- Consider OT/PT consult to assist with strengthening/mobility- Encourage toileting schedule  Outcome: Progressing     Problem: DISCHARGE PLANNING  Goal: Discharge to home or other facility with appropriate resources  Description: INTERVENTIONS:- Identify barriers to discharge w/pt and caregiver- Include patient/family/discharge partner in discharge planning- Arrange for needed discharge resources and transportation as appropriate- Identify discharge learning needs (meds, wound care, etc)- Arrange for interpreters to assist at discharge as needed- Consider post-discharge preferences of patient/family/discharge partner- Complete POLST form as appropriate- Assess patient's ability to be responsible for managing their own health- Refer to Case Management Department for coordinating discharge planning if the patient needs post-hospital services based on physician/LIP order or complex needs related to functional status, cognitive ability or social support system  Outcome: Progressing

## 2025-04-07 NOTE — PROGRESS NOTES
[facility]  part of [facility]    Progress Note    Noemi Hudson Patient Status:  Inpatient    10/10/1940 MRN [id number]   Location [facility] 5SW/SE Attending Jan Cook MD   Hosp Day # 2 PCP RADHA FLOYD MD       Subjective:   Noemi Hudson is a(n) 84 year old female who I am seeing for  hyponatremia      Resting    Objective:   /55 (BP Location: Right arm)   Pulse 83   Temp 98.3 °F (36.8 °C) (Oral)   Resp 18   Ht 5' 3\" (1.6 m)   Wt 149 lb 0.5 oz (67.6 kg)   SpO2 95%   BMI 26.40 kg/m²      Intake/Output Summary (Last 24 hours) at 2025 1156  Last data filed at 2025 1032  Gross per 24 hour   Intake 610 ml   Output 1100 ml   Net -490 ml     Wt Readings from Last 1 Encounters:   25 149 lb 0.5 oz (67.6 kg)       Exam  Gen: No acute distress, Heent: NC AT, mucous memb clear, neck supple  Pulm: Lungs clear, normal respiratory effort  CV: Heart with regular rate and rhythm, no edema  Abd: Abdomen soft, nontender, nondistended, no organomegaly, bowel sounds present  Skin: no symptoms reported  Psych: alert and oriented    Assessment and Plan:     1 -hyponatremia  Will give another dose of tolvaptan today.  Continue fluid restriction  Would not resume chlorthalidone    2 -hypertension  Continue metoprolol and losartan.  Would not resume chlorthalidone            Results:     Recent Labs   Lab 25  0607 25  0623 25  0546   RBC 4.30 4.00 3.91 3.95   HGB 12.7 12.6 11.8* 11.9*   HCT 35.8 34.3* 32.5* 34.2*   MCV 83.3 85.8 83.1 86.6   MCH 29.5 31.5 30.2 30.1   MCHC 35.5 36.7 36.3 34.8   RDW 11.5 11.5 11.8 11.7   NEPRELIM 8.70* 6.38  --   --    WBC 11.4* 8.6 7.2 7.9   .0 226.0 226.0 243.0         Recent Labs   Lab 25  0607 25  1125 25  0623 25  1801 25  0546   *  --  86  --  137*   BUN 12  --  15  --  21   CREATSERUM 0.75  --  0.88  --  1.04*   CA 8.1*  --  8.1*  --  8.3*   NA  122*   < > 122* 125* 127*   K 4.2  --  4.1  --  4.2   CL 91*  --  90*  --  94*   CO2 21.0  --  23.0  --  25.0    < > = values in this interval not displayed.          CT BRAIN OR HEAD (CPT=70450)    Result Date: 4/5/2025  CONCLUSION:  1. No acute intracranial finding.    Dictated by (CST): Leonardo Bush MD on 4/05/2025 at 9:29 PM     Finalized by (CST): Leonardo Bush MD on 4/05/2025 at 9:34 PM               MONET ROD MD  4/7/2025

## 2025-04-08 NOTE — PLAN OF CARE
Problem: Patient Centered Care  Goal: Patient preferences are identified and integrated in the patient's plan of care  Description: Interventions:- What would you like us to know as we care for you? I come from home alone.  - Provide timely, complete, and accurate information to patient/family- Incorporate patient and family knowledge, values, beliefs, and cultural backgrounds into the planning and delivery of care- Encourage patient/family to participate in care and decision-making at the level they choose- Honor patient and family perspectives and choices  Outcome: Progressing     Problem: METABOLIC/FLUID AND ELECTROLYTES - ADULT  Goal: Electrolytes maintained within normal limits  Description: INTERVENTIONS:- Monitor labs and rhythm and assess patient for signs and symptoms of electrolyte imbalances- Administer electrolyte replacement as ordered- Monitor response to electrolyte replacements, including rhythm and repeat lab results as appropriate- Fluid restriction as ordered- Instruct patient on fluid and nutrition restrictions as appropriate  Outcome: Progressing     Problem: SKIN/TISSUE INTEGRITY - ADULT  Goal: Skin integrity remains intact  Description: INTERVENTIONS- Assess and document risk factors for pressure ulcer development- Assess and document skin integrity- Monitor for areas of redness and/or skin breakdown- Initiate interventions, skin care algorithm/standards of care as needed  Outcome: Progressing     Problem: Impaired Communication  Goal: Patient will achieve maximal communication potential  Description: Interventions:  Outcome: Progressing     Problem: SAFETY ADULT - FALL  Goal: Free from fall injury  Description: INTERVENTIONS:- Assess pt frequently for physical needs- Identify cognitive and physical deficits and behaviors that affect risk of falls.- [place] fall precautions as indicated by assessment.- Educate pt/family on patient safety including physical limitations- Instruct pt to call  for assistance with activity based on assessment- Modify environment to reduce risk of injury- Provide assistive devices as appropriate- Consider OT/PT consult to assist with strengthening/mobility- Encourage toileting schedule  Outcome: Progressing     Problem: DISCHARGE PLANNING  Goal: Discharge to home or other facility with appropriate resources  Description: INTERVENTIONS:- Identify barriers to discharge w/pt and caregiver- Include patient/family/discharge partner in discharge planning- Arrange for needed discharge resources and transportation as appropriate- Identify discharge learning needs (meds, wound care, etc)- Arrange for interpreters to assist at discharge as needed- Consider post-discharge preferences of patient/family/discharge partner- Complete POLST form as appropriate- Assess patient's ability to be responsible for managing their own health- Refer to Case Management Department for coordinating discharge planning if the patient needs post-hospital services based on physician/LIP order or complex needs related to functional status, cognitive ability or social support system  Outcome: Progressing

## 2025-04-08 NOTE — HOME CARE LIAISON
[facility] Spoke w/ patient via nterpeter line with [employer] [facility]. Patient is agreeable with [facility]. Contact information placed on AVS.

## 2025-04-08 NOTE — PHYSICAL THERAPY NOTE
Physical Therapy Contact Note    Orders received and chart reviewed. Attempted to see patient for Physical Therapy services at 1440. Patient not available secondary to patient requesting to sleep, reporting fatigue and declining out of bed mobility. Will re-attempt pending patient availability/appropriateness as schedule allows, otherwise will re-schedule visit.    Pamela Pagan, PT, DPT  [facility]  Rehab Services - Physical Therapy  [id number]

## 2025-04-08 NOTE — DISCHARGE INSTRUCTIONS
(1) Follow up with primary care physician for appointment in 1 week. Please make an appointment.  (2) Please take all medications as directed by the doctor.  (3) Notify doctor immediately for fever, chills, dizziness or inability to tolerate meals.      Sometimes managing your health at home requires assistance.  The [facility] team has recognized your preference to use [facility].  They can be reached by phone at [phone].  The fax number for your reference is [phone].  A representative from the home health agency will contact you or your family to schedule your first visit.

## 2025-04-08 NOTE — PROGRESS NOTES
[facility]  part of [facility]    Progress Note    Noemi Hudson Patient Status:  Inpatient    10/10/1940 MRN [id number]   Location [facility] 5SW/SE Attending Jan Cook MD   Hosp Day # 3 PCP RADHA FLOYD MD       Subjective:   Noemi Hudson is a(n) 84 year old female who I am seeing for  hyponatremia    Feeling dizzy today      Objective:   BP 93/53 (BP Location: Right arm)   Pulse 75   Temp 98.4 °F (36.9 °C) (Oral)   Resp 18   Ht 5' 3\" (1.6 m)   Wt 149 lb 0.5 oz (67.6 kg)   SpO2 95%   BMI 26.40 kg/m²      Intake/Output Summary (Last 24 hours) at 2025 1142  Last data filed at 2025 0700  Gross per 24 hour   Intake 350 ml   Output 1100 ml   Net -750 ml     Wt Readings from Last 1 Encounters:   25 149 lb 0.5 oz (67.6 kg)       Exam  Gen: No acute distress, Heent: NC AT, mucous memb clear, neck supple  Pulm: Lungs clear, normal respiratory effort  CV: Heart with regular rate and rhythm, no edema  Abd: Abdomen soft, nontender, nondistended, no organomegaly, bowel sounds present  Skin: no symptoms reported  Psych: alert and oriented    Assessment and Plan:   1 -hyponatremia  Monitor after 2 doses of tolvaptan.  Can liberate fluid restriction     2 -hypertension  Continue metoprolol and losartan.  Would not resume chlorthalidone        Hopefully home tomorrow.  Discussed with the patient and her son      Results:     Recent Labs   Lab 25  0607 25  0623 25  0546 25  0557   RBC 4.30 4.00 3.91 3.95 3.80   HGB 12.7 12.6 11.8* 11.9* 11.7*   HCT 35.8 34.3* 32.5* 34.2* 32.6*   MCV 83.3 85.8 83.1 86.6 85.8   MCH 29.5 31.5 30.2 30.1 30.8   MCHC 35.5 36.7 36.3 34.8 35.9   RDW 11.5 11.5 11.8 11.7 11.9   NEPRELIM 8.70* 6.38  --   --   --    WBC 11.4* 8.6 7.2 7.9 6.5   .0 226.0 226.0 243.0 215.0         Recent Labs   Lab 25  0623 25  1801 25  0546 25  1407 25  0557   GLU 86  --  137*  --   140*   BUN 15  --  21  --  24*   CREATSERUM 0.88  --  1.04*  --  1.08*   CA 8.1*  --  8.3*  --  8.5*   *   < > 127* 127* 131*   K 4.1  --  4.2  --  4.1   CL 90*  --  94*  --  99   CO2 23.0  --  25.0  --  23.0    < > = values in this interval not displayed.          No results found.        MONET ROD MD  4/8/2025

## 2025-04-08 NOTE — PLAN OF CARE
Problem: Patient Centered Care  Goal: Patient preferences are identified and integrated in the patient's plan of care  Description: Interventions:- What would you like us to know as we care for you? I come from home alone.  - Provide timely, complete, and accurate information to patient/family- Incorporate patient and family knowledge, values, beliefs, and cultural backgrounds into the planning and delivery of care- Encourage patient/family to participate in care and decision-making at the level they choose- Honor patient and family perspectives and choices  Outcome: Progressing     Problem: METABOLIC/FLUID AND ELECTROLYTES - ADULT  Goal: Electrolytes maintained within normal limits  Description: INTERVENTIONS:- Monitor labs and rhythm and assess patient for signs and symptoms of electrolyte imbalances- Administer electrolyte replacement as ordered- Monitor response to electrolyte replacements, including rhythm and repeat lab results as appropriate- Fluid restriction as ordered- Instruct patient on fluid and nutrition restrictions as appropriate  Outcome: Progressing     Problem: SKIN/TISSUE INTEGRITY - ADULT  Goal: Skin integrity remains intact  Description: INTERVENTIONS- Assess and document risk factors for pressure ulcer development- Assess and document skin integrity- Monitor for areas of redness and/or skin breakdown- Initiate interventions, skin care algorithm/standards of care as needed  Outcome: Progressing     Problem: Impaired Communication  Goal: Patient will achieve maximal communication potential  Description: Interventions:- Provide modeling for options to improve word retrieval in known context  - Allow additional time for processing after asking questions or providing instructions  - Ask \"yes/no\" questions to facilitate patient's ability to communicate wants and needs  Outcome: Progressing     Problem: SAFETY ADULT - FALL  Goal: Free from fall injury  Description: INTERVENTIONS:- Assess pt  frequently for physical needs- Identify cognitive and physical deficits and behaviors that affect risk of falls.- [place] fall precautions as indicated by assessment.- Educate pt/family on patient safety including physical limitations- Instruct pt to call for assistance with activity based on assessment- Modify environment to reduce risk of injury- Provide assistive devices as appropriate- Consider OT/PT consult to assist with strengthening/mobility- Encourage toileting schedule  Outcome: Progressing     Problem: DISCHARGE PLANNING  Goal: Discharge to home or other facility with appropriate resources  Description: INTERVENTIONS:- Identify barriers to discharge w/pt and caregiver- Include patient/family/discharge partner in discharge planning- Arrange for needed discharge resources and transportation as appropriate- Identify discharge learning needs (meds, wound care, etc)- Arrange for interpreters to assist at discharge as needed- Consider post-discharge preferences of patient/family/discharge partner- Complete POLST form as appropriate- Assess patient's ability to be responsible for managing their own health- Refer to Case Management Department for coordinating discharge planning if the patient needs post-hospital services based on physician/LIP order or complex needs related to functional status, cognitive ability or social support system  Outcome: Progressing    Patient is presently resting in the bed. Alert x 4. Vital signs taken and stable. Fall risk precautions are followed at all times. Patient denied pain or discomfort. Tolerates diet well. Patient ambulates using a walker with standby assist to ensure safety. Patient has william hose on bilateral lower extremities per order. PT & OT is ordered for patient. Patient on 1800 ml of fluid restrictions per order. No complaints of pain or discomfort. Call light within reach at all times.

## 2025-04-08 NOTE — PROGRESS NOTES
[facility]  part of [facility]    Progress Note    Noemi Hudson Patient Status:  Inpatient    10/10/1940 MRN [id number]   Location [facility] 5SW/SE Attending Jan Cook MD   Hosp Day # 3 PCP RADHA FLOYD MD     Chief Complaint:   Chief Complaint   Patient presents with    Headache    Fatigue   HA dizziness x 2 weeks.     Subjective:   Noemi Hudson is dizzy today lightheaded when she got up to the bathroom. C/o dry mouth.   Hasn't had BM x 2 days not passing much gas. Eating well no N/V.   Objective:   Objective:    Blood pressure 126/54, pulse 71, temperature 98.1 °F (36.7 °C), temperature source Oral, resp. rate 17, height 5' 3\" (1.6 m), weight 149 lb 0.5 oz (67.6 kg), SpO2 95%, not currently breastfeeding.    Physical Exam:    General: No acute distress. Dry mouth   Respiratory: Clear to auscultation bilaterally. No wheezes. No rhonchi.  Cardiovascular: S1, S2. Regular rate and rhythm. No murmurs, rubs or gallops.   Abdomen: Soft, nontender, nondistended.  Positive bowel sounds. No rebound or guarding.  Neurologic: No focal neurological deficits.   Musculoskeletal: Moves all extremities.  Extremities: No edema.      Results:   Results:    Labs:  Recent Labs   Lab 25  0607 25  0623 25  0546 25  0557   WBC 11.4* 8.6 7.2 7.9 6.5   HGB 12.7 12.6 11.8* 11.9* 11.7*   MCV 83.3 85.8 83.1 86.6 85.8   .0 226.0 226.0 243.0 215.0       Recent Labs   Lab 25  0607 25  0623 25  1801 25  0546 25  1407 25  0557   *   < > 86  --  137*  --  140*   BUN 17   < > 15  --  21  --  24*   CREATSERUM 0.96   < > 0.88  --  1.04*  --  1.08*   CA 8.8   < > 8.1*  --  8.3*  --  8.5*   ALB 4.6  --  4.0  --   --   --   --    *   < > 122*   < > 127* 127* 131*   K 4.4   < > 4.1  --  4.2  --  4.1   CL 86*   < > 90*  --  94*  --  99   CO2 22.0   < > 23.0  --  25.0  --  23.0   ALKPHO 50*  --    --   --   --   --   --    AST 25  --   --   --   --   --   --    ALT 19  --   --   --   --   --   --    BILT 0.6  --   --   --   --   --   --    TP 7.0  --   --   --   --   --   --     < > = values in this interval not displayed.       Estimated Creatinine Clearance: 31.5 mL/min (A) (based on SCr of 1.08 mg/dL (H)).    No results for input(s): \"PTP\", \"INR\" in the last 168 hours.         Culture:  No results found for this visit on 04/04/25.    Cardiac  No results for input(s): \"TROP\", \"PBNP\" in the last 168 hours.      Imaging: Imaging data reviewed in Epic.  No results found.    Medications:    heparin  5,000 Units Subcutaneous Q12H    aspirin  81 mg Oral Daily    latanoprost  1 drop Both Eyes Nightly    metoprolol succinate ER  100 mg Oral Daily    losartan  100 mg Oral Daily    pantoprazole  40 mg Oral Daily    pravastatin  20 mg Oral Daily    insulin aspart  1-7 Units Subcutaneous TID CC and HS         Assessment and Plan:   Assessment & Plan:      Severe hyponatremia - improving   Likely secondary to Thiazide diuretic and SNRI as well as excessive water intake (drinks 1-1.5L/day of water) and poor PO intake. Both meds stopped.   Salt tab given, s/p tolvaptan 2 doses.   Free water restirction   Renal on consult.   Serial BMP sodium better.   Dizziness  Weakness  Muscle cramps   Secondary to above.   CT Head neg  Replace electrolytes.   PT/OT HHC   Essential HTN   Losartan, metoprolol   Monitor BP as low normal   Chlorthalidone discontinued   Dm II   A1c 7.6  Tresiba 12units daily (take 50 units at home)   BG low. ? If pt was getting hypoglycemic at home with poor PO intake   ISS  BG creeping up now   Anxiety d/o   Monitor for now.   Lexapro  on hold   HA  Fioricet PRN   Benadryl, reglan PRN   Other medical problems  H/o TIA  CAD  CKD III  Dyslipidemia     >55min spent, >50% spent counseling and coordinating care in the form of educating pt/family and d/w consultants and staff. Most of the time spent discussing  the above plan.        Plan of care discussed with patient or family at bedside.    Jan Cook MD  Hospitalist          Supplementary Documentation:     Quality:  DVT Prophylaxis: heparin   CODE status:Full   Dispo: per clinical course            Estimated date of discharge: TBD  Discharge is dependent on: clinical stability  At this point Ms. Hudson is expected to be discharge to: TBD

## 2025-04-09 NOTE — PLAN OF CARE
Problem: Patient Centered Care  Goal: Patient preferences are identified and integrated in the patient's plan of care  Description: Interventions:- What would you like us to know as we care for you? I come from home alone.  - Provide timely, complete, and accurate information to patient/family- Incorporate patient and family knowledge, values, beliefs, and cultural backgrounds into the planning and delivery of care- Encourage patient/family to participate in care and decision-making at the level they choose- Honor patient and family perspectives and choices  Outcome: Progressing     Problem: METABOLIC/FLUID AND ELECTROLYTES - ADULT  Goal: Electrolytes maintained within normal limits  Description: INTERVENTIONS:- Monitor labs and rhythm and assess patient for signs and symptoms of electrolyte imbalances- Administer electrolyte replacement as ordered- Monitor response to electrolyte replacements, including rhythm and repeat lab results as appropriate- Fluid restriction as ordered- Instruct patient on fluid and nutrition restrictions as appropriate  Outcome: Progressing     Problem: SKIN/TISSUE INTEGRITY - ADULT  Goal: Skin integrity remains intact  Description: INTERVENTIONS- Assess and document risk factors for pressure ulcer development- Assess and document skin integrity- Monitor for areas of redness and/or skin breakdown- Initiate interventions, skin care algorithm/standards of care as needed  Outcome: Progressing     Problem: Impaired Communication  Goal: Patient will achieve maximal communication potential  Description: Interventions:- Provide modeling for options to improve word retrieval in known context  - Allow additional time for processing after asking questions or providing instructions  - Ask \"yes/no\" questions to facilitate patient's ability to communicate wants and needs  Outcome: Progressing     Problem: SAFETY ADULT - FALL  Goal: Free from fall injury  Description: INTERVENTIONS:- Assess pt  frequently for physical needs- Identify cognitive and physical deficits and behaviors that affect risk of falls.- [place] fall precautions as indicated by assessment.- Educate pt/family on patient safety including physical limitations- Instruct pt to call for assistance with activity based on assessment- Modify environment to reduce risk of injury- Provide assistive devices as appropriate- Consider OT/PT consult to assist with strengthening/mobility- Encourage toileting schedule  Outcome: Progressing     Problem: DISCHARGE PLANNING  Goal: Discharge to home or other facility with appropriate resources  Description: INTERVENTIONS:- Identify barriers to discharge w/pt and caregiver- Include patient/family/discharge partner in discharge planning- Arrange for needed discharge resources and transportation as appropriate- Identify discharge learning needs (meds, wound care, etc)- Arrange for interpreters to assist at discharge as needed- Consider post-discharge preferences of patient/family/discharge partner- Complete POLST form as appropriate- Assess patient's ability to be responsible for managing their own health- Refer to Case Management Department for coordinating discharge planning if the patient needs post-hospital services based on physician/LIP order or complex needs related to functional status, cognitive ability or social support system  Outcome: Progressing     Patient is presently resting in the bed. Alert x 4. Vital signs are monitored. No complaints of pain or discomfort. Patient ambulates in room with walker and standby assist to ensure safety. Tolerates diet well. Patient on daily fluid restrictions per doctor orders. Call light within reach at all times. No complaints of feeling dizzy upon ambulation.

## 2025-04-09 NOTE — PROGRESS NOTES
[facility]  part of [facility]    Progress Note    Noemi Hudson Patient Status:  Inpatient    10/10/1940 MRN [id number]   Location [facility] 5SW/SE Attending Froylan Koroma MD   Hosp Day # 4 PCP RADHA FLOYD MD       Subjective:   Noemi Hudson is a(n) 84 year old female who I am seeing for  hyponatremia    Resting.      Objective:   BP (!) 162/60 (BP Location: Right arm)   Pulse 98   Temp 98.2 °F (36.8 °C) (Oral)   Resp 18   Ht 5' 3\" (1.6 m)   Wt 149 lb 0.5 oz (67.6 kg)   SpO2 96%   BMI 26.40 kg/m²      Intake/Output Summary (Last 24 hours) at 2025 1218  Last data filed at 2025 0941  Gross per 24 hour   Intake 680 ml   Output 1700 ml   Net -1020 ml     Wt Readings from Last 1 Encounters:   25 149 lb 0.5 oz (67.6 kg)       Exam  Gen: No acute distress, Heent: NC AT, mucous memb clear, neck supple  Pulm: Lungs clear, normal respiratory effort  CV: Heart with regular rate and rhythm, no edema  Abd: Abdomen soft, nontender, nondistended, no organomegaly, bowel sounds present  Skin: no symptoms reported  Psych: alert and oriented    Assessment and Plan:        1 -hyponatremia  Better.  Would completely discontinue chlorthalidone     2 -hypertension  Continue metoprolol and losartan.  Would not resume chlorthalidone        She is okay discharge home by me.              Results:     Recent Labs   Lab 25  0607 25  0623 25  0546 25  0557 25  0708   RBC 4.30 4.00   < > 3.95 3.80 3.79*   HGB 12.7 12.6   < > 11.9* 11.7* 11.7*   HCT 35.8 34.3*   < > 34.2* 32.6* 32.6*   MCV 83.3 85.8   < > 86.6 85.8 86.0   MCH 29.5 31.5   < > 30.1 30.8 30.9   MCHC 35.5 36.7   < > 34.8 35.9 35.9   RDW 11.5 11.5   < > 11.7 11.9 11.9   NEPRELIM 8.70* 6.38  --   --   --   --    WBC 11.4* 8.6   < > 7.9 6.5 7.1   .0 226.0   < > 243.0 215.0 227.0    < > = values in this interval not displayed.         Recent Labs   Lab 25  0557  04/08/25  1759 04/09/25  0708   * 165* 168*   BUN 24* 23 21   CREATSERUM 1.08* 1.20* 1.02   CA 8.5* 8.5* 8.5*   * 129* 132*   K 4.1 4.3 4.0   CL 99 98 99   CO2 23.0 25.0 24.0          No results found.        MONET ROD MD  4/9/2025

## 2025-04-09 NOTE — PLAN OF CARE
Problem: Patient Centered Care  Goal: Patient preferences are identified and integrated in the patient's plan of care  Description: Interventions:- What would you like us to know as we care for you? I come from home alone.  - Provide timely, complete, and accurate information to patient/family- Incorporate patient and family knowledge, values, beliefs, and cultural backgrounds into the planning and delivery of care- Encourage patient/family to participate in care and decision-making at the level they choose- Honor patient and family perspectives and choices  Outcome: Progressing     Problem: METABOLIC/FLUID AND ELECTROLYTES - ADULT  Goal: Electrolytes maintained within normal limits  Description: INTERVENTIONS:- Monitor labs and rhythm and assess patient for signs and symptoms of electrolyte imbalances- Administer electrolyte replacement as ordered- Monitor response to electrolyte replacements, including rhythm and repeat lab results as appropriate- Fluid restriction as ordered- Instruct patient on fluid and nutrition restrictions as appropriate  Outcome: Progressing     Problem: SKIN/TISSUE INTEGRITY - ADULT  Goal: Skin integrity remains intact  Description: INTERVENTIONS- Assess and document risk factors for pressure ulcer development- Assess and document skin integrity- Monitor for areas of redness and/or skin breakdown- Initiate interventions, skin care algorithm/standards of care as needed  Outcome: Progressing     Problem: Impaired Communication  Goal: Patient will achieve maximal communication potential  Description: Interventions:  Outcome: Progressing     Problem: SAFETY ADULT - FALL  Goal: Free from fall injury  Description: INTERVENTIONS:- Assess pt frequently for physical needs- Identify cognitive and physical deficits and behaviors that affect risk of falls.- [place] fall precautions as indicated by assessment.- Educate pt/family on patient safety including physical limitations- Instruct pt to call  for assistance with activity based on assessment- Modify environment to reduce risk of injury- Provide assistive devices as appropriate- Consider OT/PT consult to assist with strengthening/mobility- Encourage toileting schedule  Outcome: Progressing     Problem: DISCHARGE PLANNING  Goal: Discharge to home or other facility with appropriate resources  Description: INTERVENTIONS:- Identify barriers to discharge w/pt and caregiver- Include patient/family/discharge partner in discharge planning- Arrange for needed discharge resources and transportation as appropriate- Identify discharge learning needs (meds, wound care, etc)- Arrange for interpreters to assist at discharge as needed- Consider post-discharge preferences of patient/family/discharge partner- Complete POLST form as appropriate- Assess patient's ability to be responsible for managing their own health- Refer to Case Management Department for coordinating discharge planning if the patient needs post-hospital services based on physician/LIP order or complex needs related to functional status, cognitive ability or social support system  Outcome: Progressing

## 2025-04-09 NOTE — PLAN OF CARE
Problem: Patient Centered Care  Goal: Patient preferences are identified and integrated in the patient's plan of care  Description: Interventions:- What would you like us to know as we care for you? I come from home alone.  - Provide timely, complete, and accurate information to patient/family- Incorporate patient and family knowledge, values, beliefs, and cultural backgrounds into the planning and delivery of care- Encourage patient/family to participate in care and decision-making at the level they choose- Honor patient and family perspectives and choices  4/9/2025 0659 by Ilsa Walsh RN  Outcome: Progressing  4/9/2025 0657 by Ilsa Walsh RN  Outcome: Progressing     Problem: METABOLIC/FLUID AND ELECTROLYTES - ADULT  Goal: Electrolytes maintained within normal limits  Description: INTERVENTIONS:- Monitor labs and rhythm and assess patient for signs and symptoms of electrolyte imbalances- Administer electrolyte replacement as ordered- Monitor response to electrolyte replacements, including rhythm and repeat lab results as appropriate- Fluid restriction as ordered- Instruct patient on fluid and nutrition restrictions as appropriate  4/9/2025 0659 by Ilsa Walsh RN  Outcome: Progressing  4/9/2025 0657 by Ilsa Walsh RN  Outcome: Progressing     Problem: SKIN/TISSUE INTEGRITY - ADULT  Goal: Skin integrity remains intact  Description: INTERVENTIONS- Assess and document risk factors for pressure ulcer development- Assess and document skin integrity- Monitor for areas of redness and/or skin breakdown- Initiate interventions, skin care algorithm/standards of care as needed  4/9/2025 0659 by Ilsa Walsh RN  Outcome: Progressing  4/9/2025 0657 by Ilsa Walsh RN  Outcome: Progressing     Problem: Impaired Communication  Goal: Patient will achieve maximal communication potential  Description: Interventions:  4/9/2025 0659 by Ilsa Walsh RN  Outcome:  Progressing  4/9/2025 0657 by Ilsa Walsh RN  Outcome: Progressing     Problem: SAFETY ADULT - FALL  Goal: Free from fall injury  Description: INTERVENTIONS:- Assess pt frequently for physical needs- Identify cognitive and physical deficits and behaviors that affect risk of falls.- [place] fall precautions as indicated by assessment.- Educate pt/family on patient safety including physical limitations- Instruct pt to call for assistance with activity based on assessment- Modify environment to reduce risk of injury- Provide assistive devices as appropriate- Consider OT/PT consult to assist with strengthening/mobility- Encourage toileting schedule  4/9/2025 0659 by Ilsa Walsh RN  Outcome: Progressing  4/9/2025 0657 by Ilsa Walsh RN  Outcome: Progressing     Problem: DISCHARGE PLANNING  Goal: Discharge to home or other facility with appropriate resources  Description: INTERVENTIONS:- Identify barriers to discharge w/pt and caregiver- Include patient/family/discharge partner in discharge planning- Arrange for needed discharge resources and transportation as appropriate- Identify discharge learning needs (meds, wound care, etc)- Arrange for interpreters to assist at discharge as needed- Consider post-discharge preferences of patient/family/discharge partner- Complete POLST form as appropriate- Assess patient's ability to be responsible for managing their own health- Refer to Case Management Department for coordinating discharge planning if the patient needs post-hospital services based on physician/LIP order or complex needs related to functional status, cognitive ability or social support system  4/9/2025 0659 by Ilsa Walsh RN  Outcome: Progressing  4/9/2025 0657 by Ilsa Walsh RN  Outcome: Progressing

## 2025-04-10 NOTE — DISCHARGE SUMMARY
[facility]  part of [facility]    Discharge Summary    Noemi Hudson Patient Status:  Inpatient    10/10/1940 MRN [id number]   Location [facility] 5SW/SE Attending No att. providers found   Hosp Day # 4 PCP RADHA FLOYD MD     Date of Admission: 2025 Disposition:   Home or Self Care     Date of Discharge:  2025  7:41 PM    Admitting Diagnosis:   Hyponatremia [E87.1]  Weakness generalized [R53.1]    Hospital Discharge Diagnoses:    Severe hyponatremia   Dizziness  Weakness  Muscle cramps   Hx of DM2  Hx of anxiety  Hx of HTN  Headache  H/o TIA  CAD  CKD III  Dyslipidemia   Constipation       Problem List:     Problem List[1]    Physical Exam:      /61 (BP Location: Right arm)   Pulse 78   Temp 97.9 °F (36.6 °C) (Oral)   Resp 18   Ht 63\"   Wt 149 lb 0.5 oz (67.6 kg)   SpO2 94%   BMI 26.40 kg/m²     Gen:  NAD.  A and O x  3  CV:   RRR.  No m/g/r  Pulm:   CTA bilat  Abd:   +bs, soft, NT, ND  LE:  No c/c/e  Neuro:  nonfocal      Reason for Admission:   HA    History of Present Illness:   Noemi Hudson is a(n) 84 year old Brazilian-speaking female, with a past medical history significant for hypertension, coronary artery disease, diabetes was recently discharged from the hospital after an episode of acute on chronic pancreatitis presents today with increasing weakness and bodyaches ongoing since her discharge from the hospital.  Claims she has been experiencing difficulty getting around at home, she lives alone.  She also complains of numbness and tingling in all extremities also present around the mouth with a cottonmouth feeling as well.  Claims this tends to happen when she gets anxious.  Denies any chest pain palpitations shortness of breath nausea vomiting or diaphoresis.  Workup in ER indicated significant drop in her sodium levels.    Hospital Course:     Severe hyponatremia - Much improved.   Likely secondary to Thiazide diuretic (chlorthalidone)   and lexapro as well as excessive water intake (drinks 1-1.5L/day of water) and poor PO intake. Both meds stopped.   Salt tab given, s/p tolvaptan 2 doses.   Free water restirction   Renal was on consult.   Dizziness - resolved  Weakness - improved  Muscle cramps  - improved  Secondary to above.   CT Head neg  Replaced electrolytes.   PT/OT HHC   Essential HTN   Losartan, metoprolol   Renal was on consult  Chlorthalidone discontinued   Dm II   A1c 7.6  Tresiba reduced  Anxiety d/o   Monitor for now.   Stop lexapro due to hyponatremia.  PCP f/u.  HA  Fioricet PRN   Benadryl, reglan PRN   Other medical problems  H/o TIA  CAD  CKD III  Dyslipidemia      Consultations:   Nephrology    Discharge Condition:  Good    Discharge Medications:      Discharge Medications        CHANGE how you take these medications        Instructions Prescription details   Insulin Glargine-yfgn 100 UNIT/ML Sopn  Commonly known as: Semglee (yfgn)  What changed: how much to take      Inject 15 Units into the skin daily. Using insulin pens   Quantity: 3 mL  Refills: 5     metoprolol succinate  MG Tb24  Commonly known as: Toprol XL  What changed: how much to take      Take 0.5 tablets (50 mg total) by mouth daily.   Refills: 0     Tresiba FlexTouch 100 UNIT/ML Sopn  Generic drug: insulin degludec  What changed: additional instructions      INJECT 15 UNITS/DAY INTO THE SKIN DAILY.   Quantity: 45 mL  Refills: 3            CONTINUE taking these medications        Instructions Prescription details   Accu-Chek Tara Plus Strp      INJECT 1 DEVICE INTO THE SKIN 3 (THREE) TIMES DAILY AS NEEDED.   Quantity: 300 strip  Refills: 3     Accu-Chek Guide Test Strp  Generic drug: Glucose Blood      Use Q day   Quantity: 100 each  Refills: 5     Accu-Chek Softclix Lancets Misc      Use three times daily as directed.   Quantity: 200 each  Refills: 5     Acetaminophen 500 MG Caps      Take 1 capsule (500 mg total) by mouth every 6 (six) hours as needed.    Quantity: 100 capsule  Refills: 1     alendronate 70 MG Tabs  Commonly known as: Fosamax      Take 1 tablet (70 mg total) by mouth every 7 days.   Quantity: 12 tablet  Refills: 2     aspirin 81 MG Chew      Chew 1 tablet (81 mg total) by mouth daily.   Quantity: 90 tablet  Refills: 1     BD Pen Needle Karine 2nd Gen 32G X 4 MM Misc  Generic drug: Insulin Pen Needle      1 strip by In Vitro route daily.   Quantity: 100 each  Refills: 3     latanoprost 0.005 % Soln  Commonly known as: Xalatan      Place 1 drop into both eyes nightly.   Refills: 0     MAGNESIUM COMPLEX HIGH POTENCY OR      Take by mouth.   Refills: 0     metFORMIN HCl 1000 MG Tabs  Commonly known as: GLUCOPHAGE      TOME JESUSITA TABLETA 2 VECES AL CHRISTINE CON LAS COMIDAS   Quantity: 180 tablet  Refills: 3     Olmesartan Medoxomil 40 MG Tabs  Commonly known as: BENICAR      Take 0.5 tablets (20 mg total) by mouth daily.   Quantity: 45 tablet  Refills: 2     pantoprazole 40 MG Tbec  Commonly known as: Protonix      Take 1 tablet (40 mg total) by mouth daily.   Quantity: 30 tablet  Refills: 0     pravastatin 20 MG Tabs  Commonly known as: Pravachol      Take 1 tablet (20 mg total) by mouth daily.   Quantity: 90 tablet  Refills: 3     PreserVision AREDS Tabs      Take 2 tablets by mouth daily.   Refills: 0            STOP taking these medications      chlorthalidone 25 MG Tabs  Commonly known as: [place]        escitalopram 20 MG Tabs  Commonly known as: Lexapro               ASK your doctor about these medications        Instructions Prescription details   meclizine 12.5 MG Tabs  Commonly known as: Antivert      Take 1 tablet (12.5 mg total) by mouth 3 (three) times daily as needed.   Quantity: 40 tablet  Refills: 1               Where to Get Your Medications        These medications were sent to [id number] - [place], [place] - [address] AT [facility] [address], [phone], [phone]  [address], [facility]  [address]      Phone: [phone]   Insulin Glargine-yfgn 100 UNIT/ML Sopn  Tresiba FlexTouch 100 UNIT/ML Sopn         Follow up Visits:     Follow-up Information       Jose Choudhary MD. Schedule an appointment as soon as possible for a visit in 1 week(s).    Specialty: Family Medicine  Contact information:  [address]  [facility] [address]  [phone]                             Hospital Discharge Diagnoses:  Hyponatremia    Lace+ Score: 70  59-90 High Risk  29-58 Medium Risk  0-28   Low Risk.    TCM Follow-Up Recommendation:  LACE > 58: High Risk of readmission after discharge from the hospital.    >35 minutes spent preparing this discharge.    Froylan Hernandez MD  4/10/2025  9:26 AM        [1]   Patient Active Problem List  Diagnosis    Essential hypertension    Mixed hyperlipidemia    Type 2 diabetes mellitus with stage 3a chronic kidney disease, with long-term current use of insulin (HCC)    History of TIA (transient ischemic attack)    Pancreatic cyst (HCC)    Drusen (degenerative) of retina    History of colon polyps    Diverticulosis large intestine w/o perforation or abscess w/o bleeding    Internal hemorrhoids without complication    CKD (chronic kidney disease) stage 3, GFR 30-59 ml/min (HCC)    Post-menopausal osteoporosis    Non-STEMI (non-ST elevated myocardial infarction) (HCC)    Primary osteoarthritis involving multiple joints    Fibromyalgia    Weakness    Cervical radiculopathy    Hyponatremia    Chest pain of uncertain etiology    Dizziness    Hyperglycemia    Pancreatic mass (HCC)    Acute pancreatitis (HCC)    Weakness generalized

## 2025-04-10 NOTE — PLAN OF CARE
Discharge instructions given to patient and daughter. Patient was instructed to follow up with doctors for appointment. Saline lock removed. Patient verbalized understanding of discharge instructions. Patient transported by wheelchair and discharge home with family.

## 2025-04-15 NOTE — TELEPHONE ENCOUNTER
Patient's daughter called to schedule a hospital follow up, next available is 4/30. Patient was advised to schedule a 3 day follow up.

## 2025-04-16 NOTE — PROGRESS NOTES
4/16/2025  10:44 AM    Noemi Hudson is a 84 year old female.    Chief complaint(s):   Chief Complaint   Patient presents with    Hospital F/U     HPI:     Noemi Hudson primary complaint is regarding multiple complaints.     Patient Noemi Powell is a 84 year old female is here to be evaluated for type 2 diabetes.  Specifically, female has type 2, insulin requiring diabetes. Compliance with treatment has been fair.  Patient's diabetes was first diagnosed > 28 years ago.  Patient follows a 2000 calorie ADA diet.  Patient report experiencing the following diabetes related symptoms; Negative for polyuria, Negative for polydipsia, Negative for blurred vision.  Depression symptoms include none.  Tobacco screen: none  smoker.  Current meds include :  oral hypoglycemic include: Metformin 1000 mg BID  insulin/injectable : decrease Tresiba to 40 unit Q am .  Hypoglycemia severity is not applicable.she reports home blood glucose readings have been 90s-120 -159(#s) and believes  having good glucose control.  Most recent lab results include glycohemoglobin 7.6 %, microalbuminuria have been -.  In regard to preventative care, her last ophthalmology exam was in < 1 months ago.  Opthalmic evaluation have shown sanjay pathology.  Concurrent relative health problems include HTN.  Also complaining of burning legs.     Noemi Tijerina a 84 year old female presents with hypertension.  This was first diagnosed more than 10 years ago.  Current nonpharmacologic treatment includes low sodium diet, exercise, and meditation.  Her current cardiac medication(s) regimen includes:Metoporolol 100 mg, Chlorthalidone 25 mg  She has kept a blood pressure diary, but states that her blood pressures have been fair controll.  she is tolerating her medication(s)  well without side effects.  Compliance with treatment has been good.       In addition patient is complaining of having difficulty swallowing the reason why she is actually reports  decreased p.o. intake.  She has loss of only 3 pounds since last visit. Complaining of fatigue, low energy wanting to sleep all day.     Also complaining of feeling anxious and depressed.  She recently started taking Lexapro again in the past 2 days only.  She is feeling nervous, not depressed with episodes of crying spells.  She is agreeable to go to counseling.    HISTORY:  Past Medical History[1]   Past Surgical History[2]   Family History[3]   Social History: Short Social Hx on File[4]     Immunizations:   Immunization History   Administered Date(s) Administered    Covid-19 Vaccine Pfizer 30 mcg/0.3 ml 02/17/2021, 03/10/2021, 11/11/2021    Covid-19 Vaccine Pfizer Rich-Sucrose 30 mcg/0.3 ml 06/09/2022    FLU VAC High Dose 65 YRS & Older PRSV Free (88871) 10/20/2017, 09/11/2018, 09/20/2023    FLUAD High Dose 65 yr and older (80804) 08/16/2019, 10/02/2020    FLULAVAL 6 months & older 0.5 ml Prefilled syringe (15139) 10/14/2022    FLUZONE 6 months and older PFS 0.5 ml (13885) 09/12/2016    Fluzone Vaccine Medicare () 10/15/2017, 10/20/2017, 09/11/2018    HIB PRP-OMP 3 Dose 04/09/2016    Haemophilus B Polysac Conj Vac Im 04/09/2016    High Dose Fluzone Influenza Vaccine, 65yr+ PF 0.5mL (11206) 09/12/2016, 10/16/2017, 09/24/2021    Influenza 10/23/2015    Meningococcal-Menactra 04/09/2016    Pfizer Covid-19 Vaccine 30mcg/0.3ml 12yrs+ 12/14/2023    Pneumococcal (Prevnar 13) 08/29/2017    Pneumococcal Conjugate PCV20 11/14/2022    Pneumovax 23 11/18/2005, 04/09/2016       Medications (Active prior to today's visit):  Current Medications[5]    Allergies:  Allergies[6]      ROS:   Review of Systems   Constitutional:  Positive for fatigue. Negative for appetite change and fever.   Eyes:  Negative for visual disturbance.   Respiratory:  Negative for shortness of breath.    Cardiovascular:  Negative for chest pain.   Gastrointestinal:  Negative for abdominal pain, nausea and vomiting.   Musculoskeletal:  Negative for  back pain.   Skin:  Negative for rash.   Neurological:  Positive for headaches. Negative for dizziness.   Psychiatric/Behavioral:  The patient is nervous/anxious.        PHYSICAL EXAM:   VS: /64   Pulse 73   Ht 5' 3\" (1.6 m)   Wt 152 lb (68.9 kg)   BMI 26.93 kg/m²     Physical Exam  Vitals reviewed.   Constitutional:       General: She is not in acute distress.     Appearance: Normal appearance.   HENT:      Head: Normocephalic.   Eyes:      Conjunctiva/sclera: Conjunctivae normal.   Cardiovascular:      Rate and Rhythm: Normal rate and regular rhythm.   Pulmonary:      Effort: Pulmonary effort is normal.   Musculoskeletal:      Cervical back: Neck supple.   Skin:     Findings: No rash.   Psychiatric:         Mood and Affect: Mood is anxious and depressed.         LABORATORY RESULTS:      Results for orders placed or performed during the hospital encounter of 04/04/25   Basic Metabolic Panel (8)    Collection Time: 04/04/25  8:52 PM   Result Value Ref Range    Glucose 188 (H) 70 - 99 mg/dL    Sodium 117 (LL) 136 - 145 mmol/L    Potassium 4.4 3.5 - 5.1 mmol/L    Chloride 86 (L) 98 - 112 mmol/L    CO2 22.0 21.0 - 32.0 mmol/L    Anion Gap 9 0 - 18 mmol/L    BUN 17 9 - 23 mg/dL    Creatinine 0.96 0.55 - 1.02 mg/dL    BUN/CREA Ratio 17.7 10.0 - 20.0    Calcium, Total 8.8 8.7 - 10.4 mg/dL    Calculated Osmolality 251 (L) 275 - 295 mOsm/kg    eGFR-Cr 58 (L) >=60 mL/min/1.73m2   Hepatic Function Panel (7)    Collection Time: 04/04/25  8:52 PM   Result Value Ref Range    AST 25 <34 U/L    ALT 19 10 - 49 U/L    Alkaline Phosphatase 50 (L) 55 - 142 U/L    Bilirubin, Total 0.6 0.2 - 1.1 mg/dL    Bilirubin, Direct 0.2 <=0.3 mg/dL    Total Protein 7.0 5.7 - 8.2 g/dL    Albumin 4.6 3.2 - 4.8 g/dL   CBC With Differential With Platelet    Collection Time: 04/04/25  8:52 PM   Result Value Ref Range    WBC 11.4 (H) 4.0 - 11.0 x10(3) uL    RBC 4.30 3.80 - 5.30 x10(6)uL    HGB 12.7 12.0 - 16.0 g/dL    HCT 35.8 35.0 - 48.0 %     MCV 83.3 80.0 - 100.0 fL    MCH 29.5 26.0 - 34.0 pg    MCHC 35.5 31.0 - 37.0 g/dL    RDW-SD 34.8 (L) 35.1 - 46.3 fL    RDW 11.5 11.0 - 15.0 %    .0 150.0 - 450.0 10(3)uL    Neutrophil Absolute Prelim 8.70 (H) 1.50 - 7.70 x10 (3) uL    Neutrophil Absolute 8.70 (H) 1.50 - 7.70 x10(3) uL    Lymphocyte Absolute 1.60 1.00 - 4.00 x10(3) uL    Monocyte Absolute 1.00 0.10 - 1.00 x10(3) uL    Eosinophil Absolute 0.02 0.00 - 0.70 x10(3) uL    Basophil Absolute 0.03 0.00 - 0.20 x10(3) uL    Immature Granulocyte Absolute 0.04 0.00 - 1.00 x10(3) uL    Neutrophil % 76.3 %    Lymphocyte % 14.0 %    Monocyte % 8.8 %    Eosinophil % 0.2 %    Basophil % 0.3 %    Immature Granulocyte % 0.4 %   Magnesium    Collection Time: 04/04/25  8:52 PM   Result Value Ref Range    Magnesium 1.7 1.6 - 2.6 mg/dL   Urinalysis with Culture Reflex    Collection Time: 04/04/25  8:52 PM    Specimen: Urine, clean catch   Result Value Ref Range    Urine Color Colorless (A) Yellow    Clarity Urine Clear Clear    Spec Gravity 1.005 1.005 - 1.030    Glucose Urine 150 (A) Normal mg/dL    Bilirubin Urine Negative Negative    Ketones Urine Negative Negative mg/dL    Blood Urine Negative Negative    pH Urine 6.5 5.0 - 8.0    Protein Urine Negative Negative mg/dL    Urobilinogen Urine Normal Normal    Nitrite Urine Negative Negative    Leukocyte Esterase Urine Negative Negative    Microscopic Microscopic not indicated    Lipase    Collection Time: 04/04/25  8:52 PM   Result Value Ref Range    Lipase 89 (H) 12 - 53 U/L   RAINBOW DRAW BLUE    Collection Time: 04/04/25  8:52 PM   Result Value Ref Range    Hold Blue Auto Resulted    RAINBOW DRAW GOLD    Collection Time: 04/04/25  8:52 PM   Result Value Ref Range    Hold Gold Auto Resulted    Osmolality, Urine    Collection Time: 04/04/25 10:47 PM   Result Value Ref Range    Osmolality Urine 209 (L) 300 - 1,300 mOsm/kg   Sodium, Urine, Random    Collection Time: 04/04/25 10:47 PM   Result Value Ref Range    Na  Urine Random 48 mmol/L   POCT Glucose    Collection Time: 04/05/25 12:27 AM   Result Value Ref Range    POC Glucose  114 (H) 70 - 99 mg/dL   Sodium, Urine, Random    Collection Time: 04/05/25 12:56 AM   Result Value Ref Range    Na Urine Random 52 mmol/L   Osmolality, Urine    Collection Time: 04/05/25 12:56 AM   Result Value Ref Range    Osmolality Urine 176 (L) 300 - 1,300 mOsm/kg   Basic Metabolic Panel (8)    Collection Time: 04/05/25  6:07 AM   Result Value Ref Range    Glucose 109 (H) 70 - 99 mg/dL    Sodium 122 (L) 136 - 145 mmol/L    Potassium 4.2 3.5 - 5.1 mmol/L    Chloride 91 (L) 98 - 112 mmol/L    CO2 21.0 21.0 - 32.0 mmol/L    Anion Gap 10 0 - 18 mmol/L    BUN 12 9 - 23 mg/dL    Creatinine 0.75 0.55 - 1.02 mg/dL    BUN/CREA Ratio 16.0 10.0 - 20.0    Calcium, Total 8.1 (L) 8.7 - 10.4 mg/dL    Calculated Osmolality 254 (L) 275 - 295 mOsm/kg    eGFR-Cr 78 >=60 mL/min/1.73m2   CBC With Differential With Platelet    Collection Time: 04/05/25  6:07 AM   Result Value Ref Range    WBC 8.6 4.0 - 11.0 x10(3) uL    RBC 4.00 3.80 - 5.30 x10(6)uL    HGB 12.6 12.0 - 16.0 g/dL    HCT 34.3 (L) 35.0 - 48.0 %    MCV 85.8 80.0 - 100.0 fL    MCH 31.5 26.0 - 34.0 pg    MCHC 36.7 31.0 - 37.0 g/dL    RDW-SD 36.1 35.1 - 46.3 fL    RDW 11.5 11.0 - 15.0 %    .0 150.0 - 450.0 10(3)uL    Neutrophil Absolute Prelim 6.38 1.50 - 7.70 x10 (3) uL    Neutrophil Absolute 6.38 1.50 - 7.70 x10(3) uL    Lymphocyte Absolute 1.44 1.00 - 4.00 x10(3) uL    Monocyte Absolute 0.73 0.10 - 1.00 x10(3) uL    Eosinophil Absolute 0.02 0.00 - 0.70 x10(3) uL    Basophil Absolute 0.02 0.00 - 0.20 x10(3) uL    Immature Granulocyte Absolute 0.03 0.00 - 1.00 x10(3) uL    Neutrophil % 74.1 %    Lymphocyte % 16.7 %    Monocyte % 8.5 %    Eosinophil % 0.2 %    Basophil % 0.2 %    Immature Granulocyte % 0.3 %   POCT Glucose    Collection Time: 04/05/25 10:53 AM   Result Value Ref Range    POC Glucose  114 (H) 70 - 99 mg/dL   Sodium, Serum    Collection  Time: 04/05/25 11:25 AM   Result Value Ref Range    Sodium 122 (L) 136 - 145 mmol/L   POCT Glucose    Collection Time: 04/05/25  3:48 PM   Result Value Ref Range    POC Glucose  128 (H) 70 - 99 mg/dL   Sodium, Serum    Collection Time: 04/05/25  5:57 PM   Result Value Ref Range    Sodium 118 (LL) 136 - 145 mmol/L   Magnesium    Collection Time: 04/05/25  5:57 PM   Result Value Ref Range    Magnesium 1.8 1.6 - 2.6 mg/dL   POCT Glucose    Collection Time: 04/05/25  9:29 PM   Result Value Ref Range    POC Glucose  174 (H) 70 - 99 mg/dL   Magnesium    Collection Time: 04/06/25  6:23 AM   Result Value Ref Range    Magnesium 1.9 1.6 - 2.6 mg/dL   CBC, Platelet; No Differential    Collection Time: 04/06/25  6:23 AM   Result Value Ref Range    WBC 7.2 4.0 - 11.0 x10(3) uL    RBC 3.91 3.80 - 5.30 x10(6)uL    HGB 11.8 (L) 12.0 - 16.0 g/dL    HCT 32.5 (L) 35.0 - 48.0 %    MCV 83.1 80.0 - 100.0 fL    MCH 30.2 26.0 - 34.0 pg    MCHC 36.3 31.0 - 37.0 g/dL    RDW 11.8 11.0 - 15.0 %    RDW-SD 35.8 35.1 - 46.3 fL    .0 150.0 - 450.0 10(3)uL   Renal Function Panel    Collection Time: 04/06/25  6:23 AM   Result Value Ref Range    Glucose 86 70 - 99 mg/dL    Sodium 122 (L) 136 - 145 mmol/L    Potassium 4.1 3.5 - 5.1 mmol/L    Chloride 90 (L) 98 - 112 mmol/L    CO2 23.0 21.0 - 32.0 mmol/L    Anion Gap 9 0 - 18 mmol/L    BUN 15 9 - 23 mg/dL    Creatinine 0.88 0.55 - 1.02 mg/dL    BUN/CREA Ratio 17.0 10.0 - 20.0    Calcium, Total 8.1 (L) 8.7 - 10.4 mg/dL    Calculated Osmolality 254 (L) 275 - 295 mOsm/kg    eGFR-Cr 64 >=60 mL/min/1.73m2    Albumin 4.0 3.2 - 4.8 g/dL    Phosphorus 3.0 2.4 - 5.1 mg/dL   POCT Glucose    Collection Time: 04/06/25  9:06 AM   Result Value Ref Range    POC Glucose  126 (H) 70 - 99 mg/dL   POCT Glucose    Collection Time: 04/06/25 12:12 PM   Result Value Ref Range    POC Glucose  169 (H) 70 - 99 mg/dL   POCT Glucose    Collection Time: 04/06/25  5:06 PM   Result Value Ref Range    POC Glucose  149 (H) 70 -  99 mg/dL   Sodium, Serum    Collection Time: 04/06/25  6:01 PM   Result Value Ref Range    Sodium 125 (L) 136 - 145 mmol/L   POCT Glucose    Collection Time: 04/06/25  9:05 PM   Result Value Ref Range    POC Glucose  225 (H) 70 - 99 mg/dL   Basic Metabolic Panel (8)    Collection Time: 04/07/25  5:46 AM   Result Value Ref Range    Glucose 137 (H) 70 - 99 mg/dL    Sodium 127 (L) 136 - 145 mmol/L    Potassium 4.2 3.5 - 5.1 mmol/L    Chloride 94 (L) 98 - 112 mmol/L    CO2 25.0 21.0 - 32.0 mmol/L    Anion Gap 8 0 - 18 mmol/L    BUN 21 9 - 23 mg/dL    Creatinine 1.04 (H) 0.55 - 1.02 mg/dL    BUN/CREA Ratio 20.2 (H) 10.0 - 20.0    Calcium, Total 8.3 (L) 8.7 - 10.4 mg/dL    Calculated Osmolality 269 (L) 275 - 295 mOsm/kg    eGFR-Cr 53 (L) >=60 mL/min/1.73m2   CBC, Platelet; No Differential    Collection Time: 04/07/25  5:46 AM   Result Value Ref Range    WBC 7.9 4.0 - 11.0 x10(3) uL    RBC 3.95 3.80 - 5.30 x10(6)uL    HGB 11.9 (L) 12.0 - 16.0 g/dL    HCT 34.2 (L) 35.0 - 48.0 %    MCV 86.6 80.0 - 100.0 fL    MCH 30.1 26.0 - 34.0 pg    MCHC 34.8 31.0 - 37.0 g/dL    RDW 11.7 11.0 - 15.0 %    RDW-SD 37.3 35.1 - 46.3 fL    .0 150.0 - 450.0 10(3)uL   POCT Glucose    Collection Time: 04/07/25  9:25 AM   Result Value Ref Range    POC Glucose  136 (H) 70 - 99 mg/dL   POCT Glucose    Collection Time: 04/07/25  1:40 PM   Result Value Ref Range    POC Glucose  213 (H) 70 - 99 mg/dL   Sodium, Serum    Collection Time: 04/07/25  2:07 PM   Result Value Ref Range    Sodium 127 (L) 136 - 145 mmol/L   RAINBOW DRAW LAVENDER    Collection Time: 04/07/25  2:30 PM   Result Value Ref Range    Hold Lavender Auto Resulted    POCT Glucose    Collection Time: 04/07/25  6:08 PM   Result Value Ref Range    POC Glucose  186 (H) 70 - 99 mg/dL   POCT Glucose    Collection Time: 04/07/25  8:44 PM   Result Value Ref Range    POC Glucose  274 (H) 70 - 99 mg/dL   CBC, Platelet; No Differential    Collection Time: 04/08/25  5:57 AM   Result Value Ref  Range    WBC 6.5 4.0 - 11.0 x10(3) uL    RBC 3.80 3.80 - 5.30 x10(6)uL    HGB 11.7 (L) 12.0 - 16.0 g/dL    HCT 32.6 (L) 35.0 - 48.0 %    MCV 85.8 80.0 - 100.0 fL    MCH 30.8 26.0 - 34.0 pg    MCHC 35.9 31.0 - 37.0 g/dL    RDW 11.9 11.0 - 15.0 %    RDW-SD 37.1 35.1 - 46.3 fL    .0 150.0 - 450.0 10(3)uL   Basic Metabolic Panel (8)    Collection Time: 04/08/25  5:57 AM   Result Value Ref Range    Glucose 140 (H) 70 - 99 mg/dL    Sodium 131 (L) 136 - 145 mmol/L    Potassium 4.1 3.5 - 5.1 mmol/L    Chloride 99 98 - 112 mmol/L    CO2 23.0 21.0 - 32.0 mmol/L    Anion Gap 9 0 - 18 mmol/L    BUN 24 (H) 9 - 23 mg/dL    Creatinine 1.08 (H) 0.55 - 1.02 mg/dL    BUN/CREA Ratio 22.2 (H) 10.0 - 20.0    Calcium, Total 8.5 (L) 8.7 - 10.4 mg/dL    Calculated Osmolality 278 275 - 295 mOsm/kg    eGFR-Cr 50 (L) >=60 mL/min/1.73m2   POCT Glucose    Collection Time: 04/08/25  8:50 AM   Result Value Ref Range    POC Glucose  165 (H) 70 - 99 mg/dL   POCT Glucose    Collection Time: 04/08/25  1:21 PM   Result Value Ref Range    POC Glucose  172 (H) 70 - 99 mg/dL   Basic Metabolic Panel (8)    Collection Time: 04/08/25  5:59 PM   Result Value Ref Range    Glucose 165 (H) 70 - 99 mg/dL    Sodium 129 (L) 136 - 145 mmol/L    Potassium 4.3 3.5 - 5.1 mmol/L    Chloride 98 98 - 112 mmol/L    CO2 25.0 21.0 - 32.0 mmol/L    Anion Gap 6 0 - 18 mmol/L    BUN 23 9 - 23 mg/dL    Creatinine 1.20 (H) 0.55 - 1.02 mg/dL    BUN/CREA Ratio 19.2 10.0 - 20.0    Calcium, Total 8.5 (L) 8.7 - 10.4 mg/dL    Calculated Osmolality 275 275 - 295 mOsm/kg    eGFR-Cr 44 (L) >=60 mL/min/1.73m2   RAINBOW DRAW LAVENDER    Collection Time: 04/08/25  6:09 PM   Result Value Ref Range    Hold Lavender Auto Resulted    POCT Glucose    Collection Time: 04/08/25  6:34 PM   Result Value Ref Range    POC Glucose  167 (H) 70 - 99 mg/dL   POCT Glucose    Collection Time: 04/08/25  8:51 PM   Result Value Ref Range    POC Glucose  285 (H) 70 - 99 mg/dL   Renal Function Panel     Collection Time: 04/09/25  7:08 AM   Result Value Ref Range    Glucose 168 (H) 70 - 99 mg/dL    Sodium 132 (L) 136 - 145 mmol/L    Potassium 4.0 3.5 - 5.1 mmol/L    Chloride 99 98 - 112 mmol/L    CO2 24.0 21.0 - 32.0 mmol/L    Anion Gap 9 0 - 18 mmol/L    BUN 21 9 - 23 mg/dL    Creatinine 1.02 0.55 - 1.02 mg/dL    BUN/CREA Ratio 20.6 (H) 10.0 - 20.0    Calcium, Total 8.5 (L) 8.7 - 10.4 mg/dL    Calculated Osmolality 281 275 - 295 mOsm/kg    eGFR-Cr 54 (L) >=60 mL/min/1.73m2    Albumin 4.0 3.2 - 4.8 g/dL    Phosphorus 3.3 2.4 - 5.1 mg/dL   CBC, Platelet; No Differential    Collection Time: 04/09/25  7:08 AM   Result Value Ref Range    WBC 7.1 4.0 - 11.0 x10(3) uL    RBC 3.79 (L) 3.80 - 5.30 x10(6)uL    HGB 11.7 (L) 12.0 - 16.0 g/dL    HCT 32.6 (L) 35.0 - 48.0 %    MCV 86.0 80.0 - 100.0 fL    MCH 30.9 26.0 - 34.0 pg    MCHC 35.9 31.0 - 37.0 g/dL    RDW 11.9 11.0 - 15.0 %    RDW-SD 37.2 35.1 - 46.3 fL    .0 150.0 - 450.0 10(3)uL   POCT Glucose    Collection Time: 04/09/25  8:25 AM   Result Value Ref Range    POC Glucose  182 (H) 70 - 99 mg/dL   POCT Glucose    Collection Time: 04/09/25 12:46 PM   Result Value Ref Range    POC Glucose  253 (H) 70 - 99 mg/dL   POCT Glucose    Collection Time: 04/09/25  5:36 PM   Result Value Ref Range    POC Glucose  186 (H) 70 - 99 mg/dL       EKG / Spirometry : -     Radiology: CT BRAIN OR HEAD (CPT=70450)  Result Date: 4/5/2025  PROCEDURE: CT BRAIN OR HEAD (CPT=70450)  COMPARISON: [facility], MRI BRAIN WO ACUTE (3) SEQUENCE(CPT=70551), 11/22/2023, 8:52 PM.  INDICATIONS: blurry vision dizzy h/o TIA  TECHNIQUE: CT images were obtained without contrast material.  Automated exposure control for dose reduction was used.  Dose information is transmitted to the ACR (American College of Radiology) NRDR (National Radiology Data Registry) which includes the Dose Index Registry.  FINDINGS:  CEREBRUM: Mild age related decreased attenuation in periventricular white matter.   No edema, hemorrhage, mass or acute infarct. CEREBELLUM: No edema, hemorrhage, mass or acute infarct. BRAINSTEM: No edema, hemorrhage, mass or acute infarct. CSF SPACES: No hydrocephalus, subarachnoid hemorrhage, or mass.  SKULL: Skull base and calvarium are intact. SINUSES: No acute sinusitis or mastoiditis. ORBITS: Previous bilateral cataract surgery. OTHER: Small foci of heterotopic ossification along the falx. Atherosclerotic calcification cavernous carotid arteries.         CONCLUSION:  1. No acute intracranial finding.    Dictated by (CST): Leonardo Bush MD on 4/05/2025 at 9:29 PM     Finalized by (CST): Leonardo Bush MD on 4/05/2025 at 9:34 PM          MRI ABDOMEN AND MRCP W/3D (W+W0)(CPT=74183/38640)  Result Date: 3/27/2025  PROCEDURE: MRI ABDOMEN&MRCP W/3D (W+WO)(CPT=74183/95748)  MRCP   COMPARISON: [facility], CT ABDOMEN + PELVIS (CONTRAST ONLY) (CPT=74177), 3/26/2025, 1:14 PM.  [facility], MRI ABDOMEN&MRCP W/3D (CPT=74181/69754), 2/10/2023, 9:44 AM.  INDICATIONS: abd pain, elevated lipase, pancreatic tail cyst  TECHNIQUE: A comprehensive examination was performed utilizing a variety of imaging planes and imaging parameters to optimize visualization of suspected pathology.  Images were obtained before and after intravenous gadolinium infusion.   Magnetic resonance cholangiopancreatography was also performed.   FINDINGS:  LOWER THORAX: No effusions. Cardiac size is normal. LIVER: Normal size and morphology.  Loss of signal on out of phase imaging is consistent with hepatic steatosis.  Multiple nonenhancing cysts are seen.  No suspicious focal lesion.  GALLBLADDER: Surgically absent. BILIARY TREE: No intrahepatic biliary ductal dilatation.  The common bile duct measures 1.2 cm in diameter, similar to the prior exam, likely relating to reservoir effect. PANCREAS: No pancreatic ductal dilatation.  Multi septated lesion is noted in the pancreatic tail measuring 4.4 x 2.9 x 2.3  cm series 10, image 9; series 9 image 7, stable from the prior MRI of 02/10/2023. No significant enhancement is noted within this lesion. SPLEEN: Normal.  No enlargement or focal lesion.  ADRENALS: Normal.  KIDNEYS: Normal. RETROPERITONEUM: No enlarged lymph nodes. PERITONEUM: No free fluid. No enlarged lymph nodes. GI TRACT: No evidence of bowel obstruction.  Duodenal diverticulum is again seen. AORTA & MAJOR BRANCHES: Normal. VENOUS SYSTEM: Normal. ABDOMINAL WALL: No acute abnormality. BONES: No acute fracture.  Compression deformities of L2 and L3 are similar to the prior CT.          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  Hepatic steatosis.  No focal suspicious lesion.  Status post cholecystectomy.  Stable mild dilatation of the common bile duct likely relating to reservoir effect.  Additional chronic or incidental findings are described in the body of this report.    [facility]    Dictated by (CST): Morgan Martinez MD on 3/27/2025 at 7:15 AM     Finalized by (CST): Morgan Martinez MD on 3/27/2025 at 7:23 AM          CT ABDOMEN+PELVIS(CONTRAST ONLY)(CPT=74177)  Result Date: 3/26/2025  PROCEDURE: CT ABDOMEN + PELVIS (CONTRAST ONLY) (CPT=74177)  COMPARISON: [facility], , MRI SPINE LUMBAR (CPT=72148), 7/26/2024, 2:20 PM.  [facility], MRI ABDOMEN&MRCP W/3D (CPT=74181/81184), 2/10/2023, 9:44 AM.  [facility], CT ABDOMEN WWO CONTRAST, 1/18/2011, 3:17 PM.  INDICATIONS: elevated lipase  TECHNIQUE: CT images of the abdomen and pelvis were obtained with non-ionic intravenous contrast material.  Automated exposure control for dose reduction was used. Adjustment of the mA and/or kV was done based on the patient's size. Use of iterative reconstruction technique for dose reduction was used.  Dose information is transmitted to the ACR (American College of Radiology) NRDR (National Radiology Data Registry) which includes the  Dose Index Registry.  FINDINGS:  Normal liver morphology with stable multiple thin walled hypodensities.  Patent portal vein.  Cholecystectomy.  No duct dilation  Stable septated 3.4 centimeter cystic lesion at the pancreatic tail.  No acute pancreatitis.  Normal spleen and adrenals and kidneys  Normal aorta.  Unobstructed bowel.  Uncomplicated sigmoid diverticulosis.  No ascites.  Thin walled 3.2 centimeter left adnexal cyst stable from February 10, 2023 MR.  Normal bladder.  Small fat containing right inguinal hernia  Moderate compression fracture L2 and 3 with retropulsion of bone resulting in moderate central canal stenosis, stable from MR lumbar spine July 26, 2024           CONCLUSION: No acute pancreatitis.  Stable complex cystic lesion in the pancreatic tail     Dictated by (CST): Dilshad Smith MD on 3/26/2025 at 1:37 PM     Finalized by (CST): Dilshad Smith MD on 3/26/2025 at 1:41 PM          CT CHEST PE AORTA (IV ONLY) (CPT=71260)  Result Date: 3/26/2025  PROCEDURE: CT CHEST PE AORTA (IV ONLY) (CPT=71260)  COMPARISON: [facility], CT CHEST PE AORTA (IV ONLY) (CPT=71260), 9/09/2022, 8:32 PM.  INDICATIONS: SOB, r/o PE  TECHNIQUE: CT images of the chest were obtained with non-ionic intravenous contrast material.  Automated exposure control for dose reduction was used. Adjustment of the mA and/or kV was done based on the patient's size. Use of iterative reconstruction technique for dose reduction was used. Dose information is transmitted to the ACR (American College of Radiology) NRDR (National Radiology Data Registry) which includes the Dose Index Registry.  FINDINGS:  No acute aortic syndrome.  No PE  Normal heart size.  Calcified coronaries.  Normal pericardium.  Very small sliding hiatal hernia.  Normal pleura  Normal lungs  CONCLUSION: No PE Finalized by (CST): Dilshad Smith MD on 3/26/2025 at 1:36 PM          XR CHEST AP PORTABLE  (CPT=71045)  Result Date: 3/26/2025  PROCEDURE: XR CHEST AP  PORTABLE  (CPT=71045) TIME: 12:37.   COMPARISON: [facility], XR CHEST AP PORTABLE (CPT=71045), 11/22/2023, 8:26 PM.  [facility], XR CHEST AP PORTABLE (CPT=71045), 9/09/2022, 7:53 PM.  [facility], XR CHEST AP PORTABLE (CPT=71045), 4/07/2021, 8:45 PM.  INDICATIONS: Shortness of breath x 1 day.  TECHNIQUE:   Single view.   FINDINGS:  CARDIAC/VASC: The cardiomediastinal silhouette is unchanged in size.  There is atherosclerotic calcification of thoracic aorta. MEDIAST/DANNIELLE:   No visible mass or adenopathy. LUNGS/PLEURA: No focal opacity, pleural effusion, or pneumothorax.  There is no evidence of pulmonary edema. BONES: Multilevel degenerative changes of the thoracic spine.  Bilateral shoulder degenerative change. OTHER: Negative.          CONCLUSION:   No focal opacity, pleural effusion, or pneumothorax.    Dictated by (CST): Horacio Anaya MD on 3/26/2025 at 1:03 PM     Finalized by (CST): Horacio Anaya MD on 3/26/2025 at 1:04 PM             ASSESSMENT/PLAN:   Assessment   Encounter Diagnoses   Name Primary?    Essential (primary) hypertension Yes    Hyponatremia     Anxiety with depression     Oropharyngeal dysphagia     Fatigue, unspecified type        1. Essential (primary) hypertension    Medication: Continue with Metoprolol 100 mg Qday and Chlorthalidone 25 mg.    Stop Olmesartan  Lab:  Basic Metabolic Panel (8); Future   Follow up [facility]    2. Hyponatremia    Olmesartan on hold  Lab: Basic Metabolic Panel (8); Future  Increase po intake    3. Anxiety with depression    Continue with Lexapro 20 mg  Referral:  [facility]  Follow up in 6 weeks    4. Oropharyngeal dysphagia    Continue with Pantoprazole   Referral:  [facility]STR[facility] - INTERNAL    5. Fatigue, unspecified type    Tx Depression see above  Rx B12 IM given today          Orders This Visit:  Orders Placed This Encounter   Procedures    Basic Metabolic Panel (8)       Meds This Visit:  Requested  Prescriptions     Signed Prescriptions Disp Refills    Insulin Glargine-yfgn (SEMGLEE, YFGN,) 100 UNIT/ML Subcutaneous Solution Pen-injector 3 mL 5     Sig: Inject 40 Units into the skin daily. Using insulin pens       Imaging & Referrals:  [facility] - INTERNAL  OP REFERRAL TO [facility] GORGE FLOYD MD         [1]   Past Medical History:   Cataract    Diabetes (HCC)    Diverticulosis large intestine w/o perforation or abscess w/o bleeding    Dupuytren's contracture of left hand     LMF Ray    Essential hypertension    High blood pressure    High cholesterol    Hyperlipidemia    Internal hemorrhoids without complication    MI (myocardial infarction) (HCC)    Stroke (HCC)   [2]   Past Surgical History:  Procedure Laterality Date    Appendectomy      Cataract      Cholecystectomy      Colonoscopy      Colonoscopy N/A 12/6/2018    Procedure: COLONOSCOPY;  Surgeon: Maricruz Benavides MD;  Location: [facility] ENDOSCOPY    Palmar fasciectomy Left 11-18-16    L Dupuytren's  LMF Ray    Tubal ligation  1977   [3]   Family History  Problem Relation Age of Onset    Cancer Sister         skin   [4]   Social History  Socioeconomic History    Marital status: Single   Tobacco Use    Smoking status: Never     Passive exposure: Never    Smokeless tobacco: Never   Vaping Use    Vaping status: Never Used   Substance and Sexual Activity    Alcohol use: No     Alcohol/week: 0.0 standard drinks of alcohol    Drug use: Never   Other Topics Concern    Caffeine Concern Yes     Comment: coffee     Social Drivers of Health     Food Insecurity: No Food Insecurity (4/5/2025)    NCSS - Food Insecurity     Worried About Running Out of Food in the Last Year: No     Ran Out of Food in the Last Year: No   Transportation Needs: No Transportation Needs (4/5/2025)    NCSS - Transportation     Lack of Transportation: No   Housing Stability: Not At Risk (4/5/2025)    NCSS - Housing/Utilities     Has Housing: Yes     Worried About Losing  Housing: No     Unable to Get Utilities: No   [5]   Current Outpatient Medications   Medication Sig Dispense Refill    Insulin Glargine-yfgn (SEMGLEE, YFGN,) 100 UNIT/ML Subcutaneous Solution Pen-injector Inject 40 Units into the skin daily. Using insulin pens 3 mL 5    metoprolol succinate  MG Oral Tablet 24 Hr Take 0.5 tablets (50 mg total) by mouth daily.      MAGNESIUM COMPLEX HIGH POTENCY OR Take by mouth.      Acetaminophen 500 MG Oral Cap Take 1 capsule (500 mg total) by mouth every 6 (six) hours as needed. 100 capsule 1    alendronate 70 MG Oral Tab Take 1 tablet (70 mg total) by mouth every 7 days. 12 tablet 2    aspirin 81 MG Oral Chew Tab Chew 1 tablet (81 mg total) by mouth daily. 90 tablet 1    Insulin Pen Needle (BD PEN NEEDLE AGA 2ND GEN) 32G X 4 MM Does not apply Misc 1 strip by In Vitro route daily. 100 each 3    metFORMIN HCl 1000 MG Oral Tab TOME JESUSITA TABLETA 2 VECES AL CHRISTINE CON LAS COMIDAS 180 tablet 3    Olmesartan Medoxomil 40 MG Oral Tab Take 0.5 tablets (20 mg total) by mouth daily. 45 tablet 2    pantoprazole 40 MG Oral Tab EC Take 1 tablet (40 mg total) by mouth daily. 30 tablet 0    pravastatin 20 MG Oral Tab Take 1 tablet (20 mg total) by mouth daily. 90 tablet 3    Accu-Chek Softclix Lancets Does not apply Misc Use three times daily as directed. 200 each 5    Glucose Blood (ACCU-CHEK GUIDE TEST) In Vitro Strip Use Q day 100 each 5    meclizine 12.5 MG Oral Tab Take 1 tablet (12.5 mg total) by mouth 3 (three) times daily as needed. 40 tablet 1    Glucose Blood (ACCU-CHEK JOSE PLUS) In Vitro Strip INJECT 1 DEVICE INTO THE SKIN 3 (THREE) TIMES DAILY AS NEEDED. 300 strip 3    latanoprost 0.005 % Ophthalmic Solution Place 1 drop into both eyes nightly.      Multiple Vitamins-Minerals (PRESERVISION AREDS) Oral Tab Take 2 tablets by mouth daily.     [6] No Known Allergies

## 2025-04-18 NOTE — TELEPHONE ENCOUNTER
Endy calling to state that patients blood pressure was elevated. States she took patients blood pressure this morning and it was 155/72 with a pulse of 69. States the res of the vitals were normal. Reports patient complaining of mild headache about 2 out of 10. Patient is compliant with medication and pressure was not elevated yesterday.

## 2025-04-19 NOTE — TELEPHONE ENCOUNTER
Con't with same medications and dosage. Keep checking BP and RTC with BP #S.  Do NOT do any changes with medication base on a single BP reading.    ANA

## 2025-04-22 NOTE — TELEPHONE ENCOUNTER
Patient arrived and in ER being evaluated now   See notes for details if needed.     Closing encounter.

## 2025-04-22 NOTE — ED INITIAL ASSESSMENT (HPI)
Libyan speaking pt came in with daughter for waking up this morning around 8 AM with tongue swelling.\  With shortness of breath.  Per daughter pt started on new medicines 2 weeks ago , Insulin and Anxiety meds.  Pt is A/OX 4, breathing unlabored, pt is speaking in full sentences.  Did not take Benadryl.  Per daughter pt was recently admitted for low sodium.

## 2025-04-22 NOTE — TELEPHONE ENCOUNTER
DR Choudhary ===FYI     Action Requested: Summary for Provider     []  Critical Lab, Recommendations Needed  [] Need Additional Advice  [x]   FYI    []   Need Orders  [] Need Medications Sent to Pharmacy  []  Other     SUMMARY: With   Loly#[id number] ,STRONGLY ADVISED ED NOW OR RN will call 911.  Patient will asks her daughter to bring  her to ED.   Explained that she needs to go ED ASAP, before the tongue completely block her airway and ti save her tongue as well.        Reason for  call: Allergic Rxn Allergies (angioedema)  Onset:yesterday      Reported VERY SWOLLEN TONGUE, PURPLISH COLOR,PAINFUL,since yesterday  and  very mild short of breath .  Patient insist to have an appointment today to see DR Choudhary.Informed that her case is different and that will need emergency evaluation.     Reason for Disposition   Widespread hives, itching or facial swelling and onset < 2 hours of exposure to high-risk allergen (e.g., sting, nuts, 1st dose of antibiotic)    Protocols used: [id number]

## 2025-04-22 NOTE — TELEPHONE ENCOUNTER
Patient needs a hospital follow up appointment. The first available appointment with her PCP is on 5/7/24. Patient declined to see a different provider.  Please call the patient's daughter to make a hospital follow up appointment.

## 2025-04-22 NOTE — ED PROVIDER NOTES
Patient Seen in: [facility] Emergency Department    History     Chief Complaint   Patient presents with    Allergic Rxn Allergies       HPI    84-year-old female who presents to the emergency department given that since yesterday she has felt a mild amount of tongue swelling as well as nausea.  She denies any abdominal pain or any vision changes or diplopia or any dysphagia or trismus.  No fevers    History reviewed. Past Medical History[1]    History reviewed. Past Surgical History[2]      Medications :  Prescriptions Prior to Admission[3]     Family History[4]    Smoking Status: Social Hx on file[5]    Constitutional and vital signs reviewed.      Social History and Family History elements reviewed from today, pertinent positives to the presenting problem noted.    Physical Exam     ED Triage Vitals [04/22/25 1002]   /66   Pulse 73   Resp 20   Temp 98.6 °F (37 °C)   Temp src Oral   SpO2 97 %   O2 Device None (Room air)       All measures to prevent infection transmission during my interaction with the patient were taken. The patient was already wearing a droplet mask on my arrival to the room. Personal protective equipment was worn throughout the duration of the exam.  Handwashing was performed prior to and after the exam.  Stethoscope and any equipment used during my examination was cleaned with super sani-cloth germicidal wipes following the exam.     Physical Exam    General: NAD  Head: Normocephalic and atraumatic.  Mouth/Throat/Ears/Nose: Oropharynx is clear, thrush, mild tongue enlargement; no dysphagia or trismus    Eyes: Conjunctivae and EOM are normal.   Neck: Normal range of motion. Supple.   Cardiovascular: Normal rate, regular rhythm, normal heart sounds.  Respiratory/Chest: Clear and equal bilaterally. Exhibits no tenderness.  Gastrointestinal: Soft, non-tender, non-distended. Bowel sounds are normal.   Musculoskeletal:No swelling or deformity.   Neurological: Alert and appropriate. No  focal deficits.   Skin: Skin is warm and dry. No pallor.         ED Course        Labs Reviewed   BASIC METABOLIC PANEL (8) - Abnormal; Notable for the following components:       Result Value    Glucose 252 (*)     Sodium 128 (*)     Chloride 94 (*)     Creatinine 1.04 (*)     eGFR-Cr 53 (*)     All other components within normal limits   HEPATIC FUNCTION PANEL (7) - Abnormal; Notable for the following components:    Alkaline Phosphatase 45 (*)     All other components within normal limits   CBC WITH DIFFERENTIAL WITH PLATELET - Abnormal; Notable for the following components:    Neutrophil Absolute Prelim 8.15 (*)     Neutrophil Absolute 8.15 (*)     All other components within normal limits       As Interpreted by me    Imaging Results Available and Reviewed while in ED: No results found.  ED Medications Administered:   Medications   EPINEPHrine (Adrenalin) 1 MG/ML injection (Allergic Reaction Kit) 0.3 mg (0.3 mg Intramuscular Given 4/22/25 1024)   methylPREDNISolone sodium succinate (Solu-MEDROL) injection 60 mg (60 mg Intravenous Given 4/22/25 1023)   diphenhydrAMINE (Benadryl) cap/tab 25 mg (25 mg Oral Given 4/22/25 1028)   famotidine (Pepcid) 20 mg/2mL injection 20 mg (20 mg Intravenous Given 4/22/25 1023)         MDM     Vitals:    04/22/25 1002 04/22/25 1045 04/22/25 1130 04/22/25 1215   BP: 135/66 (!) 171/60 (!) 168/62 (!) 164/68   Pulse: 73 73 77 79   Resp: 20 20 19 16   Temp: 98.6 °F (37 °C)      TempSrc: Oral      SpO2: 97% 97% 95% 95%   Weight: 68 kg      Height: 160 cm (5' 3\")        *I personally reviewed and interpreted all ED vitals.    Pulse Ox: 95%, Room air, Normal          Medical Decision Making      Differential Diagnosis/ Diagnostic Considerations: anaphylaxis, oral candidiasis     Complicating Factors: The patient already has HTN to contribute to the complexity of this ED evaluation.    I reviewed prior chart records including telephone note from earlier today. Labs notable for hyponatremia,  essentially unchanged from prior. Oral candidiasis for which antifungals were prescribed. Lingual edema could potentially be related to the candidiasis but with the nausea, will err on the side of caution and treat for anaphylaxis which was provided.     On reevaluation, feels improved. Less tongue swelling sensation. Tolerating po .     Dc In stable condition.  Patient and daughter are comfortable with the plan.     Prescriptions:as below    I spent 30 minutes of critical care time caring for this patient. This does not include time spent on separately reported billable procedures.         Disposition and Plan     Clinical Impression:  1. Candida infection of mouth    2. Anaphylaxis, initial encounter        Disposition:  Discharge    Follow-up:  Jose Choudhary MD  [address]  [place] [address]  [phone]    Schedule an appointment as soon as possible for a visit in 1 day(s)        Medications Prescribed:  Discharge Medication List as of 4/22/2025 12:18 PM        START taking these medications    Details   fluconazole 200 MG Oral Tab Take 1 tablet (200 mg total) by mouth daily for 14 days., Normal, Disp-14 tablet, R-0      EPINEPHrine 0.3 MG/0.3ML Injection Solution Auto-injector Inject 0.3 mL (1 each total) into the muscle daily as needed., Normal, Disp-1 each, R-0      cetirizine 10 MG Oral Tab Take 1 tablet (10 mg total) by mouth daily for 7 days., Normal, Disp-7 tablet, R-0      famotidine 20 MG Oral Tab Take 1 tablet (20 mg total) by mouth 2 (two) times daily for 7 days., Normal, Disp-14 tablet, R-0                              [1]   Past Medical History:   Cataract    Diabetes (HCC)    Diverticulosis large intestine w/o perforation or abscess w/o bleeding    Dupuytren's contracture of left hand     LMF Ray    Essential hypertension    High blood pressure    High cholesterol    Hyperlipidemia    Internal hemorrhoids without complication    MI (myocardial infarction) (HCC)    Stroke ([facility])    [2]   Past Surgical History:  Procedure Laterality Date    Appendectomy      Cataract      Cholecystectomy      Colonoscopy      Colonoscopy N/A 12/6/2018    Procedure: COLONOSCOPY;  Surgeon: Maricruz Benavides MD;  Location: [facility] ENDOSCOPY    Palmar fasciectomy Left 11-18-16    L Dupuytren's  LMF Ray    Tubal ligation  1977   [3] (Not in a hospital admission)   [4]   Family History  Problem Relation Age of Onset    Cancer Sister         skin   [5]   Social History  Socioeconomic History    Marital status: Single   Tobacco Use    Smoking status: Never     Passive exposure: Never    Smokeless tobacco: Never   Vaping Use    Vaping status: Never Used   Substance and Sexual Activity    Alcohol use: No     Alcohol/week: 0.0 standard drinks of alcohol    Drug use: Never   Other Topics Concern    Caffeine Concern Yes     Comment: coffee

## 2025-04-22 NOTE — ED QUICK NOTES
Patient safe to be discharged home per provider. Discharge education given via printed AVS. Reviewed: follow up care, medications and when to seek emergency care. Patient and daughter Selena verbalizes understanding and is able to teach back. Patient and daughter ambulated to exit.

## 2025-04-22 NOTE — ED QUICK NOTES
Rounding Completed    Plan of Care reviewed. Waiting for continued observation time to end.  Elimination needs assessed.  Provided assistance to bathroom.    Bed is locked and in lowest position. Call light within reach.

## 2025-04-23 NOTE — PROGRESS NOTES
Transitions of Care Navigation  Discharge Date: 25  Contact Date: 2025    Transitions of Care Assessment:  JP Initial Assessment    General:  Assessment completed with: Patient  Patient Subjective: NCM spoke with patient states is feeling slightly better. Patient denies any trouble swallowing, throat swelling, difficulty breathing, shortness of breath, chest pain or any other symptoms at this time. Pt denies fevers, chills, nausea, vomiting, or any others. She is scheduled with PCP tomorrow 25 in the morning. She declined further outreaches or any questions at this time.  Chief Complaint: Candida infection of mouth  Verify patient name and  with patient/ caregiver: Yes    Hospital Stay/Discharge:  Tell me what you understand of why you were in the hospital or emergency department: Patient reports to ED with mild amount of tongue swelling and nausea.  Prior to leaving the hospital were your Discharge Instructions reviewed with you?: Yes  Did you receive a copy of your written Discharge Instructions?: Yes  What questions do you have about your Discharge Instructions?: No questions at this time.  Do you feel better or worse since you left the hospital or emergency department?: Better    Follow - Up Appointment:  Do you have a follow-up appointment?: Yes  Date: 25  Physician: PCP  Are there any barriers to getting to your follow-up appointment?: No    Home Health/DME:  Prior to leaving the hospital was Home Health (HH) arranged for you?: N/A  Are HH needs identified by staff during the assessment?: No     Prior to leaving the hospital or emergency department was Durable Medical Equipment (DME), medical supplies, or infusions arranged for you?: N/A  Are DME/medical supply/infusions needs identified by staff during this assessment?: No     Medications/Diet:  Did any of your medications change, during or after your hospital stay or ED visit?: Yes  Do you have your new or updated medications?:  Yes  Do you understand what your medications are for and possible side effects?: Yes  Are there any reasons that keep you from taking your medication as prescribed?: No  Any concerns about medication refills?: No    Were you given a different diet per your Discharge Instructions?: No  Reason: n/a     Questions/Concerns:  Do you have any questions or concerns that have not been discussed?: No   JP Follow-up Assessment    General:  Assessment completed with: Patient  Patient Subjective: NCM spoke with patient states is feeling slightly better. Patient denies any trouble swallowing, throat swelling, difficulty breathing, shortness of breath, chest pain or any other symptoms at this time. Pt denies fevers, chills, nausea, vomiting, or any others. She is scheduled with PCP tomorrow 4/24/25 in the morning. She declined further outreaches or any questions at this time.  Chief Complaint: Candida infection of mouth      Nursing Interventions:  All discharge instructions reviewed with the patient. Reviewed when to call MD vs when to call 911 or go the ED. Educated patient on the importance of taking all meds as prescribed as well as close f/u with PCP/specialists. Pt verbalized understanding and will contact the office with any further questions or concerns. Patient denies fevers, chills, nausea, vomiting, shortness of breath, chest pain, or any other symptoms at this time.  NCM provided contact information for any further questions/concerns. Patient verbalized understanding and agreeable.         Medications:  Medication Reconciliation:  I am aware of an inpatient discharge within the last 30 days.  The discharge medication list has been reconciled with the patient's current medication list and reviewed by me. See medication list for additions of new medication, and changes to current doses of medications and discontinued medications.  Current Outpatient Medications   Medication Sig Dispense Refill    fluconazole 200 MG  Oral Tab Take 1 tablet (200 mg total) by mouth daily for 14 days. 14 tablet 0    EPINEPHrine 0.3 MG/0.3ML Injection Solution Auto-injector Inject 0.3 mL (1 each total) into the muscle daily as needed. 1 each 0    cetirizine 10 MG Oral Tab Take 1 tablet (10 mg total) by mouth daily for 7 days. 7 tablet 0    famotidine 20 MG Oral Tab Take 1 tablet (20 mg total) by mouth 2 (two) times daily for 7 days. 14 tablet 0    Insulin Glargine-yfgn (SEMGLEE, YFGN,) 100 UNIT/ML Subcutaneous Solution Pen-injector Inject 40 Units into the skin daily. Using insulin pens 3 mL 5    metoprolol succinate  MG Oral Tablet 24 Hr Take 0.5 tablets (50 mg total) by mouth daily.      MAGNESIUM COMPLEX HIGH POTENCY OR Take by mouth.      Acetaminophen 500 MG Oral Cap Take 1 capsule (500 mg total) by mouth every 6 (six) hours as needed. 100 capsule 1    alendronate 70 MG Oral Tab Take 1 tablet (70 mg total) by mouth every 7 days. 12 tablet 2    aspirin 81 MG Oral Chew Tab Chew 1 tablet (81 mg total) by mouth daily. 90 tablet 1    Insulin Pen Needle (BD PEN NEEDLE AGA 2ND GEN) 32G X 4 MM Does not apply Misc 1 strip by In Vitro route daily. 100 each 3    metFORMIN HCl 1000 MG Oral Tab TOME JESUSITA TABLETA 2 VECES AL CHRISTINE CON LAS COMIDAS 180 tablet 3    Olmesartan Medoxomil 40 MG Oral Tab Take 0.5 tablets (20 mg total) by mouth daily. 45 tablet 2    pantoprazole 40 MG Oral Tab EC Take 1 tablet (40 mg total) by mouth daily. 30 tablet 0    pravastatin 20 MG Oral Tab Take 1 tablet (20 mg total) by mouth daily. 90 tablet 3    Accu-Chek Softclix Lancets Does not apply Misc Use three times daily as directed. 200 each 5    Glucose Blood (ACCU-CHEK GUIDE TEST) In Vitro Strip Use Q day 100 each 5    meclizine 12.5 MG Oral Tab Take 1 tablet (12.5 mg total) by mouth 3 (three) times daily as needed. 40 tablet 1    Glucose Blood (ACCU-CHEK JOSE PLUS) In Vitro Strip INJECT 1 DEVICE INTO THE SKIN 3 (THREE) TIMES DAILY AS NEEDED. 300 strip 3    latanoprost  0.005 % Ophthalmic Solution Place 1 drop into both eyes nightly.      Multiple Vitamins-Minerals (PRESERVISION AREDS) Oral Tab Take 2 tablets by mouth in the morning.             Follow-up Appointments:  Your appointments       Date & Time Appointment Department ([place])    Apr 24, 2025 9:00 AM CDT Hospital Follow Up with Jose Choudhary MD [facility] ([facility])        May 07, 2025 1:30 PM CDT Follow Up Visit with Lj Toussaint MD [facility] ([facility])        May 28, 2025 11:30 AM CDT Follow Up Visit with Jose Choudhary MD [facility] ([facility])              [facility]  [address]  [facility] [place] [facility] [place] [address]  [phone] [facility]  [address]  [place] [address]  [phone]            [facility]  Was TCC Ordered: No      Primary Care Provider (If no TCC appointment)  Does patient already have a PCP appointment scheduled? Yes  Nurse Care Manager Confirmed PCP office ER Follow-up appointment with patient       Specialist  Does the patient have any other follow-up appointment(s) need to be scheduled? No       Book By Date: 4/29/25

## 2025-04-24 NOTE — TELEPHONE ENCOUNTER
Pharmacy is requesting a 90 day prescription refill.     cloNIDine 0.1 MG Oral Tab, Take 1 tablet (0.1 mg total) by mouth 2 (two) times daily., Disp: 60 tablet, Rfl: 1

## 2025-04-24 NOTE — PROGRESS NOTES
4/24/2025  9:21 AM    Noemi Hudson is a 84 year old female.    Chief complaint(s):   Chief Complaint   Patient presents with    Follow - Up     Patient Is coming in for a ER  follow up     HPI:     Noemi Hudson primary complaint is regarding multiple issues .     Patient is 84-year-old female with long history of hypertension diabetes s who was recently in the hospital due to oral allergic reaction with swelling.  She was possibly taking olmesartan but unable to verify that with patient and her daughter.  She was given EpiPen in the emergency room and then was started on Zyrtec as well as given Diflucan for oral thrush and vaginal candidiasis.  Her sugars have been running elevated.  Her blood pressure has been running elevated as well.  At present time she is only on chlorthalidone and metoprolol for hypertension.  Patient was previously on 50 units of insulin she cut it down to 15.  Will advance her back up to 35 and continuous glucose monitoring.  Will also prescribe a hyper pen just in case her sugars drop too low I advised the daughter to spend more time with her not to leave her alone at home.      Immunizations:   Immunization History   Administered Date(s) Administered    Covid-19 Vaccine Pfizer 30 mcg/0.3 ml 02/17/2021, 03/10/2021, 11/11/2021    Covid-19 Vaccine Pfizer Rcih-Sucrose 30 mcg/0.3 ml 06/09/2022    FLU VAC High Dose 65 YRS & Older PRSV Free (83665) 10/20/2017, 09/11/2018, 09/20/2023    FLUAD High Dose 65 yr and older (84628) 08/16/2019, 10/02/2020    FLULAVAL 6 months & older 0.5 ml Prefilled syringe (59995) 10/14/2022    FLUZONE 6 months and older PFS 0.5 ml (06854) 09/12/2016    Fluzone Vaccine Medicare () 10/15/2017, 10/20/2017, 09/11/2018    HIB PRP-OMP 3 Dose 04/09/2016    Haemophilus B Polysac Conj Vac Im 04/09/2016    High Dose Fluzone Influenza Vaccine, 65yr+ PF 0.5mL (96303) 09/12/2016, 10/16/2017, 09/24/2021    Influenza 10/23/2015    Meningococcal-Menactra 04/09/2016     Pfizer Covid-19 Vaccine 30mcg/0.3ml 12yrs+ 12/14/2023    Pneumococcal (Prevnar 13) 08/29/2017    Pneumococcal Conjugate PCV20 11/14/2022    Pneumovax 23 11/18/2005, 04/09/2016       Medications (Active prior to today's visit):  Current Medications[1]    Allergies:  Allergies[2]      ROS:   Review of Systems   Constitutional:  Negative for appetite change and fever.   HENT:          Dry mouth   Eyes:  Negative for visual disturbance.   Respiratory:  Negative for shortness of breath.    Cardiovascular:  Negative for chest pain.   Gastrointestinal:  Negative for abdominal pain, nausea and vomiting.   Endocrine: Negative for polydipsia and polyuria.   Musculoskeletal:  Negative for back pain.   Skin:  Negative for rash.   Allergic/Immunologic: Positive for environmental allergies.   Neurological:  Positive for dizziness. Negative for headaches.       PHYSICAL EXAM:   VS: BP (!) 168/80   Pulse 67   Temp 97 °F (36.1 °C)   Ht 5' 3\" (1.6 m)   Wt 152 lb 3.2 oz (69 kg)   BMI 26.96 kg/m²     Physical Exam  Vitals reviewed.   Constitutional:       General: She is not in acute distress.     Appearance: Normal appearance.   HENT:      Head: Normocephalic.      Mouth/Throat:      Lips: Pink.      Mouth: Mucous membranes are moist.      Tongue: No lesions.      Pharynx: Oropharynx is clear.   Eyes:      Conjunctiva/sclera: Conjunctivae normal.   Cardiovascular:      Rate and Rhythm: Normal rate.   Pulmonary:      Effort: Pulmonary effort is normal.   Musculoskeletal:      Cervical back: Neck supple.   Skin:     Findings: No rash.   Psychiatric:         Mood and Affect: Mood normal.         LABORATORY RESULTS:        Results for orders placed or performed during the hospital encounter of 04/22/25   Basic Metabolic Panel (8)    Collection Time: 04/22/25 10:21 AM   Result Value Ref Range    Glucose 252 (H) 70 - 99 mg/dL    Sodium 128 (L) 136 - 145 mmol/L    Potassium 4.5 3.5 - 5.1 mmol/L    Chloride 94 (L) 98 - 112 mmol/L    CO2  24.0 21.0 - 32.0 mmol/L    Anion Gap 10 0 - 18 mmol/L    BUN 13 9 - 23 mg/dL    Creatinine 1.04 (H) 0.55 - 1.02 mg/dL    BUN/CREA Ratio 12.5 10.0 - 20.0    Calcium, Total 9.0 8.7 - 10.4 mg/dL    Calculated Osmolality 275 275 - 295 mOsm/kg    eGFR-Cr 53 (L) >=60 mL/min/1.73m2   Hepatic Function Panel (7)    Collection Time: 04/22/25 10:21 AM   Result Value Ref Range    AST 19 <34 U/L    ALT 14 10 - 49 U/L    Alkaline Phosphatase 45 (L) 55 - 142 U/L    Bilirubin, Total 0.5 0.2 - 1.1 mg/dL    Bilirubin, Direct 0.1 <=0.3 mg/dL    Total Protein 6.6 5.7 - 8.2 g/dL    Albumin 4.4 3.2 - 4.8 g/dL   CBC With Differential With Platelet    Collection Time: 04/22/25 10:21 AM   Result Value Ref Range    WBC 10.0 4.0 - 11.0 x10(3) uL    RBC 4.18 3.80 - 5.30 x10(6)uL    HGB 12.8 12.0 - 16.0 g/dL    HCT 35.6 35.0 - 48.0 %    MCV 85.2 80.0 - 100.0 fL    MCH 30.6 26.0 - 34.0 pg    MCHC 36.0 31.0 - 37.0 g/dL    RDW-SD 38.2 35.1 - 46.3 fL    RDW 12.4 11.0 - 15.0 %    .0 150.0 - 450.0 10(3)uL    Neutrophil Absolute Prelim 8.15 (H) 1.50 - 7.70 x10 (3) uL    Neutrophil Absolute 8.15 (H) 1.50 - 7.70 x10(3) uL    Lymphocyte Absolute 1.08 1.00 - 4.00 x10(3) uL    Monocyte Absolute 0.72 0.10 - 1.00 x10(3) uL    Eosinophil Absolute 0.01 0.00 - 0.70 x10(3) uL    Basophil Absolute 0.02 0.00 - 0.20 x10(3) uL    Immature Granulocyte Absolute 0.04 0.00 - 1.00 x10(3) uL    Neutrophil % 81.3 %    Lymphocyte % 10.8 %    Monocyte % 7.2 %    Eosinophil % 0.1 %    Basophil % 0.2 %    Immature Granulocyte % 0.4 %       EKG / Spirometry : -     Radiology: CT BRAIN OR HEAD (CPT=70450)  Result Date: 4/5/2025  PROCEDURE: CT BRAIN OR HEAD (CPT=70450)  COMPARISON: [facility], MRI BRAIN WO ACUTE (3) SEQUENCE(CPT=70551), 11/22/2023, 8:52 PM.  INDICATIONS: blurry vision dizzy h/o TIA  TECHNIQUE: CT images were obtained without contrast material.  Automated exposure control for dose reduction was used.  Dose information is transmitted to the  ACR (American College of Radiology) NRDR (National Radiology Data Registry) which includes the Dose Index Registry.  FINDINGS:  CEREBRUM: Mild age related decreased attenuation in periventricular white matter.  No edema, hemorrhage, mass or acute infarct. CEREBELLUM: No edema, hemorrhage, mass or acute infarct. BRAINSTEM: No edema, hemorrhage, mass or acute infarct. CSF SPACES: No hydrocephalus, subarachnoid hemorrhage, or mass.  SKULL: Skull base and calvarium are intact. SINUSES: No acute sinusitis or mastoiditis. ORBITS: Previous bilateral cataract surgery. OTHER: Small foci of heterotopic ossification along the falx. Atherosclerotic calcification cavernous carotid arteries.         CONCLUSION:  1. No acute intracranial finding.    Dictated by (CST): Leonardo Bush MD on 4/05/2025 at 9:29 PM     Finalized by (CST): Leonardo Bush MD on 4/05/2025 at 9:34 PM          MRI ABDOMEN AND MRCP W/3D (W+W0)(CPT=74183/39272)  Result Date: 3/27/2025  PROCEDURE: MRI ABDOMEN&MRCP W/3D (W+WO)(CPT=74183/77855)  MRCP   COMPARISON: [facility], CT ABDOMEN + PELVIS (CONTRAST ONLY) (CPT=74177), 3/26/2025, 1:14 PM.  [facility], MRI ABDOMEN&MRCP W/3D (CPT=74181/41105), 2/10/2023, 9:44 AM.  INDICATIONS: abd pain, elevated lipase, pancreatic tail cyst  TECHNIQUE: A comprehensive examination was performed utilizing a variety of imaging planes and imaging parameters to optimize visualization of suspected pathology.  Images were obtained before and after intravenous gadolinium infusion.   Magnetic resonance cholangiopancreatography was also performed.   FINDINGS:  LOWER THORAX: No effusions. Cardiac size is normal. LIVER: Normal size and morphology.  Loss of signal on out of phase imaging is consistent with hepatic steatosis.  Multiple nonenhancing cysts are seen.  No suspicious focal lesion.  GALLBLADDER: Surgically absent. BILIARY TREE: No intrahepatic biliary ductal dilatation.  The common bile duct  measures 1.2 cm in diameter, similar to the prior exam, likely relating to reservoir effect. PANCREAS: No pancreatic ductal dilatation.  Multi septated lesion is noted in the pancreatic tail measuring 4.4 x 2.9 x 2.3 cm series 10, image 9; series 9 image 7, stable from the prior MRI of 02/10/2023. No significant enhancement is noted within this lesion. SPLEEN: Normal.  No enlargement or focal lesion.  ADRENALS: Normal.  KIDNEYS: Normal. RETROPERITONEUM: No enlarged lymph nodes. PERITONEUM: No free fluid. No enlarged lymph nodes. GI TRACT: No evidence of bowel obstruction.  Duodenal diverticulum is again seen. AORTA & MAJOR BRANCHES: Normal. VENOUS SYSTEM: Normal. ABDOMINAL WALL: No acute abnormality. BONES: No acute fracture.  Compression deformities of L2 and L3 are similar to the prior CT.          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  Hepatic steatosis.  No focal suspicious lesion.  Status post cholecystectomy.  Stable mild dilatation of the common bile duct likely relating to reservoir effect.  Additional chronic or incidental findings are described in the body of this report.    elm-remote    Dictated by (CST): Morgan Martinez MD on 3/27/2025 at 7:15 AM     Finalized by (CST): Morgan Martinez MD on 3/27/2025 at 7:23 AM          CT ABDOMEN+PELVIS(CONTRAST ONLY)(CPT=74177)  Result Date: 3/26/2025  PROCEDURE: CT ABDOMEN + PELVIS (CONTRAST ONLY) (CPT=74177)  COMPARISON: [facility], MRI SPINE LUMBAR (CPT=72148), 7/26/2024, 2:20 PM.  [facility], MRI ABDOMEN&MRCP W/3D (CPT=74181/26340), 2/10/2023, 9:44 AM.  [facility], CT ABDOMEN WWO CONTRAST, 1/18/2011, 3:17 PM.  INDICATIONS: elevated lipase  TECHNIQUE: CT images of the abdomen and pelvis were obtained with non-ionic intravenous contrast material.  Automated exposure control for dose reduction was used. Adjustment of the mA and/or kV was done based on the  patient's size. Use of iterative reconstruction technique for dose reduction was used.  Dose information is transmitted to the ACR (American College of Radiology) NRDR (National Radiology Data Registry) which includes the Dose Index Registry.  FINDINGS:  Normal liver morphology with stable multiple thin walled hypodensities.  Patent portal vein.  Cholecystectomy.  No duct dilation  Stable septated 3.4 centimeter cystic lesion at the pancreatic tail.  No acute pancreatitis.  Normal spleen and adrenals and kidneys  Normal aorta.  Unobstructed bowel.  Uncomplicated sigmoid diverticulosis.  No ascites.  Thin walled 3.2 centimeter left adnexal cyst stable from February 10, 2023 MR.  Normal bladder.  Small fat containing right inguinal hernia  Moderate compression fracture L2 and 3 with retropulsion of bone resulting in moderate central canal stenosis, stable from MR lumbar spine July 26, 2024           CONCLUSION: No acute pancreatitis.  Stable complex cystic lesion in the pancreatic tail     Dictated by (CST): Dilshad Smith MD on 3/26/2025 at 1:37 PM     Finalized by (CST): Dilshad Smith MD on 3/26/2025 at 1:41 PM          CT CHEST PE AORTA (IV ONLY) (CPT=71260)  Result Date: 3/26/2025  PROCEDURE: CT CHEST PE AORTA (IV ONLY) (CPT=71260)  COMPARISON: [facility], CT CHEST PE AORTA (IV ONLY) (CPT=71260), 9/09/2022, 8:32 PM.  INDICATIONS: SOB, r/o PE  TECHNIQUE: CT images of the chest were obtained with non-ionic intravenous contrast material.  Automated exposure control for dose reduction was used. Adjustment of the mA and/or kV was done based on the patient's size. Use of iterative reconstruction technique for dose reduction was used. Dose information is transmitted to the ACR (American College of Radiology) NRDR (National Radiology Data Registry) which includes the Dose Index Registry.  FINDINGS:  No acute aortic syndrome.  No PE  Normal heart size.  Calcified coronaries.  Normal pericardium.  Very small  sliding hiatal hernia.  Normal pleura  Normal lungs  CONCLUSION: No PE Finalized by (CST): Dilshad Smith MD on 3/26/2025 at 1:36 PM          XR CHEST AP PORTABLE  (CPT=71045)  Result Date: 3/26/2025  PROCEDURE: XR CHEST AP PORTABLE  (CPT=71045) TIME: 12:37.   COMPARISON: [facility], XR CHEST AP PORTABLE (CPT=71045), 11/22/2023, 8:26 PM.  [facility], XR CHEST AP PORTABLE (CPT=71045), 9/09/2022, 7:53 PM.  [facility], XR CHEST AP PORTABLE (CPT=71045), 4/07/2021, 8:45 PM.  INDICATIONS: Shortness of breath x 1 day.  TECHNIQUE:   Single view.   FINDINGS:  CARDIAC/VASC: The cardiomediastinal silhouette is unchanged in size.  There is atherosclerotic calcification of thoracic aorta. MEDIAST/DANNIELLE:   No visible mass or adenopathy. LUNGS/PLEURA: No focal opacity, pleural effusion, or pneumothorax.  There is no evidence of pulmonary edema. BONES: Multilevel degenerative changes of the thoracic spine.  Bilateral shoulder degenerative change. OTHER: Negative.          CONCLUSION:   No focal opacity, pleural effusion, or pneumothorax.    Dictated by (CST): Horacio Anaya MD on 3/26/2025 at 1:03 PM     Finalized by (CST): Horacio Anaya MD on 3/26/2025 at 1:04 PM             ASSESSMENT/PLAN:   Assessment   Encounter Diagnoses   Name Primary?    Oral thrush Yes    Subacute vaginitis     Angioedema with urticaria     Essential (primary) hypertension        1. Oral thrush   Doing well  CPM  Follow up KPA    2. Subacute vaginitis  Resolved    3. Angioedema with urticaria  Stable  Off ARBs    4. Essential (primary) hypertension  MEDICATIONS: Chlorthalidone + Metoprolol  Requested Prescriptions     Signed Prescriptions Disp Refills    cloNIDine 0.1 MG Oral Tab 60 tablet 1     Sig: Take 1 tablet (0.1 mg total) by mouth 2 (two) times daily.    Insulin Glargine-yfgn (SEMGLEE, YFTONE,) 100 UNIT/ML Subcutaneous Solution Pen-injector 3 mL 5     Sig: Inject 35 Units into the skin daily. Using  insulin pens    glucagon (GVOKE HYPOPEN 2-PACK) 1 MG/0.2ML Subcutaneous injection 1 each 1     Sig: Inject 0.2 mL (1 mg total) into the skin once as needed.    Off Olmesartan    LABORATORY & ORDERS: No orders of the defined types were placed in this encounter.    RECOMMENDATIONS given include: avoid pseudoephedrine or other stimulants/decongestants in common cold remedies, decrease consumption of alcohol, perform routine monitoring of blood pressure with home blood pressure cuff, exercise, reduction of dietary salt intake, take medication as prescribed, try not to miss doses, smoking cessation, weight loss, and stress reduction.    FOLLOW-UP: Schedule a follow-up visit in 3 weeks.         5. Hyperglycemia due to diabetes mellitus (HCC)    MEDICATIONS:   increased insulin  Requested Prescriptions     Signed Prescriptions Disp Refills    cloNIDine 0.1 MG Oral Tab 60 tablet 1     Sig: Take 1 tablet (0.1 mg total) by mouth 2 (two) times daily.    Insulin Glargine-yfgn (SEMGLEE, YFGN,) 100 UNIT/ML Subcutaneous Solution Pen-injector 3 mL 5     Sig: Inject 35 Units into the skin daily. Using insulin pens    glucagon (GVOKE HYPOPEN 2-PACK) 1 MG/0.2ML Subcutaneous injection 1 each 1     Sig: Inject 0.2 mL (1 mg total) into the skin once as needed.   RECOMMENDATIONS given include: Patient was reassured of  her medical condition and all questions and concerns were answered. Patient was informed to please, call our office with any new or further questions or concerns that may come up in the near future. Notify Dr Choudhary or the [facility] if there is a deterioration or worsening of the medical condition. Also, inform the doctor with any new symptoms or medications' side effects.      FOLLOW-UP: Schedule a follow-up visit in  prn.             Orders This Visit:  No orders of the defined types were placed in this encounter.      Meds This Visit:  Requested Prescriptions     Signed Prescriptions Disp Refills    cloNIDine 0.1  MG Oral Tab 60 tablet 1     Sig: Take 1 tablet (0.1 mg total) by mouth 2 (two) times daily.    Insulin Glargine-yfgn (SEMGLEE, YFGN,) 100 UNIT/ML Subcutaneous Solution Pen-injector 3 mL 5     Sig: Inject 35 Units into the skin daily. Using insulin pens    glucagon (GVOKE HYPOPEN 2-PACK) 1 MG/0.2ML Subcutaneous injection 1 each 1     Sig: Inject 0.2 mL (1 mg total) into the skin once as needed.       Imaging & Referrals:  None         RADHA FLOYD MD         [1]   Current Outpatient Medications   Medication Sig Dispense Refill    chlorthalidone 25 MG Oral Tab Take 1 tablet (25 mg total) by mouth daily.      Multiple Vitamins-Minerals (ICAPS AREDS FORMULA) Oral Tab Take by mouth in the morning and before bedtime.      escitalopram 20 MG Oral Tab Take 1 tablet (20 mg total) by mouth daily.      cloNIDine 0.1 MG Oral Tab Take 1 tablet (0.1 mg total) by mouth 2 (two) times daily. 60 tablet 1    Insulin Glargine-yfgn (SEMGLEE, YFGN,) 100 UNIT/ML Subcutaneous Solution Pen-injector Inject 35 Units into the skin daily. Using insulin pens 3 mL 5    glucagon (GVOKE HYPOPEN 2-PACK) 1 MG/0.2ML Subcutaneous injection Inject 0.2 mL (1 mg total) into the skin once as needed. 1 each 1    fluconazole 200 MG Oral Tab Take 1 tablet (200 mg total) by mouth daily for 14 days. 14 tablet 0    EPINEPHrine 0.3 MG/0.3ML Injection Solution Auto-injector Inject 0.3 mL (1 each total) into the muscle daily as needed. 1 each 0    cetirizine 10 MG Oral Tab Take 1 tablet (10 mg total) by mouth daily for 7 days. 7 tablet 0    famotidine 20 MG Oral Tab Take 1 tablet (20 mg total) by mouth 2 (two) times daily for 7 days. 14 tablet 0    metoprolol succinate  MG Oral Tablet 24 Hr Take 0.5 tablets (50 mg total) by mouth daily.      MAGNESIUM COMPLEX HIGH POTENCY OR Take by mouth.      Acetaminophen 500 MG Oral Cap Take 1 capsule (500 mg total) by mouth every 6 (six) hours as needed. 100 capsule 1    alendronate 70 MG Oral Tab Take 1 tablet (70  mg total) by mouth every 7 days. 12 tablet 2    aspirin 81 MG Oral Chew Tab Chew 1 tablet (81 mg total) by mouth daily. 90 tablet 1    Insulin Pen Needle (BD PEN NEEDLE AGA 2ND GEN) 32G X 4 MM Does not apply Misc 1 strip by In Vitro route daily. 100 each 3    metFORMIN HCl 1000 MG Oral Tab TOME JESUSITA TABLETA 2 VECES AL CHRISTINE CON LAS COMIDAS 180 tablet 3    pantoprazole 40 MG Oral Tab EC Take 1 tablet (40 mg total) by mouth daily. 30 tablet 0    pravastatin 20 MG Oral Tab Take 1 tablet (20 mg total) by mouth daily. 90 tablet 3    Accu-Chek Softclix Lancets Does not apply Misc Use three times daily as directed. 200 each 5    Glucose Blood (ACCU-CHEK GUIDE TEST) In Vitro Strip Use Q day 100 each 5    meclizine 12.5 MG Oral Tab Take 1 tablet (12.5 mg total) by mouth 3 (three) times daily as needed. 40 tablet 1    Glucose Blood (ACCU-CHEK JOSE PLUS) In Vitro Strip INJECT 1 DEVICE INTO THE SKIN 3 (THREE) TIMES DAILY AS NEEDED. 300 strip 3    latanoprost 0.005 % Ophthalmic Solution Place 1 drop into both eyes nightly.      Multiple Vitamins-Minerals (PRESERVISION AREDS) Oral Tab Take 2 tablets by mouth in the morning.     [2] No Known Allergies

## 2025-04-24 NOTE — TELEPHONE ENCOUNTER
Please review. Protocol Failed; No Protocol    Re pended for 90 day supply     Please review and advise if refill is appropriate

## 2025-04-25 NOTE — TELEPHONE ENCOUNTER
Received a call form giovanny with PT stating the patient BP is elevated 148/68 - she was prescribed a new medication for this yesterday and she has not started taking it yet. She wanted the doctor to be aware of her BP.

## 2025-04-28 NOTE — TELEPHONE ENCOUNTER
Spoke with patient who states she is now taking the clonidine 0.1 mg in addition to metoprolol  mg 1/2 tab daily, and chlorthalidone 25 mg daily        Patient states Friday 4/25/25  B/P 150/75    Saturday 4/26/25    B/P 147/66    Sunday 4/27/25   B/P 130/66    Monday 4/28/25   B/P  124/63

## 2025-04-30 NOTE — TELEPHONE ENCOUNTER
KATELYNN Shearer  calling from [facility]     Janki states the patient has been taking Clonidine since 4/24/2025 and has some side effects including:  Dry mouth, dizzy, lightheaded at times, constipation    B/p today at OT visit =  122/60     Janki is concerned the patient daughter may be  overwhelmed, possibly  mixing up meds and when patient was seen in the office and ER visits   Janki is unsure if the patient is still taking fluconazole  ( from the ER for 14 days )     Dr. Choudhary -- Janki is asking if Dr Choudhary may want to reach out to the daughter?        Please advise and thank you.

## 2025-04-30 NOTE — TELEPHONE ENCOUNTER
Phone call made, spoke with patient and the daughter advising to have her drink about 1-1/2 to 2 L of fluids including water and gaterate daily.  We' all ll recheck the sodium levels in 2 days.

## 2025-04-30 NOTE — TELEPHONE ENCOUNTER
Spoke with Janki PACE at [facility], Date of Birth verified  She stated pt is taking fluconazole 200 mg twice a day, patient supposed to be taking Rx once a day.   Patient made a mistake  and from now on patient daughter is charge of her meds.       Per patient daughter also mentioned that pt is on anti anxiety meds.   She complaint of being tired, sleep all day.    Per med list pt is taking Escitalopram 20 mg, it was rx'd by another Provider.  Pls call pt daughter [phone]

## 2025-05-01 ENCOUNTER — APPOINTMENT (OUTPATIENT)
Dept: URBAN - METROPOLITAN AREA CLINIC 244 | Age: 85
Setting detail: DERMATOLOGY
End: 2025-05-01

## 2025-05-01 DIAGNOSIS — Z87.2 PERSONAL HISTORY OF DISEASES OF THE SKIN AND SUBCUTANEOUS TISSUE: ICD-10-CM

## 2025-05-01 DIAGNOSIS — H61.03 CHONDRITIS OF EXTERNAL EAR: ICD-10-CM

## 2025-05-01 PROBLEM — H61.031 CHONDRITIS OF RIGHT EXTERNAL EAR: Status: ACTIVE | Noted: 2025-05-01

## 2025-05-01 PROCEDURE — 99213 OFFICE O/P EST LOW 20 MIN: CPT

## 2025-05-01 PROCEDURE — OTHER PATHOLOGY DISCUSSION: OTHER

## 2025-05-01 PROCEDURE — OTHER DIAGNOSIS COMMENT: OTHER

## 2025-05-01 PROCEDURE — OTHER COUNSELING: OTHER

## 2025-05-01 ASSESSMENT — LOCATION DETAILED DESCRIPTION DERM: LOCATION DETAILED: RIGHT SCAPHA

## 2025-05-01 ASSESSMENT — LOCATION ZONE DERM: LOCATION ZONE: EAR

## 2025-05-01 ASSESSMENT — LOCATION SIMPLE DESCRIPTION DERM: LOCATION SIMPLE: RIGHT EAR

## 2025-05-01 NOTE — PROCEDURE: COUNSELING
Detail Level: Detailed
Sunscreen Recommendations: Brands include neutrogena, La-Roche, and Elta-MD. Rec mineral sunscreen use (zinc/titanium dioxide) over chemical sunscreens due to safety.
Nicotinamide Supplementation Recommendations: All supplements should be USP (https://www.usp.org/verification-services/verified-lilly).

## 2025-05-01 NOTE — PROCEDURE: DIAGNOSIS COMMENT
Comment: Continue doughnut pillow and Vaseline. pt stopped topical steroid for no particular reason
Detail Level: Simple
Render Risk Assessment In Note?: no

## 2025-05-06 NOTE — PROGRESS NOTES
[facility] - Gastroenterology                                                                                                  Clinic Progress Note    Chief Complaint   Patient presents with    Hospital F/U     HPI: declined a    Noemi Hudson is a 84 year old Liberian speaking woman with history of chronic kidney disease, diabetes, NSTEMI, hypertension, hyperlipidemia, TIA, cervical radiculopathy, osteoarthritis, appendectomy, cholecystectomy here  with her daughter regarding a pancreas lesion    Says she was recently in the hospital regarding abdominal pain in the lower abdomen along with constipation.  No longer having either of these.  Denies any upper abdominal pain, jaundice, unintentional weight loss, personal history of pancreatitis or family history of pancreas issues.  Says she has known about this pancreas cyst for many many years as discussed with surgeon in the past and has refused surgery understanding that it can be precancerous and turn to the cancer which can be very aggressive.    History, Medications, Allergies, ROS:      Past Medical History[1]   Past Surgical History[2]   Family Hx: Family History[3]   Social History: Short Social Hx on File[4]     Medications (Active prior to today's visit):  Current Medications[5]    Allergies:  Allergies[6]    ROS:   Systems were reviewed and were negative except as noted in the HPI    PHYSICAL EXAM:   not currently breastfeeding.    General:awake, cooperative, no acute distress  HEENT: EOMI, no scleral icterus, MMM; oral pharnyx is without exudates or lesions  Neck: no lymphadenopathy; thyroid is not enlarged and without nodules  CV: RRR  Resp: non-labored breathing  Abd: soft, non-tender, non-distended  Ext: no lower extremity swelling  Neuro: Alert, Oriented X 3  Skin: no rashes, bruises  Psych: normal affect    Labs/Imaging:     Reviewed as  noted in the HPI and A/P    ASSESSMENT/PLAN:   Noemi Hudson is a 84 year old Iranian speaking woman with history of chronic kidney disease, diabetes, NSTEMI, hypertension, hyperlipidemia, TIA, cervical radiculopathy, osteoarthritis, appendectomy, cholecystectomy here  with her daughter regarding a pancreas lesion    She was recently seen by our service in March 2025 for lower abdominal pain, constipation and a pancreas cyst. A CT A/P showed no pancreatitis but a described stable  complex cystic lesion in the pancreatic tail. 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 No enhancement or pancreatic duct dilatation.Review of other MRI reports includes from 2015 describing a 3 cm pancreas tail lesion measuring 2.5 cm in 2011. This measured 4.0 cm in February 2023    We discussed the cystic lesion of the pancreas at this time.  We again discussed the possibility of a precancerous type cyst and can become cancer which can be aggressive.  We discussed the only way to get rid of the cyst is surgery which she refuses.  We discussed consideration of endoscopic ultrasound though they may not  at this time.  We discussed also consideration of surveillance or not surveying this.  She does prefer to continue to monitor.  Discussed I would recommend monitoring over the next 6 to approximately 12 months.  She does agree with this.  We did discuss symptoms to let me know if sooner prior to the next planned MRI/MRCP.  They expressed understanding.    Recommend:  -MRI/MRCP December 2025  -follow up with primary gastroenterologist for any other GI issues    Orders This Visit:  No orders of the defined types were placed in this encounter.    Meds This Visit:  Requested Prescriptions      No prescriptions requested or ordered in this encounter     Imaging & Referrals:  None     [facility]  [facility] - Gastroenterology  5/7/2025         [1]   Past Medical History:   Cataract    Diabetes (HCC)    Diverticulosis large intestine w/o perforation or abscess w/o bleeding    Dupuytren's contracture of left hand     LMF Ray    Essential hypertension    High blood pressure    High cholesterol    Hyperlipidemia    Internal hemorrhoids without complication    MI (myocardial infarction) (HCC)    Stroke (HCC)   [2]   Past Surgical History:  Procedure Laterality Date    Appendectomy      Cataract      Cholecystectomy      Colonoscopy      Colonoscopy N/A 12/6/2018    Procedure: COLONOSCOPY;  Surgeon: Maricruz Benavides MD;  Location: [facility] ENDOSCOPY    Palmar fasciectomy Left 11-18-16    L Dupuytren's  LMF Ray    Tubal ligation  1977   [3]   Family History  Problem Relation Age of Onset    Cancer Sister         skin   [4]   Social History  Socioeconomic History    Marital status: Single   Tobacco Use    Smoking status: Never     Passive exposure: Never    Smokeless tobacco: Never   Vaping Use    Vaping status: Never Used   Substance and Sexual Activity    Alcohol use: No     Alcohol/week: 0.0 standard drinks of alcohol    Drug use: Never   Other Topics Concern    Caffeine Concern Yes     Comment: coffee     Social Drivers of Health     Food Insecurity: No Food Insecurity (4/5/2025)    NCSS - Food Insecurity     Worried About Running Out of Food in the Last Year: No     Ran Out of Food in the Last Year: No   Transportation Needs: No Transportation Needs (4/5/2025)    NCSS - Transportation     Lack of Transportation: No   Housing Stability: Not At Risk (4/5/2025)    NCSS - Housing/Utilities     Has Housing: Yes     Worried About Losing Housing: No     Unable to Get Utilities: No   [5]   Current Outpatient Medications   Medication Sig Dispense Refill    chlorthalidone 25 MG Oral Tab Take 1 tablet (25 mg total) by mouth daily.      Multiple Vitamins-Minerals (ICAPS AREDS FORMULA) Oral Tab  Take by mouth in the morning and before bedtime.      escitalopram 20 MG Oral Tab Take 1 tablet (20 mg total) by mouth daily.      Insulin Glargine-yfgn (SEMGLEE, YFGN,) 100 UNIT/ML Subcutaneous Solution Pen-injector Inject 35 Units into the skin daily. Using insulin pens 3 mL 5    cloNIDine 0.1 MG Oral Tab Take 1 tablet (0.1 mg total) by mouth 2 (two) times daily. 90 tablet 1    fluconazole 200 MG Oral Tab Take 1 tablet (200 mg total) by mouth daily for 14 days. 14 tablet 0    EPINEPHrine 0.3 MG/0.3ML Injection Solution Auto-injector Inject 0.3 mL (1 each total) into the muscle daily as needed. 1 each 0    metoprolol succinate  MG Oral Tablet 24 Hr Take 0.5 tablets (50 mg total) by mouth daily.      MAGNESIUM COMPLEX HIGH POTENCY OR Take by mouth.      Acetaminophen 500 MG Oral Cap Take 1 capsule (500 mg total) by mouth every 6 (six) hours as needed. 100 capsule 1    alendronate 70 MG Oral Tab Take 1 tablet (70 mg total) by mouth every 7 days. 12 tablet 2    aspirin 81 MG Oral Chew Tab Chew 1 tablet (81 mg total) by mouth daily. 90 tablet 1    Insulin Pen Needle (BD PEN NEEDLE AGA 2ND GEN) 32G X 4 MM Does not apply Misc 1 strip by In Vitro route daily. 100 each 3    metFORMIN HCl 1000 MG Oral Tab TOME JESUSITA TABLETA 2 VECES AL CHRISTINE CON LAS COMIDAS 180 tablet 3    pantoprazole 40 MG Oral Tab EC Take 1 tablet (40 mg total) by mouth daily. 30 tablet 0    pravastatin 20 MG Oral Tab Take 1 tablet (20 mg total) by mouth daily. 90 tablet 3    Accu-Chek Softclix Lancets Does not apply Misc Use three times daily as directed. 200 each 5    Glucose Blood (ACCU-CHEK GUIDE TEST) In Vitro Strip Use Q day 100 each 5    meclizine 12.5 MG Oral Tab Take 1 tablet (12.5 mg total) by mouth 3 (three) times daily as needed. 40 tablet 1    Glucose Blood (ACCU-CHEK JOSE PLUS) In Vitro Strip INJECT 1 DEVICE INTO THE SKIN 3 (THREE) TIMES DAILY AS NEEDED. 300 strip 3    latanoprost 0.005 % Ophthalmic Solution Place 1 drop into both eyes  nightly.      Multiple Vitamins-Minerals (PRESERVISION AREDS) Oral Tab Take 2 tablets by mouth in the morning.     [6] No Known Allergies

## 2025-05-07 NOTE — TELEPHONE ENCOUNTER
GI staff    Please place recall for MRI/MRCP for 9 months from last MRI/MRCP (3/27/25)    Thanks    MD Darek [facility] - Gastroenterology  5/7/2025  1:40 PM

## 2025-05-12 NOTE — PROGRESS NOTES
5/12/2025  9:05 AM    Noemi Hudson is a 84 year old female.    Chief complaint(s):   Chief Complaint   Patient presents with    Hypertension     3 week f/up      HPI:     Noemi Hudson primary complaint is regarding HTN.     Noemi Tijerina a 84 year old female presents with hypertension.  This was first diagnosed more than 10 years ago.  Current nonpharmacologic treatment includes low sodium diet, exercise, and meditation.  Her current cardiac medication(s) regimen includes:Metoporolol 100 mg, Chlorthalidone 25 mg, Clonidine 0.1 mg BID She has kept a blood pressure diary, but states that her blood pressures have been fair controll.  She is tolerating her medication(s)  well with occasional dizziness.  Compliance with treatment has been good. Sodium has been running low.      HISTORY:  Past Medical History[1]   Past Surgical History[2]   Family History[3]   Social History: Short Social Hx on File[4]     Immunizations:   Immunization History   Administered Date(s) Administered    Covid-19 Vaccine Pfizer 30 mcg/0.3 ml 02/17/2021, 03/10/2021, 11/11/2021    Covid-19 Vaccine Pfizer Rich-Sucrose 30 mcg/0.3 ml 06/09/2022    FLU VAC High Dose 65 YRS & Older PRSV Free (14998) 10/20/2017, 09/11/2018, 09/20/2023    FLUAD High Dose 65 yr and older (91607) 08/16/2019, 10/02/2020    FLULAVAL 6 months & older 0.5 ml Prefilled syringe (37413) 10/14/2022    FLUZONE 6 months and older PFS 0.5 ml (54836) 09/12/2016    Fluzone Vaccine Medicare () 10/15/2017, 10/20/2017, 09/11/2018    HIB PRP-OMP 3 Dose 04/09/2016    Haemophilus B Polysac Conj Vac Im 04/09/2016    High Dose Fluzone Influenza Vaccine, 65yr+ PF 0.5mL (33970) 09/12/2016, 10/16/2017, 09/24/2021    Influenza 10/23/2015    Meningococcal-Menactra 04/09/2016    Pfizer Covid-19 Vaccine 30mcg/0.3ml 12yrs+ 12/14/2023    Pneumococcal (Prevnar 13) 08/29/2017    Pneumococcal Conjugate PCV20 11/14/2022    Pneumovax 23 11/18/2005, 04/09/2016       Medications (Active  prior to today's visit):  Current Medications[5]    Allergies:  Allergies[6]      ROS:   Review of Systems   Constitutional:  Negative for appetite change and fever.   Eyes:  Negative for visual disturbance.   Respiratory:  Negative for shortness of breath.    Cardiovascular:  Negative for chest pain.   Gastrointestinal:  Negative for abdominal pain, nausea and vomiting.   Musculoskeletal:  Negative for back pain.   Skin:  Negative for rash.   Neurological:  Positive for dizziness. Negative for headaches.       PHYSICAL EXAM:   VS: /52   Pulse 69   Wt 151 lb (68.5 kg)   BMI 26.75 kg/m²     Physical Exam  Vitals reviewed.   Constitutional:       General: She is not in acute distress.     Appearance: Normal appearance.   HENT:      Head: Normocephalic.   Eyes:      Conjunctiva/sclera: Conjunctivae normal.   Cardiovascular:      Rate and Rhythm: Normal rate.   Pulmonary:      Effort: Pulmonary effort is normal.   Musculoskeletal:      Cervical back: Neck supple.   Skin:     Findings: No rash.   Psychiatric:         Mood and Affect: Mood normal.         LABORATORY RESULTS:      Results for orders placed or performed in visit on 05/02/25   Sodium, Serum [E]    Collection Time: 05/02/25  1:00 PM   Result Value Ref Range    Sodium 125 (L) 136 - 145 mmol/L       EKG / Spirometry : -     Radiology: No results found.     ASSESSMENT/PLAN:   Assessment   Encounter Diagnoses   Name Primary?    Essential (primary) hypertension Yes    Hyponatremia          MEDICATIONS:  Current Medications[7]         Refills Given today:    Requested Prescriptions     Signed Prescriptions Disp Refills    chlorthalidone 25 MG Oral Tab 45 tablet 1     Sig: Take 0.5 tablets (12.5 mg total) by mouth daily.     LABORATORY & ORDERS:   Orders Placed This Encounter   Procedures    Sodium, Serum [E]     RECOMMENDATIONS given include: Patient was reassured of  her medical condition and all questions and concerns were answered. Patient was informed  to please, call our office with any new or further questions or concerns that may come up in the near future. Notify Dr Floyd or the [facility] if there is a deterioration or worsening of the medical condition. Also, inform the doctor with any new symptoms or medications' side effects.      FOLLOW-UP: Schedule a follow-up visit in  6 weeks.           Orders This Visit:  Orders Placed This Encounter   Procedures    Sodium, Serum [E]       Meds This Visit:  Requested Prescriptions     Signed Prescriptions Disp Refills    chlorthalidone 25 MG Oral Tab 45 tablet 1     Sig: Take 0.5 tablets (12.5 mg total) by mouth daily.       Imaging & Referrals:  None         RADHA FLOYD MD         [1]   Past Medical History:   Allergic rhinitis    Cataract    Diabetes (HCC)    Diverticulosis large intestine w/o perforation or abscess w/o bleeding    Dupuytren's contracture of left hand     LMF Ray    Essential hypertension    High blood pressure    High cholesterol    Hyperlipidemia    Internal hemorrhoids without complication    MI (myocardial infarction) (HCC)    Muscle weakness    Numbness    Pain    Stroke (HCC)   [2]   Past Surgical History:  Procedure Laterality Date    Appendectomy      Cataract      Cholecystectomy      Colonoscopy      Colonoscopy N/A 12/06/2018    Procedure: COLONOSCOPY;  Surgeon: Maricruz Benavides MD;  Location: [facility] ENDOSCOPY    Laparoscopic cholecystectomy      Palmar fasciectomy Left 11/18/2016    L Dupuytren's  LMF Ray    Tubal ligation  1977   [3]   Family History  Problem Relation Age of Onset    Cancer Sister         skin   [4]   Social History  Socioeconomic History    Marital status: Single   Tobacco Use    Smoking status: Never     Passive exposure: Never    Smokeless tobacco: Never   Vaping Use    Vaping status: Never Used   Substance and Sexual Activity    Alcohol use: No     Alcohol/week: 0.0 standard drinks of alcohol    Drug use: Never   Other Topics Concern     Caffeine Concern Yes     Comment: coffee     Social Drivers of Health     Food Insecurity: No Food Insecurity (4/5/2025)    NCSS - Food Insecurity     Worried About Running Out of Food in the Last Year: No     Ran Out of Food in the Last Year: No   Transportation Needs: No Transportation Needs (4/5/2025)    NCSS - Transportation     Lack of Transportation: No   Housing Stability: Not At Risk (4/5/2025)    NCSS - Housing/Utilities     Has Housing: Yes     Worried About Losing Housing: No     Unable to Get Utilities: No   [5]   Current Outpatient Medications   Medication Sig Dispense Refill    chlorthalidone 25 MG Oral Tab Take 0.5 tablets (12.5 mg total) by mouth daily. 45 tablet 1    Multiple Vitamins-Minerals (ICAPS AREDS FORMULA) Oral Tab Take by mouth in the morning and before bedtime.      escitalopram 20 MG Oral Tab Take 1 tablet (20 mg total) by mouth daily.      Insulin Glargine-yfgn (SEMGLEE, YFGN,) 100 UNIT/ML Subcutaneous Solution Pen-injector Inject 35 Units into the skin daily. Using insulin pens 3 mL 5    cloNIDine 0.1 MG Oral Tab Take 1 tablet (0.1 mg total) by mouth 2 (two) times daily. 90 tablet 1    EPINEPHrine 0.3 MG/0.3ML Injection Solution Auto-injector Inject 0.3 mL (1 each total) into the muscle daily as needed. 1 each 0    metoprolol succinate  MG Oral Tablet 24 Hr Take 0.5 tablets (50 mg total) by mouth daily.      MAGNESIUM COMPLEX HIGH POTENCY OR Take by mouth.      alendronate 70 MG Oral Tab Take 1 tablet (70 mg total) by mouth every 7 days. 12 tablet 2    aspirin 81 MG Oral Chew Tab Chew 1 tablet (81 mg total) by mouth daily. 90 tablet 1    Insulin Pen Needle (BD PEN NEEDLE AGA 2ND GEN) 32G X 4 MM Does not apply Misc 1 strip by In Vitro route daily. 100 each 3    metFORMIN HCl 1000 MG Oral Tab TOME JESUSITA TABLETA 2 VECES AL CHRISTINE CON LAS COMIDAS 180 tablet 3    pravastatin 20 MG Oral Tab Take 1 tablet (20 mg total) by mouth daily. 90 tablet 3    Accu-Chek Softclix Lancets Does not apply  Misc Use three times daily as directed. 200 each 5    Glucose Blood (ACCU-CHEK GUIDE TEST) In Vitro Strip Use Q day 100 each 5    meclizine 12.5 MG Oral Tab Take 1 tablet (12.5 mg total) by mouth 3 (three) times daily as needed. 40 tablet 1    Glucose Blood (ACCU-CHEK JOSE PLUS) In Vitro Strip INJECT 1 DEVICE INTO THE SKIN 3 (THREE) TIMES DAILY AS NEEDED. 300 strip 3    latanoprost 0.005 % Ophthalmic Solution Place 1 drop into both eyes nightly.      Multiple Vitamins-Minerals (PRESERVISION AREDS) Oral Tab Take 2 tablets by mouth in the morning.      GVOKE HYPOPEN 2-PACK 1 MG/0.2ML Subcutaneous injection INJECT 0.2ML INTO THE SKIN ONCE AS NEEDED (Patient not taking: Reported on 5/12/2025)      Acetaminophen 500 MG Oral Cap Take 1 capsule (500 mg total) by mouth every 6 (six) hours as needed. 100 capsule 1    pantoprazole 40 MG Oral Tab EC Take 1 tablet (40 mg total) by mouth daily. (Patient not taking: Reported on 5/12/2025) 30 tablet 0   [6] No Known Allergies  [7]   chlorthalidone 25 MG Oral Tab, Take 0.5 tablets (12.5 mg total) by mouth daily., Disp: 45 tablet, Rfl: 1    Multiple Vitamins-Minerals (ICAPS AREDS FORMULA) Oral Tab, Take by mouth in the morning and before bedtime., Disp: , Rfl:     escitalopram 20 MG Oral Tab, Take 1 tablet (20 mg total) by mouth daily., Disp: , Rfl:     Insulin Glargine-yfgn (SEMGLEE, YFGN,) 100 UNIT/ML Subcutaneous Solution Pen-injector, Inject 35 Units into the skin daily. Using insulin pens, Disp: 3 mL, Rfl: 5    cloNIDine 0.1 MG Oral Tab, Take 1 tablet (0.1 mg total) by mouth 2 (two) times daily., Disp: 90 tablet, Rfl: 1    EPINEPHrine 0.3 MG/0.3ML Injection Solution Auto-injector, Inject 0.3 mL (1 each total) into the muscle daily as needed., Disp: 1 each, Rfl: 0    metoprolol succinate  MG Oral Tablet 24 Hr, Take 0.5 tablets (50 mg total) by mouth daily., Disp: , Rfl:     MAGNESIUM COMPLEX HIGH POTENCY OR, Take by mouth., Disp: , Rfl:     alendronate 70 MG Oral Tab,  Take 1 tablet (70 mg total) by mouth every 7 days., Disp: 12 tablet, Rfl: 2    aspirin 81 MG Oral Chew Tab, Chew 1 tablet (81 mg total) by mouth daily., Disp: 90 tablet, Rfl: 1    Insulin Pen Needle (BD PEN NEEDLE AGA 2ND GEN) 32G X 4 MM Does not apply Misc, 1 strip by In Vitro route daily., Disp: 100 each, Rfl: 3    metFORMIN HCl 1000 MG Oral Tab, TOME JESUSITA TABLETA 2 VECES AL CHRISTINE CON LAS COMIDAS, Disp: 180 tablet, Rfl: 3    pravastatin 20 MG Oral Tab, Take 1 tablet (20 mg total) by mouth daily., Disp: 90 tablet, Rfl: 3    Accu-Chek Softclix Lancets Does not apply Misc, Use three times daily as directed., Disp: 200 each, Rfl: 5    Glucose Blood (ACCU-CHEK GUIDE TEST) In Vitro Strip, Use Q day, Disp: 100 each, Rfl: 5    meclizine 12.5 MG Oral Tab, Take 1 tablet (12.5 mg total) by mouth 3 (three) times daily as needed., Disp: 40 tablet, Rfl: 1    Glucose Blood (ACCU-CHEK JOSE PLUS) In Vitro Strip, INJECT 1 DEVICE INTO THE SKIN 3 (THREE) TIMES DAILY AS NEEDED., Disp: 300 strip, Rfl: 3    latanoprost 0.005 % Ophthalmic Solution, Place 1 drop into both eyes nightly., Disp: , Rfl:     Multiple Vitamins-Minerals (PRESERVISION AREDS) Oral Tab, Take 2 tablets by mouth in the morning., Disp: , Rfl:     GVOKE HYPOPEN 2-PACK 1 MG/0.2ML Subcutaneous injection, INJECT 0.2ML INTO THE SKIN ONCE AS NEEDED (Patient not taking: Reported on 5/12/2025), Disp: , Rfl:     Acetaminophen 500 MG Oral Cap, Take 1 capsule (500 mg total) by mouth every 6 (six) hours as needed., Disp: 100 capsule, Rfl: 1    pantoprazole 40 MG Oral Tab EC, Take 1 tablet (40 mg total) by mouth daily. (Patient not taking: Reported on 5/12/2025), Disp: 30 tablet, Rfl: 0

## 2025-05-19 NOTE — TELEPHONE ENCOUNTER
I spoke to the patient's daughter Selena    I reviewed the below note from Dr. Toussaint with Selena, she verbalized understanding    Patient is scheduled for office visit on 5/20/2025 at 11 am    Location, date and time verified with Selena    She verbalized understanding and has no further questions at this time    Future Appts 5/19/2025 - 8/17/2025        Status Date Visit Type Length Department Provider                                 [facility] 5/30/2025 11:00 AM FOLLOW UP VISIT 30 min [facility] Ve[facility] Sarm[facility] PA-C

## 2025-05-19 NOTE — TELEPHONE ENCOUNTER
I'm glad she let us know. I would recommend she be seen in the office to evaluate and decide what testing she needs. I would recommend she see Verena mullins or primary gastroenterologist Dr. Wilhelm    Thanks    [facility]  5/19/2025  3:36 PM

## 2025-05-19 NOTE — TELEPHONE ENCOUNTER
Dr. Toussaint-    Patient last seen in office on 5/7/2025    I spoke to the patient's daughter Selena (ok per KATRIN)    Per Selena, patient has been having ongoing swallowing issues (mainly when eating/drinking). She says foods/liquid have a harder time going down the throat when swallowed (denies choking)     She says that the past few days, patient has been coughing up pink-tinged mucus (only happens when she first wakes up). Selena is unsure if it is related to swallowing issues    Selena would like your thoughts/further recommendations    Please advise    Thank you

## 2025-05-28 NOTE — PROGRESS NOTES
5/28/2025  11:55 AM    Noemi Hudson is a 84 year old female.    Chief complaint(s):   Chief Complaint   Patient presents with    Lab Results     F/U after lab work sodium levels     Throat Problem     HPI:     Noemi Hudson primary complaint is regarding HTN, low sodium.     Noemi Tijerina a 84 year old female presents with hypertension.  This was first diagnosed more than 10 years ago.  Current nonpharmacologic treatment includes low sodium diet, exercise, and meditation.  Her current cardiac medication(s) regimen includes:Metoporolol 100 mg, Chlorthalidone 12.5 mg, Clonidine 0.1 mg BID. Reports difficult cut pills. She has kept a blood pressure diary, but states that her blood pressures have been fair controll.  She is tolerating her medication(s)  well with occasional dizziness.  Compliance with treatment has been good. Sodium has been running low, now back up to 132.       HISTORY:  Past Medical History[1]   Past Surgical History[2]   Family History[3]   Social History: Short Social Hx on File[4]     Immunizations:   Immunization History   Administered Date(s) Administered    Covid-19 Vaccine Pfizer 30 mcg/0.3 ml 02/17/2021, 03/10/2021, 11/11/2021    Covid-19 Vaccine Pfizer Rich-Sucrose 30 mcg/0.3 ml 06/09/2022    FLU VAC High Dose 65 YRS & Older PRSV Free (48424) 10/20/2017, 09/11/2018, 09/20/2023    FLUAD High Dose 65 yr and older (29330) 08/16/2019, 10/02/2020    FLULAVAL 6 months & older 0.5 ml Prefilled syringe (59286) 10/14/2022    FLUZONE 6 months and older PFS 0.5 ml (01003) 09/12/2016    Fluzone Vaccine Medicare () 10/15/2017, 10/20/2017, 09/11/2018    HIB PRP-OMP 3 Dose 04/09/2016    Haemophilus B Polysac Conj Vac Im 04/09/2016    High Dose Fluzone Influenza Vaccine, 65yr+ PF 0.5mL (65208) 09/12/2016, 10/16/2017, 09/24/2021    Influenza 10/23/2015    Meningococcal-Menactra 04/09/2016    Pfizer Covid-19 Vaccine 30mcg/0.3ml 12yrs+ 12/14/2023    Pneumococcal (Prevnar 13) 08/29/2017     Pneumococcal Conjugate PCV20 11/14/2022    Pneumovax 23 11/18/2005, 04/09/2016       Medications (Active prior to today's visit):  Current Medications[5]    Allergies:  Allergies[6]      ROS:   Review of Systems   Constitutional:  Negative for appetite change and fever.   HENT:  Positive for sore throat.    Eyes:  Negative for visual disturbance.   Respiratory:  Negative for shortness of breath.    Cardiovascular:  Negative for chest pain, palpitations and leg swelling.   Gastrointestinal:  Negative for abdominal pain, nausea and vomiting.   Musculoskeletal:  Negative for back pain.   Skin:  Negative for rash.   Neurological:  Negative for dizziness and headaches.       PHYSICAL EXAM:   VS: /60   Pulse 67   Ht 5' 3\" (1.6 m)   Wt 151 lb (68.5 kg)   BMI 26.75 kg/m²     Physical Exam  Vitals reviewed.   Constitutional:       General: She is not in acute distress.     Appearance: Normal appearance.   HENT:      Head: Normocephalic.      Mouth/Throat:      Pharynx: Oropharynx is clear.   Eyes:      Conjunctiva/sclera: Conjunctivae normal.   Cardiovascular:      Rate and Rhythm: Normal rate and regular rhythm.   Pulmonary:      Effort: Pulmonary effort is normal.      Breath sounds: Normal breath sounds.   Musculoskeletal:      Cervical back: Neck supple.   Skin:     Findings: No rash.   Psychiatric:         Mood and Affect: Mood normal.         LABORATORY RESULTS:      Results for orders placed or performed in visit on 05/27/25   Sodium, Serum [E]    Collection Time: 05/27/25  1:07 PM   Result Value Ref Range    Sodium 132 (L) 136 - 145 mmol/L       EKG / Spirometry : -     Radiology: No results found.     ASSESSMENT/PLAN:   Assessment   Encounter Diagnoses   Name Primary?    Essential (primary) hypertension Yes    Hyponatremia        Sodium corrected    MEDICATIONS:   Requested Prescriptions     Signed Prescriptions Disp Refills    hydroCHLOROthiazide 12.5 MG Oral Cap 30 capsule 3     Sig: Take 1 capsule (12.5  mg total) by mouth daily.    Stop Chlorthalidone    LABORATORY & ORDERS: No orders of the defined types were placed in this encounter.    RECOMMENDATIONS given include: avoid pseudoephedrine or other stimulants/decongestants in common cold remedies, decrease consumption of alcohol, perform routine monitoring of blood pressure with home blood pressure cuff, exercise, reduction of dietary salt intake, take medication as prescribed, try not to miss doses, smoking cessation, weight loss, and stress reduction.    FOLLOW-UP: Schedule a follow-up visit in 3 months.              Orders This Visit:  No orders of the defined types were placed in this encounter.      Meds This Visit:  Requested Prescriptions     Signed Prescriptions Disp Refills    hydroCHLOROthiazide 12.5 MG Oral Cap 30 capsule 3     Sig: Take 1 capsule (12.5 mg total) by mouth daily.       Imaging & Referrals:  None         RADHA FLOYD MD         [1]   Past Medical History:   Allergic rhinitis    Cataract    Diabetes (HCC)    Diverticulosis large intestine w/o perforation or abscess w/o bleeding    Dupuytren's contracture of left hand     LMF Ray    Essential hypertension    High blood pressure    High cholesterol    Hyperlipidemia    Internal hemorrhoids without complication    MI (myocardial infarction) (HCC)    Muscle weakness    Numbness    Pain    Stroke (HCC)   [2]   Past Surgical History:  Procedure Laterality Date    Appendectomy      Cataract      Cholecystectomy      Colonoscopy      Colonoscopy N/A 12/06/2018    Procedure: COLONOSCOPY;  Surgeon: Maricruz Benavides MD;  Location: [facility] ENDOSCOPY    Laparoscopic cholecystectomy      Palmar fasciectomy Left 11/18/2016    L Dupuytren's  LMF Ray    Tubal ligation  1977   [3]   Family History  Problem Relation Age of Onset    Cancer Sister         skin   [4]   Social History  Socioeconomic History    Marital status: Single   Tobacco Use    Smoking status: Never     Passive exposure: Never     Smokeless tobacco: Never   Vaping Use    Vaping status: Never Used   Substance and Sexual Activity    Alcohol use: No     Alcohol/week: 0.0 standard drinks of alcohol    Drug use: Never   Other Topics Concern    Caffeine Concern Yes     Comment: coffee     Social Drivers of Health     Food Insecurity: No Food Insecurity (4/5/2025)    NCSS - Food Insecurity     Worried About Running Out of Food in the Last Year: No     Ran Out of Food in the Last Year: No   Transportation Needs: No Transportation Needs (4/5/2025)    NCSS - Transportation     Lack of Transportation: No   Housing Stability: Not At Risk (4/5/2025)    NCSS - Housing/Utilities     Has Housing: Yes     Worried About Losing Housing: No     Unable to Get Utilities: No   [5]   Current Outpatient Medications   Medication Sig Dispense Refill    diclofenac 1 % External Gel Apply topically 4 (four) times daily.      Multiple Vitamins-Minerals (CENTRUM SILVER 50+WOMEN OR) Take by mouth.      cetirizine 10 MG Oral Tab Take 1 tablet (10 mg total) by mouth daily.      Calcium Carb-Cholecalciferol (CALCIUM + VITAMIN D3 OR) Take by mouth.      hydroCHLOROthiazide 12.5 MG Oral Cap Take 1 capsule (12.5 mg total) by mouth daily. 30 capsule 3    cloNIDine 0.1 MG Oral Tab Take 1 tablet (0.1 mg total) by mouth 2 (two) times daily. 180 tablet 1    escitalopram 20 MG Oral Tab Take 1 tablet (20 mg total) by mouth daily.      Insulin Glargine-yfgn (SEMGLEE, YFTONE,) 100 UNIT/ML Subcutaneous Solution Pen-injector Inject 35 Units into the skin daily. Using insulin pens 3 mL 5    metoprolol succinate  MG Oral Tablet 24 Hr Take 0.5 tablets (50 mg total) by mouth daily.      MAGNESIUM COMPLEX HIGH POTENCY OR Take by mouth.      Acetaminophen 500 MG Oral Cap Take 1 capsule (500 mg total) by mouth every 6 (six) hours as needed. 100 capsule 1    alendronate 70 MG Oral Tab Take 1 tablet (70 mg total) by mouth every 7 days. 12 tablet 2    aspirin 81 MG Oral Chew Tab Chew 1 tablet  (81 mg total) by mouth daily. 90 tablet 1    Insulin Pen Needle (BD PEN NEEDLE AGA 2ND GEN) 32G X 4 MM Does not apply Misc 1 strip by In Vitro route daily. 100 each 3    metFORMIN HCl 1000 MG Oral Tab TOME JESUSITA TABLETA 2 VECES AL CHRISTINE CON LAS COMIDAS 180 tablet 3    pantoprazole 40 MG Oral Tab EC Take 1 tablet (40 mg total) by mouth daily. 30 tablet 0    pravastatin 20 MG Oral Tab Take 1 tablet (20 mg total) by mouth daily. 90 tablet 3    Accu-Chek Softclix Lancets Does not apply Misc Use three times daily as directed. 200 each 5    meclizine 12.5 MG Oral Tab Take 1 tablet (12.5 mg total) by mouth 3 (three) times daily as needed. 40 tablet 1    Glucose Blood (ACCU-CHEK JOSE PLUS) In Vitro Strip INJECT 1 DEVICE INTO THE SKIN 3 (THREE) TIMES DAILY AS NEEDED. 300 strip 3    latanoprost 0.005 % Ophthalmic Solution Place 1 drop into both eyes nightly.      Multiple Vitamins-Minerals (PRESERVISION AREDS) Oral Tab Take 2 tablets by mouth in the morning.      GVOKE HYPOPEN 2-PACK 1 MG/0.2ML Subcutaneous injection INJECT 0.2ML INTO THE SKIN ONCE AS NEEDED (Patient not taking: Reported on 5/28/2025)     [6] No Known Allergies

## 2025-05-28 NOTE — TELEPHONE ENCOUNTER
Patient daughter forgot to request refill for diclofenac 1 % External Gel  at the appointment       [id number] [place], [place] -  [address] AT [facility] [address], [phone], [phone]

## 2025-05-29 NOTE — TELEPHONE ENCOUNTER
Received a call form claudia with [facility] who is asking for an extension on home health services for this patient. Dr. Choudhary please advise, thank you.

## 2025-05-29 NOTE — TELEPHONE ENCOUNTER
SHAUN Camilo with [facility] and states she is asking for patient to be discharged next Friday, 6/6/25 - she states she saw patient today and spoke with PT/OT and they are in agreement patient can be discharged. However, she was made aware SHAUN Freitas called as well, reviewed below notes with her--> she states she will call us back after a conversation with SHAUN Freitas. She verbalized understanding. No further questions or concerns at this time.

## 2025-05-29 NOTE — TELEPHONE ENCOUNTER
Please review.  Protocol failed / Has no protocol.      The original prescription was discontinued on 3/26/2025 by Jessica Hernandez CPhT. Renewing this prescription may not be appropriate.        Requested Prescriptions   Pending Prescriptions Disp Refills    diclofenac 1 % External Gel 100 g 2     Sig: Apply 4 g topically 4 (four) times daily. Apply to affected area(s).       There is no refill protocol information for this order

## 2025-05-29 NOTE — TELEPHONE ENCOUNTER
Left a voicemail on the RN's confidential voicemail letting her know the order is approved by the doctor.

## 2025-05-30 NOTE — PROGRESS NOTES
[facility] - Gastroenterology                                                                                                               Reason for consult:   Chief Complaint   Patient presents with    Follow - Up       Requesting physician or provider: RADHA FLOYD MD      HPI:   Noemi Hudson is a 84 year old year-old female with history of pancreatic tail complex cyst, anxiety, OA, hypertension, hyperlipidemia, DM who presents for the following.       Today  -complains of cough in the am when she coughs had blood tinged mucus  -since April after allergic reaction  -complains of dysphagia and globus  -when she has water is able to pass food  -liquids are fine, she has no issues with globus or dysphagia  -had thrush in April and completed fluconazole and tongue is now improving    -can have acid reflux from time to time  -takes omeprazole as needed, last dose 3 weeks ago   -no epigastric pain when she eats  -no nausea or vomiting  -having BM daily, brown in color  -no dark black stools or bright red blood in stool     -she also has pain of her tongue specifically after eating as well     Pertinent Family Hx:  - No known history of esophageal, gastric or colon cancers  - No known IBD  - No known liver pathologies     Prior endoscopies:  12/6/2018- Cln  IMPRESSION:    1.       Diverticulosis, left-sided, uncomplicated.  2.       Internal hemorrhoids.     RECOMMENDATION:  At this stage, no additional surveillance indicated unless other signs or symptoms develop in the interim.     Social Hx:  - No tobacco use/No ETOH  - Denies illicit drug use  - Lives with: family   - Occupation: retired      Wt Readings from Last 6 Encounters:   05/30/25 151 lb (68.5 kg)   05/28/25 151 lb (68.5 kg)   05/12/25 151 lb (68.5 kg)   05/07/25 151 lb 6.4 oz (68.7 kg)   04/24/25 152 lb 3.2 oz (69 kg)   04/22/25 150 lb (68 kg)        History, Medications, Allergies, ROS:      Past  Medical History[1]   Past Surgical History[2]   Family Hx: Family History[3]   Social History: Short Social Hx on File[4]     Medications (Active prior to today's visit):  Current Medications[5]    Allergies:  Allergies[6]    ROS:   CONSTITUTIONAL: negative for fevers, chills, sweats and weight loss  EYES Negative for red eyes, yellow eyes, changes in vision  HEENT: Negative for dysphagia and hoarseness  RESPIRATORY: Negative for cough and shortness of breath  CARDIOVASCULAR: Negative for chest pain, palpitations  GASTROINTESTINAL: See HPI  GENITOURINARY: Negative for dysuria and frequency  MUSCULOSKELETAL: Negative for arthralgias and myalgias  NEUROLOGICAL: Negative for dizziness and headaches  BEHAVIOR/PSYCH: Negative for anxiety and poor appetite    PHYSICAL EXAM:   Blood pressure 148/71, pulse 69, height 5' 3\" (1.6 m), weight 151 lb (68.5 kg), not currently breastfeeding.    GEN: WD/WN, NAD  HEENT: Supple symmetrical, trachea midline  CV: RRR, the extremities are warm and well perfused   LUNGS: No increased work of breathing  ABDOMEN: No scars, normal bowel sounds, soft, non-tender, non-distended no rebound or guarding, no masses, no hepatomegaly  MSK: No redness, no warmth, no swelling of joints  SKIN: No jaundice, no erythema, no rashes  HEMATOLOGIC: No bleeding, no bruising  NEURO: Alert and interactive, normal gait    Labs/Imaging/Procedures:     Patient's pertinent labs and imaging were reviewed and discussed with patient today.     Lab Results   Component Value Date    WBC 8.0 05/30/2025    RBC 4.33 05/30/2025    HGB 13.1 05/30/2025    HCT 38.2 05/30/2025    MCV 88.2 05/30/2025    MCH 30.3 05/30/2025    MCHC 34.3 05/30/2025    RDW 11.9 05/30/2025    .0 05/30/2025        Lab Results   Component Value Date     (H) 04/22/2025    BUN 13 04/22/2025    BUNCREA 12.5 04/22/2025    CREATSERUM 1.04 (H) 04/22/2025    ANIONGAP 10 04/22/2025    GFRNAA 45 (L) 03/21/2022    GFRAA 52 (L) 03/21/2022    CA  9.0 04/22/2025    OSMOCALC 275 04/22/2025    ALKPHO 45 (L) 04/22/2025    AST 19 04/22/2025    ALT 14 04/22/2025    BILT 0.5 04/22/2025    TP 6.6 04/22/2025    ALB 4.4 04/22/2025    GLOBULIN 2.4 03/27/2025     (L) 05/27/2025    K 4.5 04/22/2025    CL 94 (L) 04/22/2025    CO2 24.0 04/22/2025        No results found.          .  ASSESSMENT/PLAN:   Noemi Hudson is a 84 year old year-old female with history of pancreatic tail complex cyst, anxiety, OA, hypertension, hyperlipidemia, DM who presents for the following.     #blood tinged sputum  #tongue pain   -discussed with patient likely due to an ENT or pulmonary concern  -advised patient follow up with ENT, if patient start to vomit blood she should contact GI office and can discuss EGD  - will obtain CBC today to ensure no blood loss     #dysphagia  #globus  -episodic with solid foods and pills, not with liquids  -discussed at this time could be 2/2 esophagitis related to acid reflux, h pylori, esophageal dysmotility, less likely malignancy due to stabilized weight and  recent CT chest and CT a/p without acute mass or lesion  -plan for h pylori testin,  ppi at this time x 2 weeks, if minimal improvement plan for esophogram and then possible EGD  -patient agreeable, all questions answered    Recommendations:  -get h pylori testing done  -start pantoprazole twice a day for 2 weeks  -if no improvement in symptoms, call to schedule esophogram   -call to set up appointment with ENT          Orders This Visit:  Orders Placed This Encounter   Procedures    CBC W Differential W Platelet    Helicobacter Pylori Breath Test, Adult       Meds This Visit:  Requested Prescriptions      No prescriptions requested or ordered in this encounter       Imaging & Referrals:  ENT - INTERNAL  XR UGI/ESOPHAGUS DOUBLE CONTRAST (CCO=[id number])      Verena Sarmiento PA-C   5/30/2025        This note was partially prepared using Dragon Medical voice recognition dictation software. As a  result, errors may occur. When identified, these errors have been corrected. While every attempt is made to correct errors during dictation, discrepancies may still exist.          [1]   Past Medical History:   Allergic rhinitis    Cataract    Diabetes (HCC)    Diverticulosis large intestine w/o perforation or abscess w/o bleeding    Dupuytren's contracture of left hand     LMF Ray    Essential hypertension    High blood pressure    High cholesterol    Hyperlipidemia    Internal hemorrhoids without complication    MI (myocardial infarction) (HCC)    Muscle weakness    Numbness    Pain    Stroke (HCC)   [2]   Past Surgical History:  Procedure Laterality Date    Appendectomy      Cataract      Cholecystectomy      Colonoscopy      Colonoscopy N/A 12/06/2018    Procedure: COLONOSCOPY;  Surgeon: Maricruz Benavides MD;  Location: [facility] ENDOSCOPY    Laparoscopic cholecystectomy      Palmar fasciectomy Left 11/18/2016    L Dupuytren's  LMF Ray    Tubal ligation  1977   [3]   Family History  Problem Relation Age of Onset    Cancer Sister         skin   [4]   Social History  Socioeconomic History    Marital status: Single   Tobacco Use    Smoking status: Never     Passive exposure: Never    Smokeless tobacco: Never   Vaping Use    Vaping status: Never Used   Substance and Sexual Activity    Alcohol use: No     Alcohol/week: 0.0 standard drinks of alcohol    Drug use: Never   Other Topics Concern    Caffeine Concern Yes     Comment: coffee     Social Drivers of Health     Food Insecurity: No Food Insecurity (4/5/2025)    NCSS - Food Insecurity     Worried About Running Out of Food in the Last Year: No     Ran Out of Food in the Last Year: No   Transportation Needs: No Transportation Needs (4/5/2025)    NCSS - Transportation     Lack of Transportation: No   Housing Stability: Not At Risk (4/5/2025)    NCSS - Housing/Utilities     Has Housing: Yes     Worried About Losing Housing: No     Unable to Get Utilities: No   [5]    Current Outpatient Medications   Medication Sig Dispense Refill    diclofenac 1 % External Gel Apply 4 g topically 4 (four) times daily. Apply to affected area(s). 100 g 2    diclofenac 1 % External Gel Apply topically 4 (four) times daily.      Multiple Vitamins-Minerals (CENTRUM SILVER 50+WOMEN OR) Take by mouth.      cetirizine 10 MG Oral Tab Take 1 tablet (10 mg total) by mouth daily.      Calcium Carb-Cholecalciferol (CALCIUM + VITAMIN D3 OR) Take by mouth.      hydroCHLOROthiazide 12.5 MG Oral Cap Take 1 capsule (12.5 mg total) by mouth daily. 30 capsule 3    cloNIDine 0.1 MG Oral Tab Take 1 tablet (0.1 mg total) by mouth 2 (two) times daily. 180 tablet 1    escitalopram 20 MG Oral Tab Take 1 tablet (20 mg total) by mouth daily.      Insulin Glargine-yfgn (SEMGLEE, YFGN,) 100 UNIT/ML Subcutaneous Solution Pen-injector Inject 35 Units into the skin daily. Using insulin pens 3 mL 5    metoprolol succinate  MG Oral Tablet 24 Hr Take 0.5 tablets (50 mg total) by mouth daily.      MAGNESIUM COMPLEX HIGH POTENCY OR Take by mouth.      Acetaminophen 500 MG Oral Cap Take 1 capsule (500 mg total) by mouth every 6 (six) hours as needed. 100 capsule 1    alendronate 70 MG Oral Tab Take 1 tablet (70 mg total) by mouth every 7 days. 12 tablet 2    aspirin 81 MG Oral Chew Tab Chew 1 tablet (81 mg total) by mouth daily. 90 tablet 1    Insulin Pen Needle (BD PEN NEEDLE AGA 2ND GEN) 32G X 4 MM Does not apply Misc 1 strip by In Vitro route daily. 100 each 3    metFORMIN HCl 1000 MG Oral Tab TOME JESUSITA TABLETA 2 VECES AL CHRISTINE CON LAS COMIDAS 180 tablet 3    pantoprazole 40 MG Oral Tab EC Take 1 tablet (40 mg total) by mouth daily. 30 tablet 0    pravastatin 20 MG Oral Tab Take 1 tablet (20 mg total) by mouth daily. 90 tablet 3    Accu-Chek Softclix Lancets Does not apply Misc Use three times daily as directed. 200 each 5    meclizine 12.5 MG Oral Tab Take 1 tablet (12.5 mg total) by mouth 3 (three) times daily as needed.  40 tablet 1    Glucose Blood (ACCU-CHEK JOSE PLUS) In Vitro Strip INJECT 1 DEVICE INTO THE SKIN 3 (THREE) TIMES DAILY AS NEEDED. 300 strip 3    latanoprost 0.005 % Ophthalmic Solution Place 1 drop into both eyes nightly.      Multiple Vitamins-Minerals (PRESERVISION AREDS) Oral Tab Take 2 tablets by mouth in the morning.      GVOKE HYPOPEN 2-PACK 1 MG/0.2ML Subcutaneous injection INJECT 0.2ML INTO THE SKIN ONCE AS NEEDED (Patient not taking: Reported on 5/30/2025)     [6] No Known Allergies

## 2025-05-30 NOTE — PATIENT INSTRUCTIONS
Recommendations:  -get h pylori testing done  -start pantoprazole twice a day for 2 weeks  -if no improvement in symptoms, call to schedule esophogram   -call to set up appointment with ENT

## 2025-06-04 NOTE — TELEPHONE ENCOUNTER
Action Requested: Summary for Provider     []  Critical Lab, Recommendations Needed  [] Need Additional Advice  []   FYI    []   Need Orders  [] Need Medications Sent to Pharmacy  []  Other     SUMMARY: Per protocol advised : Needs Office visit    Await call from patient daughter       Reason for call: Acute and Toe Injury  Onset: Data Unavailable    Janki OT  calling from [facility]     Janki is with the patient today , patient cut her Left great toe yesterday with nail clipper ( 3-4 MM )   toe is a bit red , no drainage noted , small scab noted   The toe is tender to touch and th patient cut the toe out of slipper due the pain  and is favoring the other foot      BG today 111  Patient applied  Triamcinolone , Janki explained to the patient this is not the best thing to put on her toe     Janki will call the patient daughter and have the daughter steven us to make an appointment       Reason for Disposition   Diabetes mellitus and small cut (scratch) or abrasion (scrape)    Protocols used: Toe Injury-A-[place]

## 2025-06-05 NOTE — TELEPHONE ENCOUNTER
[facility] faxed over orders for dr to sign. Order signed and faxed on 6/4/25. Confirmation received

## 2025-06-05 NOTE — PROGRESS NOTES
HPI:       The following individual(s) verbally consented to be recorded using ambient AI listening technology and understand that they can each withdraw their consent to this listening technology at any point by asking the clinician to turn off or pause the recording:    Patient name: Noemi Hudson  Additional names:  Selena    History of Present Illness  Noemi Hudson is an 84 year old female who presents with a toe injury after cutting her toenail.    She sustained the injury yesterday while cutting her toenail, accidentally cutting the skin due to hand tremors. The affected toe is significantly painful, swollen, and red compared to the other toes. Pain worsens when she lowers her foot, so she walks with the foot elevated to reduce discomfort. She is not currently taking any medications.        Medications:   Current Medications[1]    Allergies:   Allergies[2]    History:     Health Maintenance   Topic Date Due    Zoster Vaccines (1 of 2) Never done    COVID-19 Vaccine (6 - 2024-25 season) 09/01/2024    Colorectal Cancer Screening  12/30/2024    Diabetes Care Dilated Eye Exam  03/21/2025    Diabetes Care A1C  09/26/2025    Influenza Vaccine (Season Ended) 10/01/2025    Annual Physical  10/25/2025    Diabetes Care Foot Exam  02/18/2026    Diabetes Care: GFR  04/22/2026    DEXA Scan  Completed    Annual Depression Screening  Completed    Fall Risk Screening (Annual)  Completed    Diabetes Care: Microalb/Creat Ratio (Annual)  Completed    Pneumococcal Vaccine: 50+ Years  Completed    Meningococcal B Vaccine  Aged Out       No LMP recorded. Patient is postmenopausal.   Past Medical History:   Past Medical History[3]    Past Surgical History:   Past Surgical History[4]    Family History:   Family History[5]    Social History:     Social History     Socioeconomic History    Marital status: Single     Spouse name: Not on file    Number of children: Not on file    Years of education: Not on file    Highest  education level: Not on file   Occupational History    Not on file   Tobacco Use    Smoking status: Never     Passive exposure: Never    Smokeless tobacco: Never   Vaping Use    Vaping status: Never Used   Substance and Sexual Activity    Alcohol use: No     Alcohol/week: 0.0 standard drinks of alcohol    Drug use: Never    Sexual activity: Not on file   Other Topics Concern     Service Not Asked    Blood Transfusions Not Asked    Caffeine Concern Yes     Comment: coffee    Occupational Exposure Not Asked    Hobby Hazards Not Asked    Sleep Concern Not Asked    Stress Concern Not Asked    Weight Concern Not Asked    Special Diet Not Asked    Back Care Not Asked    Exercise Not Asked    Bike Helmet Not Asked    Seat Belt Not Asked    Self-Exams Not Asked   Social History Narrative    Not on file     Social Drivers of Health     Food Insecurity: No Food Insecurity (4/5/2025)    NCSS - Food Insecurity     Worried About Running Out of Food in the Last Year: No     Ran Out of Food in the Last Year: No   Transportation Needs: No Transportation Needs (4/5/2025)    NCSS - Transportation     Lack of Transportation: No   Stress: Not on file   Housing Stability: Not At Risk (4/5/2025)    NCSS - Housing/Utilities     Has Housing: Yes     Worried About Losing Housing: No     Unable to Get Utilities: No       Review of Systems:   Review of Systems   + left 1st toe pain.    Vitals:    06/04/25 1416 06/04/25 1427   BP: 154/70 132/64   Pulse: 73    Weight: 153 lb (69.4 kg)    Height: 5' 3\" (1.6 m)      Body mass index is 27.1 kg/m².    Physical Exam:   Physical Exam  Vitals reviewed.      Left 1st toe: + swollen, + erythema, + tenderness to palpation. No drainage.    Assessment and Plan::     Assessment & Plan  Paronychia of great toe of left foot    Orders:    Cephalexin; Take 1 capsule (500 mg total) by mouth 3 (three) times daily for 7 days.  Dispense: 21 capsule; Refill: 0    Mupirocin; Apply to affect lesions tid   Dispense: 30 g; Refill: 0  Advise the patient to soak the foot with warm salt water.     Discussed plan of care with pt and pt is in agreement.All questions answered. Pt to call with questions or concerns.    RTC in 2-3 days if worsen.       [1]   Current Outpatient Medications   Medication Sig Dispense Refill    cephALEXin 500 MG Oral Cap Take 1 capsule (500 mg total) by mouth 3 (three) times daily for 7 days. 21 capsule 0    mupirocin 2 % External Ointment Apply to affect lesions tid 30 g 0    diclofenac 1 % External Gel Apply 4 g topically 4 (four) times daily. Apply to affected area(s). 100 g 2    diclofenac 1 % External Gel Apply topically 4 (four) times daily.      Multiple Vitamins-Minerals (CENTRUM SILVER 50+WOMEN OR) Take by mouth.      cetirizine 10 MG Oral Tab Take 1 tablet (10 mg total) by mouth daily.      Calcium Carb-Cholecalciferol (CALCIUM + VITAMIN D3 OR) Take by mouth.      hydroCHLOROthiazide 12.5 MG Oral Cap Take 1 capsule (12.5 mg total) by mouth daily. 30 capsule 3    cloNIDine 0.1 MG Oral Tab Take 1 tablet (0.1 mg total) by mouth 2 (two) times daily. 180 tablet 1    escitalopram 20 MG Oral Tab Take 1 tablet (20 mg total) by mouth daily.      Insulin Glargine-yfgn (SEMGLEE, YFGN,) 100 UNIT/ML Subcutaneous Solution Pen-injector Inject 35 Units into the skin daily. Using insulin pens 3 mL 5    metoprolol succinate  MG Oral Tablet 24 Hr Take 0.5 tablets (50 mg total) by mouth daily.      MAGNESIUM COMPLEX HIGH POTENCY OR Take by mouth.      Acetaminophen 500 MG Oral Cap Take 1 capsule (500 mg total) by mouth every 6 (six) hours as needed. 100 capsule 1    alendronate 70 MG Oral Tab Take 1 tablet (70 mg total) by mouth every 7 days. 12 tablet 2    aspirin 81 MG Oral Chew Tab Chew 1 tablet (81 mg total) by mouth daily. 90 tablet 1    Insulin Pen Needle (BD PEN NEEDLE AGA 2ND GEN) 32G X 4 MM Does not apply Misc 1 strip by In Vitro route daily. 100 each 3    metFORMIN HCl 1000 MG Oral Tab TOME  JESUSITA TABLETA 2 VECES AL CHRISTINE CON LAS COMIDAS 180 tablet 3    pantoprazole 40 MG Oral Tab EC Take 1 tablet (40 mg total) by mouth daily. 30 tablet 0    pravastatin 20 MG Oral Tab Take 1 tablet (20 mg total) by mouth daily. 90 tablet 3    Accu-Chek Softclix Lancets Does not apply Misc Use three times daily as directed. 200 each 5    meclizine 12.5 MG Oral Tab Take 1 tablet (12.5 mg total) by mouth 3 (three) times daily as needed. 40 tablet 1    Glucose Blood (ACCU-CHEK JOSE PLUS) In Vitro Strip INJECT 1 DEVICE INTO THE SKIN 3 (THREE) TIMES DAILY AS NEEDED. 300 strip 3    latanoprost 0.005 % Ophthalmic Solution Place 1 drop into both eyes nightly.      Multiple Vitamins-Minerals (PRESERVISION AREDS) Oral Tab Take 2 tablets by mouth in the morning.      GVOKE HYPOPEN 2-PACK 1 MG/0.2ML Subcutaneous injection INJECT 0.2ML INTO THE SKIN ONCE AS NEEDED (Patient not taking: Reported on 6/4/2025)     [2] No Known Allergies  [3]   Past Medical History:   Allergic rhinitis    Cataract    Diabetes (HCC)    Diverticulosis large intestine w/o perforation or abscess w/o bleeding    Dupuytren's contracture of left hand     LMF Ray    Essential hypertension    High blood pressure    High cholesterol    Hyperlipidemia    Internal hemorrhoids without complication    MI (myocardial infarction) (HCC)    Muscle weakness    Numbness    Pain    Stroke (HCC)   [4]   Past Surgical History:  Procedure Laterality Date    Appendectomy      Cataract      Cholecystectomy      Colonoscopy      Colonoscopy N/A 12/06/2018    Procedure: COLONOSCOPY;  Surgeon: Maricruz Benavides MD;  Location: [facility] ENDOSCOPY    Laparoscopic cholecystectomy      Palmar fasciectomy Left 11/18/2016    L Dupuytren's  LMF Ray    Tubal ligation  1977   [5]   Family History  Problem Relation Age of Onset    Cancer Sister         skin

## 2025-07-09 NOTE — TELEPHONE ENCOUNTER
3 ml with 5 refills was sent on 07/05/2025 .       Outpatient Medication Detail     Disp Refills Start End    INSULIN GLARGINE-YFGN 100 UNIT/ML Subcutaneous Solution Pen-injector 3 mL 5 7/5/2025 --    Sig: ADMINISTER 50 UNITS UNDER THE SKIN DAILY. INSULIN PENS    Sent to pharmacy as: Insulin Glargine-yfgn 100 UNIT/ML Subcutaneous Solution Pen-injector    E-Prescribing Status: Receipt confirmed by pharmacy (7/5/2025 12:46 PM CDT)      Pharmacy    [id number] [place], [place] -  [address] AT [facility] [address], [phone], [phone]

## 2025-07-21 NOTE — TELEPHONE ENCOUNTER
With Hebrew interpretor ID #[id number]. Patient stated that they changed her insulin and its not working. Patient requesting to speak to Dr. Choudhary. Patient was inform that he is currently seeing patient if I can help her. She stated that her sugars have been high and the insulin is not working for her. Patient stated that this morning it was 222. Last night it was 375 and she checked it 3 hrs after eating. Patient is just wanting to speak to Dr. Choudhary. Patient was inform I will sent the message to Dr. Choudhary per her request,  please advise

## 2025-07-21 NOTE — TELEPHONE ENCOUNTER
Jose Choudhary MD to Stephy Herrera, RN  Em Rn Triage (Selected Message)        7/21/25 12:40 PM  Phone call made spoke with patient. Switched back top Treshiba 50 units a day.  Rx sent to her pharmacy.      ANA

## 2025-07-21 NOTE — TELEPHONE ENCOUNTER
I called [facility], spoke with Josie/pharmacist, made aware of message below, asked what Rx is a covered alternative-->she states they prefer the generic, they will fill Rx as a generic, they will get Rx in stock tomorrow, they will fill Rx and contact patient. She states the Notes to Pharmacy state: Notes to Pharmacy: One box 32mm BD needles; they will need a separate Rx for the needles and advised we should indicate may substitute for covered brand--> Rx sent as written-->Receipt confirmed by pharmacy (7/21/2025  4:24 PM CDT). She verbalized understanding. No further questions or concerns at this time.

## 2025-07-21 NOTE — TELEPHONE ENCOUNTER
Per pharmacy, plan does not cover      insulin degludec (TRESIBA FLEXTOUCH) 100 UNIT/ML Subcutaneous Solution Pen-injector, INJECT 50 UNITS/DAY INTO THE SKIN DAILY. Using insulin pens, Disp: 5 each, Rfl: 3

## 2025-07-25 NOTE — TELEPHONE ENCOUNTER
1st Reminder letter was sent to patient mychart:   XR UGI/ESOPHAGUS DOUBLE CONTRAST (CPT=74246) (Order #[id number]) on 5/30/25

## (undated) DIAGNOSIS — N18.31 TYPE 2 DIABETES MELLITUS WITH STAGE 3A CHRONIC KIDNEY DISEASE, WITH LONG-TERM CURRENT USE OF INSULIN (HCC): ICD-10-CM

## (undated) DIAGNOSIS — E78.2 MIXED HYPERLIPIDEMIA: ICD-10-CM

## (undated) DIAGNOSIS — E11.22 TYPE 2 DIABETES MELLITUS WITH STAGE 3A CHRONIC KIDNEY DISEASE, WITH LONG-TERM CURRENT USE OF INSULIN (HCC): ICD-10-CM

## (undated) DIAGNOSIS — Z79.4 TYPE 2 DIABETES MELLITUS WITH STAGE 3A CHRONIC KIDNEY DISEASE, WITH LONG-TERM CURRENT USE OF INSULIN (HCC): ICD-10-CM

## (undated) DEVICE — ENDOSCOPY PACK - LOWER: Brand: MEDLINE INDUSTRIES, INC.

## (undated) DEVICE — Device: Brand: DEFENDO AIR/WATER/SUCTION AND BIOPSY VALVE

## (undated) DEVICE — STERILE LATEX POWDER-FREE SURGICAL GLOVESWITH NITRILE COATING: Brand: PROTEXIS

## (undated) DEVICE — LINE MNTR ADLT SET O2 INTMD

## (undated) NOTE — LETTER
January 7, 2021    Mahsa Yo  [address]  [address]      Dear Katerina Rousseau: The following are the results of your recent tests. Please review the list of test results.   Your result is the value on the left; we have also supp

## (undated) NOTE — LETTER
October 10, 2021    Anup Rutherford  [address] [place] [address] Drive  [address] Sosa Begin triglyceridos salieron un poco alto, le recomiendo que trate de limitar comidas fritas y Cheryle  dieta bajo en grasa y colesterol.      Llamanos si tie

## (undated) NOTE — LETTER
2/12/2024              Noemi Powell        [address]         [facility] [address]         To Whom it may concern:    This is to certify that Noemi Powell had an appointment on 2/12/2024 at 11:03 AM with RADHA FLOYD MD.  May continue to drive.       Sincerely,       RADHA FLOYD MD  [address] [address] [phone]        Document electronically generated by:  RADHA FLOYD MD

## (undated) NOTE — ED AVS SNAPSHOT
Chandrakant Barnard   MRN: [id number]    Department:  [facility] Emergency Department   Date of Visit:  6/2/2019           Disclosure     Insurance plans vary and the physician(s) referred by the ER may not be covered by your plan.  Please contact within the next three months to obtain basic health screening including reassessment of your blood pressure.     IF THERE IS ANY CHANGE OR WORSENING OF YOUR CONDITION, CALL YOUR PRIMARY CARE PHYSICIAN AT ONCE OR RETURN IMMEDIATELY TO THE EMERGENCY DEPARTMEN

## (undated) NOTE — LETTER
Hospital Discharge Documentation  Please phone to schedule a hospital follow up appointment.     From: [address] Hospitalist's Office  Phone: [phone]    Patient discharged time/date: 10/5/2020  5:36 PM  Patient discharge disposition:  Home or Self This is a very pleasant 25-year-old woman who is Micronesian speaking. She was interviewed with the aid of the language line and  #[id number], whose name I believe was Wyn Moritz. She is a somewhat difficult historian.   The  reported asking h Abdomen: Soft, nontender, nondistended. Positive bowel sounds. No rebound or guarding. Neurologic: No focal neurological deficits. Musculoskeletal: Moves all extremities.     Hospital Course:   Non-STEMI (non-ST elevated myocardial infarction) ([facility] [address]  - clotrimazole 1 % External Cream  Apply 1 Application topically 2 (two) times daily. Metoprolol Succinate  MG Oral Tablet 24 Hr  Take 1 tablet (100 mg total) by mouth once daily.     atorvastatin 40 MG Oral Tab  TAKE 1 TABLET BY MOUTH EVERY DAY AT N TAKE 1 TABLET BY MOUTH EVERY DAY AT NIGHT   Quantity: 90 tablet  Refills: 3     B Complex-C Tabs      TK 1 T PO QD   Refills: 3     clotrimazole 1 % Crea  Commonly known as: LOTRIMIN      Apply 1 Application topically 2 (two) times daily.    Quantity: 28 g [address] [facility],  In 1 week. Specialty: Family Medicine  Contact information:  [address]  [address] [phone]                   Follow up Labs and imaging:         Time spent:  > 30 minutes    Maddison Boles

## (undated) NOTE — LETTER
Hospital Discharge Documentation  Please phone to schedule a hospital follow up appointment.     From: [address] Hospitalist's Office  Phone: [phone]    Patient discharged time/date: 7/25/2019  5:36 PM  Patient discharge disposition:  Home or Self CV: RRR.   No m/g/r  Pulm:   CTA bilat  Abd:   +bs, soft, NT, ND  LE:  No c/c/e  Neuro:  nonfocal      Reason for Admission:       Left upper extremity weakness and slurred speech, possible transient ischemic attack.     HISTORY OF PRESENT ILLNESS:  The p (Insulin-requiring or dependent type II diabetes mellitus)   Quantity:  100 strip  Refills:  5     Alendronate Sodium 40 MG Tabs  Commonly known as:  FOSAMAX      TAKE 1 TABLET BY MOUTH EVERY 7 DAYS.    Quantity:  12 tablet  Refills:  3     ALPRAZolam 0.5 M Quantity:  100 each  Refills:  3        STOP taking these medications    ASPIRIN ADULT LOW DOSE 81 MG Tbec  Generic drug:  aspirin              Where to Get Your Medications      Please  your prescriptions at the location directed by your doctor or

## (undated) NOTE — LETTER
[address], [facility]   Date:   9/19/2022     Name:   Davis Siemens    YOB: 1940   MRN:   [id number]       [facility]? Rupali the areas on your body where you feel the described sensations. Use the appropriate symbol. Bartholome Litten the areas of radiation. Include all affected areas. Just to complete the picture, please draw in the face. ACHE:  ^ ^ ^   NUMBNESS:  0000   PINS & NEEDLES:  = = = =                              ^ ^ ^                       0000              = = = =                                    ^ ^ ^                       0000            = = = =      BURNING:  XXXX   STABBING: ////                  XXXX                ////                         XXXX          ////     Please rupali the line below indicating your degree of pain right now  with 0 being no pain 10 being the worst pain possible.                                          0             1             2              3             4              5              6              7             8             9             10         Patient Signature:

## (undated) NOTE — LETTER
January 7, 2021    Marsha Andrade  [address] [place] [address] Drive  [address]      Dear Wilfred Sosa: The following are the results of your recent tests. Please review the list of test results.   Your result is the value on the left; we have also supp

## (undated) NOTE — LETTER
7/25/2025          Noemi Hudson    [address]     [facility] [address]         Dear Noemi,    Our records indicate that the tests ordered for you by Verena Sarmiento PA-C  have not been done.  If you have, in fact, already completed the tests or you do not wish to have the tests done, please contact our office at THE NUMBER LISTED BELOW.  Otherwise, please proceed with the testing.  Enclosed is a duplicate order for your convenience.    XR UGI/ESOPHAGUS DOUBLE CONTRAST (CPT=74246) (Order #[id number]) on 5/30/25   To schedule a test, call Central Scheduling at [phone]    Sincerely,    Verena Sarmiento PA-C  [facility], [facility], [place]  [address]  [facility] [address] [phone]